# Patient Record
Sex: MALE | Race: WHITE | URBAN - METROPOLITAN AREA
[De-identification: names, ages, dates, MRNs, and addresses within clinical notes are randomized per-mention and may not be internally consistent; named-entity substitution may affect disease eponyms.]

---

## 2022-11-15 ENCOUNTER — OFFICE (OUTPATIENT)
Dept: URBAN - METROPOLITAN AREA CLINIC 92 | Facility: CLINIC | Age: 72
Setting detail: OPHTHALMOLOGY
End: 2022-11-15
Payer: MEDICARE

## 2022-11-15 DIAGNOSIS — H40.033: ICD-10-CM

## 2022-11-15 DIAGNOSIS — H35.371: ICD-10-CM

## 2022-11-15 DIAGNOSIS — E11.9: ICD-10-CM

## 2022-11-15 DIAGNOSIS — H25.13: ICD-10-CM

## 2022-11-15 PROCEDURE — 92012 INTRM OPH EXAM EST PATIENT: CPT | Performed by: SPECIALIST

## 2022-11-15 ASSESSMENT — REFRACTION_CURRENTRX
OD_AXIS: 011
OD_SPHERE: -0.50
OD_CYLINDER: +2.50
OD_VPRISM_DIRECTION: PROGS
OS_OVR_VA: 20/
OS_VPRISM_DIRECTION: PROGS
OD_OVR_VA: 20/
OS_SPHERE: -0.75
OD_ADD: +2.50
OS_CYLINDER: +2.00
OS_AXIS: 162
OS_ADD: +2.50

## 2022-11-15 ASSESSMENT — REFRACTION_AUTOREFRACTION
OS_SPHERE: -1.00
OD_AXIS: 013
OD_CYLINDER: +2.50
OS_CYLINDER: +2.00
OS_AXIS: 156
OD_SPHERE: -0.75

## 2022-11-15 ASSESSMENT — CORNEAL DYSTROPHY - POSTERIOR
OD_POSTERIORDYSTROPHY: T GUTTATA
OS_POSTERIORDYSTROPHY: GUTTATA

## 2022-11-15 ASSESSMENT — KERATOMETRY
OS_AXISANGLE_DEGREES: 131
OS_K1POWER_DIOPTERS: 44.50
OS_K2POWER_DIOPTERS: 45.75
OD_AXISANGLE_DEGREES: 022
OD_K1POWER_DIOPTERS: 44.75
METHOD_AUTO_MANUAL: AUTO
OD_K2POWER_DIOPTERS: 46.00

## 2022-11-15 ASSESSMENT — SPHEQUIV_DERIVED
OD_SPHEQUIV: 0.5
OS_SPHEQUIV: 0
OD_SPHEQUIV: 0.5
OS_SPHEQUIV: 0.25

## 2022-11-15 ASSESSMENT — REFRACTION_MANIFEST
OS_ADD: +2.75
OD_ADD: +2.75
OD_SPHERE: -0.75
OS_SPHERE: -0.75
OS_AXIS: 165
OD_VA1: 20/20
OS_VA1: 20/30
OD_AXIS: 180
OS_CYLINDER: +2.00
OD_CYLINDER: +2.50

## 2022-11-15 ASSESSMENT — VISUAL ACUITY
OD_BCVA: 20/25-1
OS_BCVA: 20/25-2

## 2022-11-15 ASSESSMENT — AXIALLENGTH_DERIVED
OS_AL: 22.9175
OS_AL: 23.0098
OD_AL: 22.7403
OD_AL: 22.7403

## 2022-11-15 ASSESSMENT — PACHYMETRY
OS_CT_UM: 611
OD_CT_UM: 599
OD_CT_CORRECTION: -4
OS_CT_CORRECTION: -5

## 2022-11-15 ASSESSMENT — TONOMETRY
OS_IOP_MMHG: 15
OD_IOP_MMHG: 16

## 2022-11-15 ASSESSMENT — CONFRONTATIONAL VISUAL FIELD TEST (CVF)
OD_FINDINGS: FULL
OS_FINDINGS: FULL

## 2022-11-15 ASSESSMENT — LID EXAM ASSESSMENTS
OS_MEIBOMITIS: LLL LUL 2+
OD_MEIBOMITIS: RLL RUL 2+

## 2022-11-15 ASSESSMENT — CORNEAL DYSTROPHY: OS_DYSTROPHY: RARE

## 2023-09-12 PROBLEM — Z00.00 ENCOUNTER FOR PREVENTIVE HEALTH EXAMINATION: Status: ACTIVE | Noted: 2023-09-12

## 2023-09-13 ENCOUNTER — APPOINTMENT (OUTPATIENT)
Dept: PULMONOLOGY | Facility: CLINIC | Age: 73
End: 2023-09-13
Payer: MEDICARE

## 2023-09-13 VITALS
OXYGEN SATURATION: 97 % | HEART RATE: 85 BPM | WEIGHT: 135 LBS | TEMPERATURE: 97.7 F | SYSTOLIC BLOOD PRESSURE: 136 MMHG | DIASTOLIC BLOOD PRESSURE: 70 MMHG | BODY MASS INDEX: 21.19 KG/M2 | HEIGHT: 67 IN

## 2023-09-13 DIAGNOSIS — Z86.39 PERSONAL HISTORY OF OTHER ENDOCRINE, NUTRITIONAL AND METABOLIC DISEASE: ICD-10-CM

## 2023-09-13 DIAGNOSIS — I10 ESSENTIAL (PRIMARY) HYPERTENSION: ICD-10-CM

## 2023-09-13 DIAGNOSIS — R05.3 CHRONIC COUGH: ICD-10-CM

## 2023-09-13 DIAGNOSIS — R91.8 OTHER NONSPECIFIC ABNORMAL FINDING OF LUNG FIELD: ICD-10-CM

## 2023-09-13 DIAGNOSIS — A49.9 URINARY TRACT INFECTION, SITE NOT SPECIFIED: ICD-10-CM

## 2023-09-13 DIAGNOSIS — N39.0 URINARY TRACT INFECTION, SITE NOT SPECIFIED: ICD-10-CM

## 2023-09-13 PROCEDURE — 94727 GAS DIL/WSHOT DETER LNG VOL: CPT

## 2023-09-13 PROCEDURE — 94729 DIFFUSING CAPACITY: CPT

## 2023-09-13 PROCEDURE — 94060 EVALUATION OF WHEEZING: CPT

## 2023-09-13 PROCEDURE — 99205 OFFICE O/P NEW HI 60 MIN: CPT | Mod: 25,GC

## 2023-09-13 RX ORDER — LISINOPRIL 40 MG/1
40 TABLET ORAL
Refills: 0 | Status: ACTIVE | COMMUNITY

## 2023-09-13 RX ORDER — HYDROCHLOROTHIAZIDE 25 MG/1
25 TABLET ORAL
Refills: 0 | Status: ACTIVE | COMMUNITY

## 2023-09-13 RX ORDER — METOPROLOL SUCCINATE 100 MG/1
100 TABLET, EXTENDED RELEASE ORAL
Refills: 0 | Status: ACTIVE | COMMUNITY

## 2023-09-13 RX ORDER — METFORMIN HYDROCHLORIDE 1000 MG/1
1000 TABLET, COATED ORAL
Refills: 0 | Status: ACTIVE | COMMUNITY

## 2023-09-13 RX ORDER — ATORVASTATIN CALCIUM 80 MG/1
80 TABLET, FILM COATED ORAL
Refills: 0 | Status: ACTIVE | COMMUNITY

## 2023-09-13 RX ORDER — AMLODIPINE BESYLATE 10 MG/1
10 TABLET ORAL
Refills: 0 | Status: ACTIVE | COMMUNITY

## 2023-09-14 LAB
ALBUMIN SERPL ELPH-MCNC: 4.9 G/DL
ALP BLD-CCNC: 58 U/L
ALT SERPL-CCNC: 21 U/L
ANION GAP SERPL CALC-SCNC: 13 MMOL/L
APTT BLD: 31.8 SEC
AST SERPL-CCNC: 20 U/L
BASOPHILS # BLD AUTO: 0.05 K/UL
BASOPHILS NFR BLD AUTO: 0.5 %
BILIRUB SERPL-MCNC: 0.5 MG/DL
BUN SERPL-MCNC: 15 MG/DL
CALCIUM SERPL-MCNC: 9.9 MG/DL
CHLORIDE SERPL-SCNC: 98 MMOL/L
CO2 SERPL-SCNC: 29 MMOL/L
CREAT SERPL-MCNC: 0.88 MG/DL
EGFR: 90 ML/MIN/1.73M2
EOSINOPHIL # BLD AUTO: 0.16 K/UL
EOSINOPHIL NFR BLD AUTO: 1.6 %
GLUCOSE SERPL-MCNC: 114 MG/DL
HCT VFR BLD CALC: 46 %
HGB BLD-MCNC: 14.5 G/DL
IMM GRANULOCYTES NFR BLD AUTO: 0.2 %
INR PPP: 0.93 RATIO
LYMPHOCYTES # BLD AUTO: 2.38 K/UL
LYMPHOCYTES NFR BLD AUTO: 24.3 %
MAN DIFF?: NORMAL
MCHC RBC-ENTMCNC: 27.5 PG
MCHC RBC-ENTMCNC: 31.5 GM/DL
MCV RBC AUTO: 87.3 FL
MONOCYTES # BLD AUTO: 0.78 K/UL
MONOCYTES NFR BLD AUTO: 8 %
NEUTROPHILS # BLD AUTO: 6.4 K/UL
NEUTROPHILS NFR BLD AUTO: 65.4 %
PLATELET # BLD AUTO: 256 K/UL
POTASSIUM SERPL-SCNC: 3.8 MMOL/L
PROT SERPL-MCNC: 7.7 G/DL
PT BLD: 10.5 SEC
RBC # BLD: 5.27 M/UL
RBC # FLD: 15.7 %
SODIUM SERPL-SCNC: 139 MMOL/L
WBC # FLD AUTO: 9.79 K/UL

## 2023-10-04 ENCOUNTER — NON-APPOINTMENT (OUTPATIENT)
Age: 73
End: 2023-10-04

## 2023-10-06 ENCOUNTER — APPOINTMENT (OUTPATIENT)
Dept: PULMONOLOGY | Facility: HOSPITAL | Age: 73
End: 2023-10-06

## 2023-10-06 ENCOUNTER — OUTPATIENT (OUTPATIENT)
Dept: OUTPATIENT SERVICES | Facility: HOSPITAL | Age: 73
LOS: 1 days | Discharge: HOME | End: 2023-10-06
Payer: MEDICARE

## 2023-10-06 ENCOUNTER — RESULT REVIEW (OUTPATIENT)
Age: 73
End: 2023-10-06

## 2023-10-06 VITALS
HEART RATE: 76 BPM | TEMPERATURE: 97 F | OXYGEN SATURATION: 95 % | SYSTOLIC BLOOD PRESSURE: 146 MMHG | DIASTOLIC BLOOD PRESSURE: 75 MMHG | HEIGHT: 67 IN | RESPIRATION RATE: 14 BRPM | WEIGHT: 134.04 LBS

## 2023-10-06 LAB
GLUCOSE BLDC GLUCOMTR-MCNC: 129 MG/DL — HIGH (ref 70–99)
GLUCOSE BLDC GLUCOMTR-MCNC: 197 MG/DL — HIGH (ref 70–99)

## 2023-10-06 PROCEDURE — 82962 GLUCOSE BLOOD TEST: CPT

## 2023-10-06 PROCEDURE — 88173 CYTOPATH EVAL FNA REPORT: CPT

## 2023-10-06 PROCEDURE — 88173 CYTOPATH EVAL FNA REPORT: CPT | Mod: 26

## 2023-10-06 PROCEDURE — 31653 BRONCH EBUS SAMPLNG 3/> NODE: CPT

## 2023-10-06 PROCEDURE — 88305 TISSUE EXAM BY PATHOLOGIST: CPT

## 2023-10-06 PROCEDURE — 88305 TISSUE EXAM BY PATHOLOGIST: CPT | Mod: 26

## 2023-10-06 PROCEDURE — 31645 BRNCHSC W/THER ASPIR 1ST: CPT | Mod: GC

## 2023-10-06 PROCEDURE — 31653 BRONCH EBUS SAMPLNG 3/> NODE: CPT | Mod: GC

## 2023-10-06 NOTE — PRE-ANESTHESIA EVALUATION ADULT - NSANTHPMHFT_GEN_ALL_CORE
75M former smoker c Right lower lobe lung mass, PMH of HLD s/p Carotid Thromboendarterectomy, HTN, s/p PPM, DM II, COPD

## 2023-10-06 NOTE — PRE-ANESTHESIA EVALUATION ADULT - NSANTHTOBACCOSD_GEN_ALL_CORE
Smoker of 20 years x 2 packs = 40pyrs; quit 12 years ago. Smoker of 20 years x 2 packs = 40pyrs; quit 12 years ago./No

## 2023-10-06 NOTE — PRE-ANESTHESIA EVALUATION ADULT - NSANTHOSAYNRD_GEN_A_CORE
No. NEW screening performed.  STOP BANG Legend: 0-2 = LOW Risk; 3-4 = INTERMEDIATE Risk; 5-8 = HIGH Risk

## 2023-10-06 NOTE — PRE-ANESTHESIA EVALUATION ADULT - NSANTHINPATMEDSFT_GEN_ALL_CORE
amLODIPine Besylate 10 MG Oral Tablet  Atorvastatin Calcium 80 MG Oral Tablet  hydroCHLOROthiazide 25 MG Oral Tablet  Lisinopril 40 MG Oral Tablet  metFORMIN HCl - 1000 MG Oral Tablet  Metoprolol Succinate  MG Oral Tablet Extended Release 24 Hour

## 2023-10-09 LAB
NON-GYNECOLOGICAL CYTOLOGY STUDY: SIGNIFICANT CHANGE UP

## 2023-10-10 ENCOUNTER — LABORATORY RESULT (OUTPATIENT)
Age: 73
End: 2023-10-10

## 2023-10-10 ENCOUNTER — APPOINTMENT (OUTPATIENT)
Dept: HEMATOLOGY ONCOLOGY | Facility: CLINIC | Age: 73
End: 2023-10-10
Payer: MEDICARE

## 2023-10-10 ENCOUNTER — NON-APPOINTMENT (OUTPATIENT)
Age: 73
End: 2023-10-10

## 2023-10-10 VITALS
HEIGHT: 67 IN | OXYGEN SATURATION: 96 % | BODY MASS INDEX: 20.88 KG/M2 | RESPIRATION RATE: 18 BRPM | HEART RATE: 93 BPM | WEIGHT: 133 LBS | DIASTOLIC BLOOD PRESSURE: 89 MMHG | SYSTOLIC BLOOD PRESSURE: 170 MMHG | TEMPERATURE: 98.1 F

## 2023-10-10 PROCEDURE — 99205 OFFICE O/P NEW HI 60 MIN: CPT

## 2023-10-11 LAB
HBV CORE IGG+IGM SER QL: NONREACTIVE
HBV SURFACE AB SER QL: NONREACTIVE
HBV SURFACE AG SER QL: NONREACTIVE
HCV AB SER QL: NONREACTIVE
HCV S/CO RATIO: 0.14 S/CO
HIV1+2 AB SPEC QL IA.RAPID: NONREACTIVE

## 2023-10-12 ENCOUNTER — NON-APPOINTMENT (OUTPATIENT)
Age: 73
End: 2023-10-12

## 2023-10-13 ENCOUNTER — OUTPATIENT (OUTPATIENT)
Dept: OUTPATIENT SERVICES | Facility: HOSPITAL | Age: 73
LOS: 1 days | End: 2023-10-13
Payer: MEDICARE

## 2023-10-13 ENCOUNTER — APPOINTMENT (OUTPATIENT)
Dept: MRI IMAGING | Facility: HOSPITAL | Age: 73
End: 2023-10-13

## 2023-10-13 PROCEDURE — A9585: CPT

## 2023-10-13 PROCEDURE — 70553 MRI BRAIN STEM W/O & W/DYE: CPT | Mod: 26,MH

## 2023-10-13 PROCEDURE — 70553 MRI BRAIN STEM W/O & W/DYE: CPT

## 2023-10-17 ENCOUNTER — NON-APPOINTMENT (OUTPATIENT)
Age: 73
End: 2023-10-17

## 2023-10-17 ENCOUNTER — APPOINTMENT (OUTPATIENT)
Dept: RADIATION ONCOLOGY | Facility: CLINIC | Age: 73
End: 2023-10-17
Payer: MEDICARE

## 2023-10-17 ENCOUNTER — APPOINTMENT (OUTPATIENT)
Dept: HEMATOLOGY ONCOLOGY | Facility: CLINIC | Age: 73
End: 2023-10-17
Payer: MEDICARE

## 2023-10-17 ENCOUNTER — APPOINTMENT (OUTPATIENT)
Dept: NEUROSURGERY | Facility: CLINIC | Age: 73
End: 2023-10-17
Payer: MEDICARE

## 2023-10-17 VITALS
RESPIRATION RATE: 18 BRPM | SYSTOLIC BLOOD PRESSURE: 144 MMHG | HEIGHT: 67 IN | TEMPERATURE: 98.2 F | HEART RATE: 80 BPM | WEIGHT: 137 LBS | BODY MASS INDEX: 21.5 KG/M2 | OXYGEN SATURATION: 97 % | DIASTOLIC BLOOD PRESSURE: 77 MMHG

## 2023-10-17 VITALS
WEIGHT: 137 LBS | OXYGEN SATURATION: 97 % | BODY MASS INDEX: 21.5 KG/M2 | TEMPERATURE: 97.5 F | RESPIRATION RATE: 18 BRPM | DIASTOLIC BLOOD PRESSURE: 85 MMHG | HEART RATE: 91 BPM | SYSTOLIC BLOOD PRESSURE: 159 MMHG | HEIGHT: 67 IN

## 2023-10-17 PROCEDURE — 99204 OFFICE O/P NEW MOD 45 MIN: CPT

## 2023-10-17 PROCEDURE — 99203 OFFICE O/P NEW LOW 30 MIN: CPT

## 2023-10-17 PROCEDURE — 99215 OFFICE O/P EST HI 40 MIN: CPT

## 2023-10-17 RX ORDER — POTASSIUM CHLORIDE 1500 MG/1
20 TABLET, FILM COATED, EXTENDED RELEASE ORAL
Qty: 2 | Refills: 0 | Status: COMPLETED | COMMUNITY
Start: 2023-10-10 | End: 2023-10-17

## 2023-10-23 ENCOUNTER — OUTPATIENT (OUTPATIENT)
Dept: OUTPATIENT SERVICES | Facility: HOSPITAL | Age: 73
LOS: 1 days | Discharge: HOME | End: 2023-10-23
Payer: MEDICARE

## 2023-10-23 ENCOUNTER — APPOINTMENT (OUTPATIENT)
Dept: RADIATION ONCOLOGY | Facility: CLINIC | Age: 73
End: 2023-10-23
Payer: MEDICARE

## 2023-10-23 PROCEDURE — 99024 POSTOP FOLLOW-UP VISIT: CPT

## 2023-10-23 PROCEDURE — 77263 THER RADIOLOGY TX PLNG CPLX: CPT

## 2023-10-24 ENCOUNTER — NON-APPOINTMENT (OUTPATIENT)
Age: 73
End: 2023-10-24

## 2023-10-30 ENCOUNTER — APPOINTMENT (OUTPATIENT)
Dept: INFUSION THERAPY | Facility: CLINIC | Age: 73
End: 2023-10-30

## 2023-10-30 ENCOUNTER — APPOINTMENT (OUTPATIENT)
Dept: HEMATOLOGY ONCOLOGY | Facility: CLINIC | Age: 73
End: 2023-10-30
Payer: MEDICARE

## 2023-10-30 ENCOUNTER — OUTPATIENT (OUTPATIENT)
Dept: OUTPATIENT SERVICES | Facility: HOSPITAL | Age: 73
LOS: 1 days | End: 2023-10-30
Payer: MEDICARE

## 2023-10-30 VITALS
WEIGHT: 132.06 LBS | TEMPERATURE: 98 F | RESPIRATION RATE: 18 BRPM | OXYGEN SATURATION: 96 % | DIASTOLIC BLOOD PRESSURE: 70 MMHG | HEIGHT: 67 IN | SYSTOLIC BLOOD PRESSURE: 126 MMHG | HEART RATE: 69 BPM

## 2023-10-30 VITALS
SYSTOLIC BLOOD PRESSURE: 128 MMHG | HEART RATE: 67 BPM | RESPIRATION RATE: 17 BRPM | DIASTOLIC BLOOD PRESSURE: 68 MMHG | OXYGEN SATURATION: 97 % | TEMPERATURE: 98 F

## 2023-10-30 DIAGNOSIS — C79.31 MALIGNANT NEOPLASM OF UNSPECIFIED PART OF UNSPECIFIED BRONCHUS OR LUNG: ICD-10-CM

## 2023-10-30 DIAGNOSIS — C34.90 MALIGNANT NEOPLASM OF UNSPECIFIED PART OF UNSPECIFIED BRONCHUS OR LUNG: ICD-10-CM

## 2023-10-30 LAB
ALBUMIN SERPL ELPH-MCNC: 3.7 G/DL — SIGNIFICANT CHANGE UP (ref 3.3–5)
ALBUMIN SERPL ELPH-MCNC: 3.7 G/DL — SIGNIFICANT CHANGE UP (ref 3.3–5)
ALP SERPL-CCNC: 65 U/L — SIGNIFICANT CHANGE UP (ref 40–120)
ALP SERPL-CCNC: 65 U/L — SIGNIFICANT CHANGE UP (ref 40–120)
ALT FLD-CCNC: 25 U/L — SIGNIFICANT CHANGE UP (ref 10–45)
ALT FLD-CCNC: 25 U/L — SIGNIFICANT CHANGE UP (ref 10–45)
ANION GAP SERPL CALC-SCNC: 13 MMOL/L — SIGNIFICANT CHANGE UP (ref 5–17)
ANION GAP SERPL CALC-SCNC: 13 MMOL/L — SIGNIFICANT CHANGE UP (ref 5–17)
AST SERPL-CCNC: 26 U/L — SIGNIFICANT CHANGE UP (ref 10–40)
AST SERPL-CCNC: 26 U/L — SIGNIFICANT CHANGE UP (ref 10–40)
BILIRUB SERPL-MCNC: 0.7 MG/DL — SIGNIFICANT CHANGE UP (ref 0.2–1.2)
BILIRUB SERPL-MCNC: 0.7 MG/DL — SIGNIFICANT CHANGE UP (ref 0.2–1.2)
BUN SERPL-MCNC: 22 MG/DL — SIGNIFICANT CHANGE UP (ref 7–23)
BUN SERPL-MCNC: 22 MG/DL — SIGNIFICANT CHANGE UP (ref 7–23)
CALCIUM SERPL-MCNC: 9.9 MG/DL — SIGNIFICANT CHANGE UP (ref 8.4–10.5)
CALCIUM SERPL-MCNC: 9.9 MG/DL — SIGNIFICANT CHANGE UP (ref 8.4–10.5)
CHLORIDE SERPL-SCNC: 93 MMOL/L — LOW (ref 96–108)
CHLORIDE SERPL-SCNC: 93 MMOL/L — LOW (ref 96–108)
CO2 SERPL-SCNC: 31 MMOL/L — SIGNIFICANT CHANGE UP (ref 22–31)
CO2 SERPL-SCNC: 31 MMOL/L — SIGNIFICANT CHANGE UP (ref 22–31)
CREAT SERPL-MCNC: 0.9 MG/DL — SIGNIFICANT CHANGE UP (ref 0.5–1.3)
CREAT SERPL-MCNC: 0.9 MG/DL — SIGNIFICANT CHANGE UP (ref 0.5–1.3)
EGFR: 90 ML/MIN/1.73M2 — SIGNIFICANT CHANGE UP
EGFR: 90 ML/MIN/1.73M2 — SIGNIFICANT CHANGE UP
GLUCOSE SERPL-MCNC: 177 MG/DL — HIGH (ref 70–99)
GLUCOSE SERPL-MCNC: 177 MG/DL — HIGH (ref 70–99)
HCT VFR BLD CALC: 46 % — SIGNIFICANT CHANGE UP (ref 39–50)
HCT VFR BLD CALC: 46 % — SIGNIFICANT CHANGE UP (ref 39–50)
HGB BLD-MCNC: 15.3 G/DL — SIGNIFICANT CHANGE UP (ref 13–17)
HGB BLD-MCNC: 15.3 G/DL — SIGNIFICANT CHANGE UP (ref 13–17)
LYMPHOCYTES # BLD AUTO: 18.6 % — SIGNIFICANT CHANGE UP (ref 13–44)
LYMPHOCYTES # BLD AUTO: 18.6 % — SIGNIFICANT CHANGE UP (ref 13–44)
LYMPHOCYTES # BLD AUTO: 2.1 K/UL — SIGNIFICANT CHANGE UP (ref 1–3.3)
LYMPHOCYTES # BLD AUTO: 2.1 K/UL — SIGNIFICANT CHANGE UP (ref 1–3.3)
MCHC RBC-ENTMCNC: 28.6 PG — SIGNIFICANT CHANGE UP (ref 27–34)
MCHC RBC-ENTMCNC: 28.6 PG — SIGNIFICANT CHANGE UP (ref 27–34)
MCHC RBC-ENTMCNC: 33.3 GM/DL — SIGNIFICANT CHANGE UP (ref 32–36)
MCHC RBC-ENTMCNC: 33.3 GM/DL — SIGNIFICANT CHANGE UP (ref 32–36)
MCV RBC AUTO: 86 FL — SIGNIFICANT CHANGE UP (ref 80–100)
MCV RBC AUTO: 86 FL — SIGNIFICANT CHANGE UP (ref 80–100)
NEUTROPHILS # BLD AUTO: 8.5 K/UL — HIGH (ref 1.8–7.4)
NEUTROPHILS # BLD AUTO: 8.5 K/UL — HIGH (ref 1.8–7.4)
NEUTROPHILS NFR BLD AUTO: 74.6 % — SIGNIFICANT CHANGE UP (ref 43–77)
NEUTROPHILS NFR BLD AUTO: 74.6 % — SIGNIFICANT CHANGE UP (ref 43–77)
PLATELET # BLD AUTO: 249 K/UL — SIGNIFICANT CHANGE UP (ref 150–400)
PLATELET # BLD AUTO: 249 K/UL — SIGNIFICANT CHANGE UP (ref 150–400)
POTASSIUM SERPL-MCNC: 3.2 MMOL/L — LOW (ref 3.5–5.3)
POTASSIUM SERPL-MCNC: 3.2 MMOL/L — LOW (ref 3.5–5.3)
POTASSIUM SERPL-SCNC: 3.2 MMOL/L — LOW (ref 3.5–5.3)
POTASSIUM SERPL-SCNC: 3.2 MMOL/L — LOW (ref 3.5–5.3)
PROT SERPL-MCNC: 7.7 G/DL — SIGNIFICANT CHANGE UP (ref 6–8.3)
PROT SERPL-MCNC: 7.7 G/DL — SIGNIFICANT CHANGE UP (ref 6–8.3)
RBC # BLD: 5.35 M/UL — SIGNIFICANT CHANGE UP (ref 4.2–5.8)
RBC # BLD: 5.35 M/UL — SIGNIFICANT CHANGE UP (ref 4.2–5.8)
RBC # FLD: 15.3 % — HIGH (ref 10.3–14.5)
RBC # FLD: 15.3 % — HIGH (ref 10.3–14.5)
SODIUM SERPL-SCNC: 137 MMOL/L — SIGNIFICANT CHANGE UP (ref 135–145)
SODIUM SERPL-SCNC: 137 MMOL/L — SIGNIFICANT CHANGE UP (ref 135–145)
T4 AB SER-ACNC: 9.23 UG/DL — SIGNIFICANT CHANGE UP (ref 4.5–11.7)
T4 AB SER-ACNC: 9.23 UG/DL — SIGNIFICANT CHANGE UP (ref 4.5–11.7)
TSH SERPL-MCNC: 0.99 UIU/ML — SIGNIFICANT CHANGE UP (ref 0.27–4.2)
TSH SERPL-MCNC: 0.99 UIU/ML — SIGNIFICANT CHANGE UP (ref 0.27–4.2)
WBC # BLD: 11.4 K/UL — HIGH (ref 3.8–10.5)
WBC # BLD: 11.4 K/UL — HIGH (ref 3.8–10.5)
WBC # FLD AUTO: 11.4 K/UL — HIGH (ref 3.8–10.5)
WBC # FLD AUTO: 11.4 K/UL — HIGH (ref 3.8–10.5)

## 2023-10-30 PROCEDURE — 85025 COMPLETE CBC W/AUTO DIFF WBC: CPT

## 2023-10-30 PROCEDURE — 80053 COMPREHEN METABOLIC PANEL: CPT

## 2023-10-30 PROCEDURE — 84443 ASSAY THYROID STIM HORMONE: CPT

## 2023-10-30 PROCEDURE — 36415 COLL VENOUS BLD VENIPUNCTURE: CPT

## 2023-10-30 PROCEDURE — 99215 OFFICE O/P EST HI 40 MIN: CPT

## 2023-10-30 PROCEDURE — 96413 CHEMO IV INFUSION 1 HR: CPT

## 2023-10-30 PROCEDURE — 84436 ASSAY OF TOTAL THYROXINE: CPT

## 2023-10-30 RX ORDER — PEMBROLIZUMAB 25 MG/ML
200 INJECTION, SOLUTION INTRAVENOUS ONCE
Refills: 0 | Status: COMPLETED | OUTPATIENT
Start: 2023-10-30 | End: 2023-10-30

## 2023-10-30 RX ORDER — POTASSIUM CHLORIDE 20 MEQ
40 PACKET (EA) ORAL ONCE
Refills: 0 | Status: COMPLETED | OUTPATIENT
Start: 2023-10-30 | End: 2023-10-30

## 2023-10-30 RX ORDER — POTASSIUM CHLORIDE 1500 MG/1
20 TABLET, FILM COATED, EXTENDED RELEASE ORAL
Qty: 2 | Refills: 0 | Status: ACTIVE | COMMUNITY
Start: 2023-10-30 | End: 1900-01-01

## 2023-10-30 RX ADMIN — PEMBROLIZUMAB 200 MILLIGRAM(S): 25 INJECTION, SOLUTION INTRAVENOUS at 11:35

## 2023-10-30 RX ADMIN — Medication 40 MILLIEQUIVALENT(S): at 11:51

## 2023-10-30 RX ADMIN — PEMBROLIZUMAB 200 MILLIGRAM(S): 25 INJECTION, SOLUTION INTRAVENOUS at 12:15

## 2023-10-31 PROBLEM — C34.90 LUNG CANCER METASTATIC TO BRAIN: Status: ACTIVE | Noted: 2023-10-17

## 2023-11-06 ENCOUNTER — NON-APPOINTMENT (OUTPATIENT)
Age: 73
End: 2023-11-06

## 2023-11-08 PROCEDURE — 77470 SPECIAL RADIATION TREATMENT: CPT | Mod: 26

## 2023-11-08 PROCEDURE — 77334 RADIATION TREATMENT AID(S): CPT | Mod: 26

## 2023-11-08 PROCEDURE — 77290 THER RAD SIMULAJ FIELD CPLX: CPT | Mod: 26

## 2023-11-15 ENCOUNTER — OUTPATIENT (OUTPATIENT)
Dept: OUTPATIENT SERVICES | Facility: HOSPITAL | Age: 73
LOS: 1 days | End: 2023-11-15
Payer: MEDICARE

## 2023-11-15 ENCOUNTER — APPOINTMENT (OUTPATIENT)
Dept: HEART AND VASCULAR | Facility: CLINIC | Age: 73
End: 2023-11-15
Payer: MEDICARE

## 2023-11-15 ENCOUNTER — NON-APPOINTMENT (OUTPATIENT)
Age: 73
End: 2023-11-15

## 2023-11-15 ENCOUNTER — APPOINTMENT (OUTPATIENT)
Dept: MRI IMAGING | Facility: HOSPITAL | Age: 73
End: 2023-11-15

## 2023-11-15 DIAGNOSIS — Z95.0 PRESENCE OF CARDIAC PACEMAKER: ICD-10-CM

## 2023-11-15 PROCEDURE — A9585: CPT

## 2023-11-15 PROCEDURE — 70553 MRI BRAIN STEM W/O & W/DYE: CPT

## 2023-11-15 PROCEDURE — 70553 MRI BRAIN STEM W/O & W/DYE: CPT | Mod: 26,MH

## 2023-11-15 PROCEDURE — 93280 PM DEVICE PROGR EVAL DUAL: CPT

## 2023-11-16 ENCOUNTER — APPOINTMENT (OUTPATIENT)
Dept: NEUROSURGERY | Facility: IMAGING CENTER | Age: 73
End: 2023-11-16
Payer: MEDICARE

## 2023-11-16 ENCOUNTER — OUTPATIENT (OUTPATIENT)
Dept: OUTPATIENT SERVICES | Facility: HOSPITAL | Age: 73
LOS: 1 days | Discharge: HOME | End: 2023-11-16
Payer: MEDICARE

## 2023-11-16 DIAGNOSIS — C79.31 SECONDARY MALIGNANT NEOPLASM OF BRAIN: ICD-10-CM

## 2023-11-16 PROCEDURE — 77295 3-D RADIOTHERAPY PLAN: CPT | Mod: 26

## 2023-11-16 PROCEDURE — 77300 RADIATION THERAPY DOSE PLAN: CPT | Mod: 26

## 2023-11-16 PROCEDURE — 77432 STEREOTACTIC RADIATION TRMT: CPT

## 2023-11-16 PROCEDURE — 61797 SRS CRAN LES SIMPLE ADDL: CPT

## 2023-11-16 PROCEDURE — 61796 SRS CRANIAL LESION SIMPLE: CPT

## 2023-11-20 ENCOUNTER — OUTPATIENT (OUTPATIENT)
Dept: OUTPATIENT SERVICES | Facility: HOSPITAL | Age: 73
LOS: 1 days | End: 2023-11-20
Payer: MEDICARE

## 2023-11-20 ENCOUNTER — APPOINTMENT (OUTPATIENT)
Dept: HEMATOLOGY ONCOLOGY | Facility: CLINIC | Age: 73
End: 2023-11-20
Payer: MEDICARE

## 2023-11-20 ENCOUNTER — APPOINTMENT (OUTPATIENT)
Dept: INFUSION THERAPY | Facility: CLINIC | Age: 73
End: 2023-11-20

## 2023-11-20 VITALS
OXYGEN SATURATION: 98 % | TEMPERATURE: 98 F | SYSTOLIC BLOOD PRESSURE: 138 MMHG | DIASTOLIC BLOOD PRESSURE: 80 MMHG | RESPIRATION RATE: 18 BRPM | HEART RATE: 68 BPM

## 2023-11-20 VITALS
OXYGEN SATURATION: 97 % | SYSTOLIC BLOOD PRESSURE: 144 MMHG | WEIGHT: 137 LBS | RESPIRATION RATE: 18 BRPM | BODY MASS INDEX: 21.5 KG/M2 | HEART RATE: 74 BPM | DIASTOLIC BLOOD PRESSURE: 78 MMHG | TEMPERATURE: 96.7 F | HEIGHT: 67 IN

## 2023-11-20 VITALS
TEMPERATURE: 97 F | RESPIRATION RATE: 16 BRPM | SYSTOLIC BLOOD PRESSURE: 134 MMHG | WEIGHT: 136.91 LBS | HEART RATE: 72 BPM | DIASTOLIC BLOOD PRESSURE: 76 MMHG | HEIGHT: 67 IN | OXYGEN SATURATION: 96 %

## 2023-11-20 DIAGNOSIS — C34.90 MALIGNANT NEOPLASM OF UNSPECIFIED PART OF UNSPECIFIED BRONCHUS OR LUNG: ICD-10-CM

## 2023-11-20 DIAGNOSIS — Z80.3 FAMILY HISTORY OF MALIGNANT NEOPLASM OF BREAST: ICD-10-CM

## 2023-11-20 DIAGNOSIS — C34.91 MALIGNANT NEOPLASM OF UNSPECIFIED PART OF RIGHT BRONCHUS OR LUNG: ICD-10-CM

## 2023-11-20 LAB
ALBUMIN SERPL ELPH-MCNC: 4.2 G/DL — SIGNIFICANT CHANGE UP (ref 3.3–5)
ALBUMIN SERPL ELPH-MCNC: 4.2 G/DL — SIGNIFICANT CHANGE UP (ref 3.3–5)
ALP SERPL-CCNC: 64 U/L — SIGNIFICANT CHANGE UP (ref 40–120)
ALP SERPL-CCNC: 64 U/L — SIGNIFICANT CHANGE UP (ref 40–120)
ALT FLD-CCNC: 20 U/L — SIGNIFICANT CHANGE UP (ref 10–45)
ALT FLD-CCNC: 20 U/L — SIGNIFICANT CHANGE UP (ref 10–45)
ANION GAP SERPL CALC-SCNC: 9 MMOL/L — SIGNIFICANT CHANGE UP (ref 5–17)
ANION GAP SERPL CALC-SCNC: 9 MMOL/L — SIGNIFICANT CHANGE UP (ref 5–17)
AST SERPL-CCNC: 22 U/L — SIGNIFICANT CHANGE UP (ref 10–40)
AST SERPL-CCNC: 22 U/L — SIGNIFICANT CHANGE UP (ref 10–40)
BILIRUB SERPL-MCNC: 0.8 MG/DL — SIGNIFICANT CHANGE UP (ref 0.2–1.2)
BILIRUB SERPL-MCNC: 0.8 MG/DL — SIGNIFICANT CHANGE UP (ref 0.2–1.2)
BUN SERPL-MCNC: 15 MG/DL — SIGNIFICANT CHANGE UP (ref 7–23)
BUN SERPL-MCNC: 15 MG/DL — SIGNIFICANT CHANGE UP (ref 7–23)
CALCIUM SERPL-MCNC: 10.4 MG/DL — SIGNIFICANT CHANGE UP (ref 8.4–10.5)
CALCIUM SERPL-MCNC: 10.4 MG/DL — SIGNIFICANT CHANGE UP (ref 8.4–10.5)
CHLORIDE SERPL-SCNC: 98 MMOL/L — SIGNIFICANT CHANGE UP (ref 96–108)
CHLORIDE SERPL-SCNC: 98 MMOL/L — SIGNIFICANT CHANGE UP (ref 96–108)
CO2 SERPL-SCNC: 34 MMOL/L — HIGH (ref 22–31)
CO2 SERPL-SCNC: 34 MMOL/L — HIGH (ref 22–31)
CREAT SERPL-MCNC: 1 MG/DL — SIGNIFICANT CHANGE UP (ref 0.5–1.3)
CREAT SERPL-MCNC: 1 MG/DL — SIGNIFICANT CHANGE UP (ref 0.5–1.3)
EGFR: 79 ML/MIN/1.73M2 — SIGNIFICANT CHANGE UP
EGFR: 79 ML/MIN/1.73M2 — SIGNIFICANT CHANGE UP
GLUCOSE SERPL-MCNC: 114 MG/DL — HIGH (ref 70–99)
GLUCOSE SERPL-MCNC: 114 MG/DL — HIGH (ref 70–99)
HCT VFR BLD CALC: 47.2 % — SIGNIFICANT CHANGE UP (ref 39–50)
HCT VFR BLD CALC: 47.2 % — SIGNIFICANT CHANGE UP (ref 39–50)
HGB BLD-MCNC: 15.6 G/DL — SIGNIFICANT CHANGE UP (ref 13–17)
HGB BLD-MCNC: 15.6 G/DL — SIGNIFICANT CHANGE UP (ref 13–17)
LYMPHOCYTES # BLD AUTO: 2.2 K/UL — SIGNIFICANT CHANGE UP (ref 1–3.3)
LYMPHOCYTES # BLD AUTO: 2.2 K/UL — SIGNIFICANT CHANGE UP (ref 1–3.3)
LYMPHOCYTES # BLD AUTO: 22.1 % — SIGNIFICANT CHANGE UP (ref 13–44)
LYMPHOCYTES # BLD AUTO: 22.1 % — SIGNIFICANT CHANGE UP (ref 13–44)
MCHC RBC-ENTMCNC: 27.9 PG — SIGNIFICANT CHANGE UP (ref 27–34)
MCHC RBC-ENTMCNC: 27.9 PG — SIGNIFICANT CHANGE UP (ref 27–34)
MCHC RBC-ENTMCNC: 33.1 GM/DL — SIGNIFICANT CHANGE UP (ref 32–36)
MCHC RBC-ENTMCNC: 33.1 GM/DL — SIGNIFICANT CHANGE UP (ref 32–36)
MCV RBC AUTO: 84.4 FL — SIGNIFICANT CHANGE UP (ref 80–100)
MCV RBC AUTO: 84.4 FL — SIGNIFICANT CHANGE UP (ref 80–100)
NEUTROPHILS # BLD AUTO: 7.1 K/UL — SIGNIFICANT CHANGE UP (ref 1.8–7.4)
NEUTROPHILS # BLD AUTO: 7.1 K/UL — SIGNIFICANT CHANGE UP (ref 1.8–7.4)
NEUTROPHILS NFR BLD AUTO: 71.2 % — SIGNIFICANT CHANGE UP (ref 43–77)
NEUTROPHILS NFR BLD AUTO: 71.2 % — SIGNIFICANT CHANGE UP (ref 43–77)
PLATELET # BLD AUTO: 275 K/UL — SIGNIFICANT CHANGE UP (ref 150–400)
PLATELET # BLD AUTO: 275 K/UL — SIGNIFICANT CHANGE UP (ref 150–400)
POTASSIUM SERPL-MCNC: 3.9 MMOL/L — SIGNIFICANT CHANGE UP (ref 3.5–5.3)
POTASSIUM SERPL-MCNC: 3.9 MMOL/L — SIGNIFICANT CHANGE UP (ref 3.5–5.3)
POTASSIUM SERPL-SCNC: 3.9 MMOL/L — SIGNIFICANT CHANGE UP (ref 3.5–5.3)
POTASSIUM SERPL-SCNC: 3.9 MMOL/L — SIGNIFICANT CHANGE UP (ref 3.5–5.3)
PROT SERPL-MCNC: 8.7 G/DL — HIGH (ref 6–8.3)
PROT SERPL-MCNC: 8.7 G/DL — HIGH (ref 6–8.3)
RBC # BLD: 5.59 M/UL — SIGNIFICANT CHANGE UP (ref 4.2–5.8)
RBC # BLD: 5.59 M/UL — SIGNIFICANT CHANGE UP (ref 4.2–5.8)
RBC # FLD: 15.4 % — HIGH (ref 10.3–14.5)
RBC # FLD: 15.4 % — HIGH (ref 10.3–14.5)
SODIUM SERPL-SCNC: 141 MMOL/L — SIGNIFICANT CHANGE UP (ref 135–145)
SODIUM SERPL-SCNC: 141 MMOL/L — SIGNIFICANT CHANGE UP (ref 135–145)
T4 AB SER-ACNC: 9.31 UG/DL — SIGNIFICANT CHANGE UP (ref 4.5–11.7)
T4 AB SER-ACNC: 9.31 UG/DL — SIGNIFICANT CHANGE UP (ref 4.5–11.7)
TSH SERPL-MCNC: 0.83 UIU/ML — SIGNIFICANT CHANGE UP (ref 0.27–4.2)
TSH SERPL-MCNC: 0.83 UIU/ML — SIGNIFICANT CHANGE UP (ref 0.27–4.2)
WBC # BLD: 10 K/UL — SIGNIFICANT CHANGE UP (ref 3.8–10.5)
WBC # BLD: 10 K/UL — SIGNIFICANT CHANGE UP (ref 3.8–10.5)
WBC # FLD AUTO: 10 K/UL — SIGNIFICANT CHANGE UP (ref 3.8–10.5)
WBC # FLD AUTO: 10 K/UL — SIGNIFICANT CHANGE UP (ref 3.8–10.5)

## 2023-11-20 PROCEDURE — 84436 ASSAY OF TOTAL THYROXINE: CPT

## 2023-11-20 PROCEDURE — 85025 COMPLETE CBC W/AUTO DIFF WBC: CPT

## 2023-11-20 PROCEDURE — 36415 COLL VENOUS BLD VENIPUNCTURE: CPT

## 2023-11-20 PROCEDURE — 84443 ASSAY THYROID STIM HORMONE: CPT

## 2023-11-20 PROCEDURE — 80053 COMPREHEN METABOLIC PANEL: CPT

## 2023-11-20 PROCEDURE — 99215 OFFICE O/P EST HI 40 MIN: CPT

## 2023-11-20 PROCEDURE — 96413 CHEMO IV INFUSION 1 HR: CPT

## 2023-11-20 RX ORDER — PEMBROLIZUMAB 25 MG/ML
200 INJECTION, SOLUTION INTRAVENOUS ONCE
Refills: 0 | Status: COMPLETED | OUTPATIENT
Start: 2023-11-20 | End: 2023-11-20

## 2023-11-20 RX ADMIN — PEMBROLIZUMAB 200 MILLIGRAM(S): 25 INJECTION, SOLUTION INTRAVENOUS at 15:50

## 2023-11-20 RX ADMIN — PEMBROLIZUMAB 200 MILLIGRAM(S): 25 INJECTION, SOLUTION INTRAVENOUS at 16:24

## 2023-11-20 NOTE — PHARMACY COMMUNICATION NOTE - COMMENTS
Per Dr. Tovar, patient is cleared to receive Keytruda today prior to the return of CBC/CMP results.

## 2023-11-22 PROBLEM — C34.91 NON-SMALL CELL CANCER OF RIGHT LUNG: Status: ACTIVE | Noted: 2023-10-10

## 2023-11-22 PROBLEM — Z80.3 FAMILY HISTORY OF MALIGNANT NEOPLASM OF BREAST: Status: ACTIVE | Noted: 2023-09-13

## 2023-11-27 ENCOUNTER — NON-APPOINTMENT (OUTPATIENT)
Age: 73
End: 2023-11-27

## 2023-12-07 NOTE — REVIEW OF SYSTEMS
[Cough] : cough [SOB on Exertion] : shortness of breath during exertion [Diarrhea: Grade 0] : Diarrhea: Grade 0 [Negative] : Allergic/Immunologic

## 2023-12-11 ENCOUNTER — APPOINTMENT (OUTPATIENT)
Dept: HEMATOLOGY ONCOLOGY | Facility: CLINIC | Age: 73
End: 2023-12-11
Payer: MEDICARE

## 2023-12-11 ENCOUNTER — APPOINTMENT (OUTPATIENT)
Dept: INFUSION THERAPY | Facility: CLINIC | Age: 73
End: 2023-12-11

## 2023-12-11 ENCOUNTER — OUTPATIENT (OUTPATIENT)
Dept: OUTPATIENT SERVICES | Facility: HOSPITAL | Age: 73
LOS: 1 days | End: 2023-12-11
Payer: MEDICARE

## 2023-12-11 VITALS
DIASTOLIC BLOOD PRESSURE: 84 MMHG | TEMPERATURE: 99 F | RESPIRATION RATE: 18 BRPM | HEART RATE: 79 BPM | WEIGHT: 139.99 LBS | OXYGEN SATURATION: 99 % | HEIGHT: 67 IN | SYSTOLIC BLOOD PRESSURE: 148 MMHG

## 2023-12-11 VITALS
DIASTOLIC BLOOD PRESSURE: 74 MMHG | OXYGEN SATURATION: 99 % | SYSTOLIC BLOOD PRESSURE: 117 MMHG | TEMPERATURE: 97 F | HEART RATE: 77 BPM | RESPIRATION RATE: 18 BRPM

## 2023-12-11 DIAGNOSIS — C34.90 MALIGNANT NEOPLASM OF UNSPECIFIED PART OF UNSPECIFIED BRONCHUS OR LUNG: ICD-10-CM

## 2023-12-11 LAB
ALBUMIN SERPL ELPH-MCNC: 3.8 G/DL — SIGNIFICANT CHANGE UP (ref 3.3–5)
ALBUMIN SERPL ELPH-MCNC: 3.8 G/DL — SIGNIFICANT CHANGE UP (ref 3.3–5)
ALP SERPL-CCNC: 68 U/L — SIGNIFICANT CHANGE UP (ref 40–120)
ALP SERPL-CCNC: 68 U/L — SIGNIFICANT CHANGE UP (ref 40–120)
ALT FLD-CCNC: 20 U/L — SIGNIFICANT CHANGE UP (ref 10–45)
ALT FLD-CCNC: 20 U/L — SIGNIFICANT CHANGE UP (ref 10–45)
ANION GAP SERPL CALC-SCNC: 9 MMOL/L — SIGNIFICANT CHANGE UP (ref 5–17)
ANION GAP SERPL CALC-SCNC: 9 MMOL/L — SIGNIFICANT CHANGE UP (ref 5–17)
AST SERPL-CCNC: 25 U/L — SIGNIFICANT CHANGE UP (ref 10–40)
AST SERPL-CCNC: 25 U/L — SIGNIFICANT CHANGE UP (ref 10–40)
BILIRUB SERPL-MCNC: 0.8 MG/DL — SIGNIFICANT CHANGE UP (ref 0.2–1.2)
BILIRUB SERPL-MCNC: 0.8 MG/DL — SIGNIFICANT CHANGE UP (ref 0.2–1.2)
BUN SERPL-MCNC: 16 MG/DL — SIGNIFICANT CHANGE UP (ref 7–23)
BUN SERPL-MCNC: 16 MG/DL — SIGNIFICANT CHANGE UP (ref 7–23)
CALCIUM SERPL-MCNC: 9.9 MG/DL — SIGNIFICANT CHANGE UP (ref 8.4–10.5)
CALCIUM SERPL-MCNC: 9.9 MG/DL — SIGNIFICANT CHANGE UP (ref 8.4–10.5)
CHLORIDE SERPL-SCNC: 102 MMOL/L — SIGNIFICANT CHANGE UP (ref 96–108)
CHLORIDE SERPL-SCNC: 102 MMOL/L — SIGNIFICANT CHANGE UP (ref 96–108)
CO2 SERPL-SCNC: 29 MMOL/L — SIGNIFICANT CHANGE UP (ref 22–31)
CO2 SERPL-SCNC: 29 MMOL/L — SIGNIFICANT CHANGE UP (ref 22–31)
CREAT SERPL-MCNC: 0.9 MG/DL — SIGNIFICANT CHANGE UP (ref 0.5–1.3)
CREAT SERPL-MCNC: 0.9 MG/DL — SIGNIFICANT CHANGE UP (ref 0.5–1.3)
EGFR: 89 ML/MIN/1.73M2 — SIGNIFICANT CHANGE UP
EGFR: 89 ML/MIN/1.73M2 — SIGNIFICANT CHANGE UP
GLUCOSE SERPL-MCNC: 146 MG/DL — HIGH (ref 70–99)
GLUCOSE SERPL-MCNC: 146 MG/DL — HIGH (ref 70–99)
HCT VFR BLD CALC: 43.9 % — SIGNIFICANT CHANGE UP (ref 39–50)
HCT VFR BLD CALC: 43.9 % — SIGNIFICANT CHANGE UP (ref 39–50)
HGB BLD-MCNC: 14.1 G/DL — SIGNIFICANT CHANGE UP (ref 13–17)
HGB BLD-MCNC: 14.1 G/DL — SIGNIFICANT CHANGE UP (ref 13–17)
LYMPHOCYTES # BLD AUTO: 19.1 % — SIGNIFICANT CHANGE UP (ref 13–44)
LYMPHOCYTES # BLD AUTO: 19.1 % — SIGNIFICANT CHANGE UP (ref 13–44)
LYMPHOCYTES # BLD AUTO: 2.1 K/UL — SIGNIFICANT CHANGE UP (ref 1–3.3)
LYMPHOCYTES # BLD AUTO: 2.1 K/UL — SIGNIFICANT CHANGE UP (ref 1–3.3)
MCHC RBC-ENTMCNC: 27.3 PG — SIGNIFICANT CHANGE UP (ref 27–34)
MCHC RBC-ENTMCNC: 27.3 PG — SIGNIFICANT CHANGE UP (ref 27–34)
MCHC RBC-ENTMCNC: 32.1 GM/DL — SIGNIFICANT CHANGE UP (ref 32–36)
MCHC RBC-ENTMCNC: 32.1 GM/DL — SIGNIFICANT CHANGE UP (ref 32–36)
MCV RBC AUTO: 85.1 FL — SIGNIFICANT CHANGE UP (ref 80–100)
MCV RBC AUTO: 85.1 FL — SIGNIFICANT CHANGE UP (ref 80–100)
NEUTROPHILS # BLD AUTO: 8 K/UL — HIGH (ref 1.8–7.4)
NEUTROPHILS # BLD AUTO: 8 K/UL — HIGH (ref 1.8–7.4)
NEUTROPHILS NFR BLD AUTO: 73.4 % — SIGNIFICANT CHANGE UP (ref 43–77)
NEUTROPHILS NFR BLD AUTO: 73.4 % — SIGNIFICANT CHANGE UP (ref 43–77)
PLATELET # BLD AUTO: 263 K/UL — SIGNIFICANT CHANGE UP (ref 150–400)
PLATELET # BLD AUTO: 263 K/UL — SIGNIFICANT CHANGE UP (ref 150–400)
POTASSIUM SERPL-MCNC: 4 MMOL/L — SIGNIFICANT CHANGE UP (ref 3.5–5.3)
POTASSIUM SERPL-MCNC: 4 MMOL/L — SIGNIFICANT CHANGE UP (ref 3.5–5.3)
POTASSIUM SERPL-SCNC: 4 MMOL/L — SIGNIFICANT CHANGE UP (ref 3.5–5.3)
POTASSIUM SERPL-SCNC: 4 MMOL/L — SIGNIFICANT CHANGE UP (ref 3.5–5.3)
PROT SERPL-MCNC: 7.5 G/DL — SIGNIFICANT CHANGE UP (ref 6–8.3)
PROT SERPL-MCNC: 7.5 G/DL — SIGNIFICANT CHANGE UP (ref 6–8.3)
RBC # BLD: 5.16 M/UL — SIGNIFICANT CHANGE UP (ref 4.2–5.8)
RBC # BLD: 5.16 M/UL — SIGNIFICANT CHANGE UP (ref 4.2–5.8)
RBC # FLD: 15.3 % — HIGH (ref 10.3–14.5)
RBC # FLD: 15.3 % — HIGH (ref 10.3–14.5)
SODIUM SERPL-SCNC: 140 MMOL/L — SIGNIFICANT CHANGE UP (ref 135–145)
SODIUM SERPL-SCNC: 140 MMOL/L — SIGNIFICANT CHANGE UP (ref 135–145)
T4 FREE SERPL-MCNC: 1.24 NG/DL — SIGNIFICANT CHANGE UP (ref 0.93–1.7)
T4 FREE SERPL-MCNC: 1.24 NG/DL — SIGNIFICANT CHANGE UP (ref 0.93–1.7)
TSH SERPL-MCNC: 0.98 UIU/ML — SIGNIFICANT CHANGE UP (ref 0.27–4.2)
TSH SERPL-MCNC: 0.98 UIU/ML — SIGNIFICANT CHANGE UP (ref 0.27–4.2)
WBC # BLD: 10.9 K/UL — HIGH (ref 3.8–10.5)
WBC # BLD: 10.9 K/UL — HIGH (ref 3.8–10.5)
WBC # FLD AUTO: 10.9 K/UL — HIGH (ref 3.8–10.5)
WBC # FLD AUTO: 10.9 K/UL — HIGH (ref 3.8–10.5)

## 2023-12-11 PROCEDURE — 85025 COMPLETE CBC W/AUTO DIFF WBC: CPT

## 2023-12-11 PROCEDURE — 84443 ASSAY THYROID STIM HORMONE: CPT

## 2023-12-11 PROCEDURE — 96413 CHEMO IV INFUSION 1 HR: CPT

## 2023-12-11 PROCEDURE — 84439 ASSAY OF FREE THYROXINE: CPT

## 2023-12-11 PROCEDURE — 80053 COMPREHEN METABOLIC PANEL: CPT

## 2023-12-11 PROCEDURE — 99215 OFFICE O/P EST HI 40 MIN: CPT

## 2023-12-11 PROCEDURE — 36415 COLL VENOUS BLD VENIPUNCTURE: CPT

## 2023-12-11 RX ORDER — PEMBROLIZUMAB 25 MG/ML
200 INJECTION, SOLUTION INTRAVENOUS ONCE
Refills: 0 | Status: COMPLETED | OUTPATIENT
Start: 2023-12-11 | End: 2023-12-11

## 2023-12-11 RX ADMIN — PEMBROLIZUMAB 200 MILLIGRAM(S): 25 INJECTION, SOLUTION INTRAVENOUS at 13:47

## 2023-12-11 RX ADMIN — PEMBROLIZUMAB 200 MILLIGRAM(S): 25 INJECTION, SOLUTION INTRAVENOUS at 14:32

## 2023-12-18 ENCOUNTER — APPOINTMENT (OUTPATIENT)
Dept: NEUROSURGERY | Facility: CLINIC | Age: 73
End: 2023-12-18

## 2023-12-26 NOTE — PHYSICAL EXAM
[Normal] : affect appropriate [de-identified] : no accessory muscle use, occasional cough [de-identified] : normal gait [de-identified] : no gross deficits

## 2023-12-26 NOTE — HISTORY OF PRESENT ILLNESS
[Disease: _____________________] : Disease: [unfilled] [T: ___] : T[unfilled] [N: ___] : N[unfilled] [M: ___] : M[unfilled] [AJCC Stage: ____] : AJCC Stage: [unfilled] [90: Able to carry normal activity; minor signs or symptoms of disease.] : 90: Able to carry normal activity; minor signs or symptoms of disease.  [ECOG Performance Status: 1 - Restricted in physically strenuous activity but ambulatory and able to carry out work of a light or sedentary nature] : Performance Status: 1 - Restricted in physically strenuous activity but ambulatory and able to carry out work of a light or sedentary nature, e.g., light house work, office work [de-identified] : Mr. Christopher is a 72 M ex heavey smoker who presents for follow up of NSCLC.  Oncologic History: - Initially presented to pulmonology regarding approximately 1 year of generalized symptoms such as chest congestion, cough, and weight loss. 40 pack year history, quit 12 years ago. - CT Chest (LHR) reviwed by pulmonology "CT chest LHR 2023 personally reviewed : GGO in bilateral apices; RLL spiculated lung mass with some invasion in the right mainstem bronchus. Suspect lymphangitic spread due to some interlobular septal thickening. Emphysema" - PET/CT (2023) hypermetabolic RLL pulmonary mass 4.1x3.9 cm,emphysema, hypermetabolic mediastinal and right hilar lymphadenopathy, consistent with metastatic jose disease - Bronchoscopy (10/06/2023) showing mediastinal lymphadenopathy with extrinsic compression of right upper, right middle, and right lower lobe segments, mucosal infiltration.  - Lymph node level 7 (10/6/2023), positive for malignant cells, metastatic non small cell carcinoma with features of adenocarcinoma. Level 11L negative, 4L lymph node negative. TT VUS: ALK N411L DDR2 TMB-HIGH PEYMAN PD-L1 90% positive - MR Brain (10/16/2023) demonstrates at least 3 intracranial metastases with the largest measuring 7 mm within the right temporal lobe. 2023: Foundation on original tissue resulted: PD-L1 22c3 80% EGFR L858R - subclonal  (PD-L1) amplification - equivocal [id number] (PD-L2) amplification - equivocal NF1 * CREBBP * IKZF1 L330* NFE2L2 L30P NFKBIA amplification - equivocal NKX2-1 amplification - equivocal TP53 L257R - subclonal, E287* [de-identified] : Adenocarcinoma - PD-L1 90% [de-identified] : Pulm:  Neurosurgery: Dr. D'Amico Radiation: Dr. Wernicke [FreeTextEntry1] : 10/30/2023: C1 pembrolizumab 11/20/23: C2 pembrolizumab 12/11/23: C3 pembrolizumab [de-identified] : We discussed the results of foundation NGS testing from last week. 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 We have discussed that in instances where targetable alteration is found the preferred line of treatment is changing to targeted therapy with EGFR TKI, osimertinib.  However given the fact that he already received 1 dose of immunotherapy before he is more at risk for developing pneumonitis with EGFR TKI. He states since getting his first treatment he feels much better, his appetite is much greater, he is currently not on steroids.  He has not been seeing any side effects from his treatment.

## 2023-12-26 NOTE — ASSESSMENT
[FreeTextEntry1] : Mr. Christopher is a 72 M who presents for follow up of NSCLC.  NSCLC, adenocarcinoma T4N2M1 - stage IV PD-L1 90% positive, DDR2 mutant, TP53 mutant. 3 brain mets  Seeing  at [facility] for [facility] (completed on 11/16/2023)  We had extensive discussion today that additional NGS testing results despite initial testing not revealing any targetable alteration, and now showing EGFR L858R subcoronal mutation with a VAS of 4.2%.  It is unusual that [facility] liquid biopsy did not pick that up, nor [facility] initial NGS testing on tissue, but nonetheless given the low VAF and subclonality can be somewhat explained.  I explained to the patient that first-line treatment in EGFR mutant driven lung cancers is EGFR TKI with osimertinib.  However in patients who are prior exposed to pembrolizumab, there is a greater risk of developing pneumonitis and other side effects from TKI's. The patient states he feels much better after the first dose of pembrolizumab, his appetite has improved significantly.  I gave him 3 options:  1)To change therapy now to osimertinib,  2)hold immunotherapy today and wait for molecular tumor board on Monday to obtain recommendation from tumor board regarding the super clonal EGFR  alteration, or  3)continue pembrolizumab to complete 3 more cycles.    We discussed NCCN guidelines, that the preferred modality is targeted therapy.  He states that given he feels much better with this treatment he would prefer to continue at this time, get his treatment today, and repeat scans after 3 cycles of therapy to see where his disease stands. The patient, his friend and I had an extensive discussion about all 3 modalities.  I answered all their questions to the best of my ability.  They were given a printout of all molecular testing.  C/w pembro today (C2) Present at molecular TB on Monday. I will call the patient after TB.  RTC in 3 wks. Labs each visit: CBC, CMP, TSH

## 2024-01-02 ENCOUNTER — RESULT REVIEW (OUTPATIENT)
Age: 74
End: 2024-01-02

## 2024-01-02 ENCOUNTER — APPOINTMENT (OUTPATIENT)
Dept: CT IMAGING | Facility: HOSPITAL | Age: 74
End: 2024-01-02

## 2024-01-02 ENCOUNTER — APPOINTMENT (OUTPATIENT)
Dept: MRI IMAGING | Facility: HOSPITAL | Age: 74
End: 2024-01-02

## 2024-01-02 ENCOUNTER — APPOINTMENT (OUTPATIENT)
Dept: HEART AND VASCULAR | Facility: CLINIC | Age: 74
End: 2024-01-02
Payer: MEDICARE

## 2024-01-02 ENCOUNTER — OUTPATIENT (OUTPATIENT)
Dept: OUTPATIENT SERVICES | Facility: HOSPITAL | Age: 74
LOS: 1 days | End: 2024-01-02
Payer: MEDICARE

## 2024-01-02 LAB
POCT ISTAT CREATININE: 1.1 MG/DL — SIGNIFICANT CHANGE UP (ref 0.5–1.3)
POCT ISTAT CREATININE: 1.1 MG/DL — SIGNIFICANT CHANGE UP (ref 0.5–1.3)

## 2024-01-02 PROCEDURE — 74177 CT ABD & PELVIS W/CONTRAST: CPT | Mod: MH

## 2024-01-02 PROCEDURE — A9585: CPT

## 2024-01-02 PROCEDURE — 70553 MRI BRAIN STEM W/O & W/DYE: CPT | Mod: MH

## 2024-01-02 PROCEDURE — 70553 MRI BRAIN STEM W/O & W/DYE: CPT | Mod: 26,MH

## 2024-01-02 PROCEDURE — 74177 CT ABD & PELVIS W/CONTRAST: CPT | Mod: 26,MH

## 2024-01-02 PROCEDURE — 71260 CT THORAX DX C+: CPT | Mod: 26,MH

## 2024-01-02 PROCEDURE — 82565 ASSAY OF CREATININE: CPT

## 2024-01-02 PROCEDURE — 93280 PM DEVICE PROGR EVAL DUAL: CPT

## 2024-01-02 PROCEDURE — 71260 CT THORAX DX C+: CPT | Mod: MH

## 2024-01-02 NOTE — PROCEDURE
[Complete Heart Block] : complete heart block [Pacemaker] : pacemaker [DDD] : DDD [Normal] : The battery status is normal. [Threshold Testing Performed] : Threshold testing was performed [Lead Imp:  ___ohms] : lead impedance was [unfilled] ohms [Sensing Amplitude ___mv] : sensing amplitude was [unfilled] mv [___V @] : [unfilled] V [___ ms] : [unfilled] ms [de-identified] : No escape > 40 bpm  [de-identified] : Denise MELARA DR MRI [de-identified] : Medtronic [de-identified] : [id number] [de-identified] : 8/2020 [de-identified] :  [de-identified] : AP 3.7 % 99.9 %

## 2024-01-02 NOTE — DISCUSSION/SUMMARY
[Pacemaker Function Normal] : normal pacemaker function [FreeTextEntry1] : 72 y/o M pacing dependent with normal dual chamber PPM (medtronic). Device was reprogrammed to DOO mode pre MRI.  Device is now reprogramed back to DDD post MRI. All parameters stable post MRI.

## 2024-01-02 NOTE — HISTORY OF PRESENT ILLNESS
[None] : The patient complains of no symptoms [de-identified] : Mr. Christopher is a 72 y.o. M with Medtronic PPM-D (8/2020) at [facility] who presents for MRI programming. He is pacing dependent.  RV lead is deep septal lead per device information.   No device related complaints.

## 2024-01-07 PROBLEM — Z87.891 FORMER SMOKER: Status: ACTIVE | Noted: 2023-10-17

## 2024-01-08 DIAGNOSIS — C79.31 SECONDARY MALIGNANT NEOPLASM OF BRAIN: ICD-10-CM

## 2024-01-08 DIAGNOSIS — G93.6 CEREBRAL EDEMA: ICD-10-CM

## 2024-01-08 DIAGNOSIS — C34.90 MALIGNANT NEOPLASM OF UNSPECIFIED PART OF UNSPECIFIED BRONCHUS OR LUNG: ICD-10-CM

## 2024-01-08 DIAGNOSIS — R91.8 OTHER NONSPECIFIC ABNORMAL FINDING OF LUNG FIELD: ICD-10-CM

## 2024-01-09 ENCOUNTER — APPOINTMENT (OUTPATIENT)
Dept: NEUROSURGERY | Facility: CLINIC | Age: 74
End: 2024-01-09
Payer: MEDICARE

## 2024-01-09 ENCOUNTER — LABORATORY RESULT (OUTPATIENT)
Age: 74
End: 2024-01-09

## 2024-01-09 ENCOUNTER — APPOINTMENT (OUTPATIENT)
Dept: INFUSION THERAPY | Facility: CLINIC | Age: 74
End: 2024-01-09

## 2024-01-09 ENCOUNTER — APPOINTMENT (OUTPATIENT)
Dept: HEMATOLOGY ONCOLOGY | Facility: CLINIC | Age: 74
End: 2024-01-09
Payer: MEDICARE

## 2024-01-09 ENCOUNTER — OUTPATIENT (OUTPATIENT)
Dept: OUTPATIENT SERVICES | Facility: HOSPITAL | Age: 74
LOS: 1 days | End: 2024-01-09
Payer: MEDICARE

## 2024-01-09 VITALS
HEIGHT: 67 IN | RESPIRATION RATE: 18 BRPM | WEIGHT: 144 LBS | HEART RATE: 79 BPM | OXYGEN SATURATION: 95 % | TEMPERATURE: 96.7 F | SYSTOLIC BLOOD PRESSURE: 157 MMHG | BODY MASS INDEX: 22.6 KG/M2 | DIASTOLIC BLOOD PRESSURE: 78 MMHG

## 2024-01-09 VITALS
SYSTOLIC BLOOD PRESSURE: 157 MMHG | WEIGHT: 143.96 LBS | HEART RATE: 79 BPM | RESPIRATION RATE: 18 BRPM | DIASTOLIC BLOOD PRESSURE: 78 MMHG | OXYGEN SATURATION: 95 % | TEMPERATURE: 97 F | HEIGHT: 67 IN

## 2024-01-09 VITALS
DIASTOLIC BLOOD PRESSURE: 75 MMHG | SYSTOLIC BLOOD PRESSURE: 133 MMHG | HEART RATE: 60 BPM | RESPIRATION RATE: 18 BRPM | TEMPERATURE: 98 F | OXYGEN SATURATION: 95 %

## 2024-01-09 DIAGNOSIS — Z87.891 PERSONAL HISTORY OF NICOTINE DEPENDENCE: ICD-10-CM

## 2024-01-09 DIAGNOSIS — C34.90 MALIGNANT NEOPLASM OF UNSPECIFIED PART OF UNSPECIFIED BRONCHUS OR LUNG: ICD-10-CM

## 2024-01-09 LAB
ALBUMIN SERPL ELPH-MCNC: 3.9 G/DL
ALP BLD-CCNC: 60 U/L
ALT SERPL-CCNC: 18 U/L
ANION GAP SERPL CALC-SCNC: 2 MMOL/L
AST SERPL-CCNC: 22 U/L
BILIRUB SERPL-MCNC: 0.8 MG/DL
BUN SERPL-MCNC: 18 MG/DL
CALCIUM SERPL-MCNC: 10.2 MG/DL
CHLORIDE SERPL-SCNC: 103 MMOL/L
CO2 SERPL-SCNC: 33 MMOL/L
CREAT SERPL-MCNC: 1 MG/DL
EGFR: 78 ML/MIN/1.73M2
GLUCOSE SERPL-MCNC: 123 MG/DL
POTASSIUM SERPL-SCNC: 4.1 MMOL/L
PROT SERPL-MCNC: 7.4 G/DL
SODIUM SERPL-SCNC: 138 MMOL/L

## 2024-01-09 PROCEDURE — 99212 OFFICE O/P EST SF 10 MIN: CPT | Mod: 24

## 2024-01-09 PROCEDURE — 96413 CHEMO IV INFUSION 1 HR: CPT

## 2024-01-09 PROCEDURE — 99215 OFFICE O/P EST HI 40 MIN: CPT

## 2024-01-09 RX ORDER — PEMBROLIZUMAB 25 MG/ML
200 INJECTION, SOLUTION INTRAVENOUS ONCE
Refills: 0 | Status: COMPLETED | OUTPATIENT
Start: 2024-01-09 | End: 2024-01-09

## 2024-01-09 RX ORDER — KRILL/OM-3/DHA/EPA/PHOSPHO/AST 1000-230MG
81 CAPSULE ORAL
Refills: 0 | Status: ACTIVE | COMMUNITY
Start: 2024-01-09

## 2024-01-09 RX ADMIN — PEMBROLIZUMAB 200 MILLIGRAM(S): 25 INJECTION, SOLUTION INTRAVENOUS at 11:39

## 2024-01-09 RX ADMIN — PEMBROLIZUMAB 200 MILLIGRAM(S): 25 INJECTION, SOLUTION INTRAVENOUS at 12:09

## 2024-01-09 NOTE — END OF VISIT
[Time Spent: ___ minutes] : I have spent [unfilled] minutes of time on the encounter. [] : Fellow [FreeTextEntry3] : Seen with oncology fellow, .  74 yo M with stage IV PDL1 high NSCLC with brain mets, s/p 3 cycles of pembrolizumab and SRS to brain mets. CT CAP with overall >20% decrease in lung disease. BM resolved. This is c/w partial response. Repeat CT CAP and MRIB in 3 months. RTC in 3 weeks with labs as above.

## 2024-01-09 NOTE — ASSESSMENT
[FreeTextEntry1] : 72 M ex heavy smoker who presents for follow up of stage IV NSCLC (adenocarcinoma) with BMs, PDL1 high. Started on pembrolizumab 10/30/2023, here for cycle 4.  NSCLC, adenocarcinoma T4N2M1 - stage IV - PD-L1 90% positive, DDR2 mutant, TP53 mutant. EGFR was noted to be contaminant  - CT CAP with overall good partial response of conglomerate mass; 2 new nodules noted (increasing ins ize, largest now 11 mm) - will continue to monitor but taken together >20% decrease in disease burden c/w [place] - MRI brain 1/2/2024 - decrease in size of right temporal ring-enhancing lesion; previous posterior temporal and meial posterior frontal enhancing nodules are no longer identified. Improvement of edema as well. 3mm nodular focus along right anterior inferior frontal convexity - unchanged.  - labs reviewed, stable for pembrolizumab today C4 - can get routine CT C/A/P and MRI brain in 3 months - Tolerating well, no adverse events - follows with Dr. Sanchez for pacemaker  RTC in 3 wks with tx Labs each visit: CBC, CMP, TSH

## 2024-01-09 NOTE — PHYSICAL EXAM
[Normal] : affect appropriate [de-identified] : no accessory muscle use, occasional cough [de-identified] : normal gait [de-identified] : no gross deficits

## 2024-01-09 NOTE — DISCHARGE INSTRUCTIONS: CHEMOTHERAPY - NSFACILITYCONTAFTRHOUR_HEME_A_AMB
[facility] ([phone]) [facility] ([phone])

## 2024-01-09 NOTE — DISCHARGE INSTRUCTIONS: CHEMOTHERAPY - NSAPPTSFOLLOWUP_HEME_A_AMB
[facility]... [facility]...

## 2024-01-09 NOTE — HISTORY OF PRESENT ILLNESS
[Disease: _____________________] : Disease: [unfilled] [T: ___] : T[unfilled] [N: ___] : N[unfilled] [M: ___] : M[unfilled] [AJCC Stage: ____] : AJCC Stage: [unfilled] [90: Able to carry normal activity; minor signs or symptoms of disease.] : 90: Able to carry normal activity; minor signs or symptoms of disease.  [ECOG Performance Status: 1 - Restricted in physically strenuous activity but ambulatory and able to carry out work of a light or sedentary nature] : Performance Status: 1 - Restricted in physically strenuous activity but ambulatory and able to carry out work of a light or sedentary nature, e.g., light house work, office work [de-identified] : Mr. Christopher is a 72 M ex heavy smoker who presents for follow up of stage IV NSCLC (adenocarcinoma) with BMs, PDL1 high.  Oncologic History: - Initially presented to pulmonology regarding approximately 1 year of generalized symptoms such as chest congestion, cough, and weight loss. 40 pack year history, quit 12 years ago. - CT Chest (LHR) reviwed by pulmonology "CT chest LHR 2023 personally reviewed : GGO in bilateral apices; RLL spiculated lung mass with some invasion in the right mainstem bronchus. Suspect lymphangitic spread due to some interlobular septal thickening. Emphysema" - PET/CT (2023) hypermetabolic RLL pulmonary mass 4.1x3.9 cm,emphysema, hypermetabolic mediastinal and right hilar lymphadenopathy, consistent with metastatic jose disease - Bronchoscopy (10/06/2023) showing mediastinal lymphadenopathy with extrinsic compression of right upper, right middle, and right lower lobe segments, mucosal infiltration.  - Lymph node level 7 (10/6/2023), positive for malignant cells, metastatic non small cell carcinoma with features of adenocarcinoma. Level 11L negative, 4L lymph node negative. TT VUS: ALK N411L DDR2 TMB-HIGH PEYMAN PD-L1 90% positive - MR Brain (10/16/2023) demonstrates at least 3 intracranial metastases with the largest measuring 7 mm within the right temporal lobe. 2023: Foundation on original tissue resulted: PD-L1 22c3 80%  (PD-L1) amplification - equivocal [id number] (PD-L2) amplification - equivocal NF1 * CREBBP * IKZF1 L330* NFE2L2 L30P NFKBIA amplification - equivocal NKX2-1 amplification - equivocal This Amended Report has been issued to reflect a change in reportable variants with the removal of the EGFR L858R alteration on the reanalyzed sample, resulting in the removal of the associated therapies afatinib, dacomitinib, erlotinib, gefitinib, and osimertinib, and to remove the TP53 L257R alteration on the reanalyzed sample. Please note that other aspects of this report may have changed from the previous version to reflect the most up-to-date reporting information. 2024: MRIB: Since 11/15/2023 there has been interval decrease in size and surrounding vasogenic edema of previously noted right temporal ring-enhancing lesion that is now an approximately 4 mm nodule. The previously seen posterior temporal and mesial posterior frontal enhancing nodules are no longer identified. There has been marked improvement in associated edema.  Approximately 3 mm nodular focus of enhancement along the right anterior inferior frontal convexity, unchanged; findings may represent a tiny vessel. Less than 1 mm punctate focus of T1 hyperintensity/possible enhancement seen within the right occipital cortex that likely represent artifact.. 2024: CT CAP: 1.  Decreased size of previously noted right perihilar/subcarinal conglomerate mass. 2.  Scattered small pulmonary nodules, some are stable, however, 2 have increased in size as noted above. 3.  Stable mild nodular fullness of the left adrenal gland; cannot exclude metastasis. [de-identified] : Adenocarcinoma - PD-L1 90% [de-identified] : Pulm:  Neurosurgery: Dr. D'Amico Radiation: Dr. Wernicke [FreeTextEntry1] : 10/30/2023: C1 pembrolizumab 11/20/23: C2 pembrolizumab 12/11/23: C3 pembrolizumab 1/9/24: C4 pembrolizumab [de-identified] : Clifford endorses some loose stools that have improved.  Some nausea a few weeks ago, that has improved as well Cough is improving. Has some intermittent body itching - taking "oatmeal baths"

## 2024-01-10 LAB — TSH SERPL-ACNC: 1.42 UIU/ML

## 2024-01-29 ENCOUNTER — OUTPATIENT (OUTPATIENT)
Dept: OUTPATIENT SERVICES | Facility: HOSPITAL | Age: 74
LOS: 1 days | End: 2024-01-29
Payer: MEDICARE

## 2024-01-29 ENCOUNTER — LABORATORY RESULT (OUTPATIENT)
Age: 74
End: 2024-01-29

## 2024-01-29 ENCOUNTER — APPOINTMENT (OUTPATIENT)
Dept: INFUSION THERAPY | Facility: CLINIC | Age: 74
End: 2024-01-29

## 2024-01-29 ENCOUNTER — APPOINTMENT (OUTPATIENT)
Dept: HEMATOLOGY ONCOLOGY | Facility: CLINIC | Age: 74
End: 2024-01-29
Payer: MEDICARE

## 2024-01-29 VITALS
HEART RATE: 82 BPM | DIASTOLIC BLOOD PRESSURE: 83 MMHG | TEMPERATURE: 97.3 F | HEIGHT: 67 IN | RESPIRATION RATE: 18 BRPM | BODY MASS INDEX: 22.44 KG/M2 | OXYGEN SATURATION: 97 % | WEIGHT: 143 LBS | SYSTOLIC BLOOD PRESSURE: 136 MMHG

## 2024-01-29 VITALS
DIASTOLIC BLOOD PRESSURE: 83 MMHG | WEIGHT: 143.08 LBS | HEART RATE: 82 BPM | SYSTOLIC BLOOD PRESSURE: 136 MMHG | OXYGEN SATURATION: 97 % | HEIGHT: 67 IN | TEMPERATURE: 97 F | RESPIRATION RATE: 17 BRPM

## 2024-01-29 DIAGNOSIS — C34.90 MALIGNANT NEOPLASM OF UNSPECIFIED PART OF UNSPECIFIED BRONCHUS OR LUNG: ICD-10-CM

## 2024-01-29 LAB
ALBUMIN SERPL ELPH-MCNC: 4.3 G/DL
ALP BLD-CCNC: 70 U/L
ALT SERPL-CCNC: 21 U/L
ANION GAP SERPL CALC-SCNC: 3 MMOL/L
AST SERPL-CCNC: 24 U/L
BILIRUB SERPL-MCNC: 0.9 MG/DL
BUN SERPL-MCNC: 21 MG/DL
CALCIUM SERPL-MCNC: 10.3 MG/DL
CHLORIDE SERPL-SCNC: 102 MMOL/L
CO2 SERPL-SCNC: 33 MMOL/L
CREAT SERPL-MCNC: 1.1 MG/DL
EGFR: 70 ML/MIN/1.73M2
GLUCOSE SERPL-MCNC: 126 MG/DL
MAGNESIUM SERPL-MCNC: 2.1 MG/DL
POTASSIUM SERPL-SCNC: 4 MMOL/L
PROT SERPL-MCNC: 8.1 G/DL
SODIUM SERPL-SCNC: 138 MMOL/L

## 2024-01-29 PROCEDURE — 36415 COLL VENOUS BLD VENIPUNCTURE: CPT

## 2024-01-29 PROCEDURE — 96413 CHEMO IV INFUSION 1 HR: CPT

## 2024-01-29 PROCEDURE — 99213 OFFICE O/P EST LOW 20 MIN: CPT | Mod: 25

## 2024-01-29 RX ORDER — PEMBROLIZUMAB 25 MG/ML
200 INJECTION, SOLUTION INTRAVENOUS ONCE
Refills: 0 | Status: COMPLETED | OUTPATIENT
Start: 2024-01-29 | End: 2024-01-29

## 2024-01-29 RX ADMIN — PEMBROLIZUMAB 200 MILLIGRAM(S): 25 INJECTION, SOLUTION INTRAVENOUS at 13:34

## 2024-01-29 RX ADMIN — PEMBROLIZUMAB 200 MILLIGRAM(S): 25 INJECTION, SOLUTION INTRAVENOUS at 14:08

## 2024-01-29 NOTE — PHYSICAL EXAM
[Normal] : affect appropriate [de-identified] : no accessory muscle use, occasional cough [de-identified] : normal gait, left upper arm bicep shorten possible bicep partial tear. Nontender , no swelling, erythema or ecchymosis. FROM , no motor or sensory deficits [de-identified] : no gross deficits

## 2024-01-29 NOTE — HISTORY OF PRESENT ILLNESS
[Disease: _____________________] : Disease: [unfilled] [T: ___] : T[unfilled] [N: ___] : N[unfilled] [M: ___] : M[unfilled] [AJCC Stage: ____] : AJCC Stage: [unfilled] [90: Able to carry normal activity; minor signs or symptoms of disease.] : 90: Able to carry normal activity; minor signs or symptoms of disease.  [ECOG Performance Status: 1 - Restricted in physically strenuous activity but ambulatory and able to carry out work of a light or sedentary nature] : Performance Status: 1 - Restricted in physically strenuous activity but ambulatory and able to carry out work of a light or sedentary nature, e.g., light house work, office work [de-identified] : Mr. Christopher is a 72 M ex heavy smoker who presents for follow up of stage IV NSCLC (adenocarcinoma) with BMs, PDL1 high.  Oncologic History: - Initially presented to pulmonology regarding approximately 1 year of generalized symptoms such as chest congestion, cough, and weight loss. 40 pack year history, quit 12 years ago. - CT Chest (LHR) reviwed by pulmonology "CT chest LHR 2023 personally reviewed : GGO in bilateral apices; RLL spiculated lung mass with some invasion in the right mainstem bronchus. Suspect lymphangitic spread due to some interlobular septal thickening. Emphysema" - PET/CT (2023) hypermetabolic RLL pulmonary mass 4.1x3.9 cm,emphysema, hypermetabolic mediastinal and right hilar lymphadenopathy, consistent with metastatic jose disease - Bronchoscopy (10/06/2023) showing mediastinal lymphadenopathy with extrinsic compression of right upper, right middle, and right lower lobe segments, mucosal infiltration.  - Lymph node level 7 (10/6/2023), positive for malignant cells, metastatic non small cell carcinoma with features of adenocarcinoma. Level 11L negative, 4L lymph node negative. TT VUS: ALK N411L DDR2 TMB-HIGH PEYMAN PD-L1 90% positive - MR Brain (10/16/2023) demonstrates at least 3 intracranial metastases with the largest measuring 7 mm within the right temporal lobe. 2023: Foundation on original tissue resulted: PD-L1 22c3 80%  (PD-L1) amplification - equivocal [id number] (PD-L2) amplification - equivocal NF1 * CREBBP * IKZF1 L330* NFE2L2 L30P NFKBIA amplification - equivocal NKX2-1 amplification - equivocal This Amended Report has been issued to reflect a change in reportable variants with the removal of the EGFR L858R alteration on the reanalyzed sample, resulting in the removal of the associated therapies afatinib, dacomitinib, erlotinib, gefitinib, and osimertinib, and to remove the TP53 L257R alteration on the reanalyzed sample. Please note that other aspects of this report may have changed from the previous version to reflect the most up-to-date reporting information. 2024: MRIB: Since 11/15/2023 there has been interval decrease in size and surrounding vasogenic edema of previously noted right temporal ring-enhancing lesion that is now an approximately 4 mm nodule. The previously seen posterior temporal and mesial posterior frontal enhancing nodules are no longer identified. There has been marked improvement in associated edema.  Approximately 3 mm nodular focus of enhancement along the right anterior inferior frontal convexity, unchanged; findings may represent a tiny vessel. Less than 1 mm punctate focus of T1 hyperintensity/possible enhancement seen within the right occipital cortex that likely represent artifact.. 2024: CT CAP: 1.  Decreased size of previously noted right perihilar/subcarinal conglomerate mass. 2.  Scattered small pulmonary nodules, some are stable, however, 2 have increased in size as noted above. 3.  Stable mild nodular fullness of the left adrenal gland; cannot exclude metastasis.  2024 onc clinic 74 y/o m with IV NSCLC presents to clinic for follow and tx. He states to feel well. He reports recently he noticed a bruised to his left upper arm and thought it was just a bruise. However, he noticed that the left upper arm bicep shape was different. He denies any tenderness or any injury. No limitation in movement or numbness or tingling.  [de-identified] : Adenocarcinoma - PD-L1 90% [de-identified] : Pulm:  Neurosurgery: Dr. D'Amico Radiation: Dr. Wernicke [FreeTextEntry1] : 10/30/2023: C1 pembrolizumab 11/20/23: C2 pembrolizumab 12/11/23: C3 pembrolizumab 1/9/24: C4 pembrolizumab 1/29/2024  C5; Pembro  [de-identified] : Clifford endorses some loose stools that have improved.  Some nausea a few weeks ago, that has improved as well Cough is improving. Has some intermittent body itching - taking "oatmeal baths"

## 2024-01-29 NOTE — ASSESSMENT
[FreeTextEntry1] : 72 M ex heavy smoker who presents for follow up of stage IV NSCLC (adenocarcinoma) with BMs, PDL1 high. Started on pembrolizumab 10/30/2023, here for cycle 4.  NSCLC, adenocarcinoma T4N2M1 - stage IV - PD-L1 90% positive, DDR2 mutant, TP53 mutant. EGFR was noted to be contaminant  - CT CAP with overall good partial response of conglomerate mass; 2 new nodules noted (increasing ins ize, largest now 11 mm) - will continue to monitor but taken together >20% decrease in disease burden c/w [place] - MRI brain 1/2/2024 - decrease in size of right temporal ring-enhancing lesion; previous posterior temporal and meial posterior frontal enhancing nodules are no longer identified. Improvement of edema as well. 3mm nodular focus along right anterior inferior frontal convexity - unchanged.  - labs reviewed, stable for pembrolizumab today C4 - can get routine CT C/A/P and MRI brain in 3 months - Tolerating well, no adverse events - follows with Dr. Sanchez for pacemaker  RTC in 3 wks with tx Labs each visit: CBC, CMP, TSH  1/29/2024 Onc clinic a/p IV NSCLC  stable labs : proceed with C5  C5 out of 17 cycles c/w current meds cont to monitor labs and s/s with each visit.  RTC in 3 wks. .Encourage to contact the office for any concerns or worsening symptoms. Pt verbalizes understanding

## 2024-01-29 NOTE — REVIEW OF SYSTEMS
[Cough] : cough [SOB on Exertion] : shortness of breath during exertion [Diarrhea: Grade 0] : Diarrhea: Grade 0 [Negative] : Allergic/Immunologic [FreeTextEntry9] : left upper arm bicep muscle shorten

## 2024-01-29 NOTE — END OF VISIT
[] : Fellow [Time Spent: ___ minutes] : I have spent [unfilled] minutes of time on the encounter. [FreeTextEntry3] : Seen with oncology fellow, .  72 yo M with stage IV PDL1 high NSCLC with brain mets, s/p 3 cycles of pembrolizumab and SRS to brain mets. CT CAP with overall >20% decrease in lung disease. BM resolved. This is c/w partial response. Repeat CT CAP and MRIB in 3 months. RTC in 3 weeks with labs as above.

## 2024-01-30 LAB — TSH SERPL-ACNC: 0.84 UIU/ML

## 2024-02-01 ENCOUNTER — APPOINTMENT (OUTPATIENT)
Dept: ORTHOPEDIC SURGERY | Facility: CLINIC | Age: 74
End: 2024-02-01
Payer: MEDICARE

## 2024-02-01 DIAGNOSIS — S46.112A STRAIN OF MUSCLE, FASCIA AND TENDON OF LONG HEAD OF BICEPS, LEFT ARM, INITIAL ENCOUNTER: ICD-10-CM

## 2024-02-01 DIAGNOSIS — S46.212A STRAIN OF MUSCLE, FASCIA AND TENDON OF OTHER PARTS OF BICEPS, LEFT ARM, INITIAL ENCOUNTER: ICD-10-CM

## 2024-02-01 PROCEDURE — 99203 OFFICE O/P NEW LOW 30 MIN: CPT

## 2024-02-01 NOTE — ASSESSMENT
[FreeTextEntry1] : 74 y/o RHD male who ruptured his proximal long head of the biceps tendon about 6 weeks ago based on the exam and history. He has good pain-free range of motion of the shoulder and elbow and good strength. I discussed the injury.  Patients often can have satisfactory function but sometimes will have some increased comfort in the biceps muscle with this type of injury. I recommended symptomatic treatment and some home exercises which I reviewed for the shoulder and arm with strengthening.  Heat and ice as needed.  Tylenol as needed. If he has ongoing discomfort he will come back for follow-up. I did discuss surgical treatment which would be to reattach the biceps tendon into the more proximal humerus.  That would require some immobilization postoperatively and limited activity followed by physical therapy. Usually nonoperative treatment provide satisfactory outcome and I would not recommend surgery unless he has chronic pain. If it is lingering I suggested trying physical therapy before seeing me back and gave him a prescription. Follow-up as needed.

## 2024-02-01 NOTE — PHYSICAL EXAM
[UE] : Sensory: Intact in bilateral upper extremities [Normal RUE] : Right Upper Extremity: No scars, rashes, lesions, ulcers, skin intact [Normal LUE] : Left Upper Extremity: No scars, rashes, lesions, ulcers, skin intact [Normal Touch] : sensation intact for touch [Normal] : Oriented to person, place, and time, insight and judgement were intact and the affect was normal [de-identified] :  X-rays ordered, performed and reviewed today of LEFT [de-identified] : Left upper extremity No edema, ecchymosis, erythema. There is obvious Vern deformity left biceps with the biceps muscle dropped distally. Mild tenderness in the area but no significant tenderness.  No significant tenderness around the shoulder. Patient is thin exaggerating of the visible deformity. Range of motion of the left shoulder actively is with 170 degrees forward elevation without any pain and internal rotation to T10 without pain.  Passively with the arm at the side there is 65 degrees external rotation. Negative Neer and Marroquin. 5/5 supraspinatus, internal rotation, external rotation, biceps strength. Elbow range of motion 0 to 145 degrees flexion and 90 degrees pronation and supination Motor and sensation are intact distally.

## 2024-02-01 NOTE — HISTORY OF PRESENT ILLNESS
[de-identified] :  Mr. Christopher is a 73 year old RHD man who comes in for evaluation for LEFT bicep pain that started 6-8 weeks ago while lifting a Ramírez bed and patient belives he pulled his muscle. He describes his pain as 1 or 2/10. He also reports that the shape of his bicep started to look like bulging. After the incident, the area bruised and got better after 1 or 1 and a half week later. It is not particularly painful now up to a 2 out of 10 and intermittent.  No shoulder pain or elbow pain.  No other concerns.  He is not taking medication for the pain.

## 2024-02-20 ENCOUNTER — OUTPATIENT (OUTPATIENT)
Dept: OUTPATIENT SERVICES | Facility: HOSPITAL | Age: 74
LOS: 1 days | End: 2024-02-20
Payer: MEDICARE

## 2024-02-20 ENCOUNTER — LABORATORY RESULT (OUTPATIENT)
Age: 74
End: 2024-02-20

## 2024-02-20 ENCOUNTER — APPOINTMENT (OUTPATIENT)
Dept: HEMATOLOGY ONCOLOGY | Facility: CLINIC | Age: 74
End: 2024-02-20
Payer: MEDICARE

## 2024-02-20 ENCOUNTER — APPOINTMENT (OUTPATIENT)
Dept: INFUSION THERAPY | Facility: CLINIC | Age: 74
End: 2024-02-20

## 2024-02-20 VITALS
HEART RATE: 76 BPM | SYSTOLIC BLOOD PRESSURE: 159 MMHG | WEIGHT: 143.96 LBS | HEIGHT: 67 IN | DIASTOLIC BLOOD PRESSURE: 75 MMHG | RESPIRATION RATE: 16 BRPM | TEMPERATURE: 96 F | OXYGEN SATURATION: 95 %

## 2024-02-20 VITALS
SYSTOLIC BLOOD PRESSURE: 134 MMHG | DIASTOLIC BLOOD PRESSURE: 76 MMHG | TEMPERATURE: 97 F | HEART RATE: 65 BPM | RESPIRATION RATE: 18 BRPM | OXYGEN SATURATION: 96 %

## 2024-02-20 VITALS
TEMPERATURE: 96.5 F | OXYGEN SATURATION: 95 % | WEIGHT: 144 LBS | RESPIRATION RATE: 18 BRPM | DIASTOLIC BLOOD PRESSURE: 75 MMHG | HEIGHT: 67 IN | SYSTOLIC BLOOD PRESSURE: 159 MMHG | BODY MASS INDEX: 22.6 KG/M2 | HEART RATE: 76 BPM

## 2024-02-20 DIAGNOSIS — C79.31 MALIGNANT NEOPLASM OF UNSPECIFIED PART OF UNSPECIFIED BRONCHUS OR LUNG: ICD-10-CM

## 2024-02-20 DIAGNOSIS — C34.90 MALIGNANT NEOPLASM OF UNSPECIFIED PART OF UNSPECIFIED BRONCHUS OR LUNG: ICD-10-CM

## 2024-02-20 DIAGNOSIS — J44.9 CHRONIC OBSTRUCTIVE PULMONARY DISEASE, UNSPECIFIED: ICD-10-CM

## 2024-02-20 PROCEDURE — 96413 CHEMO IV INFUSION 1 HR: CPT

## 2024-02-20 PROCEDURE — G2211 COMPLEX E/M VISIT ADD ON: CPT

## 2024-02-20 PROCEDURE — 99213 OFFICE O/P EST LOW 20 MIN: CPT

## 2024-02-20 RX ORDER — PEMBROLIZUMAB 25 MG/ML
200 INJECTION, SOLUTION INTRAVENOUS ONCE
Refills: 0 | Status: COMPLETED | OUTPATIENT
Start: 2024-02-20 | End: 2024-02-20

## 2024-02-20 RX ADMIN — PEMBROLIZUMAB 200 MILLIGRAM(S): 25 INJECTION, SOLUTION INTRAVENOUS at 12:05

## 2024-02-20 RX ADMIN — PEMBROLIZUMAB 200 MILLIGRAM(S): 25 INJECTION, SOLUTION INTRAVENOUS at 11:31

## 2024-02-20 NOTE — PHARMACY COMMUNICATION NOTE - COMMENTS
Per Dr. Tovar via Teams chat, patient is cleared for Keytruda on 2/20/2024 prior to the CMP resulting.

## 2024-02-23 ENCOUNTER — OFFICE (OUTPATIENT)
Dept: URBAN - METROPOLITAN AREA CLINIC 92 | Facility: CLINIC | Age: 74
Setting detail: OPHTHALMOLOGY
End: 2024-02-23
Payer: MEDICARE

## 2024-02-23 VITALS — HEIGHT: 60 IN

## 2024-02-23 DIAGNOSIS — H25.13: ICD-10-CM

## 2024-02-23 DIAGNOSIS — E11.9: ICD-10-CM

## 2024-02-23 DIAGNOSIS — H52.4: ICD-10-CM

## 2024-02-23 DIAGNOSIS — H40.033: ICD-10-CM

## 2024-02-23 PROBLEM — J44.9 COPD, MODERATE: Status: ACTIVE | Noted: 2023-09-13

## 2024-02-23 PROBLEM — C34.90 NSCLC METASTATIC TO BRAIN: Status: ACTIVE | Noted: 2023-10-17

## 2024-02-23 PROCEDURE — 92015 DETERMINE REFRACTIVE STATE: CPT | Performed by: SPECIALIST

## 2024-02-23 PROCEDURE — 92012 INTRM OPH EXAM EST PATIENT: CPT | Performed by: SPECIALIST

## 2024-02-23 ASSESSMENT — REFRACTION_MANIFEST
OD_CYLINDER: +2.25
OS_SPHERE: -0.75
OS_VA1: 20/30
OS_CYLINDER: +1.75
OS_AXIS: 165
OD_SPHERE: -0.75
OS_VA1: 20/30
OD_ADD: +2.75
OS_ADD: +2.75
OS_SPHERE: -0.75
OD_AXIS: 180
OD_CYLINDER: +2.50
OD_SPHERE: -1.00
OD_AXIS: 175
OS_ADD: +2.75
OS_CYLINDER: +2.00
OD_VA1: 20/20
OD_ADD: +2.75
OS_AXIS: 163
OD_VA1: 20/30

## 2024-02-23 ASSESSMENT — CONFRONTATIONAL VISUAL FIELD TEST (CVF)
OS_FINDINGS: FULL
OD_FINDINGS: FULL

## 2024-02-23 ASSESSMENT — REFRACTION_AUTOREFRACTION
OD_CYLINDER: +2.00
OS_AXIS: 158
OD_SPHERE: -0.75
OS_CYLINDER: +2.00
OD_AXIS: 008
OS_SPHERE: -0.75

## 2024-02-23 ASSESSMENT — REFRACTION_CURRENTRX
OD_OVR_VA: 20/
OD_AXIS: 94+
OD_VPRISM_DIRECTION: PROGS
OD_CYLINDER: +2.50
OS_SPHERE: +2.00
OD_SPHERE: +2.00
OS_AXIS: 162
OD_AXIS: 011
OS_VPRISM_DIRECTION: PROGS
OD_ADD: +2.00
OD_ADD: +2.50
OD_SPHERE: -0.50
OS_OVR_VA: 20/
OD_CYLINDER: -2.75
OS_ADD: +2.00
OS_ADD: +2.50
OS_CYLINDER: +2.00
OD_OVR_VA: 20/
OS_AXIS: 86+
OS_OVR_VA: 20/
OS_SPHERE: -0.75
OS_CYLINDER: -2.00

## 2024-02-23 ASSESSMENT — CORNEAL DYSTROPHY: OS_DYSTROPHY: RARE

## 2024-02-23 ASSESSMENT — CORNEAL DYSTROPHY - POSTERIOR
OD_POSTERIORDYSTROPHY: T GUTTATA
OS_POSTERIORDYSTROPHY: GUTTATA

## 2024-02-23 ASSESSMENT — SPHEQUIV_DERIVED
OS_SPHEQUIV: 0.125
OD_SPHEQUIV: 0.25
OS_SPHEQUIV: 0.25
OD_SPHEQUIV: 0.125
OS_SPHEQUIV: 0.25
OD_SPHEQUIV: 0.5

## 2024-02-23 ASSESSMENT — LID EXAM ASSESSMENTS
OS_MEIBOMITIS: LLL LUL 2+
OD_MEIBOMITIS: RLL RUL 2+

## 2024-02-23 NOTE — HISTORY OF PRESENT ILLNESS
[Disease: _____________________] : Disease: [unfilled] [T: ___] : T[unfilled] [N: ___] : N[unfilled] [M: ___] : M[unfilled] [AJCC Stage: ____] : AJCC Stage: [unfilled] [90: Able to carry normal activity; minor signs or symptoms of disease.] : 90: Able to carry normal activity; minor signs or symptoms of disease.  [ECOG Performance Status: 1 - Restricted in physically strenuous activity but ambulatory and able to carry out work of a light or sedentary nature] : Performance Status: 1 - Restricted in physically strenuous activity but ambulatory and able to carry out work of a light or sedentary nature, e.g., light house work, office work [de-identified] : Mr. Christopher is a 73 M ex heavy smoker who presents for follow up of stage IV NSCLC (adenocarcinoma) with BMs, PDL1 high.  Oncologic History: - Initially presented to pulmonology regarding approximately 1 year of generalized symptoms such as chest congestion, cough, and weight loss. 40 pack year history, quit 12 years ago. - CT Chest ([facility]) reviwed by pulmonology "CT chest [facility] 2023 personally reviewed : GGO in bilateral apices; RLL spiculated lung mass with some invasion in the right mainstem bronchus. Suspect lymphangitic spread due to some interlobular septal thickening. Emphysema" - PET/CT (2023) hypermetabolic RLL pulmonary mass 4.1x3.9 cm,emphysema, hypermetabolic mediastinal and right hilar lymphadenopathy, consistent with metastatic jose disease - Bronchoscopy (10/06/2023) showing mediastinal lymphadenopathy with extrinsic compression of right upper, right middle, and right lower lobe segments, mucosal infiltration.  - Lymph node level 7 (10/6/2023), positive for malignant cells, metastatic non small cell carcinoma with features of adenocarcinoma. Level 11L negative, 4L lymph node negative. TT VUS: ALK N411L DDR2 TMB-HIGH PEYMAN PD-L1 90% positive - MR Brain (10/16/2023) demonstrates at least 3 intracranial metastases with the largest measuring 7 mm within the right temporal lobe. 2023: Foundation on original tissue resulted: PD-L1 22c3 80%  (PD-L1) amplification - equivocal [id number] (PD-L2) amplification - equivocal NF1 * CREBBP * IKZF1 L330* NFE2L2 L30P NFKBIA amplification - equivocal NKX2-1 amplification - equivocal This Amended Report has been issued to reflect a change in reportable variants with the removal of the EGFR L858R alteration on the reanalyzed sample, resulting in the removal of the associated therapies afatinib, dacomitinib, erlotinib, gefitinib, and osimertinib, and to remove the TP53 L257R alteration on the reanalyzed sample. Please note that other aspects of this report may have changed from the previous version to reflect the most up-to-date reporting information. 2024: MRIB: Since 11/15/2023 there has been interval decrease in size and surrounding vasogenic edema of previously noted right temporal ring-enhancing lesion that is now an approximately 4 mm nodule. The previously seen posterior temporal and mesial posterior frontal enhancing nodules are no longer identified. There has been marked improvement in associated edema. Approximately 3 mm nodular focus of enhancement along the right anterior inferior frontal convexity, unchanged; findings may represent a tiny vessel. Less than 1 mm punctate focus of T1 hyperintensity/possible enhancement seen within the right occipital cortex that likely represent artifact.. 2024: CT CAP: 1.  Decreased size of previously noted right perihilar/subcarinal conglomerate mass. 2.  Scattered small pulmonary nodules, some are stable, however, 2 have increased in size as noted above. 3.  Stable mild nodular fullness of the left adrenal gland; cannot exclude metastasis.  [de-identified] : Adenocarcinoma - PD-L1 90% [de-identified] : Pulm:  Neurosurgery: Dr. D'Amico Radiation: Dr. Wernicke [FreeTextEntry1] : 10/30/2023: C1 pembrolizumab 11/20/23: C2 pembrolizumab 12/11/23: C3 pembrolizumab 1/9/24: C4 pembrolizumab 1/29/2024  C5; Pembro  2/20/24: C6 Pembro  [de-identified] : Still having body itching - taking benadryl when it gets bad. still present but mostly manageable  Denies chest pain, nausea, vomiting, shortness of breath, cough.  Mostly regular bowel movements.

## 2024-02-23 NOTE — PHYSICAL EXAM
[Normal] : affect appropriate [de-identified] : normal gait, left upper arm bicep shorten possible bicep partial tear. Nontender , no swelling, erythema or ecchymosis. FROM , no motor or sensory deficits [de-identified] : no gross deficits

## 2024-02-23 NOTE — ASSESSMENT
[FreeTextEntry1] : 73 M ex heavy smoker who presents for follow up of stage IV NSCLC (adenocarcinoma) with BMs, PDL1 high. Started on pembrolizumab 10/30/2023.  NSCLC, adenocarcinoma T4N2M1 - stage IV PD-L1 90% positive, DDR2 mutant, TP53 mutant. EGFR was noted to be contaminant  CT CAP with overall good partial response of conglomerate mass; 2 new nodules noted (increasing ins ize, largest now 11 mm) - will continue to monitor but taken together >20% decrease in disease burden c/w CA MRI brain 1/2/2024 - decrease in size of right temporal ring-enhancing lesion; previous posterior temporal and meial posterior frontal enhancing nodules are no longer identified. Improvement of edema as well. 3mm nodular focus along right anterior inferior frontal convexity - unchanged.  - can get routine CT C/A/P and MRI brain in late March/early April - Tolerating well, no adverse events - follows with Dr. Sanchez for pacemaker  RTC in 3 wks with tx Labs each visit: CBC, CMP, TSH

## 2024-02-23 NOTE — END OF VISIT
[] : Fellow [Time Spent: ___ minutes] : I have spent [unfilled] minutes of time on the encounter. [FreeTextEntry3] : Seen with oncology fellow, .  72 yo M with stage IV PDL1 high NSCLC with brain mets, s/p 5 cycles of pembrolizumab and SRS to brain mets. CT CAP with overall >20% decrease in lung disease. BM resolved. This is c/w partial response. Repeat CT CAP and MRIB every 3 months. RTC in 3 weeks with labs as above.

## 2024-03-07 RX ORDER — PEMBROLIZUMAB 25 MG/ML
200 INJECTION, SOLUTION INTRAVENOUS ONCE
Refills: 0 | Status: COMPLETED | OUTPATIENT
Start: 2024-03-11 | End: 2024-03-11

## 2024-03-11 ENCOUNTER — APPOINTMENT (OUTPATIENT)
Dept: HEMATOLOGY ONCOLOGY | Facility: CLINIC | Age: 74
End: 2024-03-11
Payer: MEDICARE

## 2024-03-11 ENCOUNTER — LABORATORY RESULT (OUTPATIENT)
Age: 74
End: 2024-03-11

## 2024-03-11 ENCOUNTER — OUTPATIENT (OUTPATIENT)
Dept: OUTPATIENT SERVICES | Facility: HOSPITAL | Age: 74
LOS: 1 days | End: 2024-03-11
Payer: MEDICARE

## 2024-03-11 ENCOUNTER — APPOINTMENT (OUTPATIENT)
Dept: INFUSION THERAPY | Facility: CLINIC | Age: 74
End: 2024-03-11

## 2024-03-11 VITALS
WEIGHT: 145.95 LBS | RESPIRATION RATE: 18 BRPM | SYSTOLIC BLOOD PRESSURE: 107 MMHG | TEMPERATURE: 97 F | DIASTOLIC BLOOD PRESSURE: 82 MMHG | OXYGEN SATURATION: 99 % | HEIGHT: 67 IN | HEART RATE: 74 BPM

## 2024-03-11 VITALS
BODY MASS INDEX: 22.91 KG/M2 | HEART RATE: 74 BPM | OXYGEN SATURATION: 95 % | TEMPERATURE: 97.3 F | RESPIRATION RATE: 18 BRPM | SYSTOLIC BLOOD PRESSURE: 157 MMHG | WEIGHT: 146 LBS | HEIGHT: 67 IN | DIASTOLIC BLOOD PRESSURE: 82 MMHG

## 2024-03-11 DIAGNOSIS — C34.90 MALIGNANT NEOPLASM OF UNSPECIFIED PART OF UNSPECIFIED BRONCHUS OR LUNG: ICD-10-CM

## 2024-03-11 LAB
ALBUMIN SERPL ELPH-MCNC: 3.8 G/DL
ALP BLD-CCNC: 61 U/L
ALT SERPL-CCNC: 21 U/L
ANION GAP SERPL CALC-SCNC: 7 MMOL/L
AST SERPL-CCNC: 23 U/L
BILIRUB SERPL-MCNC: 0.8 MG/DL
BUN SERPL-MCNC: 13 MG/DL
CALCIUM SERPL-MCNC: 9.9 MG/DL
CHLORIDE SERPL-SCNC: 103 MMOL/L
CO2 SERPL-SCNC: 32 MMOL/L
CREAT SERPL-MCNC: 1.1 MG/DL
EGFR: 70 ML/MIN/1.73M2
GLUCOSE SERPL-MCNC: 117 MG/DL
POTASSIUM SERPL-SCNC: 4.1 MMOL/L
PROT SERPL-MCNC: 7.3 G/DL
SODIUM SERPL-SCNC: 142 MMOL/L

## 2024-03-11 PROCEDURE — 99213 OFFICE O/P EST LOW 20 MIN: CPT | Mod: 25

## 2024-03-11 PROCEDURE — 36415 COLL VENOUS BLD VENIPUNCTURE: CPT

## 2024-03-11 PROCEDURE — 96413 CHEMO IV INFUSION 1 HR: CPT

## 2024-03-11 RX ADMIN — PEMBROLIZUMAB 200 MILLIGRAM(S): 25 INJECTION, SOLUTION INTRAVENOUS at 14:45

## 2024-03-11 RX ADMIN — PEMBROLIZUMAB 200 MILLIGRAM(S): 25 INJECTION, SOLUTION INTRAVENOUS at 13:58

## 2024-03-12 NOTE — ASSESSMENT
[FreeTextEntry1] : 73 M ex heavy smoker who presents for follow up of stage IV NSCLC (adenocarcinoma) with BMs, PDL1 high. Started on pembrolizumab 10/30/2023.  NSCLC, adenocarcinoma T4N2M1 - stage IV PD-L1 90% positive, DDR2 mutant, TP53 mutant. EGFR was noted to be contaminant  CT CAP with overall good partial response of conglomerate mass; 2 new nodules noted (increasing ins ize, largest now 11 mm) - will continue to monitor but taken together >20% decrease in disease burden c/w CO MRI brain 1/2/2024 - decrease in size of right temporal ring-enhancing lesion; previous posterior temporal and meial posterior frontal enhancing nodules are no longer identified. Improvement of edema as well. 3mm nodular focus along right anterior inferior frontal convexity - unchanged.  - can get routine CT C/A/P and MRI brain in late March/early April - Tolerating well, no adverse events - follows with Dr. Sanchez for pacemaker  RTC in 3 wks with tx Labs each visit: CBC, CMP, TSH  3/11/24 onc clinic  a/p  stage IV NSCLC (adenocarcinoma) with BMs, PDL1 high. Labs: stable Tolerating Pembro well CAP /MRIB scheduled for april cont to check labs and s/s upon visits. RTC 4/1/24 C8 pembro  . Encourage to contact the office for any concerns or worsening symptoms. Pt verbalizes understanding.

## 2024-03-12 NOTE — REVIEW OF SYSTEMS
[Diarrhea: Grade 0] : Diarrhea: Grade 0 [FreeTextEntry9] : left upper arm bicep muscle shorten  [Cough] : no cough [SOB on Exertion] : no shortness of breath during exertion [Negative] : Musculoskeletal

## 2024-03-12 NOTE — HISTORY OF PRESENT ILLNESS
[Disease: _____________________] : Disease: [unfilled] [T: ___] : T[unfilled] [N: ___] : N[unfilled] [M: ___] : M[unfilled] [AJCC Stage: ____] : AJCC Stage: [unfilled] [90: Able to carry normal activity; minor signs or symptoms of disease.] : 90: Able to carry normal activity; minor signs or symptoms of disease.  [ECOG Performance Status: 1 - Restricted in physically strenuous activity but ambulatory and able to carry out work of a light or sedentary nature] : Performance Status: 1 - Restricted in physically strenuous activity but ambulatory and able to carry out work of a light or sedentary nature, e.g., light house work, office work [de-identified] : Mr. Christopher is a 73 M ex heavy smoker who presents for follow up of stage IV NSCLC (adenocarcinoma) with BMs, PDL1 high.  Oncologic History: - Initially presented to pulmonology regarding approximately 1 year of generalized symptoms such as chest congestion, cough, and weight loss. 40 pack year history, quit 12 years ago. - CT Chest ([facility]) reviwed by pulmonology "CT chest [facility] 2023 personally reviewed : GGO in bilateral apices; RLL spiculated lung mass with some invasion in the right mainstem bronchus. Suspect lymphangitic spread due to some interlobular septal thickening. Emphysema" - PET/CT (2023) hypermetabolic RLL pulmonary mass 4.1x3.9 cm,emphysema, hypermetabolic mediastinal and right hilar lymphadenopathy, consistent with metastatic jose disease - Bronchoscopy (10/06/2023) showing mediastinal lymphadenopathy with extrinsic compression of right upper, right middle, and right lower lobe segments, mucosal infiltration.  - Lymph node level 7 (10/6/2023), positive for malignant cells, metastatic non small cell carcinoma with features of adenocarcinoma. Level 11L negative, 4L lymph node negative. TT VUS: ALK N411L DDR2 TMB-HIGH PEYMAN PD-L1 90% positive - MR Brain (10/16/2023) demonstrates at least 3 intracranial metastases with the largest measuring 7 mm within the right temporal lobe. 2023: Foundation on original tissue resulted: PD-L1 22c3 80%  (PD-L1) amplification - equivocal [id number] (PD-L2) amplification - equivocal NF1 * CREBBP * IKZF1 L330* NFE2L2 L30P NFKBIA amplification - equivocal NKX2-1 amplification - equivocal This Amended Report has been issued to reflect a change in reportable variants with the removal of the EGFR L858R alteration on the reanalyzed sample, resulting in the removal of the associated therapies afatinib, dacomitinib, erlotinib, gefitinib, and osimertinib, and to remove the TP53 L257R alteration on the reanalyzed sample. Please note that other aspects of this report may have changed from the previous version to reflect the most up-to-date reporting information. 2024: MRIB: Since 11/15/2023 there has been interval decrease in size and surrounding vasogenic edema of previously noted right temporal ring-enhancing lesion that is now an approximately 4 mm nodule. The previously seen posterior temporal and mesial posterior frontal enhancing nodules are no longer identified. There has been marked improvement in associated edema. Approximately 3 mm nodular focus of enhancement along the right anterior inferior frontal convexity, unchanged; findings may represent a tiny vessel. Less than 1 mm punctate focus of T1 hyperintensity/possible enhancement seen within the right occipital cortex that likely represent artifact.. 2024: CT CAP: 1.  Decreased size of previously noted right perihilar/subcarinal conglomerate mass. 2.  Scattered small pulmonary nodules, some are stable, however, 2 have increased in size as noted above. 3.  Stable mild nodular fullness of the left adrenal gland; cannot exclude metastasis.  3/11/24  onc clinic 74 y/o presents for follow up of stage IV NSCLC (adenocarcinoma) with BMs, PDL1 high. Patient report of intermittent pruritus relieved with topicals no other ac complaints. Tolerating tx well.   [de-identified] : Adenocarcinoma - PD-L1 90% [de-identified] : Pulm:  Neurosurgery: Dr. D'Amico Radiation: Dr. Wernicke [FreeTextEntry1] : 10/30/2023: C1 pembrolizumab 11/20/23: C2 pembrolizumab 12/11/23: C3 pembrolizumab 1/9/24: C4 pembrolizumab 1/29/2024  C5; Pembro  2/20/24: C6 Pembro  3/11/24: C7 Pembro 4/1/2024  C8 Pembro  [de-identified] : Still having body itching - taking benadryl when it gets bad. still present but mostly manageable  Denies chest pain, nausea, vomiting, shortness of breath, cough.  Mostly regular bowel movements.

## 2024-03-12 NOTE — END OF VISIT
[] : Fellow [Time Spent: ___ minutes] : I have spent [unfilled] minutes of time on the encounter. [FreeTextEntry3] : Seen with oncology fellow, .  74 yo M with stage IV PDL1 high NSCLC with brain mets, s/p 5 cycles of pembrolizumab and SRS to brain mets. CT CAP with overall >20% decrease in lung disease. BM resolved. This is c/w partial response. Repeat CT CAP and MRIB every 3 months. RTC in 3 weeks with labs as above.

## 2024-03-12 NOTE — PHYSICAL EXAM
[Normal] : affect appropriate [de-identified] : normal gait, left upper arm bicep shorten possible bicep partial tear. Nontender , no swelling, erythema or ecchymosis. FROM , no motor or sensory deficits [de-identified] : no gross deficits

## 2024-03-13 LAB — TSH SERPL-ACNC: 0.84 UIU/ML

## 2024-03-15 ENCOUNTER — RESULT REVIEW (OUTPATIENT)
Age: 74
End: 2024-03-15

## 2024-04-01 ENCOUNTER — OUTPATIENT (OUTPATIENT)
Dept: OUTPATIENT SERVICES | Facility: HOSPITAL | Age: 74
LOS: 1 days | End: 2024-04-01
Payer: MEDICARE

## 2024-04-01 ENCOUNTER — APPOINTMENT (OUTPATIENT)
Dept: INFUSION THERAPY | Facility: CLINIC | Age: 74
End: 2024-04-01

## 2024-04-01 VITALS
DIASTOLIC BLOOD PRESSURE: 80 MMHG | OXYGEN SATURATION: 97 % | TEMPERATURE: 98 F | RESPIRATION RATE: 18 BRPM | HEART RATE: 78 BPM | SYSTOLIC BLOOD PRESSURE: 135 MMHG

## 2024-04-01 VITALS
OXYGEN SATURATION: 96 % | DIASTOLIC BLOOD PRESSURE: 79 MMHG | SYSTOLIC BLOOD PRESSURE: 132 MMHG | TEMPERATURE: 98 F | RESPIRATION RATE: 18 BRPM | WEIGHT: 145.95 LBS | HEIGHT: 67 IN | HEART RATE: 88 BPM

## 2024-04-01 DIAGNOSIS — C34.90 MALIGNANT NEOPLASM OF UNSPECIFIED PART OF UNSPECIFIED BRONCHUS OR LUNG: ICD-10-CM

## 2024-04-01 LAB
ALBUMIN SERPL ELPH-MCNC: 3.7 G/DL — SIGNIFICANT CHANGE UP (ref 3.3–5)
ALP SERPL-CCNC: 61 U/L — SIGNIFICANT CHANGE UP (ref 40–120)
ALT FLD-CCNC: 24 U/L — SIGNIFICANT CHANGE UP (ref 10–45)
ANION GAP SERPL CALC-SCNC: 4 MMOL/L — LOW (ref 5–17)
AST SERPL-CCNC: 21 U/L — SIGNIFICANT CHANGE UP (ref 10–40)
BILIRUB SERPL-MCNC: 0.5 MG/DL — SIGNIFICANT CHANGE UP (ref 0.2–1.2)
BUN SERPL-MCNC: 17 MG/DL — SIGNIFICANT CHANGE UP (ref 7–23)
CALCIUM SERPL-MCNC: 9.6 MG/DL — SIGNIFICANT CHANGE UP (ref 8.4–10.5)
CHLORIDE SERPL-SCNC: 109 MMOL/L — HIGH (ref 96–108)
CO2 SERPL-SCNC: 30 MMOL/L — SIGNIFICANT CHANGE UP (ref 22–31)
CREAT SERPL-MCNC: 1 MG/DL — SIGNIFICANT CHANGE UP (ref 0.5–1.3)
EGFR: 78 ML/MIN/1.73M2 — SIGNIFICANT CHANGE UP
GLUCOSE SERPL-MCNC: 86 MG/DL — SIGNIFICANT CHANGE UP (ref 70–99)
HCT VFR BLD CALC: 44.6 % — SIGNIFICANT CHANGE UP (ref 39–50)
HGB BLD-MCNC: 14.6 G/DL — SIGNIFICANT CHANGE UP (ref 13–17)
LYMPHOCYTES # BLD AUTO: 2.3 K/UL — SIGNIFICANT CHANGE UP (ref 1–3.3)
LYMPHOCYTES # BLD AUTO: 23.1 % — SIGNIFICANT CHANGE UP (ref 13–44)
MCHC RBC-ENTMCNC: 27.7 PG — SIGNIFICANT CHANGE UP (ref 27–34)
MCHC RBC-ENTMCNC: 32.7 GM/DL — SIGNIFICANT CHANGE UP (ref 32–36)
MCV RBC AUTO: 84.5 FL — SIGNIFICANT CHANGE UP (ref 80–100)
NEUTROPHILS # BLD AUTO: 6.7 K/UL — SIGNIFICANT CHANGE UP (ref 1.8–7.4)
NEUTROPHILS NFR BLD AUTO: 67.4 % — SIGNIFICANT CHANGE UP (ref 43–77)
PLATELET # BLD AUTO: 212 K/UL — SIGNIFICANT CHANGE UP (ref 150–400)
POTASSIUM SERPL-MCNC: 4 MMOL/L — SIGNIFICANT CHANGE UP (ref 3.5–5.3)
POTASSIUM SERPL-SCNC: 4 MMOL/L — SIGNIFICANT CHANGE UP (ref 3.5–5.3)
PROT SERPL-MCNC: 7 G/DL — SIGNIFICANT CHANGE UP (ref 6–8.3)
RBC # BLD: 5.28 M/UL — SIGNIFICANT CHANGE UP (ref 4.2–5.8)
RBC # FLD: 16 % — HIGH (ref 10.3–14.5)
SODIUM SERPL-SCNC: 143 MMOL/L — SIGNIFICANT CHANGE UP (ref 135–145)
T4 FREE SERPL-MCNC: 1.17 NG/DL — SIGNIFICANT CHANGE UP (ref 0.93–1.7)
TSH SERPL-MCNC: 0.82 UIU/ML — SIGNIFICANT CHANGE UP (ref 0.27–4.2)
WBC # BLD: 10 K/UL — SIGNIFICANT CHANGE UP (ref 3.8–10.5)
WBC # FLD AUTO: 10 K/UL — SIGNIFICANT CHANGE UP (ref 3.8–10.5)

## 2024-04-01 PROCEDURE — 85025 COMPLETE CBC W/AUTO DIFF WBC: CPT

## 2024-04-01 PROCEDURE — 80053 COMPREHEN METABOLIC PANEL: CPT

## 2024-04-01 PROCEDURE — 84439 ASSAY OF FREE THYROXINE: CPT

## 2024-04-01 PROCEDURE — 84443 ASSAY THYROID STIM HORMONE: CPT

## 2024-04-01 PROCEDURE — 36415 COLL VENOUS BLD VENIPUNCTURE: CPT

## 2024-04-01 PROCEDURE — 96413 CHEMO IV INFUSION 1 HR: CPT

## 2024-04-01 RX ORDER — PEMBROLIZUMAB 25 MG/ML
200 INJECTION, SOLUTION INTRAVENOUS ONCE
Refills: 0 | Status: COMPLETED | OUTPATIENT
Start: 2024-04-01 | End: 2024-04-01

## 2024-04-01 RX ADMIN — PEMBROLIZUMAB 200 MILLIGRAM(S): 25 INJECTION, SOLUTION INTRAVENOUS at 17:37

## 2024-04-01 RX ADMIN — PEMBROLIZUMAB 200 MILLIGRAM(S): 25 INJECTION, SOLUTION INTRAVENOUS at 17:07

## 2024-04-05 ENCOUNTER — APPOINTMENT (OUTPATIENT)
Dept: HEART AND VASCULAR | Facility: CLINIC | Age: 74
End: 2024-04-05
Payer: MEDICARE

## 2024-04-05 ENCOUNTER — APPOINTMENT (OUTPATIENT)
Dept: MRI IMAGING | Facility: HOSPITAL | Age: 74
End: 2024-04-05
Payer: MEDICARE

## 2024-04-05 ENCOUNTER — APPOINTMENT (OUTPATIENT)
Dept: CT IMAGING | Facility: HOSPITAL | Age: 74
End: 2024-04-05
Payer: MEDICARE

## 2024-04-05 ENCOUNTER — OUTPATIENT (OUTPATIENT)
Dept: OUTPATIENT SERVICES | Facility: HOSPITAL | Age: 74
LOS: 1 days | End: 2024-04-05
Payer: MEDICARE

## 2024-04-05 PROCEDURE — 70553 MRI BRAIN STEM W/O & W/DYE: CPT

## 2024-04-05 PROCEDURE — 93283 PRGRMG EVAL IMPLANTABLE DFB: CPT

## 2024-04-05 PROCEDURE — 74177 CT ABD & PELVIS W/CONTRAST: CPT

## 2024-04-05 PROCEDURE — 74177 CT ABD & PELVIS W/CONTRAST: CPT | Mod: 26,MH

## 2024-04-05 PROCEDURE — 71260 CT THORAX DX C+: CPT | Mod: 26,MH

## 2024-04-05 PROCEDURE — 71260 CT THORAX DX C+: CPT

## 2024-04-05 PROCEDURE — 70553 MRI BRAIN STEM W/O & W/DYE: CPT | Mod: 26,MH

## 2024-04-05 PROCEDURE — A9585: CPT

## 2024-04-05 PROCEDURE — 82565 ASSAY OF CREATININE: CPT

## 2024-04-05 NOTE — PROCEDURE
[Complete Heart Block] : complete heart block [Pacemaker] : pacemaker [DDD] : DDD [Normal] : The battery status is normal. [Threshold Testing Performed] : Threshold testing was performed [Lead Imp:  ___ohms] : lead impedance was [unfilled] ohms [Sensing Amplitude ___mv] : sensing amplitude was [unfilled] mv [___V @] : [unfilled] V [___ ms] : [unfilled] ms [de-identified] : No escape > 40 bpm  [de-identified] : Denise MELARA DR MRI [de-identified] : Medtronic [de-identified] : [id number] [de-identified] : 8/2020 [de-identified] :  [de-identified] : Patient placed in DOO at 100 bpm  [de-identified] : : 99.9% AT/AF: <0.1% AP: 5.3%

## 2024-04-05 NOTE — DISCUSSION/SUMMARY
[Pacemaker Function Normal] : normal pacemaker function [FreeTextEntry1] : 74 y/o M pacing dependent with normal dual chamber PPM (medtronic). Device was reprogrammed to DOO mode pre MRI.  Device is now reprogramed back to DDD post MRI. All parameters stable post MRI.

## 2024-04-05 NOTE — HISTORY OF PRESENT ILLNESS
[None] : The patient complains of no symptoms [de-identified] : Mr. Christopher is a 72 y.o. M with Medtronic PPM-D (8/2020) at [facility] who presents for MRI programming. He is pacing dependent.  RV lead is deep septal lead per device information.   No device related complaints.

## 2024-04-22 ENCOUNTER — OUTPATIENT (OUTPATIENT)
Dept: OUTPATIENT SERVICES | Facility: HOSPITAL | Age: 74
LOS: 1 days | End: 2024-04-22
Payer: MEDICARE

## 2024-04-22 ENCOUNTER — APPOINTMENT (OUTPATIENT)
Dept: INFUSION THERAPY | Facility: CLINIC | Age: 74
End: 2024-04-22

## 2024-04-22 VITALS
DIASTOLIC BLOOD PRESSURE: 79 MMHG | TEMPERATURE: 98 F | HEIGHT: 67 IN | HEART RATE: 84 BPM | RESPIRATION RATE: 18 BRPM | WEIGHT: 141.98 LBS | SYSTOLIC BLOOD PRESSURE: 129 MMHG | OXYGEN SATURATION: 96 %

## 2024-04-22 VITALS
RESPIRATION RATE: 18 BRPM | SYSTOLIC BLOOD PRESSURE: 119 MMHG | HEART RATE: 70 BPM | OXYGEN SATURATION: 97 % | TEMPERATURE: 98 F | DIASTOLIC BLOOD PRESSURE: 66 MMHG

## 2024-04-22 DIAGNOSIS — C34.90 MALIGNANT NEOPLASM OF UNSPECIFIED PART OF UNSPECIFIED BRONCHUS OR LUNG: ICD-10-CM

## 2024-04-22 LAB
ALBUMIN SERPL ELPH-MCNC: 3.7 G/DL — SIGNIFICANT CHANGE UP (ref 3.3–5)
ALP SERPL-CCNC: 64 U/L — SIGNIFICANT CHANGE UP (ref 40–120)
ALT FLD-CCNC: 27 U/L — SIGNIFICANT CHANGE UP (ref 10–45)
ANION GAP SERPL CALC-SCNC: -1 MMOL/L — LOW (ref 5–17)
AST SERPL-CCNC: 23 U/L — SIGNIFICANT CHANGE UP (ref 10–40)
BILIRUB SERPL-MCNC: 0.7 MG/DL — SIGNIFICANT CHANGE UP (ref 0.2–1.2)
BUN SERPL-MCNC: 15 MG/DL — SIGNIFICANT CHANGE UP (ref 7–23)
CALCIUM SERPL-MCNC: 9.6 MG/DL — SIGNIFICANT CHANGE UP (ref 8.4–10.5)
CHLORIDE SERPL-SCNC: 106 MMOL/L — SIGNIFICANT CHANGE UP (ref 96–108)
CO2 SERPL-SCNC: 35 MMOL/L — HIGH (ref 22–31)
CREAT SERPL-MCNC: 0.9 MG/DL — SIGNIFICANT CHANGE UP (ref 0.5–1.3)
EGFR: 89 ML/MIN/1.73M2 — SIGNIFICANT CHANGE UP
GLUCOSE SERPL-MCNC: 141 MG/DL — HIGH (ref 70–99)
HCT VFR BLD CALC: 44.3 % — SIGNIFICANT CHANGE UP (ref 39–50)
HGB BLD-MCNC: 14.6 G/DL — SIGNIFICANT CHANGE UP (ref 13–17)
LYMPHOCYTES # BLD AUTO: 2 K/UL — SIGNIFICANT CHANGE UP (ref 1–3.3)
LYMPHOCYTES # BLD AUTO: 21.8 % — SIGNIFICANT CHANGE UP (ref 13–44)
MCHC RBC-ENTMCNC: 27.8 PG — SIGNIFICANT CHANGE UP (ref 27–34)
MCHC RBC-ENTMCNC: 33 GM/DL — SIGNIFICANT CHANGE UP (ref 32–36)
MCV RBC AUTO: 84.4 FL — SIGNIFICANT CHANGE UP (ref 80–100)
NEUTROPHILS # BLD AUTO: 6.7 K/UL — SIGNIFICANT CHANGE UP (ref 1.8–7.4)
NEUTROPHILS NFR BLD AUTO: 70.4 % — SIGNIFICANT CHANGE UP (ref 43–77)
PLATELET # BLD AUTO: 228 K/UL — SIGNIFICANT CHANGE UP (ref 150–400)
POTASSIUM SERPL-MCNC: 3.9 MMOL/L — SIGNIFICANT CHANGE UP (ref 3.5–5.3)
POTASSIUM SERPL-SCNC: 3.9 MMOL/L — SIGNIFICANT CHANGE UP (ref 3.5–5.3)
PROT SERPL-MCNC: 7 G/DL — SIGNIFICANT CHANGE UP (ref 6–8.3)
RBC # BLD: 5.25 M/UL — SIGNIFICANT CHANGE UP (ref 4.2–5.8)
RBC # FLD: 15.4 % — HIGH (ref 10.3–14.5)
SODIUM SERPL-SCNC: 140 MMOL/L — SIGNIFICANT CHANGE UP (ref 135–145)
T4 AB SER-ACNC: 8.41 UG/DL — SIGNIFICANT CHANGE UP (ref 4.5–11.7)
TSH SERPL-MCNC: 0.98 UIU/ML — SIGNIFICANT CHANGE UP (ref 0.27–4.2)
WBC # BLD: 9.4 K/UL — SIGNIFICANT CHANGE UP (ref 3.8–10.5)
WBC # FLD AUTO: 9.4 K/UL — SIGNIFICANT CHANGE UP (ref 3.8–10.5)

## 2024-04-22 PROCEDURE — 96413 CHEMO IV INFUSION 1 HR: CPT

## 2024-04-22 PROCEDURE — 80053 COMPREHEN METABOLIC PANEL: CPT

## 2024-04-22 PROCEDURE — 85025 COMPLETE CBC W/AUTO DIFF WBC: CPT

## 2024-04-22 PROCEDURE — 36415 COLL VENOUS BLD VENIPUNCTURE: CPT

## 2024-04-22 PROCEDURE — 84436 ASSAY OF TOTAL THYROXINE: CPT

## 2024-04-22 PROCEDURE — 84443 ASSAY THYROID STIM HORMONE: CPT

## 2024-04-22 RX ORDER — PEMBROLIZUMAB 25 MG/ML
200 INJECTION, SOLUTION INTRAVENOUS ONCE
Refills: 0 | Status: COMPLETED | OUTPATIENT
Start: 2024-04-22 | End: 2024-04-22

## 2024-04-22 RX ADMIN — PEMBROLIZUMAB 200 MILLIGRAM(S): 25 INJECTION, SOLUTION INTRAVENOUS at 10:57

## 2024-04-22 RX ADMIN — PEMBROLIZUMAB 200 MILLIGRAM(S): 25 INJECTION, SOLUTION INTRAVENOUS at 11:30

## 2024-05-13 ENCOUNTER — APPOINTMENT (OUTPATIENT)
Dept: INFUSION THERAPY | Facility: CLINIC | Age: 74
End: 2024-05-13

## 2024-05-13 ENCOUNTER — OUTPATIENT (OUTPATIENT)
Dept: OUTPATIENT SERVICES | Facility: HOSPITAL | Age: 74
LOS: 1 days | End: 2024-05-13
Payer: MEDICARE

## 2024-05-13 VITALS
HEIGHT: 67 IN | HEART RATE: 85 BPM | DIASTOLIC BLOOD PRESSURE: 82 MMHG | OXYGEN SATURATION: 99 % | TEMPERATURE: 97 F | SYSTOLIC BLOOD PRESSURE: 150 MMHG | RESPIRATION RATE: 18 BRPM | WEIGHT: 143.08 LBS

## 2024-05-13 DIAGNOSIS — E83.119 HEMOCHROMATOSIS, UNSPECIFIED: ICD-10-CM

## 2024-05-13 LAB
ALBUMIN SERPL ELPH-MCNC: 3.6 G/DL — SIGNIFICANT CHANGE UP (ref 3.3–5)
ALP SERPL-CCNC: 59 U/L — SIGNIFICANT CHANGE UP (ref 40–120)
ALT FLD-CCNC: 20 U/L — SIGNIFICANT CHANGE UP (ref 10–45)
ANION GAP SERPL CALC-SCNC: 10 MMOL/L — SIGNIFICANT CHANGE UP (ref 5–17)
AST SERPL-CCNC: 21 U/L — SIGNIFICANT CHANGE UP (ref 10–40)
BILIRUB SERPL-MCNC: 0.6 MG/DL — SIGNIFICANT CHANGE UP (ref 0.2–1.2)
BUN SERPL-MCNC: 16 MG/DL — SIGNIFICANT CHANGE UP (ref 7–23)
CALCIUM SERPL-MCNC: 9.6 MG/DL — SIGNIFICANT CHANGE UP (ref 8.4–10.5)
CHLORIDE SERPL-SCNC: 99 MMOL/L — SIGNIFICANT CHANGE UP (ref 96–108)
CO2 SERPL-SCNC: 32 MMOL/L — HIGH (ref 22–31)
CREAT SERPL-MCNC: 1.1 MG/DL — SIGNIFICANT CHANGE UP (ref 0.5–1.3)
EGFR: 70 ML/MIN/1.73M2 — SIGNIFICANT CHANGE UP
GLUCOSE SERPL-MCNC: 117 MG/DL — HIGH (ref 70–99)
HCT VFR BLD CALC: 44.8 % — SIGNIFICANT CHANGE UP (ref 39–50)
HGB BLD-MCNC: 14.5 G/DL — SIGNIFICANT CHANGE UP (ref 13–17)
LYMPHOCYTES # BLD AUTO: 2 K/UL — SIGNIFICANT CHANGE UP (ref 1–3.3)
LYMPHOCYTES # BLD AUTO: 23.8 % — SIGNIFICANT CHANGE UP (ref 13–44)
MCHC RBC-ENTMCNC: 27.4 PG — SIGNIFICANT CHANGE UP (ref 27–34)
MCHC RBC-ENTMCNC: 32.4 GM/DL — SIGNIFICANT CHANGE UP (ref 32–36)
MCV RBC AUTO: 84.5 FL — SIGNIFICANT CHANGE UP (ref 80–100)
NEUTROPHILS # BLD AUTO: 5.7 K/UL — SIGNIFICANT CHANGE UP (ref 1.8–7.4)
NEUTROPHILS NFR BLD AUTO: 67.3 % — SIGNIFICANT CHANGE UP (ref 43–77)
PLATELET # BLD AUTO: 211 K/UL — SIGNIFICANT CHANGE UP (ref 150–400)
POTASSIUM SERPL-MCNC: 3.9 MMOL/L — SIGNIFICANT CHANGE UP (ref 3.5–5.3)
POTASSIUM SERPL-SCNC: 3.9 MMOL/L — SIGNIFICANT CHANGE UP (ref 3.5–5.3)
PROT SERPL-MCNC: 6.8 G/DL — SIGNIFICANT CHANGE UP (ref 6–8.3)
RBC # BLD: 5.3 M/UL — SIGNIFICANT CHANGE UP (ref 4.2–5.8)
RBC # FLD: 15.4 % — HIGH (ref 10.3–14.5)
SODIUM SERPL-SCNC: 141 MMOL/L — SIGNIFICANT CHANGE UP (ref 135–145)
T4 FREE SERPL-MCNC: 1.28 NG/DL — SIGNIFICANT CHANGE UP (ref 0.93–1.7)
TSH SERPL-MCNC: 0.81 UIU/ML — SIGNIFICANT CHANGE UP (ref 0.27–4.2)
WBC # BLD: 8.5 K/UL — SIGNIFICANT CHANGE UP (ref 3.8–10.5)
WBC # FLD AUTO: 8.5 K/UL — SIGNIFICANT CHANGE UP (ref 3.8–10.5)

## 2024-05-13 PROCEDURE — 84439 ASSAY OF FREE THYROXINE: CPT

## 2024-05-13 PROCEDURE — 84443 ASSAY THYROID STIM HORMONE: CPT

## 2024-05-13 PROCEDURE — 80053 COMPREHEN METABOLIC PANEL: CPT

## 2024-05-13 PROCEDURE — 36415 COLL VENOUS BLD VENIPUNCTURE: CPT

## 2024-05-13 PROCEDURE — 96413 CHEMO IV INFUSION 1 HR: CPT

## 2024-05-13 PROCEDURE — 85025 COMPLETE CBC W/AUTO DIFF WBC: CPT

## 2024-05-13 RX ORDER — PEMBROLIZUMAB 25 MG/ML
200 INJECTION, SOLUTION INTRAVENOUS ONCE
Refills: 0 | Status: COMPLETED | OUTPATIENT
Start: 2024-05-13 | End: 2024-05-13

## 2024-05-13 RX ADMIN — PEMBROLIZUMAB 200 MILLIGRAM(S): 25 INJECTION, SOLUTION INTRAVENOUS at 13:20

## 2024-05-13 RX ADMIN — PEMBROLIZUMAB 200 MILLIGRAM(S): 25 INJECTION, SOLUTION INTRAVENOUS at 12:37

## 2024-05-24 ENCOUNTER — OFFICE (OUTPATIENT)
Dept: URBAN - METROPOLITAN AREA CLINIC 92 | Facility: CLINIC | Age: 74
Setting detail: OPHTHALMOLOGY
End: 2024-05-24
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
## 2024-05-24 PROCEDURE — NO SHOW FE NO SHOW FEE: Performed by: SPECIALIST

## 2024-06-03 ENCOUNTER — APPOINTMENT (OUTPATIENT)
Dept: INFUSION THERAPY | Facility: CLINIC | Age: 74
End: 2024-06-03

## 2024-06-03 ENCOUNTER — OUTPATIENT (OUTPATIENT)
Dept: OUTPATIENT SERVICES | Facility: HOSPITAL | Age: 74
LOS: 1 days | End: 2024-06-03
Payer: MEDICARE

## 2024-06-03 VITALS
HEIGHT: 67 IN | SYSTOLIC BLOOD PRESSURE: 127 MMHG | WEIGHT: 143.08 LBS | RESPIRATION RATE: 17 BRPM | OXYGEN SATURATION: 96 % | TEMPERATURE: 98 F | DIASTOLIC BLOOD PRESSURE: 78 MMHG | HEART RATE: 83 BPM

## 2024-06-03 DIAGNOSIS — C34.90 MALIGNANT NEOPLASM OF UNSPECIFIED PART OF UNSPECIFIED BRONCHUS OR LUNG: ICD-10-CM

## 2024-06-03 LAB
ALBUMIN SERPL ELPH-MCNC: 3.9 G/DL — SIGNIFICANT CHANGE UP (ref 3.3–5)
ALP SERPL-CCNC: 58 U/L — SIGNIFICANT CHANGE UP (ref 40–120)
ALT FLD-CCNC: 17 U/L — SIGNIFICANT CHANGE UP (ref 10–45)
ANION GAP SERPL CALC-SCNC: 8 MMOL/L — SIGNIFICANT CHANGE UP (ref 5–17)
AST SERPL-CCNC: 20 U/L — SIGNIFICANT CHANGE UP (ref 10–40)
BILIRUB SERPL-MCNC: 0.7 MG/DL — SIGNIFICANT CHANGE UP (ref 0.2–1.2)
BUN SERPL-MCNC: 19 MG/DL — SIGNIFICANT CHANGE UP (ref 7–23)
CALCIUM SERPL-MCNC: 9.6 MG/DL — SIGNIFICANT CHANGE UP (ref 8.4–10.5)
CHLORIDE SERPL-SCNC: 102 MMOL/L — SIGNIFICANT CHANGE UP (ref 96–108)
CO2 SERPL-SCNC: 30 MMOL/L — SIGNIFICANT CHANGE UP (ref 22–31)
CREAT SERPL-MCNC: 0.8 MG/DL — SIGNIFICANT CHANGE UP (ref 0.5–1.3)
EGFR: 92 ML/MIN/1.73M2 — SIGNIFICANT CHANGE UP
GLUCOSE SERPL-MCNC: 115 MG/DL — HIGH (ref 70–99)
HCT VFR BLD CALC: 46.1 % — SIGNIFICANT CHANGE UP (ref 39–50)
HGB BLD-MCNC: 14.8 G/DL — SIGNIFICANT CHANGE UP (ref 13–17)
LYMPHOCYTES # BLD AUTO: 2.2 K/UL — SIGNIFICANT CHANGE UP (ref 1–3.3)
LYMPHOCYTES # BLD AUTO: 25.8 % — SIGNIFICANT CHANGE UP (ref 13–44)
MCHC RBC-ENTMCNC: 27.5 PG — SIGNIFICANT CHANGE UP (ref 27–34)
MCHC RBC-ENTMCNC: 32.1 GM/DL — SIGNIFICANT CHANGE UP (ref 32–36)
MCV RBC AUTO: 85.5 FL — SIGNIFICANT CHANGE UP (ref 80–100)
NEUTROPHILS # BLD AUTO: 5.6 K/UL — SIGNIFICANT CHANGE UP (ref 1.8–7.4)
NEUTROPHILS NFR BLD AUTO: 64.2 % — SIGNIFICANT CHANGE UP (ref 43–77)
PLATELET # BLD AUTO: 216 K/UL — SIGNIFICANT CHANGE UP (ref 150–400)
POTASSIUM SERPL-MCNC: 3.9 MMOL/L — SIGNIFICANT CHANGE UP (ref 3.5–5.3)
POTASSIUM SERPL-SCNC: 3.9 MMOL/L — SIGNIFICANT CHANGE UP (ref 3.5–5.3)
PROT SERPL-MCNC: 7 G/DL — SIGNIFICANT CHANGE UP (ref 6–8.3)
RBC # BLD: 5.39 M/UL — SIGNIFICANT CHANGE UP (ref 4.2–5.8)
RBC # FLD: 15.5 % — HIGH (ref 10.3–14.5)
SODIUM SERPL-SCNC: 140 MMOL/L — SIGNIFICANT CHANGE UP (ref 135–145)
TSH SERPL-MCNC: 0.59 UIU/ML — SIGNIFICANT CHANGE UP (ref 0.27–4.2)
WBC # BLD: 8.7 K/UL — SIGNIFICANT CHANGE UP (ref 3.8–10.5)
WBC # FLD AUTO: 8.7 K/UL — SIGNIFICANT CHANGE UP (ref 3.8–10.5)

## 2024-06-03 PROCEDURE — 36415 COLL VENOUS BLD VENIPUNCTURE: CPT

## 2024-06-03 PROCEDURE — 85025 COMPLETE CBC W/AUTO DIFF WBC: CPT

## 2024-06-03 PROCEDURE — 80053 COMPREHEN METABOLIC PANEL: CPT

## 2024-06-03 PROCEDURE — 84443 ASSAY THYROID STIM HORMONE: CPT

## 2024-06-03 PROCEDURE — 96413 CHEMO IV INFUSION 1 HR: CPT

## 2024-06-03 RX ORDER — PEMBROLIZUMAB 25 MG/ML
200 INJECTION, SOLUTION INTRAVENOUS ONCE
Refills: 0 | Status: COMPLETED | OUTPATIENT
Start: 2024-06-03 | End: 2024-06-03

## 2024-06-03 RX ADMIN — PEMBROLIZUMAB 200 MILLIGRAM(S): 25 INJECTION, SOLUTION INTRAVENOUS at 13:40

## 2024-06-03 RX ADMIN — PEMBROLIZUMAB 200 MILLIGRAM(S): 25 INJECTION, SOLUTION INTRAVENOUS at 14:10

## 2024-06-05 ENCOUNTER — APPOINTMENT (OUTPATIENT)
Dept: HEMATOLOGY ONCOLOGY | Facility: CLINIC | Age: 74
End: 2024-06-05
Payer: MEDICARE

## 2024-06-05 VITALS
TEMPERATURE: 97.7 F | HEIGHT: 67 IN | BODY MASS INDEX: 22.44 KG/M2 | SYSTOLIC BLOOD PRESSURE: 156 MMHG | DIASTOLIC BLOOD PRESSURE: 80 MMHG | OXYGEN SATURATION: 94 % | WEIGHT: 143 LBS | RESPIRATION RATE: 18 BRPM | HEART RATE: 90 BPM

## 2024-06-05 PROCEDURE — 99214 OFFICE O/P EST MOD 30 MIN: CPT

## 2024-06-05 PROCEDURE — G2211 COMPLEX E/M VISIT ADD ON: CPT

## 2024-06-05 NOTE — HISTORY OF PRESENT ILLNESS
[Disease: _____________________] : Disease: [unfilled] [T: ___] : T[unfilled] [N: ___] : N[unfilled] [M: ___] : M[unfilled] [AJCC Stage: ____] : AJCC Stage: [unfilled] [de-identified] : Mr. Christopher is a 73 M ex heavy smoker who presents for follow up of stage IV NSCLC (adenocarcinoma) with BMs, PDL1 high.  Oncologic History: - Initially presented to pulmonology regarding approximately 1 year of generalized symptoms such as chest congestion, cough, and weight loss. 40 pack year history, quit 12 years ago. - CT Chest ([facility]) reviwed by pulmonology "CT chest [facility] 2023 personally reviewed : GGO in bilateral apices; RLL spiculated lung mass with some invasion in the right mainstem bronchus. Suspect lymphangitic spread due to some interlobular septal thickening. Emphysema" - PET/CT (2023) hypermetabolic RLL pulmonary mass 4.1x3.9 cm,emphysema, hypermetabolic mediastinal and right hilar lymphadenopathy, consistent with metastatic jose disease - Bronchoscopy (10/06/2023) showing mediastinal lymphadenopathy with extrinsic compression of right upper, right middle, and right lower lobe segments, mucosal infiltration.  - Lymph node level 7 (10/6/2023), positive for malignant cells, metastatic non small cell carcinoma with features of adenocarcinoma. Level 11L negative, 4L lymph node negative. TT VUS: ALK N411L DDR2 TMB-HIGH PEYMAN PD-L1 90% positive - MR Brain (10/16/2023) demonstrates at least 3 intracranial metastases with the largest measuring 7 mm within the right temporal lobe. 2023: Foundation on original tissue resulted: PD-L1 22c3 80%  (PD-L1) amplification - equivocal [id number] (PD-L2) amplification - equivocal NF1 * CREBBP * IKZF1 L330* NFE2L2 L30P NFKBIA amplification - equivocal NKX2-1 amplification - equivocal This Amended Report has been issued to reflect a change in reportable variants with the removal of the EGFR L858R alteration on the reanalyzed sample, resulting in the removal of the associated therapies afatinib, dacomitinib, erlotinib, gefitinib, and osimertinib, and to remove the TP53 L257R alteration on the reanalyzed sample. Please note that other aspects of this report may have changed from the previous version to reflect the most up-to-date reporting information. 2024: MRIB: Since 11/15/2023 there has been interval decrease in size and surrounding vasogenic edema of previously noted right temporal ring-enhancing lesion that is now an approximately 4 mm nodule. The previously seen posterior temporal and mesial posterior frontal enhancing nodules are no longer identified. There has been marked improvement in associated edema. Approximately 3 mm nodular focus of enhancement along the right anterior inferior frontal convexity, unchanged; findings may represent a tiny vessel. Less than 1 mm punctate focus of T1 hyperintensity/possible enhancement seen within the right occipital cortex that likely represent artifact.. 2024: CT CAP: 1.  Decreased size of previously noted right perihilar/subcarinal conglomerate mass. 2.  Scattered small pulmonary nodules, some are stable, however, 2 have increased in size as noted above. 3.  Stable mild nodular fullness of the left adrenal gland; cannot exclude metastasis.  3/11/24  onc clinic 74 y/o presents for follow up of stage IV NSCLC (adenocarcinoma) with BMs, PDL1 high. Patient report of intermittent pruritus relieved with topicals no other ac complaints. Tolerating tx well.   24 onc clinic 74 y/o m with stage IV NSCLC present for follow up. Patient report of feeling well he has no ac complaints at this time. Tolerating tx well.  [de-identified] : Adenocarcinoma - PD-L1 90% [de-identified] : Pulm:  Neurosurgery: Dr. D'Amico Radiation: Dr. Wernicke [FreeTextEntry1] : 10/30/2023: C1 pembrolizumab 11/20/23: C2 pembrolizumab 12/11/23: C3 pembrolizumab 1/9/24: C4 pembrolizumab 1/29/2024  C5; Pembro  2/20/24: C6 Pembro  3/11/24: C7 Pembro 4/1/2024  C8 Pembro  4/22/2024  C9 pembro 5/13/2024  C10 pembro 6/3/2024    C11  pembro  6/24/2024   C12  pembro     [de-identified] : Still having body itching - taking benadryl when it gets bad. still present but mostly manageable  Denies chest pain, nausea, vomiting, shortness of breath, cough.  Mostly regular bowel movements.    [90: Able to carry normal activity; minor signs or symptoms of disease.] : 90: Able to carry normal activity; minor signs or symptoms of disease.  [ECOG Performance Status: 1 - Restricted in physically strenuous activity but ambulatory and able to carry out work of a light or sedentary nature] : Performance Status: 1 - Restricted in physically strenuous activity but ambulatory and able to carry out work of a light or sedentary nature, e.g., light house work, office work

## 2024-06-05 NOTE — PHYSICAL EXAM
[Normal] : affect appropriate [de-identified] : normal gait, left upper arm bicep shorten possible bicep partial tear. Nontender , no swelling, erythema or ecchymosis. FROM , no motor or sensory deficits [de-identified] : no gross deficits

## 2024-06-05 NOTE — PHYSICAL EXAM
[Normal] : affect appropriate [de-identified] : normal gait, left upper arm bicep shorten possible bicep partial tear. Nontender , no swelling, erythema or ecchymosis. FROM , no motor or sensory deficits [de-identified] : no gross deficits

## 2024-06-05 NOTE — REVIEW OF SYSTEMS
[Cough] : no cough [SOB on Exertion] : no shortness of breath during exertion [Diarrhea: Grade 0] : Diarrhea: Grade 0 [Negative] : Allergic/Immunologic

## 2024-06-05 NOTE — HISTORY OF PRESENT ILLNESS
[Disease: _____________________] : Disease: [unfilled] [T: ___] : T[unfilled] [N: ___] : N[unfilled] [M: ___] : M[unfilled] [AJCC Stage: ____] : AJCC Stage: [unfilled] [de-identified] : Mr. Christopher is a 73 M ex heavy smoker who presents for follow up of stage IV NSCLC (adenocarcinoma) with BMs, PDL1 high.  Oncologic History: - Initially presented to pulmonology regarding approximately 1 year of generalized symptoms such as chest congestion, cough, and weight loss. 40 pack year history, quit 12 years ago. - CT Chest ([facility]) reviwed by pulmonology "CT chest [facility] 2023 personally reviewed : GGO in bilateral apices; RLL spiculated lung mass with some invasion in the right mainstem bronchus. Suspect lymphangitic spread due to some interlobular septal thickening. Emphysema" - PET/CT (2023) hypermetabolic RLL pulmonary mass 4.1x3.9 cm,emphysema, hypermetabolic mediastinal and right hilar lymphadenopathy, consistent with metastatic jose disease - Bronchoscopy (10/06/2023) showing mediastinal lymphadenopathy with extrinsic compression of right upper, right middle, and right lower lobe segments, mucosal infiltration.  - Lymph node level 7 (10/6/2023), positive for malignant cells, metastatic non small cell carcinoma with features of adenocarcinoma. Level 11L negative, 4L lymph node negative. TT VUS: ALK N411L DDR2 TMB-HIGH PEYMAN PD-L1 90% positive - MR Brain (10/16/2023) demonstrates at least 3 intracranial metastases with the largest measuring 7 mm within the right temporal lobe. 2023: Foundation on original tissue resulted: PD-L1 22c3 80%  (PD-L1) amplification - equivocal [id number] (PD-L2) amplification - equivocal NF1 * CREBBP * IKZF1 L330* NFE2L2 L30P NFKBIA amplification - equivocal NKX2-1 amplification - equivocal This Amended Report has been issued to reflect a change in reportable variants with the removal of the EGFR L858R alteration on the reanalyzed sample, resulting in the removal of the associated therapies afatinib, dacomitinib, erlotinib, gefitinib, and osimertinib, and to remove the TP53 L257R alteration on the reanalyzed sample. Please note that other aspects of this report may have changed from the previous version to reflect the most up-to-date reporting information. 2024: MRIB: Since 11/15/2023 there has been interval decrease in size and surrounding vasogenic edema of previously noted right temporal ring-enhancing lesion that is now an approximately 4 mm nodule. The previously seen posterior temporal and mesial posterior frontal enhancing nodules are no longer identified. There has been marked improvement in associated edema. Approximately 3 mm nodular focus of enhancement along the right anterior inferior frontal convexity, unchanged; findings may represent a tiny vessel. Less than 1 mm punctate focus of T1 hyperintensity/possible enhancement seen within the right occipital cortex that likely represent artifact.. 2024: CT CAP: 1.  Decreased size of previously noted right perihilar/subcarinal conglomerate mass. 2.  Scattered small pulmonary nodules, some are stable, however, 2 have increased in size as noted above. 3.  Stable mild nodular fullness of the left adrenal gland; cannot exclude metastasis.  3/11/24  onc clinic 72 y/o presents for follow up of stage IV NSCLC (adenocarcinoma) with BMs, PDL1 high. Patient report of intermittent pruritus relieved with topicals no other ac complaints. Tolerating tx well.   24 onc clinic 72 y/o m with stage IV NSCLC present for follow up. Patient report of feeling well he has no ac complaints at this time. Tolerating tx well.  [de-identified] : Adenocarcinoma - PD-L1 90% [de-identified] : Pulm:  Neurosurgery: Dr. D'Amico Radiation: Dr. Wernicke [FreeTextEntry1] : 10/30/2023: C1 pembrolizumab 11/20/23: C2 pembrolizumab 12/11/23: C3 pembrolizumab 1/9/24: C4 pembrolizumab 1/29/2024  C5; Pembro  2/20/24: C6 Pembro  3/11/24: C7 Pembro 4/1/2024  C8 Pembro  4/22/2024  C9 pembro 5/13/2024  C10 pembro 6/3/2024    C11  pembro  6/24/2024   C12  pembro     [de-identified] : Still having body itching - taking benadryl when it gets bad. still present but mostly manageable  Denies chest pain, nausea, vomiting, shortness of breath, cough.  Mostly regular bowel movements.    [90: Able to carry normal activity; minor signs or symptoms of disease.] : 90: Able to carry normal activity; minor signs or symptoms of disease.  [ECOG Performance Status: 1 - Restricted in physically strenuous activity but ambulatory and able to carry out work of a light or sedentary nature] : Performance Status: 1 - Restricted in physically strenuous activity but ambulatory and able to carry out work of a light or sedentary nature, e.g., light house work, office work

## 2024-06-05 NOTE — ASSESSMENT
[FreeTextEntry1] : 73 M ex heavy smoker who presents for follow up of stage IV NSCLC (adenocarcinoma) with BMs, PDL1 high. Started on pembrolizumab 10/30/2023.  NSCLC, adenocarcinoma T4N2M1 - stage IV PD-L1 90% positive, DDR2 mutant, TP53 mutant. EGFR was noted to be contaminant  CT CAP with overall good partial response of conglomerate mass; 2 new nodules noted (increasing ins ize, largest now 11 mm) - will continue to monitor but taken together >20% decrease in disease burden c/w [place] MRI brain 1/2/2024 - decrease in size of right temporal ring-enhancing lesion; previous posterior temporal and meial posterior frontal enhancing nodules are no longer identified. Improvement of edema as well. 3mm nodular focus along right anterior inferior frontal convexity - unchanged.  - can get routine CT C/A/P and MRI brain in late March/early April - Tolerating well, no adverse events - follows with Dr. Sanchez for pacemaker  RTC in 3 wks with tx Labs each visit: CBC, CMP, TSH  3/11/24 onc clinic  a/p  stage IV NSCLC (adenocarcinoma) with BMs, PDL1 high. Labs: stable Tolerating Pembro well CAP /MRIB scheduled for april cont to check labs and s/s upon visits. RTC 4/1/24 C8 pembro  . Encourage to contact the office for any concerns or worsening symptoms. Pt verbalizes understanding.  6/5/24 onc clinic  a/p stage IV NSCLC  Labs stable Continue to monitor labs and s/s Tolerating pembro well Pending scans in july  Next visit labs cbc, cmp , tsh / ft4  RTC in 3 weeks for f/u and tx .Encourage to contact the office for any concerns or worsening symptoms. Pt verbalizes understanding

## 2024-06-05 NOTE — ASSESSMENT
[FreeTextEntry1] : 73 M ex heavy smoker who presents for follow up of stage IV NSCLC (adenocarcinoma) with BMs, PDL1 high. Started on pembrolizumab 10/30/2023.  NSCLC, adenocarcinoma T4N2M1 - stage IV PD-L1 90% positive, DDR2 mutant, TP53 mutant. EGFR was noted to be contaminant  CT CAP with overall good partial response of conglomerate mass; 2 new nodules noted (increasing ins ize, largest now 11 mm) - will continue to monitor but taken together >20% decrease in disease burden c/w [place] MRI brain 1/2/2024 - decrease in size of right temporal ring-enhancing lesion; previous posterior temporal and meial posterior frontal enhancing nodules are no longer identified. Improvement of edema as well. 3mm nodular focus along right anterior inferior frontal convexity - unchanged.  - can get routine CT C/A/P and MRI brain in late March/early April - Tolerating well, no adverse events - follows with Dr. Sanchez for pacemaker  RTC in 3 wks with tx Labs each visit: CBC, CMP, TSH  3/11/24 onc clinic  a/p  stage IV NSCLC (adenocarcinoma) with BMs, PDL1 high. Labs: stable Tolerating Pembro well CAP /MRIB scheduled for april cont to check labs and s/s upon visits. RTC 4/1/24 C8 pembro  . Encourage to contact the office for any concerns or worsening symptoms. Pt verbalizes understanding.  6/5/24 onc clinic  a/p stage IV NSCLC  Labs stable Continue to monitor labs and s/s Tolerating pembro well Pending scans in july  Next visit labs cbc, cmp , tsh / ft4  RTC in 3 weeks for f/u and tx .Encourage to contact the office for any concerns or worsening symptoms. Pt verbalizes understanding

## 2024-06-05 NOTE — END OF VISIT
Talked with wife. She was not sure why the center sent a note home for pt to see Hepatology. Dialysis center states blood flow issues and bleeding at site after dialysis. Pending an urgent appt with Dr. William, pt's Vascular MD who revised his fistula. Will call wife once appt confirmed.   [FreeTextEntry3] : Seen with oncology fellow, .  74 yo M with stage IV PDL1 high NSCLC with brain mets, s/p 5 cycles of pembrolizumab and SRS to brain mets. CT CAP with overall >20% decrease in lung disease. BM resolved. This is c/w partial response. Repeat CT CAP and MRIB every 3 months. RTC in 3 weeks with labs as above. [] : Fellow [Time Spent: ___ minutes] : I have spent [unfilled] minutes of time on the encounter.

## 2024-06-05 NOTE — BEGINNING OF VISIT
[0] : 2) Feeling down, depressed, or hopeless: Not at all (0) [Future Reassessment of Pain Scale] : Future reassessment of pain scale

## 2024-06-07 ENCOUNTER — OFFICE (OUTPATIENT)
Dept: URBAN - METROPOLITAN AREA CLINIC 92 | Facility: CLINIC | Age: 74
Setting detail: OPHTHALMOLOGY
End: 2024-06-07
Payer: MEDICARE

## 2024-06-07 DIAGNOSIS — H25.12: ICD-10-CM

## 2024-06-07 DIAGNOSIS — H35.371: ICD-10-CM

## 2024-06-07 DIAGNOSIS — H40.033: ICD-10-CM

## 2024-06-07 DIAGNOSIS — E11.9: ICD-10-CM

## 2024-06-07 DIAGNOSIS — H25.13: ICD-10-CM

## 2024-06-07 PROBLEM — H25.11 CATARACT SENILE NUCLEAR SCLEROSIS; RIGHT EYE, LEFT EYE, BOTH EYES: Status: ACTIVE | Noted: 2024-06-07

## 2024-06-07 PROCEDURE — 92014 COMPRE OPH EXAM EST PT 1/>: CPT | Performed by: SPECIALIST

## 2024-06-07 PROCEDURE — 92136 OPHTHALMIC BIOMETRY: CPT | Mod: TC | Performed by: SPECIALIST

## 2024-06-07 PROCEDURE — 92136 OPHTHALMIC BIOMETRY: CPT | Mod: 26,LT | Performed by: SPECIALIST

## 2024-06-07 ASSESSMENT — CONFRONTATIONAL VISUAL FIELD TEST (CVF)
OD_FINDINGS: FULL
OS_FINDINGS: FULL

## 2024-06-07 ASSESSMENT — LID EXAM ASSESSMENTS
OS_MEIBOMITIS: LLL LUL 2+
OD_MEIBOMITIS: RLL RUL 2+

## 2024-06-24 ENCOUNTER — LABORATORY RESULT (OUTPATIENT)
Age: 74
End: 2024-06-24

## 2024-06-24 ENCOUNTER — APPOINTMENT (OUTPATIENT)
Dept: INFUSION THERAPY | Facility: CLINIC | Age: 74
End: 2024-06-24

## 2024-06-24 ENCOUNTER — APPOINTMENT (OUTPATIENT)
Dept: HEMATOLOGY ONCOLOGY | Facility: CLINIC | Age: 74
End: 2024-06-24
Payer: MEDICARE

## 2024-06-24 ENCOUNTER — OUTPATIENT (OUTPATIENT)
Dept: OUTPATIENT SERVICES | Facility: HOSPITAL | Age: 74
LOS: 1 days | End: 2024-06-24
Payer: MEDICARE

## 2024-06-24 VITALS
WEIGHT: 141.1 LBS | DIASTOLIC BLOOD PRESSURE: 70 MMHG | SYSTOLIC BLOOD PRESSURE: 144 MMHG | HEIGHT: 67 IN | TEMPERATURE: 98 F | OXYGEN SATURATION: 96 % | RESPIRATION RATE: 18 BRPM | HEART RATE: 73 BPM

## 2024-06-24 VITALS
DIASTOLIC BLOOD PRESSURE: 72 MMHG | TEMPERATURE: 98.1 F | BODY MASS INDEX: 22.13 KG/M2 | OXYGEN SATURATION: 96 % | RESPIRATION RATE: 18 BRPM | SYSTOLIC BLOOD PRESSURE: 144 MMHG | WEIGHT: 141 LBS | HEIGHT: 67 IN | HEART RATE: 73 BPM

## 2024-06-24 DIAGNOSIS — C34.90 MALIGNANT NEOPLASM OF UNSPECIFIED PART OF UNSPECIFIED BRONCHUS OR LUNG: ICD-10-CM

## 2024-06-24 LAB
ALBUMIN SERPL ELPH-MCNC: 3.9 G/DL
ALP BLD-CCNC: 51 U/L
ALT SERPL-CCNC: 19 U/L
ANION GAP SERPL CALC-SCNC: 12 MMOL/L
AST SERPL-CCNC: 21 U/L
BILIRUB SERPL-MCNC: 0.8 MG/DL
BUN SERPL-MCNC: 20 MG/DL
CALCIUM SERPL-MCNC: 9.7 MG/DL
CHLORIDE SERPL-SCNC: 104 MMOL/L
CO2 SERPL-SCNC: 32 MMOL/L
CREAT SERPL-MCNC: 1 MG/DL
EGFR: 78 ML/MIN/1.73M2
GLUCOSE SERPL-MCNC: 116 MG/DL
MAGNESIUM SERPL-MCNC: 2.1 MG/DL
POTASSIUM SERPL-SCNC: 4.4 MMOL/L
PROT SERPL-MCNC: 7.3 G/DL
SODIUM SERPL-SCNC: 148 MMOL/L

## 2024-06-24 PROCEDURE — 96413 CHEMO IV INFUSION 1 HR: CPT

## 2024-06-24 PROCEDURE — 99214 OFFICE O/P EST MOD 30 MIN: CPT

## 2024-06-24 PROCEDURE — G2211 COMPLEX E/M VISIT ADD ON: CPT

## 2024-06-24 PROCEDURE — 36415 COLL VENOUS BLD VENIPUNCTURE: CPT

## 2024-06-24 RX ORDER — PEMBROLIZUMAB 25 MG/ML
200 INJECTION, SOLUTION INTRAVENOUS ONCE
Refills: 0 | Status: COMPLETED | OUTPATIENT
Start: 2024-06-24 | End: 2024-06-24

## 2024-06-24 RX ADMIN — PEMBROLIZUMAB 200 MILLIGRAM(S): 25 INJECTION, SOLUTION INTRAVENOUS at 14:04

## 2024-06-24 RX ADMIN — PEMBROLIZUMAB 200 MILLIGRAM(S): 25 INJECTION, SOLUTION INTRAVENOUS at 14:35

## 2024-06-25 LAB — TSH SERPL-ACNC: 0.66 UIU/ML

## 2024-06-25 NOTE — HISTORY OF PRESENT ILLNESS
[Disease: _____________________] : Disease: [unfilled] [T: ___] : T[unfilled] [N: ___] : N[unfilled] [M: ___] : M[unfilled] [AJCC Stage: ____] : AJCC Stage: [unfilled] [90: Able to carry normal activity; minor signs or symptoms of disease.] : 90: Able to carry normal activity; minor signs or symptoms of disease.  [ECOG Performance Status: 1 - Restricted in physically strenuous activity but ambulatory and able to carry out work of a light or sedentary nature] : Performance Status: 1 - Restricted in physically strenuous activity but ambulatory and able to carry out work of a light or sedentary nature, e.g., light house work, office work [de-identified] : Mr. Christopher is a 73 M ex heavy smoker who presents for follow up of stage IV NSCLC (adenocarcinoma) with BMs, PDL1 high.  Oncologic History: - Initially presented to pulmonology regarding approximately 1 year of generalized symptoms such as chest congestion, cough, and weight loss. 40 pack year history, quit 12 years ago. - CT Chest ([facility]) reviwed by pulmonology "CT chest [facility] 2023 personally reviewed : GGO in bilateral apices; RLL spiculated lung mass with some invasion in the right mainstem bronchus. Suspect lymphangitic spread due to some interlobular septal thickening. Emphysema" - PET/CT (2023) hypermetabolic RLL pulmonary mass 4.1x3.9 cm,emphysema, hypermetabolic mediastinal and right hilar lymphadenopathy, consistent with metastatic jose disease - Bronchoscopy (10/06/2023) showing mediastinal lymphadenopathy with extrinsic compression of right upper, right middle, and right lower lobe segments, mucosal infiltration.  - Lymph node level 7 (10/6/2023), positive for malignant cells, metastatic non small cell carcinoma with features of adenocarcinoma. Level 11L negative, 4L lymph node negative. TT VUS: ALK N411L DDR2 TMB-HIGH [place]YMAN PD-L1 90% positive - MR Brain (10/16/2023) demonstrates at least 3 intracranial metastases with the largest measuring 7 mm within the right temporal lobe. 2023: Foundation on original tissue resulted: PD-L1 22c3 80%  (PD-L1) amplification - equivocal [id number] (PD-L2) amplification - equivocal NF1 * CREBBP * IKZF1 L330* NFE2L2 L30P NFKBIA amplification - equivocal NKX2-1 amplification - equivocal This Amended Report has been issued to reflect a change in reportable variants with the removal of the EGFR L858R alteration on the reanalyzed sample, resulting in the removal of the associated therapies afatinib, dacomitinib, erlotinib, gefitinib, and osimertinib, and to remove the TP53 L257R alteration on the reanalyzed sample. Please note that other aspects of this report may have changed from the previous version to reflect the most up-to-date reporting information. 2024: MRIB: Since 11/15/2023 there has been interval decrease in size and surrounding vasogenic edema of previously noted right temporal ring-enhancing lesion that is now an approximately 4 mm nodule. The previously seen posterior temporal and mesial posterior frontal enhancing nodules are no longer identified. There has been marked improvement in associated edema. Approximately 3 mm nodular focus of enhancement along the right anterior inferior frontal convexity, unchanged; findings may represent a tiny vessel. Less than 1 mm punctate focus of T1 hyperintensity/possible enhancement seen within the right occipital cortex that likely represent artifact.. 2024: CT CAP: 1.  Decreased size of previously noted right perihilar/subcarinal conglomerate mass. 2.  Scattered small pulmonary nodules, some are stable, however, 2 have increased in size as noted above. 3.  Stable mild nodular fullness of the left adrenal gland; cannot exclude metastasis.  3/11/24  onc clinic 72 y/o presents for follow up of stage IV NSCLC (adenocarcinoma) with BMs, PDL1 high. Patient report of intermittent pruritus relieved with topicals no other ac complaints. Tolerating tx well.   24 onc clinic 72 y/o m with stage IV NSCLC present for follow up. Patient report of feeling well he has no ac complaints at this time. Tolerating tx well.   24 onc clinic  72 y/o with stage IV NSCLC is here for follow up and tx,  Patient states of  feeling well. He has no ac complaints at this time.  [de-identified] : Adenocarcinoma - PD-L1 90% [de-identified] : Pulm:  Neurosurgery: Dr. D'Amico Radiation: Dr. Wernicke [FreeTextEntry1] : 10/30/2023: C1 pembrolizumab 11/20/23: C2 pembrolizumab 12/11/23: C3 pembrolizumab 1/9/24: C4 pembrolizumab 1/29/2024  C5; Pembro  2/20/24: C6 Pembro  3/11/24: C7 Pembro 4/1/2024  C8 Pembro  4/22/2024  C9 pembro 5/13/2024  C10 pembro 6/3/2024    C11  pembro  6/24/2024   C12  pembro  7/15/2024   C13    [de-identified] : Still having body itching - taking benadryl when it gets bad. still present but mostly manageable  Denies chest pain, nausea, vomiting, shortness of breath, cough.  Mostly regular bowel movements.

## 2024-06-25 NOTE — REVIEW OF SYSTEMS
[Diarrhea: Grade 0] : Diarrhea: Grade 0 [Negative] : Allergic/Immunologic [Cough] : no cough [SOB on Exertion] : no shortness of breath during exertion

## 2024-06-25 NOTE — ASSESSMENT
[FreeTextEntry1] : 73 M ex heavy smoker who presents for follow up of stage IV NSCLC (adenocarcinoma) with BMs, PDL1 high. Started on pembrolizumab 10/30/2023.  NSCLC, adenocarcinoma T4N2M1 - stage IV PD-L1 90% positive, DDR2 mutant, TP53 mutant. EGFR was noted to be contaminant  CT CAP with overall good partial response of conglomerate mass; 2 new nodules noted (increasing ins ize, largest now 11 mm) - will continue to monitor but taken together >20% decrease in disease burden c/w [place] MRI brain 1/2/2024 - decrease in size of right temporal ring-enhancing lesion; previous posterior temporal and meial posterior frontal enhancing nodules are no longer identified. Improvement of edema as well. 3mm nodular focus along right anterior inferior frontal convexity - unchanged.  - can get routine CT C/A/P and MRI brain in late March/early April - Tolerating well, no adverse events - follows with Dr. Sanchez for pacemaker  RTC in 3 wks with tx Labs each visit: CBC, CMP, TSH  3/11/24 onc clinic  a/p  stage IV NSCLC (adenocarcinoma) with BMs, PDL1 high. Labs: stable Tolerating Pembro well CAP /MRIB scheduled for april cont to check labs and s/s upon visits. RTC 4/1/24 C8 pembro  . Encourage to contact the office for any concerns or worsening symptoms. Pt verbalizes understanding.  6/5/24 onc clinic  a/p stage IV NSCLC  Labs stable Continue to monitor labs and s/s Tolerating pembro well Pending scans in july  Next visit labs cbc, cmp , tsh / ft4  RTC in 3 weeks for f/u and tx .Encourage to contact the office for any concerns or worsening symptoms. Pt verbalizes understanding  6/24/24 onc clinic  a/p stage IV NSCLC  labs stable Cont to monitor labs and s/s Proceed with C12 pembro , Cycle 12 out of 17  pending scans 7/8 RTC 7/15 for f/u, scan review and tx next visit labs cmc,cmp, tsh/.tf4 .Encourage to contact the office for any concerns or worsening symptoms. Pt verbalizes understanding

## 2024-06-25 NOTE — PHYSICAL EXAM
[Normal] : affect appropriate [de-identified] : normal gait,. . FROM , no motor or sensory deficits [de-identified] : no gross deficits

## 2024-07-09 ENCOUNTER — APPOINTMENT (OUTPATIENT)
Dept: UROLOGY | Facility: CLINIC | Age: 74
End: 2024-07-09
Payer: MEDICARE

## 2024-07-09 VITALS
HEART RATE: 74 BPM | SYSTOLIC BLOOD PRESSURE: 158 MMHG | OXYGEN SATURATION: 94 % | DIASTOLIC BLOOD PRESSURE: 78 MMHG | TEMPERATURE: 97.4 F

## 2024-07-09 PROCEDURE — 99204 OFFICE O/P NEW MOD 45 MIN: CPT

## 2024-07-11 ENCOUNTER — NON-APPOINTMENT (OUTPATIENT)
Age: 74
End: 2024-07-11

## 2024-07-12 ENCOUNTER — OUTPATIENT (OUTPATIENT)
Dept: OUTPATIENT SERVICES | Facility: HOSPITAL | Age: 74
LOS: 1 days | End: 2024-07-12
Payer: MEDICARE

## 2024-07-12 ENCOUNTER — APPOINTMENT (OUTPATIENT)
Dept: HEART AND VASCULAR | Facility: CLINIC | Age: 74
End: 2024-07-12

## 2024-07-12 ENCOUNTER — APPOINTMENT (OUTPATIENT)
Dept: CT IMAGING | Facility: HOSPITAL | Age: 74
End: 2024-07-12

## 2024-07-12 ENCOUNTER — APPOINTMENT (OUTPATIENT)
Dept: MRI IMAGING | Facility: HOSPITAL | Age: 74
End: 2024-07-12

## 2024-07-12 LAB — POCT ISTAT CREATININE: 0.9 MG/DL — SIGNIFICANT CHANGE UP (ref 0.5–1.3)

## 2024-07-12 PROCEDURE — 93280 PM DEVICE PROGR EVAL DUAL: CPT

## 2024-07-12 PROCEDURE — 71260 CT THORAX DX C+: CPT

## 2024-07-12 PROCEDURE — 71260 CT THORAX DX C+: CPT | Mod: 26,MH

## 2024-07-12 PROCEDURE — 74177 CT ABD & PELVIS W/CONTRAST: CPT

## 2024-07-12 PROCEDURE — 70553 MRI BRAIN STEM W/O & W/DYE: CPT

## 2024-07-12 PROCEDURE — 82565 ASSAY OF CREATININE: CPT

## 2024-07-12 PROCEDURE — 74177 CT ABD & PELVIS W/CONTRAST: CPT | Mod: 26,MH

## 2024-07-12 PROCEDURE — A9585: CPT

## 2024-07-12 PROCEDURE — 70553 MRI BRAIN STEM W/O & W/DYE: CPT | Mod: 26,MH

## 2024-07-15 ENCOUNTER — OUTPATIENT (OUTPATIENT)
Dept: OUTPATIENT SERVICES | Facility: HOSPITAL | Age: 74
LOS: 1 days | End: 2024-07-15
Payer: MEDICARE

## 2024-07-15 ENCOUNTER — APPOINTMENT (OUTPATIENT)
Dept: INFUSION THERAPY | Facility: CLINIC | Age: 74
End: 2024-07-15

## 2024-07-15 ENCOUNTER — APPOINTMENT (OUTPATIENT)
Dept: HEMATOLOGY ONCOLOGY | Facility: CLINIC | Age: 74
End: 2024-07-15
Payer: MEDICARE

## 2024-07-15 VITALS
WEIGHT: 138.89 LBS | DIASTOLIC BLOOD PRESSURE: 71 MMHG | RESPIRATION RATE: 18 BRPM | HEART RATE: 88 BPM | TEMPERATURE: 99 F | HEIGHT: 67 IN | SYSTOLIC BLOOD PRESSURE: 144 MMHG | OXYGEN SATURATION: 99 %

## 2024-07-15 VITALS
BODY MASS INDEX: 21.82 KG/M2 | SYSTOLIC BLOOD PRESSURE: 144 MMHG | WEIGHT: 139 LBS | RESPIRATION RATE: 18 BRPM | TEMPERATURE: 98.7 F | OXYGEN SATURATION: 96 % | HEART RATE: 91 BPM | DIASTOLIC BLOOD PRESSURE: 71 MMHG | HEIGHT: 67 IN

## 2024-07-15 DIAGNOSIS — C79.31 MALIGNANT NEOPLASM OF UNSPECIFIED PART OF UNSPECIFIED BRONCHUS OR LUNG: ICD-10-CM

## 2024-07-15 DIAGNOSIS — C34.90 MALIGNANT NEOPLASM OF UNSPECIFIED PART OF UNSPECIFIED BRONCHUS OR LUNG: ICD-10-CM

## 2024-07-15 LAB
ALBUMIN SERPL ELPH-MCNC: 4.2 G/DL
ALP BLD-CCNC: 49 U/L
ALT SERPL-CCNC: 22 U/L
ANION GAP SERPL CALC-SCNC: 9 MMOL/L
AST SERPL-CCNC: 24 U/L
BILIRUB SERPL-MCNC: 0.8 MG/DL
BUN SERPL-MCNC: 15 MG/DL
CALCIUM SERPL-MCNC: 10.3 MG/DL
CHLORIDE SERPL-SCNC: 102 MMOL/L
CO2 SERPL-SCNC: 32 MMOL/L
CREAT SERPL-MCNC: 0.8 MG/DL
EGFR: 92 ML/MIN/1.73M2
GLUCOSE SERPL-MCNC: 131 MG/DL
HCT VFR BLD CALC: 47.5 %
HGB BLD-MCNC: 15.5 G/DL
LYMPHOCYTES # BLD AUTO: 2.1 K/UL
LYMPHOCYTES NFR BLD AUTO: 22.1 %
MAN DIFF?: NO
MCHC RBC-ENTMCNC: 28 PG
MCHC RBC-ENTMCNC: 32.6 GM/DL
MCV RBC AUTO: 85.7 FL
NEUTROPHILS # BLD AUTO: 6.7 K/UL
NEUTROPHILS NFR BLD AUTO: 70.2 %
PLATELET # BLD AUTO: 220 K/UL
POTASSIUM SERPL-SCNC: 3.9 MMOL/L
PROT SERPL-MCNC: 7.9 G/DL
RBC # BLD: 5.54 M/UL
RBC # FLD: 15.3 %
SODIUM SERPL-SCNC: 143 MMOL/L
WBC # FLD AUTO: 9.5 K/UL

## 2024-07-15 PROCEDURE — 96413 CHEMO IV INFUSION 1 HR: CPT

## 2024-07-15 PROCEDURE — 99215 OFFICE O/P EST HI 40 MIN: CPT

## 2024-07-15 PROCEDURE — G2211 COMPLEX E/M VISIT ADD ON: CPT

## 2024-07-15 RX ORDER — PEMBROLIZUMAB 25 MG/ML
200 INJECTION, SOLUTION INTRAVENOUS ONCE
Refills: 0 | Status: COMPLETED | OUTPATIENT
Start: 2024-07-15 | End: 2024-07-15

## 2024-07-15 RX ADMIN — PEMBROLIZUMAB 200 MILLIGRAM(S): 25 INJECTION, SOLUTION INTRAVENOUS at 12:20

## 2024-07-15 RX ADMIN — PEMBROLIZUMAB 200 MILLIGRAM(S): 25 INJECTION, SOLUTION INTRAVENOUS at 11:45

## 2024-07-16 LAB — TSH SERPL-ACNC: 0.69 UIU/ML

## 2024-07-18 DIAGNOSIS — C34.91 MALIGNANT NEOPLASM OF UNSPECIFIED PART OF RIGHT BRONCHUS OR LUNG: ICD-10-CM

## 2024-07-18 DIAGNOSIS — I70.8 ATHEROSCLEROSIS OF OTHER ARTERIES: ICD-10-CM

## 2024-07-18 DIAGNOSIS — I70.0 ATHEROSCLEROSIS OF AORTA: ICD-10-CM

## 2024-07-18 DIAGNOSIS — N40.0 BENIGN PROSTATIC HYPERPLASIA WITHOUT LOWER URINARY TRACT SYMPTOMS: ICD-10-CM

## 2024-07-18 DIAGNOSIS — I25.10 ATHEROSCLEROTIC HEART DISEASE OF NATIVE CORONARY ARTERY WITHOUT ANGINA PECTORIS: ICD-10-CM

## 2024-07-18 DIAGNOSIS — N32.89 OTHER SPECIFIED DISORDERS OF BLADDER: ICD-10-CM

## 2024-07-18 DIAGNOSIS — K57.30 DIVERTICULOSIS OF LARGE INTESTINE WITHOUT PERFORATION OR ABSCESS WITHOUT BLEEDING: ICD-10-CM

## 2024-07-18 DIAGNOSIS — Z95.818 PRESENCE OF OTHER CARDIAC IMPLANTS AND GRAFTS: ICD-10-CM

## 2024-07-18 DIAGNOSIS — G93.89 OTHER SPECIFIED DISORDERS OF BRAIN: ICD-10-CM

## 2024-07-18 DIAGNOSIS — R90.82 WHITE MATTER DISEASE, UNSPECIFIED: ICD-10-CM

## 2024-07-18 DIAGNOSIS — K42.9 UMBILICAL HERNIA WITHOUT OBSTRUCTION OR GANGRENE: ICD-10-CM

## 2024-07-18 DIAGNOSIS — E27.8 OTHER SPECIFIED DISORDERS OF ADRENAL GLAND: ICD-10-CM

## 2024-07-18 DIAGNOSIS — R91.8 OTHER NONSPECIFIC ABNORMAL FINDING OF LUNG FIELD: ICD-10-CM

## 2024-07-18 DIAGNOSIS — J43.2 CENTRILOBULAR EMPHYSEMA: ICD-10-CM

## 2024-07-18 DIAGNOSIS — N32.3 DIVERTICULUM OF BLADDER: ICD-10-CM

## 2024-07-18 DIAGNOSIS — N28.1 CYST OF KIDNEY, ACQUIRED: ICD-10-CM

## 2024-07-18 DIAGNOSIS — C79.31 SECONDARY MALIGNANT NEOPLASM OF BRAIN: ICD-10-CM

## 2024-08-05 ENCOUNTER — OUTPATIENT (OUTPATIENT)
Dept: OUTPATIENT SERVICES | Facility: HOSPITAL | Age: 74
LOS: 1 days | End: 2024-08-05
Payer: MEDICARE

## 2024-08-05 ENCOUNTER — APPOINTMENT (OUTPATIENT)
Dept: HEMATOLOGY ONCOLOGY | Facility: CLINIC | Age: 74
End: 2024-08-05

## 2024-08-05 ENCOUNTER — APPOINTMENT (OUTPATIENT)
Dept: INFUSION THERAPY | Facility: CLINIC | Age: 74
End: 2024-08-05

## 2024-08-05 VITALS
TEMPERATURE: 98 F | DIASTOLIC BLOOD PRESSURE: 66 MMHG | RESPIRATION RATE: 18 BRPM | HEART RATE: 72 BPM | SYSTOLIC BLOOD PRESSURE: 121 MMHG | WEIGHT: 141.54 LBS | OXYGEN SATURATION: 96 % | HEIGHT: 66 IN

## 2024-08-05 DIAGNOSIS — C34.90 MALIGNANT NEOPLASM OF UNSPECIFIED PART OF UNSPECIFIED BRONCHUS OR LUNG: ICD-10-CM

## 2024-08-05 PROCEDURE — 36415 COLL VENOUS BLD VENIPUNCTURE: CPT

## 2024-08-05 PROCEDURE — G2211 COMPLEX E/M VISIT ADD ON: CPT

## 2024-08-05 PROCEDURE — 99214 OFFICE O/P EST MOD 30 MIN: CPT

## 2024-08-05 PROCEDURE — 96413 CHEMO IV INFUSION 1 HR: CPT

## 2024-08-05 RX ORDER — PEMBROLIZUMAB 25 MG/ML
200 INJECTION, SOLUTION INTRAVENOUS ONCE
Refills: 0 | Status: COMPLETED | OUTPATIENT
Start: 2024-08-05 | End: 2024-08-05

## 2024-08-05 RX ADMIN — PEMBROLIZUMAB 200 MILLIGRAM(S): 25 INJECTION, SOLUTION INTRAVENOUS at 12:09

## 2024-08-09 NOTE — END OF VISIT
[] : Fellow [Time Spent: ___ minutes] : I have spent [unfilled] minutes of time on the encounter. [FreeTextEntry3] : Seen with oncology fellow, .  74 yo M with stage IV PDL1 high NSCLC with brain mets. S/p SRS to BMs. CT CAP with possibly new lung nodes in RLL - will review at [facility] this week. Would be unusual to get response in LAD and new mets. Reviewed images in detail with patient. No evidence of irAEs. Favor repeat CT CAP in 3 months with MRIB. Proceed with pemrbolizumab today.

## 2024-08-09 NOTE — PHYSICAL EXAM
[Normal] : affect appropriate [de-identified] : normal gait,. . FROM , no motor or sensory deficits [de-identified] : no gross deficits

## 2024-08-09 NOTE — HISTORY OF PRESENT ILLNESS
[Disease: _____________________] : Disease: [unfilled] [T: ___] : T[unfilled] [N: ___] : N[unfilled] [M: ___] : M[unfilled] [AJCC Stage: ____] : AJCC Stage: [unfilled] [90: Able to carry normal activity; minor signs or symptoms of disease.] : 90: Able to carry normal activity; minor signs or symptoms of disease.  [ECOG Performance Status: 1 - Restricted in physically strenuous activity but ambulatory and able to carry out work of a light or sedentary nature] : Performance Status: 1 - Restricted in physically strenuous activity but ambulatory and able to carry out work of a light or sedentary nature, e.g., light house work, office work [de-identified] : Mr. Christopher is a 73 M ex heavy smoker who presents for follow up of stage IV NSCLC (adenocarcinoma) with BMs, PDL1 high.  Oncologic History: - Initially presented to pulmonology regarding approximately 1 year of generalized symptoms such as chest congestion, cough, and weight loss. 40 pack year history, quit 12 years ago. - CT Chest ([facility]) reviwed by pulmonology "CT chest [facility] 2023 personally reviewed : GGO in bilateral apices; RLL spiculated lung mass with some invasion in the right mainstem bronchus. Suspect lymphangitic spread due to some interlobular septal thickening. Emphysema" - PET/CT (2023) hypermetabolic RLL pulmonary mass 4.1x3.9 cm,emphysema, hypermetabolic mediastinal and right hilar lymphadenopathy, consistent with metastatic jose disease - Bronchoscopy (10/06/2023) showing mediastinal lymphadenopathy with extrinsic compression of right upper, right middle, and right lower lobe segments, mucosal infiltration.  - Lymph node level 7 (10/6/2023), positive for malignant cells, metastatic non small cell carcinoma with features of adenocarcinoma. Level 11L negative, 4L lymph node negative. TT VUS: ALK N411L DDR2 TMB-HIGH [place]YMAN PD-L1 90% positive - MR Brain (10/16/2023) demonstrates at least 3 intracranial metastases with the largest measuring 7 mm within the right temporal lobe. 2023: Foundation on original tissue resulted: PD-L1 22c3 80%  (PD-L1) amplification - equivocal [id number] (PD-L2) amplification - equivocal NF1 * CREBBP * IKZF1 L330* NFE2L2 L30P NFKBIA amplification - equivocal NKX2-1 amplification - equivocal This Amended Report has been issued to reflect a change in reportable variants with the removal of the EGFR L858R alteration on the reanalyzed sample, resulting in the removal of the associated therapies afatinib, dacomitinib, erlotinib, gefitinib, and osimertinib, and to remove the TP53 L257R alteration on the reanalyzed sample. Please note that other aspects of this report may have changed from the previous version to reflect the most up-to-date reporting information. 2024: MRIB: Since 11/15/2023 there has been interval decrease in size and surrounding vasogenic edema of previously noted right temporal ring-enhancing lesion that is now an approximately 4 mm nodule. The previously seen posterior temporal and mesial posterior frontal enhancing nodules are no longer identified. There has been marked improvement in associated edema. Approximately 3 mm nodular focus of enhancement along the right anterior inferior frontal convexity, unchanged; findings may represent a tiny vessel. Less than 1 mm punctate focus of T1 hyperintensity/possible enhancement seen within the right occipital cortex that likely represent artifact.. 2024: CT CAP: 1.  Decreased size of previously noted right perihilar/subcarinal conglomerate mass. 2.  Scattered small pulmonary nodules, some are stable, however, 2 have increased in size as noted above. 3.  Stable mild nodular fullness of the left adrenal gland; cannot exclude metastasis.  2024 CT C/A/P 1.  As noted previously, multiple varied sized small pulmonary nodules. Small new nodules right lower lobe as described above. These are indeterminate, too small to characterize. Recommend continued follow-up. 2.  Slight decrease in size of right perihilar/subcarinal conglomerate mass. 3.  Stable mild nodular fullness of the left adrenal gland.  24 MRI Brain Near complete resolution of metastatic lesion in the posterior RIGHT temporal lobe now measuring 1 to 2 mm. No other metastatic lesions are identified. Mild pontine, periventricular and scattered deep and subcortical white matter ischemia. Tiny old cortical infarction about the RIGHT medial RIGHT central sulcus at the convexity.  MRI Brain 24 Since 2024, continued interval decrease in now punctate focus of enhancement in the right temporal lobe corresponding to previous rim-enhancing metastatic lesion. No new area of enhancement are signal abnormality to suggest new metastases.  CT/A/P 7/15/24  1. Since 2024, essentially unchanged size of right perihilar/subcarinal conglomerate mass. 2. Overall increase in pulmonary nodule burden, with several new nodules appearing in the right lower lobe superior segment measuring up to 8 mm. Consider short interval 3 month CT follow-up.  24 onc clinic    73 M ex heavy smoker who presents for follow up of stage IV NSCLC (adenocarcinoma) with BMs, PDL1 high. Patient presents for follow up and tx. He states of feeling well. and has no ac complaints. Denies , dizziness, n, v, d, or neuropathy. No worsening sob. States he feel fine.   [de-identified] : Adenocarcinoma - PD-L1 90% [de-identified] : Pulm:  Neurosurgery: Dr. D'Amico Radiation: Dr. Wernicke [FreeTextEntry1] : 10/30/2023: C1 pembrolizumab 11/20/23: C2 pembrolizumab 12/11/23: C3 pembrolizumab 1/9/24: C4 pembrolizumab 1/29/2024  C5; Pembro  2/20/24: C6 Pembro  3/11/24: C7 Pembro 4/1/2024  C8 Pembro  4/22/2024  C9 pembro 5/13/2024  C10 pembro 6/3/2024    C11  pembro  6/24/2024   C12  pembro  7/15/2024   C13 pembrolizumab 8/5/2024     C14 pembrolizumab    [de-identified] : Manageable body itching - takes benadryl as needed Denies any acute symptoms.  denies chest pain, shortenss of breath.  Trying to eat multiple meals daily Weight loss of 2 pounds since last appt

## 2024-08-09 NOTE — HISTORY OF PRESENT ILLNESS
[Disease: _____________________] : Disease: [unfilled] [T: ___] : T[unfilled] [N: ___] : N[unfilled] [M: ___] : M[unfilled] [AJCC Stage: ____] : AJCC Stage: [unfilled] [90: Able to carry normal activity; minor signs or symptoms of disease.] : 90: Able to carry normal activity; minor signs or symptoms of disease.  [ECOG Performance Status: 1 - Restricted in physically strenuous activity but ambulatory and able to carry out work of a light or sedentary nature] : Performance Status: 1 - Restricted in physically strenuous activity but ambulatory and able to carry out work of a light or sedentary nature, e.g., light house work, office work [de-identified] : Mr. Christopher is a 73 M ex heavy smoker who presents for follow up of stage IV NSCLC (adenocarcinoma) with BMs, PDL1 high.  Oncologic History: - Initially presented to pulmonology regarding approximately 1 year of generalized symptoms such as chest congestion, cough, and weight loss. 40 pack year history, quit 12 years ago. - CT Chest ([facility]) reviwed by pulmonology "CT chest [facility] 2023 personally reviewed : GGO in bilateral apices; RLL spiculated lung mass with some invasion in the right mainstem bronchus. Suspect lymphangitic spread due to some interlobular septal thickening. Emphysema" - PET/CT (2023) hypermetabolic RLL pulmonary mass 4.1x3.9 cm,emphysema, hypermetabolic mediastinal and right hilar lymphadenopathy, consistent with metastatic jose disease - Bronchoscopy (10/06/2023) showing mediastinal lymphadenopathy with extrinsic compression of right upper, right middle, and right lower lobe segments, mucosal infiltration.  - Lymph node level 7 (10/6/2023), positive for malignant cells, metastatic non small cell carcinoma with features of adenocarcinoma. Level 11L negative, 4L lymph node negative. TT VUS: ALK N411L DDR2 TMB-HIGH PEYMAN PD-L1 90% positive - MR Brain (10/16/2023) demonstrates at least 3 intracranial metastases with the largest measuring 7 mm within the right temporal lobe. 2023: Foundation on original tissue resulted: PD-L1 22c3 80%  (PD-L1) amplification - equivocal [id number] (PD-L2) amplification - equivocal NF1 * CREBBP * IKZF1 L330* NFE2L2 L30P NFKBIA amplification - equivocal NKX2-1 amplification - equivocal This Amended Report has been issued to reflect a change in reportable variants with the removal of the EGFR L858R alteration on the reanalyzed sample, resulting in the removal of the associated therapies afatinib, dacomitinib, erlotinib, gefitinib, and osimertinib, and to remove the TP53 L257R alteration on the reanalyzed sample. Please note that other aspects of this report may have changed from the previous version to reflect the most up-to-date reporting information. 2024: MRIB: Since 11/15/2023 there has been interval decrease in size and surrounding vasogenic edema of previously noted right temporal ring-enhancing lesion that is now an approximately 4 mm nodule. The previously seen posterior temporal and mesial posterior frontal enhancing nodules are no longer identified. There has been marked improvement in associated edema. Approximately 3 mm nodular focus of enhancement along the right anterior inferior frontal convexity, unchanged; findings may represent a tiny vessel. Less than 1 mm punctate focus of T1 hyperintensity/possible enhancement seen within the right occipital cortex that likely represent artifact.. 2024: CT CAP: 1.  Decreased size of previously noted right perihilar/subcarinal conglomerate mass. 2.  Scattered small pulmonary nodules, some are stable, however, 2 have increased in size as noted above. 3.  Stable mild nodular fullness of the left adrenal gland; cannot exclude metastasis.  2024 CT C/A/P 1.  As noted previously, multiple varied sized small pulmonary nodules. Small new nodules right lower lobe as described above. These are indeterminate, too small to characterize. Recommend continued follow-up. 2.  Slight decrease in size of right perihilar/subcarinal conglomerate mass. 3.  Stable mild nodular fullness of the left adrenal gland.  24 MRI Brain Near complete resolution of metastatic lesion in the posterior RIGHT temporal lobe now measuring 1 to 2 mm. No other metastatic lesions are identified. Mild pontine, periventricular and scattered deep and subcortical white matter ischemia. Tiny old cortical infarction about the RIGHT medial RIGHT central sulcus at the convexity.  MRI Brain 24 Since 2024, continued interval decrease in now punctate focus of enhancement in the right temporal lobe corresponding to previous rim-enhancing metastatic lesion. No new area of enhancement are signal abnormality to suggest new metastases.  CT/A/P 7/15/24  1. Since 2024, essentially unchanged size of right perihilar/subcarinal conglomerate mass. 2. Overall increase in pulmonary nodule burden, with several new nodules appearing in the right lower lobe superior segment measuring up to 8 mm. Consider short interval 3 month CT follow-up.  24 onc clinic    73 M ex heavy smoker who presents for follow up of stage IV NSCLC (adenocarcinoma) with BMs, PDL1 high. Patient presents for follow up and tx. He states of feeling well. and has no ac complaints. Denies , dizziness, n, v, d, or neuropathy. No worsening sob. States he feel fine.   [de-identified] : Adenocarcinoma - PD-L1 90% [de-identified] : Pulm:  Neurosurgery: Dr. D'Amico Radiation: Dr. Wernicke [FreeTextEntry1] : 10/30/2023: C1 pembrolizumab 11/20/23: C2 pembrolizumab 12/11/23: C3 pembrolizumab 1/9/24: C4 pembrolizumab 1/29/2024  C5; Pembro  2/20/24: C6 Pembro  3/11/24: C7 Pembro 4/1/2024  C8 Pembro  4/22/2024  C9 pembro 5/13/2024  C10 pembro 6/3/2024    C11  pembro  6/24/2024   C12  pembro  7/15/2024   C13 pembrolizumab 8/5/2024     C14 pembrolizumab    [de-identified] : Manageable body itching - takes benadryl as needed Denies any acute symptoms.  denies chest pain, shortenss of breath.  Trying to eat multiple meals daily Weight loss of 2 pounds since last appt

## 2024-08-09 NOTE — END OF VISIT
[] : Fellow [Time Spent: ___ minutes] : I have spent [unfilled] minutes of time on the encounter. [FreeTextEntry3] : Seen with oncology fellow, .  72 yo M with stage IV PDL1 high NSCLC with brain mets. S/p SRS to BMs. CT CAP with possibly new lung nodes in RLL - will review at [facility] this week. Would be unusual to get response in LAD and new mets. Reviewed images in detail with patient. No evidence of irAEs. Favor repeat CT CAP in 3 months with MRIB. Proceed with pemrbolizumab today.

## 2024-08-09 NOTE — HISTORY OF PRESENT ILLNESS
[Disease: _____________________] : Disease: [unfilled] [T: ___] : T[unfilled] [N: ___] : N[unfilled] [M: ___] : M[unfilled] [AJCC Stage: ____] : AJCC Stage: [unfilled] [90: Able to carry normal activity; minor signs or symptoms of disease.] : 90: Able to carry normal activity; minor signs or symptoms of disease.  [ECOG Performance Status: 1 - Restricted in physically strenuous activity but ambulatory and able to carry out work of a light or sedentary nature] : Performance Status: 1 - Restricted in physically strenuous activity but ambulatory and able to carry out work of a light or sedentary nature, e.g., light house work, office work [de-identified] : Mr. Christopher is a 73 M ex heavy smoker who presents for follow up of stage IV NSCLC (adenocarcinoma) with BMs, PDL1 high.  Oncologic History: - Initially presented to pulmonology regarding approximately 1 year of generalized symptoms such as chest congestion, cough, and weight loss. 40 pack year history, quit 12 years ago. - CT Chest ([facility]) reviwed by pulmonology "CT chest [facility] 2023 personally reviewed : GGO in bilateral apices; RLL spiculated lung mass with some invasion in the right mainstem bronchus. Suspect lymphangitic spread due to some interlobular septal thickening. Emphysema" - PET/CT (2023) hypermetabolic RLL pulmonary mass 4.1x3.9 cm,emphysema, hypermetabolic mediastinal and right hilar lymphadenopathy, consistent with metastatic jose disease - Bronchoscopy (10/06/2023) showing mediastinal lymphadenopathy with extrinsic compression of right upper, right middle, and right lower lobe segments, mucosal infiltration.  - Lymph node level 7 (10/6/2023), positive for malignant cells, metastatic non small cell carcinoma with features of adenocarcinoma. Level 11L negative, 4L lymph node negative. TT VUS: ALK N411L DDR2 TMB-HIGH [place] PD-L1 90% positive - MR Brain (10/16/2023) demonstrates at least 3 intracranial metastases with the largest measuring 7 mm within the right temporal lobe. 2023: Foundation on original tissue resulted: PD-L1 22c3 80%  (PD-L1) amplification - equivocal [id number] (PD-L2) amplification - equivocal NF1 * CREBBP * IKZF1 L330* NFE2L2 L30P NFKBIA amplification - equivocal NKX2-1 amplification - equivocal This Amended Report has been issued to reflect a change in reportable variants with the removal of the EGFR L858R alteration on the reanalyzed sample, resulting in the removal of the associated therapies afatinib, dacomitinib, erlotinib, gefitinib, and osimertinib, and to remove the TP53 L257R alteration on the reanalyzed sample. Please note that other aspects of this report may have changed from the previous version to reflect the most up-to-date reporting information. 2024: MRIB: Since 11/15/2023 there has been interval decrease in size and surrounding vasogenic edema of previously noted right temporal ring-enhancing lesion that is now an approximately 4 mm nodule. The previously seen posterior temporal and mesial posterior frontal enhancing nodules are no longer identified. There has been marked improvement in associated edema. Approximately 3 mm nodular focus of enhancement along the right anterior inferior frontal convexity, unchanged; findings may represent a tiny vessel. Less than 1 mm punctate focus of T1 hyperintensity/possible enhancement seen within the right occipital cortex that likely represent artifact.. 2024: CT CAP: 1.  Decreased size of previously noted right perihilar/subcarinal conglomerate mass. 2.  Scattered small pulmonary nodules, some are stable, however, 2 have increased in size as noted above. 3.  Stable mild nodular fullness of the left adrenal gland; cannot exclude metastasis.  2024 CT C/A/P 1.  As noted previously, multiple varied sized small pulmonary nodules. Small new nodules right lower lobe as described above. These are indeterminate, too small to characterize. Recommend continued follow-up. 2.  Slight decrease in size of right perihilar/subcarinal conglomerate mass. 3.  Stable mild nodular fullness of the left adrenal gland.  24 MRI Brain Near complete resolution of metastatic lesion in the posterior RIGHT temporal lobe now measuring 1 to 2 mm. No other metastatic lesions are identified. Mild pontine, periventricular and scattered deep and subcortical white matter ischemia. Tiny old cortical infarction about the RIGHT medial RIGHT central sulcus at the convexity.  MRI Brain 24 Since 2024, continued interval decrease in now punctate focus of enhancement in the right temporal lobe corresponding to previous rim-enhancing metastatic lesion. No new area of enhancement are signal abnormality to suggest new metastases.  CT/A/P 7/15/24  1. Since 2024, essentially unchanged size of right perihilar/subcarinal conglomerate mass. 2. Overall increase in pulmonary nodule burden, with several new nodules appearing in the right lower lobe superior segment measuring up to 8 mm. Consider short interval 3 month CT follow-up.  24 onc clinic    73 M ex heavy smoker who presents for follow up of stage IV NSCLC (adenocarcinoma) with BMs, PDL1 high. Patient presents for follow up and tx. He states of feeling well. and has no ac complaints. Denies , dizziness, n, v, d, or neuropathy. No worsening sob. States he feel fine.   [de-identified] : Adenocarcinoma - PD-L1 90% [de-identified] : Pulm:  Neurosurgery: Dr. D'Amico Radiation: Dr. Wernicke [FreeTextEntry1] : 10/30/2023: C1 pembrolizumab 11/20/23: C2 pembrolizumab 12/11/23: C3 pembrolizumab 1/9/24: C4 pembrolizumab 1/29/2024  C5; Pembro  2/20/24: C6 Pembro  3/11/24: C7 Pembro 4/1/2024  C8 Pembro  4/22/2024  C9 pembro 5/13/2024  C10 pembro 6/3/2024    C11  pembro  6/24/2024   C12  pembro  7/15/2024   C13 pembrolizumab 8/5/2024     C14 pembrolizumab    [de-identified] : Manageable body itching - takes benadryl as needed Denies any acute symptoms.  denies chest pain, shortenss of breath.  Trying to eat multiple meals daily Weight loss of 2 pounds since last appt

## 2024-08-09 NOTE — ASSESSMENT
[FreeTextEntry1] : 73 M ex heavy smoker who presents for follow up of stage IV NSCLC (adenocarcinoma) with BMs, PDL1 high. Started on pembrolizumab 10/30/2023.  NSCLC, adenocarcinoma [id number] - stage IV PD-L1 90% positive, DDR2 mutant, TP53 mutant. EGFR was noted to be contaminant  - started pembrolizumab 10/2023 - s/p SRS to brain metastases 11/16/23 - CT C/A/P 7/15/24  1. Since 4/5/2024, essentially unchanged size of right perihilar/subcarinal conglomerate mass. 2. Overall increase in pulmonary nodule burden, with several new nodules appearing in the right lower lobe superior segment measuring up to 8 mm. Consider short interval 3 month CT follow-up. - MRI Brain 7/12/24 Since 4/5/2024, continued interval decrease in now punctate focus of enhancement in the right temporal lobe corresponding to previous rim-enhancing metastatic lesion. No new area of enhancement are signal abnormality to suggest new metastases. - scans appear to overall be responding to treatment. Suspect new pulmonary burden may be 2/2 to immunotherapy/inflammation rather than true metastases. We will present his case in Tumor Board 7/18/24 - okay for treatment today C13 pembrolizumab  - follows with Dr. Sanchez for pacemaker  Discussed with Dr. Tovar.   8/5/24  onc clinic  a/p NSCLC stage IV  Labs Stable Proceed with Cycle 14 pembrolizumab  Continue to monitor cbc, cmp , TSh/ft4  Continue to monitor s./s  Pending rpt scans 8/23  RTC in 3 wks for F/u and tx Appt 8/26 for scan review w/ Dr. Tovar.  .Encourage to contact the office for any concerns or worsening symptoms. Pt verbalizes understanding

## 2024-08-09 NOTE — PHYSICAL EXAM
[Normal] : affect appropriate [de-identified] : normal gait,. . FROM , no motor or sensory deficits [de-identified] : no gross deficits

## 2024-08-09 NOTE — PHYSICAL EXAM
[Normal] : affect appropriate [de-identified] : normal gait,. . FROM , no motor or sensory deficits [de-identified] : no gross deficits

## 2024-08-23 ENCOUNTER — APPOINTMENT (OUTPATIENT)
Dept: CT IMAGING | Facility: HOSPITAL | Age: 74
End: 2024-08-23

## 2024-08-23 ENCOUNTER — OUTPATIENT (OUTPATIENT)
Dept: OUTPATIENT SERVICES | Facility: HOSPITAL | Age: 74
LOS: 1 days | End: 2024-08-23
Payer: MEDICARE

## 2024-08-23 PROCEDURE — 71260 CT THORAX DX C+: CPT

## 2024-08-23 PROCEDURE — 71260 CT THORAX DX C+: CPT | Mod: 26,MH

## 2024-08-23 PROCEDURE — 82565 ASSAY OF CREATININE: CPT

## 2024-08-23 NOTE — PHYSICAL EXAM
[Normal] : affect appropriate [de-identified] : normal gait,. . FROM , no motor or sensory deficits [de-identified] : no gross deficits

## 2024-08-23 NOTE — END OF VISIT
[] : Fellow [FreeTextEntry3] : Seen with oncology fellow, .  74 yo M with stage IV PDL1 high NSCLC with brain mets. S/p SRS to BMs. CT CAP with possibly new lung nodes in RLL - will review at [facility] this week. Would be unusual to get response in LAD and new mets. Reviewed images in detail with patient. No evidence of irAEs. Favor repeat CT CAP in 3 months with MRIB. Proceed with pemrbolizumab today. [Time Spent: ___ minutes] : I have spent [unfilled] minutes of time on the encounter which excludes teaching and separately reported services.

## 2024-08-23 NOTE — HISTORY OF PRESENT ILLNESS
[Disease: _____________________] : Disease: [unfilled] [T: ___] : T[unfilled] [N: ___] : N[unfilled] [M: ___] : M[unfilled] [AJCC Stage: ____] : AJCC Stage: [unfilled] [de-identified] : Mr. Christopher is a 73 M ex heavy smoker who presents for follow up of stage IV NSCLC (adenocarcinoma) with BMs, PDL1 high.  Oncologic History: - Initially presented to pulmonology regarding approximately 1 year of generalized symptoms such as chest congestion, cough, and weight loss. 40 pack year history, quit 12 years ago. - CT Chest ([facility]) reviwed by pulmonology "CT chest [facility] 2023 personally reviewed : GGO in bilateral apices; RLL spiculated lung mass with some invasion in the right mainstem bronchus. Suspect lymphangitic spread due to some interlobular septal thickening. Emphysema" - PET/CT (2023) hypermetabolic RLL pulmonary mass 4.1x3.9 cm,emphysema, hypermetabolic mediastinal and right hilar lymphadenopathy, consistent with metastatic jose disease - Bronchoscopy (10/06/2023) showing mediastinal lymphadenopathy with extrinsic compression of right upper, right middle, and right lower lobe segments, mucosal infiltration.  - Lymph node level 7 (10/6/2023), positive for malignant cells, metastatic non small cell carcinoma with features of adenocarcinoma. Level 11L negative, 4L lymph node negative. TT VUS: ALK N411L DDR2 TMB-HIGH [place] PD-L1 90% positive - MR Brain (10/16/2023) demonstrates at least 3 intracranial metastases with the largest measuring 7 mm within the right temporal lobe. 2023: Foundation on original tissue resulted: PD-L1 22c3 80%  (PD-L1) amplification - equivocal [id number] (PD-L2) amplification - equivocal NF1 * CREBBP * IKZF1 L330* NFE2L2 L30P NFKBIA amplification - equivocal NKX2-1 amplification - equivocal This Amended Report has been issued to reflect a change in reportable variants with the removal of the EGFR L858R alteration on the reanalyzed sample, resulting in the removal of the associated therapies afatinib, dacomitinib, erlotinib, gefitinib, and osimertinib, and to remove the TP53 L257R alteration on the reanalyzed sample. Please note that other aspects of this report may have changed from the previous version to reflect the most up-to-date reporting information. 2024: MRIB: Since 11/15/2023 there has been interval decrease in size and surrounding vasogenic edema of previously noted right temporal ring-enhancing lesion that is now an approximately 4 mm nodule. The previously seen posterior temporal and mesial posterior frontal enhancing nodules are no longer identified. There has been marked improvement in associated edema. Approximately 3 mm nodular focus of enhancement along the right anterior inferior frontal convexity, unchanged; findings may represent a tiny vessel. Less than 1 mm punctate focus of T1 hyperintensity/possible enhancement seen within the right occipital cortex that likely represent artifact.. 2024: CT CAP: 1.  Decreased size of previously noted right perihilar/subcarinal conglomerate mass. 2.  Scattered small pulmonary nodules, some are stable, however, 2 have increased in size as noted above. 3.  Stable mild nodular fullness of the left adrenal gland; cannot exclude metastasis.  2024 CT C/A/P 1.  As noted previously, multiple varied sized small pulmonary nodules. Small new nodules right lower lobe as described above. These are indeterminate, too small to characterize. Recommend continued follow-up. 2.  Slight decrease in size of right perihilar/subcarinal conglomerate mass. 3.  Stable mild nodular fullness of the left adrenal gland.  24 MRI Brain Near complete resolution of metastatic lesion in the posterior RIGHT temporal lobe now measuring 1 to 2 mm. No other metastatic lesions are identified. Mild pontine, periventricular and scattered deep and subcortical white matter ischemia. Tiny old cortical infarction about the RIGHT medial RIGHT central sulcus at the convexity.  MRI Brain 24 Since 2024, continued interval decrease in now punctate focus of enhancement in the right temporal lobe corresponding to previous rim-enhancing metastatic lesion. No new area of enhancement are signal abnormality to suggest new metastases.  CT/A/P 7/15/24  1. Since 2024, essentially unchanged size of right perihilar/subcarinal conglomerate mass. 2. Overall increase in pulmonary nodule burden, with several new nodules appearing in the right lower lobe superior segment measuring up to 8 mm. Consider short interval 3 month CT follow-up.  24 onc clinic    73 M ex heavy smoker who presents for follow up of stage IV NSCLC (adenocarcinoma) with BMs, PDL1 high. Patient presents for follow up and tx. He states of feeling well. and has no ac complaints. Denies , dizziness, n, v, d, or neuropathy. No worsening sob. States he feel fine.   [de-identified] : Adenocarcinoma - PD-L1 90% [de-identified] : Pulm:  Neurosurgery: Dr. D'Amico Radiation: Dr. Wernicke [FreeTextEntry1] : 10/30/2023: C1 pembrolizumab 11/20/23: C2 pembrolizumab 12/11/23: C3 pembrolizumab 1/9/24: C4 pembrolizumab 1/29/2024  C5; Pembro  2/20/24: C6 Pembro  3/11/24: C7 Pembro 4/1/2024  C8 Pembro  4/22/2024  C9 pembro 5/13/2024  C10 pembro 6/3/2024    C11  pembro  6/24/2024   C12  pembro  7/15/2024   C13 pembrolizumab 8/5/2024     C14 pembrolizumab    [de-identified] : Manageable body itching - takes benadryl as needed Denies any acute symptoms.  denies chest pain, shortenss of breath.  Trying to eat multiple meals daily Weight loss of 2 pounds since last appt   [90: Able to carry normal activity; minor signs or symptoms of disease.] : 90: Able to carry normal activity; minor signs or symptoms of disease.  [ECOG Performance Status: 1 - Restricted in physically strenuous activity but ambulatory and able to carry out work of a light or sedentary nature] : Performance Status: 1 - Restricted in physically strenuous activity but ambulatory and able to carry out work of a light or sedentary nature, e.g., light house work, office work

## 2024-08-26 ENCOUNTER — APPOINTMENT (OUTPATIENT)
Dept: HEMATOLOGY ONCOLOGY | Facility: CLINIC | Age: 74
End: 2024-08-26
Payer: MEDICARE

## 2024-08-26 ENCOUNTER — APPOINTMENT (OUTPATIENT)
Dept: INFUSION THERAPY | Facility: CLINIC | Age: 74
End: 2024-08-26

## 2024-08-26 VITALS
SYSTOLIC BLOOD PRESSURE: 135 MMHG | OXYGEN SATURATION: 97 % | DIASTOLIC BLOOD PRESSURE: 76 MMHG | HEIGHT: 66 IN | BODY MASS INDEX: 22.5 KG/M2 | TEMPERATURE: 97.7 F | WEIGHT: 140 LBS | HEART RATE: 73 BPM | RESPIRATION RATE: 18 BRPM

## 2024-08-26 DIAGNOSIS — R91.8 OTHER NONSPECIFIC ABNORMAL FINDING OF LUNG FIELD: ICD-10-CM

## 2024-08-26 DIAGNOSIS — C34.90 MALIGNANT NEOPLASM OF UNSPECIFIED PART OF UNSPECIFIED BRONCHUS OR LUNG: ICD-10-CM

## 2024-08-26 DIAGNOSIS — C79.31 MALIGNANT NEOPLASM OF UNSPECIFIED PART OF UNSPECIFIED BRONCHUS OR LUNG: ICD-10-CM

## 2024-08-26 LAB
ALBUMIN SERPL ELPH-MCNC: 3.7 G/DL
ALP BLD-CCNC: 36 U/L
ALT SERPL-CCNC: 17 U/L
ANION GAP SERPL CALC-SCNC: -1 MMOL/L
APTT BLD: 32.8 SEC
AST SERPL-CCNC: 20 U/L
BILIRUB SERPL-MCNC: 0.6 MG/DL
BUN SERPL-MCNC: 15 MG/DL
CALCIUM SERPL-MCNC: 9.8 MG/DL
CHLORIDE SERPL-SCNC: 107 MMOL/L
CO2 SERPL-SCNC: 33 MMOL/L
CREAT SERPL-MCNC: 0.9 MG/DL
EGFR: 89 ML/MIN/1.73M2
GLUCOSE SERPL-MCNC: 139 MG/DL
HCT VFR BLD CALC: 43.1 %
HGB BLD-MCNC: 14.1 G/DL
INR PPP: 0.94
LYMPHOCYTES # BLD AUTO: 2 K/UL
LYMPHOCYTES NFR BLD AUTO: 16.2 %
MAN DIFF?: NO
MCHC RBC-ENTMCNC: 28.1 PG
MCHC RBC-ENTMCNC: 32.7 GM/DL
MCV RBC AUTO: 85.9 FL
NEUTROPHILS # BLD AUTO: 9.2 K/UL
NEUTROPHILS NFR BLD AUTO: 76.1 %
PLATELET # BLD AUTO: 228 K/UL
POTASSIUM SERPL-SCNC: 4.3 MMOL/L
PROT SERPL-MCNC: 7.1 G/DL
PT BLD: 10.7 SEC
RBC # BLD: 5.02 M/UL
RBC # FLD: 15.2 %
SODIUM SERPL-SCNC: 139 MMOL/L
WBC # FLD AUTO: 12.1 K/UL

## 2024-08-26 PROCEDURE — G2211 COMPLEX E/M VISIT ADD ON: CPT

## 2024-08-26 PROCEDURE — 99215 OFFICE O/P EST HI 40 MIN: CPT

## 2024-08-26 NOTE — HISTORY OF PRESENT ILLNESS
[de-identified] : Mr. Christopher is a 73 M ex heavy smoker who presents for follow up of stage IV NSCLC (adenocarcinoma) with BMs, PDL1 high.  Oncologic History: - Initially presented to pulmonology regarding approximately 1 year of generalized symptoms such as chest congestion, cough, and weight loss. 40 pack year history, quit 12 years ago. - CT Chest ([facility]) reviwed by pulmonology "CT chest [facility] 2023 personally reviewed : GGO in bilateral apices; RLL spiculated lung mass with some invasion in the right mainstem bronchus. Suspect lymphangitic spread due to some interlobular septal thickening. Emphysema" - PET/CT (2023) hypermetabolic RLL pulmonary mass 4.1x3.9 cm,emphysema, hypermetabolic mediastinal and right hilar lymphadenopathy, consistent with metastatic jose disease - Bronchoscopy (10/06/2023) showing mediastinal lymphadenopathy with extrinsic compression of right upper, right middle, and right lower lobe segments, mucosal infiltration.  - Lymph node level 7 (10/6/2023), positive for malignant cells, metastatic non small cell carcinoma with features of adenocarcinoma. Level 11L negative, 4L lymph node negative. TT VUS: ALK N411L DDR2 TMB-HIGH PEYMAN PD-L1 90% positive - MR Brain (10/16/2023) demonstrates at least 3 intracranial metastases with the largest measuring 7 mm within the right temporal lobe. 2023: Foundation on original tissue resulted: PD-L1 22c3 80%  (PD-L1) amplification - equivocal [id number] (PD-L2) amplification - equivocal NF1 * CREBBP * IKZF1 L330* NFE2L2 L30P NFKBIA amplification - equivocal NKX2-1 amplification - equivocal This Amended Report has been issued to reflect a change in reportable variants with the removal of the EGFR L858R alteration on the reanalyzed sample, resulting in the removal of the associated therapies afatinib, dacomitinib, erlotinib, gefitinib, and osimertinib, and to remove the TP53 L257R alteration on the reanalyzed sample. Please note that other aspects of this report may have changed from the previous version to reflect the most up-to-date reporting information. 2024: MRIB: Since 11/15/2023 there has been interval decrease in size and surrounding vasogenic edema of previously noted right temporal ring-enhancing lesion that is now an approximately 4 mm nodule. The previously seen posterior temporal and mesial posterior frontal enhancing nodules are no longer identified. There has been marked improvement in associated edema. Approximately 3 mm nodular focus of enhancement along the right anterior inferior frontal convexity, unchanged; findings may represent a tiny vessel. Less than 1 mm punctate focus of T1 hyperintensity/possible enhancement seen within the right occipital cortex that likely represent artifact.. 2024: CT CAP: 1.  Decreased size of previously noted right perihilar/subcarinal conglomerate mass. 2.  Scattered small pulmonary nodules, some are stable, however, 2 have increased in size as noted above. 3.  Stable mild nodular fullness of the left adrenal gland; cannot exclude metastasis.  2024 CT C/A/P 1.  As noted previously, multiple varied sized small pulmonary nodules. Small new nodules right lower lobe as described above. These are indeterminate, too small to characterize. Recommend continued follow-up. 2.  Slight decrease in size of right perihilar/subcarinal conglomerate mass. 3.  Stable mild nodular fullness of the left adrenal gland.  24 MRI Brain Near complete resolution of metastatic lesion in the posterior RIGHT temporal lobe now measuring 1 to 2 mm. No other metastatic lesions are identified. Mild pontine, periventricular and scattered deep and subcortical white matter ischemia. Tiny old cortical infarction about the RIGHT medial RIGHT central sulcus at the convexity.  MRI Brain 24 Since 2024, continued interval decrease in now punctate focus of enhancement in the right temporal lobe corresponding to previous rim-enhancing metastatic lesion. No new area of enhancement are signal abnormality to suggest new metastases.  7/15/24 CT CAP: 1. Since 2024, essentially unchanged size of right perihilar/subcarinal conglomerate mass. 2. Overall increase in pulmonary nodule burden, with several new nodules appearing in the right lower lobe superior segment measuring up to 8 mm. Consider short interval 3 month CT follow-up.  2024: CT Chest: 1. Findings concerning for hematogenous as well as lymphangitic spread of disease involving predominantly the superior segment to the right lower lobe. 2. Continued evidence of encasement of the right hilum and tracheobronchial tree by confluent soft tissue attenuation/lymphadenopathy. 3. Correlation with whole-body PET/CT and histological sampling is suggested [de-identified] : Adenocarcinoma - PD-L1 90% [de-identified] : Pulm:  Neurosurgery: Dr. D'Amico Radiation: Dr. Wernicke [FreeTextEntry1] : 10/30/2023: C1 pembrolizumab 11/20/23: C2 pembrolizumab 12/11/23: C3 pembrolizumab 1/9/24: C4 pembrolizumab 1/29/2024  C5; Pembro  2/20/24: C6 Pembro  3/11/24: C7 Pembro 4/1/2024  C8 Pembro  4/22/2024  C9 pembro 5/13/2024  C10 pembro 6/3/2024    C11  pembro  6/24/2024   C12  pembro  7/15/2024   C13 pembrolizumab 8/5/2024     C14 pembrolizumab    [de-identified] : Manageable body itching - takes benadryl as needed Denies any acute symptoms.  denies chest pain, shortenss of breath.  Trying to eat multiple meals daily

## 2024-08-26 NOTE — ASSESSMENT
[FreeTextEntry1] : 73 M ex heavy smoker who presents for follow up of stage IV NSCLC (adenocarcinoma) with BMs, PDL1 high. Started on pembrolizumab 10/30/2023.  NSCLC, adenocarcinoma T4N2M1 - stage IV PD-L1 90% positive, DDR2 mutant, TP53 mutant. EGFR was noted to be contaminant  -- started pembrolizumab 10/2023 -- s/p SRS to brain metastases 11/16/23 -- I reviewed his images with  from radiology. SUmmary of the discussion was that there are several nodules in all lung lobes now, and the prior RLL conglomerate of GGO is more solid c/f disease progression and hematogenous spread of disease. --I discussed also with  from pulmonary medicine, who recommends an IR guided biopsy instead of a bronhcopscopy given the patient has no signs or symptoms of an infection. --labs obtained today: CBC, CMP, TSH --the patient is on therapy with pembrolizumab requiring intensive monitoring with CBC, CMP, TSH ever 3 wks and physical exam to monitor for irAE --will hold his pembrolizumab today, obtain coags in addition to regular labs --plan for IR guided biopsy of increasing pulmonary nodule conglomerate  RTC after biopsy.

## 2024-08-26 NOTE — PHYSICAL EXAM
[de-identified] : normal gait,. . FROM , no motor or sensory deficits [de-identified] : no gross deficits

## 2024-09-04 ENCOUNTER — APPOINTMENT (OUTPATIENT)
Dept: PULMONOLOGY | Facility: CLINIC | Age: 74
End: 2024-09-04
Payer: MEDICARE

## 2024-09-04 VITALS
TEMPERATURE: 97.1 F | HEART RATE: 88 BPM | HEIGHT: 66 IN | SYSTOLIC BLOOD PRESSURE: 120 MMHG | DIASTOLIC BLOOD PRESSURE: 70 MMHG | WEIGHT: 140 LBS | OXYGEN SATURATION: 96 % | BODY MASS INDEX: 22.5 KG/M2

## 2024-09-04 PROCEDURE — 99213 OFFICE O/P EST LOW 20 MIN: CPT

## 2024-09-04 PROCEDURE — G2211 COMPLEX E/M VISIT ADD ON: CPT

## 2024-09-04 NOTE — CONSULT LETTER
[Dear  ___] : Dear  [unfilled], [Courtesy Letter:] : I had the pleasure of seeing your patient, [unfilled], in my office today. [Please see my note below.] : Please see my note below. [Consult Closing:] : Thank you very much for allowing me to participate in the care of this patient.  If you have any questions, please do not hesitate to contact me. [Sincerely,] : Sincerely, [FreeTextEntry2] : Pal Manzo  [address], [address] [place] [address] [FreeTextEntry3] : Kierra Landaverde MD, [facility]

## 2024-09-04 NOTE — HISTORY OF PRESENT ILLNESS
[Former] : former [>= 20 pack years] : >= 20 pack years [TextBox_4] : Met by me in 9/2023 when he presented w large R lung adenocarcinoma metastatic to brain at presentation. Referred to care of med onc and rad onc here. Has been on pembro since 10/2023 and s/p SRS to brain mets 11/2023.  9/4/2024: Sent back today by Dr Tovar for pulmonary nodules. He had his CT on 8/23 and then he had Covid 3-4 days after the scan. Feeling better now, back to normal. First time having Covid. At the time of the scan he felt fine. No history of other cancer. On no COPD meds, not dyspneic. [TextBox_11] : 2 [TextBox_13] : 20 [YearQuit] : 2008

## 2024-09-04 NOTE — CONSULT LETTER
[Dear  ___] : Dear  [unfilled], [Courtesy Letter:] : I had the pleasure of seeing your patient, [unfilled], in my office today. [Please see my note below.] : Please see my note below. [Consult Closing:] : Thank you very much for allowing me to participate in the care of this patient.  If you have any questions, please do not hesitate to contact me. [Sincerely,] : Sincerely, [FreeTextEntry2] : Pal Manzo  [address], [address] [place] [address] [FreeTextEntry3] : Kierra Landaverde MD, [facility]

## 2024-09-04 NOTE — ASSESSMENT
[FreeTextEntry1] : Data reviewed:  CT chest [facility] 8/2024 personally reviewed and compared to 7/2024 and 4/2024 and also the original scan in 9/2023 : there is an increasing group of nodules in the RLL from April to July to August, and improvement in the main mass from 9/2023  PFT 9/13/2023: mod fixed obstruction, FEV1 58%, %, DLCO 56%  Assessment: New lung nodules Metastatic lung adeno Moderate COPD w emphysema Former smoker  Plan: TTNB is a simpler procedure to biopsy one of these nodules as previously discussed w Dr Tovar. If he had infectious symptoms this might favor bronch but he does not. TTNB being arranged by her office but glad to follow w him as needed. Risks/benefits reviewed w him. He has the untreated, asymptomatic COPD.

## 2024-09-04 NOTE — ASSESSMENT
[FreeTextEntry1] : Data reviewed:  CT chest [facility] 8/2024 personally reviewed and compared to 7/2024 and 4/2024 and also the original scan in 9/2023 : there is an increasing group of nodules in the RLL from April to July to August, and improvement in the main mass from 9/2023  PFT 9/13/2023: mod fixed obstruction, FEV1 58%, %, DLCO 56%  Assessment: New lung nodules Metastatic lung adeno Moderate COPD w emphysema Former smoker  Plan: TTNB is a simpler procedure to biopsy one of these nodules as previously discussed w Dr Tovar. If he had infectious symptoms this might favor bronch but he does not. TTNB being arranged by her office but glad to follow w him as needed. Risks/benefits reviewed w him. He has the untreated, asymptomatic COPD.

## 2024-09-20 ENCOUNTER — APPOINTMENT (OUTPATIENT)
Dept: INTERVENTIONAL RADIOLOGY/VASCULAR | Facility: HOSPITAL | Age: 74
End: 2024-09-20

## 2024-09-20 ENCOUNTER — OUTPATIENT (OUTPATIENT)
Dept: OUTPATIENT SERVICES | Facility: HOSPITAL | Age: 74
LOS: 1 days | End: 2024-09-20

## 2024-09-30 ENCOUNTER — RESULT REVIEW (OUTPATIENT)
Age: 74
End: 2024-09-30

## 2024-09-30 ENCOUNTER — APPOINTMENT (OUTPATIENT)
Dept: CT IMAGING | Facility: HOSPITAL | Age: 74
End: 2024-09-30

## 2024-09-30 ENCOUNTER — OUTPATIENT (OUTPATIENT)
Dept: OUTPATIENT SERVICES | Facility: HOSPITAL | Age: 74
LOS: 1 days | End: 2024-09-30
Payer: MEDICARE

## 2024-09-30 LAB — GLUCOSE BLDC GLUCOMTR-MCNC: 115 MG/DL — HIGH (ref 70–99)

## 2024-09-30 PROCEDURE — 82962 GLUCOSE BLOOD TEST: CPT

## 2024-09-30 PROCEDURE — 71045 X-RAY EXAM CHEST 1 VIEW: CPT

## 2024-09-30 PROCEDURE — 71045 X-RAY EXAM CHEST 1 VIEW: CPT | Mod: 26

## 2024-09-30 PROCEDURE — 32408 CORE NDL BX LNG/MED PERQ: CPT

## 2024-09-30 PROCEDURE — 87116 MYCOBACTERIA CULTURE: CPT

## 2024-09-30 PROCEDURE — 87015 SPECIMEN INFECT AGNT CONCNTJ: CPT

## 2024-09-30 PROCEDURE — 88305 TISSUE EXAM BY PATHOLOGIST: CPT

## 2024-09-30 PROCEDURE — 87075 CULTR BACTERIA EXCEPT BLOOD: CPT

## 2024-09-30 PROCEDURE — C2613: CPT

## 2024-09-30 PROCEDURE — 87070 CULTURE OTHR SPECIMN AEROBIC: CPT

## 2024-09-30 PROCEDURE — 87102 FUNGUS ISOLATION CULTURE: CPT

## 2024-09-30 PROCEDURE — 88305 TISSUE EXAM BY PATHOLOGIST: CPT | Mod: 26

## 2024-09-30 PROCEDURE — 87206 SMEAR FLUORESCENT/ACID STAI: CPT

## 2024-09-30 PROCEDURE — 88333 PATH CONSLTJ SURG CYTO XM 1: CPT | Mod: 26

## 2024-09-30 PROCEDURE — 88333 PATH CONSLTJ SURG CYTO XM 1: CPT

## 2024-10-01 LAB
GRAM STN FLD: SIGNIFICANT CHANGE UP
NIGHT BLUE STAIN TISS: SIGNIFICANT CHANGE UP
SPECIMEN SOURCE: SIGNIFICANT CHANGE UP
SPECIMEN SOURCE: SIGNIFICANT CHANGE UP

## 2024-10-04 DIAGNOSIS — R91.8 OTHER NONSPECIFIC ABNORMAL FINDING OF LUNG FIELD: ICD-10-CM

## 2024-10-04 DIAGNOSIS — C34.91 MALIGNANT NEOPLASM OF UNSPECIFIED PART OF RIGHT BRONCHUS OR LUNG: ICD-10-CM

## 2024-10-04 DIAGNOSIS — Z98.890 OTHER SPECIFIED POSTPROCEDURAL STATES: ICD-10-CM

## 2024-10-04 LAB — SURGICAL PATHOLOGY STUDY: SIGNIFICANT CHANGE UP

## 2024-10-05 LAB
CULTURE RESULTS: SIGNIFICANT CHANGE UP
SPECIMEN SOURCE: SIGNIFICANT CHANGE UP

## 2024-10-08 ENCOUNTER — APPOINTMENT (OUTPATIENT)
Dept: CT IMAGING | Facility: CLINIC | Age: 74
End: 2024-10-08
Payer: MEDICARE

## 2024-10-08 ENCOUNTER — NON-APPOINTMENT (OUTPATIENT)
Age: 74
End: 2024-10-08

## 2024-10-08 ENCOUNTER — APPOINTMENT (OUTPATIENT)
Dept: PULMONOLOGY | Facility: CLINIC | Age: 74
End: 2024-10-08
Payer: MEDICARE

## 2024-10-08 VITALS
WEIGHT: 140 LBS | SYSTOLIC BLOOD PRESSURE: 120 MMHG | HEART RATE: 83 BPM | DIASTOLIC BLOOD PRESSURE: 80 MMHG | BODY MASS INDEX: 22.5 KG/M2 | TEMPERATURE: 96.3 F | HEIGHT: 66 IN | OXYGEN SATURATION: 95 %

## 2024-10-08 DIAGNOSIS — J44.9 CHRONIC OBSTRUCTIVE PULMONARY DISEASE, UNSPECIFIED: ICD-10-CM

## 2024-10-08 DIAGNOSIS — C34.91 MALIGNANT NEOPLASM OF UNSPECIFIED PART OF RIGHT BRONCHUS OR LUNG: ICD-10-CM

## 2024-10-08 PROCEDURE — 99214 OFFICE O/P EST MOD 30 MIN: CPT | Mod: 25

## 2024-10-08 PROCEDURE — 94727 GAS DIL/WSHOT DETER LNG VOL: CPT

## 2024-10-08 PROCEDURE — 94060 EVALUATION OF WHEEZING: CPT

## 2024-10-08 PROCEDURE — 94729 DIFFUSING CAPACITY: CPT

## 2024-10-08 PROCEDURE — 71250 CT THORAX DX C-: CPT

## 2024-10-10 ENCOUNTER — NON-APPOINTMENT (OUTPATIENT)
Age: 74
End: 2024-10-10

## 2024-10-11 DIAGNOSIS — J98.4 OTHER DISORDERS OF LUNG: ICD-10-CM

## 2024-10-11 RX ORDER — PREDNISONE 10 MG/1
10 TABLET ORAL
Qty: 120 | Refills: 0 | Status: ACTIVE | COMMUNITY
Start: 2024-10-11 | End: 1900-01-01

## 2024-10-14 ENCOUNTER — APPOINTMENT (OUTPATIENT)
Dept: PULMONOLOGY | Facility: CLINIC | Age: 74
End: 2024-10-14
Payer: MEDICARE

## 2024-10-14 PROCEDURE — 94618 PULMONARY STRESS TESTING: CPT

## 2024-10-15 LAB
CRP SERPL-MCNC: 5 MG/L
LDH SERPL-CCNC: 195 U/L

## 2024-10-17 DIAGNOSIS — E11.9 TYPE 2 DIABETES MELLITUS W/OUT COMPLICATIONS: ICD-10-CM

## 2024-10-21 ENCOUNTER — APPOINTMENT (OUTPATIENT)
Dept: PULMONOLOGY | Facility: CLINIC | Age: 74
End: 2024-10-21
Payer: MEDICARE

## 2024-10-21 VITALS
HEIGHT: 66 IN | HEART RATE: 88 BPM | DIASTOLIC BLOOD PRESSURE: 64 MMHG | OXYGEN SATURATION: 96 % | WEIGHT: 140 LBS | BODY MASS INDEX: 22.5 KG/M2 | TEMPERATURE: 98 F | SYSTOLIC BLOOD PRESSURE: 140 MMHG

## 2024-10-21 PROCEDURE — 99213 OFFICE O/P EST LOW 20 MIN: CPT | Mod: 25

## 2024-10-21 PROCEDURE — 36415 COLL VENOUS BLD VENIPUNCTURE: CPT

## 2024-10-22 LAB — CRP SERPL-MCNC: <3 MG/L

## 2024-10-24 LAB — ERYTHROCYTE [SEDIMENTATION RATE] IN BLOOD BY WESTERGREN METHOD: 20 MM/HR

## 2024-10-31 ENCOUNTER — APPOINTMENT (OUTPATIENT)
Dept: PULMONOLOGY | Facility: CLINIC | Age: 74
End: 2024-10-31
Payer: MEDICARE

## 2024-10-31 VITALS
OXYGEN SATURATION: 96 % | TEMPERATURE: 97.5 F | BODY MASS INDEX: 22.5 KG/M2 | DIASTOLIC BLOOD PRESSURE: 80 MMHG | SYSTOLIC BLOOD PRESSURE: 168 MMHG | HEIGHT: 66 IN | WEIGHT: 140 LBS | HEART RATE: 77 BPM

## 2024-10-31 DIAGNOSIS — J98.4 OTHER DISORDERS OF LUNG: ICD-10-CM

## 2024-10-31 PROCEDURE — 99213 OFFICE O/P EST LOW 20 MIN: CPT

## 2024-10-31 PROCEDURE — G2211 COMPLEX E/M VISIT ADD ON: CPT

## 2024-11-05 ENCOUNTER — APPOINTMENT (OUTPATIENT)
Dept: UROLOGY | Facility: CLINIC | Age: 74
End: 2024-11-05
Payer: MEDICARE

## 2024-11-05 DIAGNOSIS — R30.0 DYSURIA: ICD-10-CM

## 2024-11-05 DIAGNOSIS — R81 GLYCOSURIA: ICD-10-CM

## 2024-11-05 PROCEDURE — 99214 OFFICE O/P EST MOD 30 MIN: CPT

## 2024-11-05 RX ORDER — CEFPODOXIME PROXETIL 200 MG/1
200 TABLET, FILM COATED ORAL
Qty: 14 | Refills: 0 | Status: ACTIVE | COMMUNITY
Start: 2024-11-05 | End: 1900-01-01

## 2024-11-07 ENCOUNTER — NON-APPOINTMENT (OUTPATIENT)
Age: 74
End: 2024-11-07

## 2024-11-07 LAB
ANION GAP SERPL CALC-SCNC: 15 MMOL/L
BACTERIA UR CULT: NORMAL
BILIRUB UR QL STRIP: NORMAL
BUN SERPL-MCNC: 21 MG/DL
CALCIUM SERPL-MCNC: 8.9 MG/DL
CHLORIDE SERPL-SCNC: 98 MMOL/L
CLARITY UR: CLEAR
CO2 SERPL-SCNC: 26 MMOL/L
COLLECTION METHOD: NORMAL
CREAT SERPL-MCNC: 1.04 MG/DL
EGFR: 75 ML/MIN/1.73M2
ESTIMATED AVERAGE GLUCOSE: 148 MG/DL
GLUCOSE SERPL-MCNC: 170 MG/DL
GLUCOSE UR-MCNC: 100
HBA1C MFR BLD HPLC: 6.8 %
HCG UR QL: 0.2 EU/DL
HGB UR QL STRIP.AUTO: NORMAL
KETONES UR-MCNC: NORMAL
LEUKOCYTE ESTERASE UR QL STRIP: NORMAL
NITRITE UR QL STRIP: NORMAL
PH UR STRIP: 5.5
POTASSIUM SERPL-SCNC: 3.9 MMOL/L
PROT UR STRIP-MCNC: 100
SODIUM SERPL-SCNC: 139 MMOL/L
SP GR UR STRIP: 1.03

## 2024-11-18 ENCOUNTER — APPOINTMENT (OUTPATIENT)
Dept: CT IMAGING | Facility: CLINIC | Age: 74
End: 2024-11-18
Payer: MEDICARE

## 2024-11-18 PROCEDURE — 71250 CT THORAX DX C-: CPT

## 2024-11-25 ENCOUNTER — OFFICE (OUTPATIENT)
Dept: URBAN - METROPOLITAN AREA CLINIC 92 | Facility: CLINIC | Age: 74
Setting detail: OPHTHALMOLOGY
End: 2024-11-25
Payer: MEDICARE

## 2024-11-25 DIAGNOSIS — H11.823: ICD-10-CM

## 2024-11-25 DIAGNOSIS — H01.111: ICD-10-CM

## 2024-11-25 DIAGNOSIS — H02.89: ICD-10-CM

## 2024-11-25 DIAGNOSIS — H11.442: ICD-10-CM

## 2024-11-25 DIAGNOSIS — H01.114: ICD-10-CM

## 2024-11-25 PROCEDURE — 99213 OFFICE O/P EST LOW 20 MIN: CPT | Performed by: SPECIALIST

## 2024-11-25 ASSESSMENT — REFRACTION_CURRENTRX
OD_OVR_VA: 20/
OD_CYLINDER: +2.50
OS_SPHERE: +0.50
OD_SPHERE: +1.75
OS_OVR_VA: 20/
OS_SPHERE: -0.75
OS_ADD: +2.50
OS_AXIS: 162
OS_CYLINDER: -2.00
OS_ADD: +2.00
OD_AXIS: 108
OS_OVR_VA: 20/
OD_AXIS: 011
OS_SPHERE: +2.00
OS_AXIS: 86+
OD_AXIS: 94+
OD_OVR_VA: 20/
OS_VPRISM_DIRECTION: PROGS
OS_AXIS: 71
OS_CYLINDER: -2.00
OD_SPHERE: +2.00
OD_CYLINDER: -2.75
OD_SPHERE: -0.50
OD_ADD: +2.00
OD_CYLINDER: -2.75
OD_OVR_VA: 20/
OS_CYLINDER: +2.00
OD_ADD: +2.00
OD_VPRISM_DIRECTION: PROGS
OS_ADD: +2.25
OS_OVR_VA: 20/
OD_ADD: +2.50

## 2024-11-25 ASSESSMENT — REFRACTION_MANIFEST
OD_AXIS: 180
OD_ADD: +2.75
OS_AXIS: 163
OD_AXIS: 175
OD_CYLINDER: +2.50
OD_ADD: +2.75
OD_SPHERE: -0.75
OS_ADD: +2.75
OS_AXIS: 165
OS_VA1: 20/30
OS_SPHERE: -0.75
OS_ADD: +2.75
OD_CYLINDER: +2.25
OS_SPHERE: -0.75
OS_CYLINDER: +1.75
OS_VA1: 20/30
OD_VA1: 20/30
OD_VA1: 20/20
OS_CYLINDER: +2.00
OD_SPHERE: -1.00

## 2024-11-25 ASSESSMENT — REFRACTION_AUTOREFRACTION
OD_CYLINDER: +2.25
OS_AXIS: 151
OD_SPHERE: -1.00
OS_CYLINDER: +2.25
OD_AXIS: 10
OS_SPHERE: -1.25

## 2024-11-25 ASSESSMENT — CORNEAL DYSTROPHY - POSTERIOR
OS_POSTERIORDYSTROPHY: GUTTATA
OD_POSTERIORDYSTROPHY: T GUTTATA

## 2024-11-25 ASSESSMENT — CONFRONTATIONAL VISUAL FIELD TEST (CVF)
OS_FINDINGS: FULL
OD_FINDINGS: FULL

## 2024-11-25 ASSESSMENT — KERATOMETRY
OS_K1POWER_DIOPTERS: 45.00
OS_AXISANGLE_DEGREES: 124
OD_K1POWER_DIOPTERS: 44.50
OD_AXISANGLE_DEGREES: 20
METHOD_AUTO_MANUAL: AUTO
OS_K2POWER_DIOPTERS: 6.00
OD_K2POWER_DIOPTERS: 45.75

## 2024-11-25 ASSESSMENT — VISUAL ACUITY
OD_BCVA: 20/50
OS_BCVA: 20/40

## 2024-11-25 ASSESSMENT — LID EXAM ASSESSMENTS
OD_MEIBOMITIS: RLL RUL 2+
OS_MEIBOMITIS: LLL LUL 2+

## 2024-11-25 ASSESSMENT — CORNEAL DYSTROPHY: OS_DYSTROPHY: RARE

## 2024-12-02 ENCOUNTER — APPOINTMENT (OUTPATIENT)
Dept: HEMATOLOGY ONCOLOGY | Facility: CLINIC | Age: 74
End: 2024-12-02
Payer: MEDICARE

## 2024-12-02 VITALS
RESPIRATION RATE: 18 BRPM | TEMPERATURE: 97.9 F | HEIGHT: 66 IN | OXYGEN SATURATION: 96 % | HEART RATE: 94 BPM | WEIGHT: 140 LBS | BODY MASS INDEX: 22.5 KG/M2 | DIASTOLIC BLOOD PRESSURE: 91 MMHG | SYSTOLIC BLOOD PRESSURE: 160 MMHG

## 2024-12-02 DIAGNOSIS — J44.9 CHRONIC OBSTRUCTIVE PULMONARY DISEASE, UNSPECIFIED: ICD-10-CM

## 2024-12-02 DIAGNOSIS — C34.90 MALIGNANT NEOPLASM OF UNSPECIFIED PART OF UNSPECIFIED BRONCHUS OR LUNG: ICD-10-CM

## 2024-12-02 DIAGNOSIS — C79.31 MALIGNANT NEOPLASM OF UNSPECIFIED PART OF UNSPECIFIED BRONCHUS OR LUNG: ICD-10-CM

## 2024-12-02 LAB
ALBUMIN SERPL ELPH-MCNC: 4 G/DL
ALP BLD-CCNC: 42 U/L
ALT SERPL-CCNC: 24 U/L
ANION GAP SERPL CALC-SCNC: 8 MMOL/L
AST SERPL-CCNC: 24 U/L
BILIRUB SERPL-MCNC: 0.7 MG/DL
BUN SERPL-MCNC: 16 MG/DL
CALCIUM SERPL-MCNC: 10.1 MG/DL
CHLORIDE SERPL-SCNC: 102 MMOL/L
CO2 SERPL-SCNC: 32 MMOL/L
CREAT SERPL-MCNC: 0.9 MG/DL
EGFR: 89 ML/MIN/1.73M2
GLUCOSE SERPL-MCNC: 143 MG/DL
HCT VFR BLD CALC: 47.4 %
HGB BLD-MCNC: 15.5 G/DL
LYMPHOCYTES # BLD AUTO: 2.5 K/UL
LYMPHOCYTES NFR BLD AUTO: 23.3 %
MAN DIFF?: NO
MCHC RBC-ENTMCNC: 28.2 PG
MCHC RBC-ENTMCNC: 32.7 G/DL
MCV RBC AUTO: 86.2 FL
NEUTROPHILS # BLD AUTO: 7.6 K/UL
NEUTROPHILS NFR BLD AUTO: 69.2 %
PLATELET # BLD AUTO: 233 K/UL
POTASSIUM SERPL-SCNC: 4.3 MMOL/L
PROT SERPL-MCNC: 7.4 G/DL
RBC # BLD: 5.5 M/UL
RBC # FLD: 16.5 %
SODIUM SERPL-SCNC: 142 MMOL/L
WBC # FLD AUTO: 10.9 K/UL

## 2024-12-02 PROCEDURE — G2211 COMPLEX E/M VISIT ADD ON: CPT

## 2024-12-02 PROCEDURE — 99215 OFFICE O/P EST HI 40 MIN: CPT

## 2024-12-02 RX ORDER — EZETIMIBE 10 MG/1
10 TABLET ORAL
Refills: 0 | Status: ACTIVE | COMMUNITY
Start: 2024-12-02

## 2024-12-03 LAB — TSH SERPL-ACNC: 0.99 UIU/ML

## 2024-12-16 ENCOUNTER — OUTPATIENT (OUTPATIENT)
Dept: OUTPATIENT SERVICES | Facility: HOSPITAL | Age: 74
LOS: 1 days | End: 2024-12-16
Payer: MEDICARE

## 2024-12-16 ENCOUNTER — APPOINTMENT (OUTPATIENT)
Dept: INFUSION THERAPY | Facility: CLINIC | Age: 74
End: 2024-12-16

## 2024-12-16 VITALS
HEIGHT: 67 IN | RESPIRATION RATE: 18 BRPM | HEART RATE: 94 BPM | OXYGEN SATURATION: 95 % | TEMPERATURE: 98 F | DIASTOLIC BLOOD PRESSURE: 91 MMHG | WEIGHT: 145.95 LBS | SYSTOLIC BLOOD PRESSURE: 163 MMHG

## 2024-12-16 VITALS
HEART RATE: 78 BPM | TEMPERATURE: 98 F | SYSTOLIC BLOOD PRESSURE: 165 MMHG | DIASTOLIC BLOOD PRESSURE: 81 MMHG | OXYGEN SATURATION: 95 % | RESPIRATION RATE: 18 BRPM

## 2024-12-16 DIAGNOSIS — C34.90 MALIGNANT NEOPLASM OF UNSPECIFIED PART OF UNSPECIFIED BRONCHUS OR LUNG: ICD-10-CM

## 2024-12-16 PROCEDURE — 96413 CHEMO IV INFUSION 1 HR: CPT

## 2024-12-16 RX ORDER — PEMBROLIZUMAB 25 MG/ML
200 INJECTION, SOLUTION INTRAVENOUS ONCE
Refills: 0 | Status: COMPLETED | OUTPATIENT
Start: 2024-12-16 | End: 2024-12-16

## 2024-12-16 RX ADMIN — PEMBROLIZUMAB 200 MILLIGRAM(S): 25 INJECTION, SOLUTION INTRAVENOUS at 17:58

## 2024-12-16 RX ADMIN — PEMBROLIZUMAB 200 MILLIGRAM(S): 25 INJECTION, SOLUTION INTRAVENOUS at 18:28

## 2024-12-17 ENCOUNTER — APPOINTMENT (OUTPATIENT)
Dept: UROLOGY | Facility: CLINIC | Age: 74
End: 2024-12-17

## 2024-12-19 ENCOUNTER — OUTPATIENT (OUTPATIENT)
Dept: OUTPATIENT SERVICES | Facility: HOSPITAL | Age: 74
LOS: 1 days | End: 2024-12-19
Payer: MEDICARE

## 2024-12-19 ENCOUNTER — RESULT REVIEW (OUTPATIENT)
Age: 74
End: 2024-12-19

## 2024-12-19 ENCOUNTER — APPOINTMENT (OUTPATIENT)
Dept: MRI IMAGING | Facility: HOSPITAL | Age: 74
End: 2024-12-19

## 2024-12-19 ENCOUNTER — APPOINTMENT (OUTPATIENT)
Dept: HEART AND VASCULAR | Facility: CLINIC | Age: 74
End: 2024-12-19

## 2024-12-19 PROCEDURE — A9585: CPT

## 2024-12-19 PROCEDURE — 93280 PM DEVICE PROGR EVAL DUAL: CPT

## 2024-12-19 PROCEDURE — 70553 MRI BRAIN STEM W/O & W/DYE: CPT

## 2024-12-19 PROCEDURE — 70553 MRI BRAIN STEM W/O & W/DYE: CPT | Mod: 26,MH

## 2024-12-23 ENCOUNTER — APPOINTMENT (OUTPATIENT)
Dept: NEUROSURGERY | Facility: CLINIC | Age: 74
End: 2024-12-23
Payer: MEDICARE

## 2024-12-23 DIAGNOSIS — C79.31 MALIGNANT NEOPLASM OF UNSPECIFIED PART OF UNSPECIFIED BRONCHUS OR LUNG: ICD-10-CM

## 2024-12-23 DIAGNOSIS — C34.91 MALIGNANT NEOPLASM OF UNSPECIFIED PART OF RIGHT BRONCHUS OR LUNG: ICD-10-CM

## 2024-12-23 DIAGNOSIS — C34.90 MALIGNANT NEOPLASM OF UNSPECIFIED PART OF UNSPECIFIED BRONCHUS OR LUNG: ICD-10-CM

## 2024-12-23 PROCEDURE — 99212 OFFICE O/P EST SF 10 MIN: CPT

## 2025-01-06 ENCOUNTER — OUTPATIENT (OUTPATIENT)
Dept: OUTPATIENT SERVICES | Facility: HOSPITAL | Age: 75
LOS: 1 days | End: 2025-01-06
Payer: MEDICARE

## 2025-01-06 ENCOUNTER — APPOINTMENT (OUTPATIENT)
Dept: HEMATOLOGY ONCOLOGY | Facility: CLINIC | Age: 75
End: 2025-01-06
Payer: MEDICARE

## 2025-01-06 ENCOUNTER — APPOINTMENT (OUTPATIENT)
Dept: INFUSION THERAPY | Facility: CLINIC | Age: 75
End: 2025-01-06

## 2025-01-06 VITALS
DIASTOLIC BLOOD PRESSURE: 79 MMHG | HEIGHT: 66 IN | WEIGHT: 141 LBS | SYSTOLIC BLOOD PRESSURE: 145 MMHG | BODY MASS INDEX: 22.66 KG/M2 | OXYGEN SATURATION: 95 % | RESPIRATION RATE: 18 BRPM | HEART RATE: 104 BPM | TEMPERATURE: 96.4 F

## 2025-01-06 VITALS
DIASTOLIC BLOOD PRESSURE: 79 MMHG | OXYGEN SATURATION: 97 % | SYSTOLIC BLOOD PRESSURE: 150 MMHG | HEART RATE: 96 BPM | RESPIRATION RATE: 18 BRPM | HEIGHT: 67 IN | WEIGHT: 141.1 LBS | TEMPERATURE: 97 F

## 2025-01-06 DIAGNOSIS — C79.31 MALIGNANT NEOPLASM OF UNSPECIFIED PART OF UNSPECIFIED BRONCHUS OR LUNG: ICD-10-CM

## 2025-01-06 DIAGNOSIS — C79.31 SECONDARY MALIGNANT NEOPLASM OF BRAIN: ICD-10-CM

## 2025-01-06 DIAGNOSIS — C34.90 MALIGNANT NEOPLASM OF UNSPECIFIED PART OF UNSPECIFIED BRONCHUS OR LUNG: ICD-10-CM

## 2025-01-06 LAB
ALBUMIN SERPL ELPH-MCNC: 3.9 G/DL
ALP BLD-CCNC: 45 U/L
ALT SERPL-CCNC: 19 U/L
ANION GAP SERPL CALC-SCNC: 0 MMOL/L
AST SERPL-CCNC: 22 U/L
BILIRUB SERPL-MCNC: 0.6 MG/DL
BUN SERPL-MCNC: 17 MG/DL
CALCIUM SERPL-MCNC: 10.2 MG/DL
CHLORIDE SERPL-SCNC: 107 MMOL/L
CO2 SERPL-SCNC: 29 MMOL/L
CREAT SERPL-MCNC: 0.9 MG/DL
EGFR: 89 ML/MIN/1.73M2
GLUCOSE SERPL-MCNC: 111 MG/DL
HCT VFR BLD CALC: 45.1 %
HGB BLD-MCNC: 15 G/DL
LYMPHOCYTES # BLD AUTO: 1.6 K/UL
LYMPHOCYTES NFR BLD AUTO: 15.4 %
MAN DIFF?: NO
MCHC RBC-ENTMCNC: 27.7 PG
MCHC RBC-ENTMCNC: 33.3 G/DL
MCV RBC AUTO: 83.2 FL
NEUTROPHILS # BLD AUTO: 8 K/UL
NEUTROPHILS NFR BLD AUTO: 76.8 %
PLATELET # BLD AUTO: 229 K/UL
POTASSIUM SERPL-SCNC: 4.2 MMOL/L
PROT SERPL-MCNC: 7.2 G/DL
RBC # BLD: 5.42 M/UL
RBC # FLD: 15.2 %
SODIUM SERPL-SCNC: 136 MMOL/L
WBC # FLD AUTO: 10.4 K/UL

## 2025-01-06 PROCEDURE — 99214 OFFICE O/P EST MOD 30 MIN: CPT

## 2025-01-06 PROCEDURE — 96413 CHEMO IV INFUSION 1 HR: CPT

## 2025-01-06 PROCEDURE — G2211 COMPLEX E/M VISIT ADD ON: CPT

## 2025-01-06 RX ORDER — PEMBROLIZUMAB 50 MG/2ML
200 INJECTION, POWDER, LYOPHILIZED, FOR SOLUTION INTRAVENOUS ONCE
Refills: 0 | Status: COMPLETED | OUTPATIENT
Start: 2025-01-06 | End: 2025-01-06

## 2025-01-06 RX ADMIN — PEMBROLIZUMAB 200 MILLIGRAM(S): 50 INJECTION, POWDER, LYOPHILIZED, FOR SOLUTION INTRAVENOUS at 17:00

## 2025-01-06 RX ADMIN — PEMBROLIZUMAB 200 MILLIGRAM(S): 50 INJECTION, POWDER, LYOPHILIZED, FOR SOLUTION INTRAVENOUS at 16:27

## 2025-01-07 LAB — TSH SERPL-ACNC: 0.91 UIU/ML

## 2025-01-23 RX ORDER — PEMBROLIZUMAB 50 MG/2ML
200 INJECTION, POWDER, LYOPHILIZED, FOR SOLUTION INTRAVENOUS ONCE
Refills: 0 | Status: COMPLETED | OUTPATIENT
Start: 2025-01-27 | End: 2025-01-27

## 2025-01-27 ENCOUNTER — APPOINTMENT (OUTPATIENT)
Dept: INFUSION THERAPY | Facility: CLINIC | Age: 75
End: 2025-01-27

## 2025-01-27 ENCOUNTER — OUTPATIENT (OUTPATIENT)
Dept: OUTPATIENT SERVICES | Facility: HOSPITAL | Age: 75
LOS: 1 days | End: 2025-01-27
Payer: MEDICARE

## 2025-01-27 VITALS
RESPIRATION RATE: 18 BRPM | DIASTOLIC BLOOD PRESSURE: 72 MMHG | TEMPERATURE: 98 F | HEART RATE: 77 BPM | SYSTOLIC BLOOD PRESSURE: 129 MMHG | OXYGEN SATURATION: 96 %

## 2025-01-27 VITALS
HEIGHT: 67 IN | RESPIRATION RATE: 18 BRPM | WEIGHT: 139.99 LBS | SYSTOLIC BLOOD PRESSURE: 103 MMHG | TEMPERATURE: 98 F | OXYGEN SATURATION: 97 % | HEART RATE: 76 BPM | DIASTOLIC BLOOD PRESSURE: 57 MMHG

## 2025-01-27 DIAGNOSIS — C34.90 MALIGNANT NEOPLASM OF UNSPECIFIED PART OF UNSPECIFIED BRONCHUS OR LUNG: ICD-10-CM

## 2025-01-27 LAB
ALBUMIN SERPL ELPH-MCNC: 4 G/DL — SIGNIFICANT CHANGE UP (ref 3.3–5)
ALP SERPL-CCNC: 54 U/L — SIGNIFICANT CHANGE UP (ref 40–120)
ALT FLD-CCNC: 20 U/L — SIGNIFICANT CHANGE UP (ref 10–45)
ANION GAP SERPL CALC-SCNC: 2 MMOL/L — LOW (ref 5–17)
AST SERPL-CCNC: 22 U/L — SIGNIFICANT CHANGE UP (ref 10–40)
BILIRUB SERPL-MCNC: 0.8 MG/DL — SIGNIFICANT CHANGE UP (ref 0.2–1.2)
BUN SERPL-MCNC: 16 MG/DL — SIGNIFICANT CHANGE UP (ref 7–23)
CALCIUM SERPL-MCNC: 10.1 MG/DL — SIGNIFICANT CHANGE UP (ref 8.4–10.5)
CHLORIDE SERPL-SCNC: 107 MMOL/L — SIGNIFICANT CHANGE UP (ref 96–108)
CO2 SERPL-SCNC: 32 MMOL/L — HIGH (ref 22–31)
CREAT SERPL-MCNC: 1 MG/DL — SIGNIFICANT CHANGE UP (ref 0.5–1.3)
EGFR: 78 ML/MIN/1.73M2 — SIGNIFICANT CHANGE UP
GLUCOSE SERPL-MCNC: 147 MG/DL — HIGH (ref 70–99)
HCT VFR BLD CALC: 46 % — SIGNIFICANT CHANGE UP (ref 39–50)
HGB BLD-MCNC: 15 G/DL — SIGNIFICANT CHANGE UP (ref 13–17)
LYMPHOCYTES # BLD AUTO: 18.3 % — SIGNIFICANT CHANGE UP (ref 13–44)
LYMPHOCYTES # BLD AUTO: 2.1 K/UL — SIGNIFICANT CHANGE UP (ref 1–3.3)
MCHC RBC-ENTMCNC: 27.4 PG — SIGNIFICANT CHANGE UP (ref 27–34)
MCHC RBC-ENTMCNC: 32.6 G/DL — SIGNIFICANT CHANGE UP (ref 32–36)
MCV RBC AUTO: 84.1 FL — SIGNIFICANT CHANGE UP (ref 80–100)
NEUTROPHILS # BLD AUTO: 8.3 K/UL — HIGH (ref 1.8–7.4)
NEUTROPHILS NFR BLD AUTO: 73.8 % — SIGNIFICANT CHANGE UP (ref 43–77)
PLATELET # BLD AUTO: 253 K/UL — SIGNIFICANT CHANGE UP (ref 150–400)
POTASSIUM SERPL-MCNC: 4.5 MMOL/L — SIGNIFICANT CHANGE UP (ref 3.5–5.3)
POTASSIUM SERPL-SCNC: 4.5 MMOL/L — SIGNIFICANT CHANGE UP (ref 3.5–5.3)
PROT SERPL-MCNC: 7.6 G/DL — SIGNIFICANT CHANGE UP (ref 6–8.3)
RBC # BLD: 5.47 M/UL — SIGNIFICANT CHANGE UP (ref 4.2–5.8)
RBC # FLD: 15.2 % — HIGH (ref 10.3–14.5)
SODIUM SERPL-SCNC: 141 MMOL/L — SIGNIFICANT CHANGE UP (ref 135–145)
T4 AB SER-ACNC: 7.9 UG/DL — SIGNIFICANT CHANGE UP (ref 4.5–11.7)
TSH SERPL-MCNC: 0.79 UIU/ML — SIGNIFICANT CHANGE UP (ref 0.27–4.2)
WBC # BLD: 11.3 K/UL — HIGH (ref 3.8–10.5)
WBC # FLD AUTO: 11.3 K/UL — HIGH (ref 3.8–10.5)

## 2025-01-27 PROCEDURE — 96413 CHEMO IV INFUSION 1 HR: CPT

## 2025-01-27 PROCEDURE — 36415 COLL VENOUS BLD VENIPUNCTURE: CPT

## 2025-01-27 PROCEDURE — 85025 COMPLETE CBC W/AUTO DIFF WBC: CPT

## 2025-01-27 PROCEDURE — 84443 ASSAY THYROID STIM HORMONE: CPT

## 2025-01-27 PROCEDURE — 80053 COMPREHEN METABOLIC PANEL: CPT

## 2025-01-27 PROCEDURE — 84436 ASSAY OF TOTAL THYROXINE: CPT

## 2025-01-27 RX ADMIN — PEMBROLIZUMAB 200 MILLIGRAM(S): 50 INJECTION, POWDER, LYOPHILIZED, FOR SOLUTION INTRAVENOUS at 11:29

## 2025-01-27 RX ADMIN — PEMBROLIZUMAB 200 MILLIGRAM(S): 50 INJECTION, POWDER, LYOPHILIZED, FOR SOLUTION INTRAVENOUS at 10:59

## 2025-01-30 ENCOUNTER — OUTPATIENT (OUTPATIENT)
Dept: OUTPATIENT SERVICES | Facility: HOSPITAL | Age: 75
LOS: 1 days | End: 2025-01-30
Payer: MEDICARE

## 2025-01-30 ENCOUNTER — APPOINTMENT (OUTPATIENT)
Dept: MRI IMAGING | Facility: HOSPITAL | Age: 75
End: 2025-01-30

## 2025-01-30 ENCOUNTER — NON-APPOINTMENT (OUTPATIENT)
Age: 75
End: 2025-01-30

## 2025-01-30 ENCOUNTER — APPOINTMENT (OUTPATIENT)
Dept: HEART AND VASCULAR | Facility: CLINIC | Age: 75
End: 2025-01-30
Payer: MEDICARE

## 2025-01-30 PROCEDURE — A9585: CPT

## 2025-01-30 PROCEDURE — 93280 PM DEVICE PROGR EVAL DUAL: CPT

## 2025-01-30 PROCEDURE — 70553 MRI BRAIN STEM W/O & W/DYE: CPT

## 2025-01-30 PROCEDURE — 70553 MRI BRAIN STEM W/O & W/DYE: CPT | Mod: 26

## 2025-02-03 ENCOUNTER — APPOINTMENT (OUTPATIENT)
Dept: HEMATOLOGY ONCOLOGY | Facility: CLINIC | Age: 75
End: 2025-02-03
Payer: MEDICARE

## 2025-02-03 ENCOUNTER — NON-APPOINTMENT (OUTPATIENT)
Age: 75
End: 2025-02-03

## 2025-02-03 VITALS
TEMPERATURE: 95.7 F | DIASTOLIC BLOOD PRESSURE: 77 MMHG | RESPIRATION RATE: 18 BRPM | WEIGHT: 136 LBS | OXYGEN SATURATION: 95 % | BODY MASS INDEX: 21.86 KG/M2 | HEART RATE: 93 BPM | HEIGHT: 66 IN | SYSTOLIC BLOOD PRESSURE: 136 MMHG

## 2025-02-03 PROCEDURE — 99214 OFFICE O/P EST MOD 30 MIN: CPT

## 2025-02-03 PROCEDURE — G2211 COMPLEX E/M VISIT ADD ON: CPT

## 2025-02-12 ENCOUNTER — APPOINTMENT (OUTPATIENT)
Dept: NEUROSURGERY | Facility: CLINIC | Age: 75
End: 2025-02-12
Payer: MEDICARE

## 2025-02-12 ENCOUNTER — APPOINTMENT (OUTPATIENT)
Dept: PULMONOLOGY | Facility: CLINIC | Age: 75
End: 2025-02-12
Payer: MEDICARE

## 2025-02-12 VITALS
RESPIRATION RATE: 18 BRPM | HEIGHT: 66 IN | SYSTOLIC BLOOD PRESSURE: 127 MMHG | HEART RATE: 80 BPM | BODY MASS INDEX: 21.86 KG/M2 | DIASTOLIC BLOOD PRESSURE: 73 MMHG | OXYGEN SATURATION: 98 % | WEIGHT: 136 LBS

## 2025-02-12 VITALS
OXYGEN SATURATION: 94 % | DIASTOLIC BLOOD PRESSURE: 78 MMHG | HEIGHT: 66 IN | WEIGHT: 136 LBS | TEMPERATURE: 97.6 F | HEART RATE: 74 BPM | BODY MASS INDEX: 21.86 KG/M2 | SYSTOLIC BLOOD PRESSURE: 120 MMHG

## 2025-02-12 DIAGNOSIS — J44.9 CHRONIC OBSTRUCTIVE PULMONARY DISEASE, UNSPECIFIED: ICD-10-CM

## 2025-02-12 DIAGNOSIS — C79.31 SECONDARY MALIGNANT NEOPLASM OF BRAIN: ICD-10-CM

## 2025-02-12 DIAGNOSIS — J98.4 OTHER DISORDERS OF LUNG: ICD-10-CM

## 2025-02-12 DIAGNOSIS — C79.31 MALIGNANT NEOPLASM OF UNSPECIFIED PART OF UNSPECIFIED BRONCHUS OR LUNG: ICD-10-CM

## 2025-02-12 DIAGNOSIS — C34.90 MALIGNANT NEOPLASM OF UNSPECIFIED PART OF UNSPECIFIED BRONCHUS OR LUNG: ICD-10-CM

## 2025-02-12 PROCEDURE — G2211 COMPLEX E/M VISIT ADD ON: CPT

## 2025-02-12 PROCEDURE — 99212 OFFICE O/P EST SF 10 MIN: CPT

## 2025-02-12 PROCEDURE — 99214 OFFICE O/P EST MOD 30 MIN: CPT

## 2025-02-12 RX ORDER — ALBUTEROL SULFATE 90 UG/1
108 (90 BASE) INHALANT RESPIRATORY (INHALATION)
Qty: 1 | Refills: 3 | Status: ACTIVE | COMMUNITY
Start: 2025-02-12 | End: 1900-01-01

## 2025-02-21 ENCOUNTER — APPOINTMENT (OUTPATIENT)
Dept: INFUSION THERAPY | Facility: CLINIC | Age: 75
End: 2025-02-21

## 2025-02-21 ENCOUNTER — APPOINTMENT (OUTPATIENT)
Dept: HEMATOLOGY ONCOLOGY | Facility: CLINIC | Age: 75
End: 2025-02-21
Payer: MEDICARE

## 2025-02-21 ENCOUNTER — OUTPATIENT (OUTPATIENT)
Dept: OUTPATIENT SERVICES | Facility: HOSPITAL | Age: 75
LOS: 1 days | End: 2025-02-21
Payer: MEDICARE

## 2025-02-21 VITALS
DIASTOLIC BLOOD PRESSURE: 84 MMHG | TEMPERATURE: 97.6 F | WEIGHT: 135 LBS | SYSTOLIC BLOOD PRESSURE: 158 MMHG | HEIGHT: 66 IN | RESPIRATION RATE: 18 BRPM | BODY MASS INDEX: 21.69 KG/M2 | OXYGEN SATURATION: 96 % | HEART RATE: 92 BPM

## 2025-02-21 VITALS
RESPIRATION RATE: 18 BRPM | HEIGHT: 67 IN | TEMPERATURE: 97 F | SYSTOLIC BLOOD PRESSURE: 132 MMHG | WEIGHT: 134.92 LBS | DIASTOLIC BLOOD PRESSURE: 83 MMHG | OXYGEN SATURATION: 95 % | HEART RATE: 82 BPM

## 2025-02-21 VITALS
SYSTOLIC BLOOD PRESSURE: 132 MMHG | TEMPERATURE: 98 F | OXYGEN SATURATION: 95 % | DIASTOLIC BLOOD PRESSURE: 70 MMHG | HEART RATE: 83 BPM | RESPIRATION RATE: 16 BRPM

## 2025-02-21 DIAGNOSIS — C34.90 MALIGNANT NEOPLASM OF UNSPECIFIED PART OF UNSPECIFIED BRONCHUS OR LUNG: ICD-10-CM

## 2025-02-21 DIAGNOSIS — C34.91 MALIGNANT NEOPLASM OF UNSPECIFIED PART OF RIGHT BRONCHUS OR LUNG: ICD-10-CM

## 2025-02-21 LAB
ALBUMIN SERPL ELPH-MCNC: 4.4 G/DL
ALP BLD-CCNC: 61 U/L
ALT SERPL-CCNC: 18 U/L
ANION GAP SERPL CALC-SCNC: 4 MMOL/L
AST SERPL-CCNC: 25 U/L
BILIRUB SERPL-MCNC: 0.8 MG/DL
BUN SERPL-MCNC: 13 MG/DL
CALCIUM SERPL-MCNC: 9.9 MG/DL
CHLORIDE SERPL-SCNC: 107 MMOL/L
CO2 SERPL-SCNC: 32 MMOL/L
CREAT SERPL-MCNC: 0.8 MG/DL
EGFR: 92 ML/MIN/1.73M2
GLUCOSE SERPL-MCNC: 137 MG/DL
HCT VFR BLD CALC: 47.4 %
HGB BLD-MCNC: 15.6 G/DL
LYMPHOCYTES # BLD AUTO: 1.6 K/UL
LYMPHOCYTES NFR BLD AUTO: 17.4 %
MAN DIFF?: NO
MCHC RBC-ENTMCNC: 27.5 PG
MCHC RBC-ENTMCNC: 32.9 G/DL
MCV RBC AUTO: 83.6 FL
NEUTROPHILS # BLD AUTO: 6.9 K/UL
NEUTROPHILS NFR BLD AUTO: 73.5 %
PLATELET # BLD AUTO: 239 K/UL
POTASSIUM SERPL-SCNC: 4.9 MMOL/L
PROT SERPL-MCNC: 8.2 G/DL
RBC # BLD: 5.67 M/UL
RBC # FLD: 14.8 %
SODIUM SERPL-SCNC: 143 MMOL/L
WBC # FLD AUTO: 9.3 K/UL

## 2025-02-21 PROCEDURE — 36415 COLL VENOUS BLD VENIPUNCTURE: CPT

## 2025-02-21 PROCEDURE — 96413 CHEMO IV INFUSION 1 HR: CPT

## 2025-02-21 PROCEDURE — 99215 OFFICE O/P EST HI 40 MIN: CPT | Mod: 25

## 2025-02-21 RX ORDER — PEMBROLIZUMAB 50 MG/2ML
200 INJECTION, POWDER, LYOPHILIZED, FOR SOLUTION INTRAVENOUS ONCE
Refills: 0 | Status: COMPLETED | OUTPATIENT
Start: 2025-02-21 | End: 2025-02-21

## 2025-02-21 RX ADMIN — PEMBROLIZUMAB 200 MILLIGRAM(S): 50 INJECTION, POWDER, LYOPHILIZED, FOR SOLUTION INTRAVENOUS at 15:10

## 2025-02-21 RX ADMIN — PEMBROLIZUMAB 200 MILLIGRAM(S): 50 INJECTION, POWDER, LYOPHILIZED, FOR SOLUTION INTRAVENOUS at 14:40

## 2025-02-26 LAB — TSH SERPL-ACNC: 0.83 UIU/ML

## 2025-03-03 ENCOUNTER — NON-APPOINTMENT (OUTPATIENT)
Age: 75
End: 2025-03-03

## 2025-03-03 ENCOUNTER — APPOINTMENT (OUTPATIENT)
Dept: SURGERY | Facility: CLINIC | Age: 75
End: 2025-03-03
Payer: MEDICARE

## 2025-03-03 VITALS
TEMPERATURE: 97.5 F | HEART RATE: 101 BPM | OXYGEN SATURATION: 95 % | DIASTOLIC BLOOD PRESSURE: 79 MMHG | WEIGHT: 134 LBS | BODY MASS INDEX: 21.53 KG/M2 | SYSTOLIC BLOOD PRESSURE: 141 MMHG | HEIGHT: 66 IN

## 2025-03-03 DIAGNOSIS — Z80.42 FAMILY HISTORY OF MALIGNANT NEOPLASM OF PROSTATE: ICD-10-CM

## 2025-03-03 DIAGNOSIS — J44.9 CHRONIC OBSTRUCTIVE PULMONARY DISEASE, UNSPECIFIED: ICD-10-CM

## 2025-03-03 DIAGNOSIS — Z82.49 FAMILY HISTORY OF ISCHEMIC HEART DISEASE AND OTHER DISEASES OF THE CIRCULATORY SYSTEM: ICD-10-CM

## 2025-03-03 DIAGNOSIS — I10 ESSENTIAL (PRIMARY) HYPERTENSION: ICD-10-CM

## 2025-03-03 DIAGNOSIS — Z87.891 PERSONAL HISTORY OF NICOTINE DEPENDENCE: ICD-10-CM

## 2025-03-03 DIAGNOSIS — C79.31 MALIGNANT NEOPLASM OF UNSPECIFIED PART OF UNSPECIFIED BRONCHUS OR LUNG: ICD-10-CM

## 2025-03-03 DIAGNOSIS — Z95.0 PRESENCE OF CARDIAC PACEMAKER: ICD-10-CM

## 2025-03-03 DIAGNOSIS — K40.90 UNILATERAL INGUINAL HERNIA, W/OUT OBSTRUCTION OR GANGRENE, NOT SPECIFIED AS RECURRENT: ICD-10-CM

## 2025-03-03 DIAGNOSIS — C34.90 MALIGNANT NEOPLASM OF UNSPECIFIED PART OF UNSPECIFIED BRONCHUS OR LUNG: ICD-10-CM

## 2025-03-03 DIAGNOSIS — C34.91 MALIGNANT NEOPLASM OF UNSPECIFIED PART OF RIGHT BRONCHUS OR LUNG: ICD-10-CM

## 2025-03-03 DIAGNOSIS — Z80.3 FAMILY HISTORY OF MALIGNANT NEOPLASM OF BREAST: ICD-10-CM

## 2025-03-03 DIAGNOSIS — Z86.39 PERSONAL HISTORY OF OTHER ENDOCRINE, NUTRITIONAL AND METABOLIC DISEASE: ICD-10-CM

## 2025-03-03 DIAGNOSIS — C79.31 SECONDARY MALIGNANT NEOPLASM OF BRAIN: ICD-10-CM

## 2025-03-03 DIAGNOSIS — K42.9 UMBILICAL HERNIA W/OUT OBSTRUCTION OR GANGRENE: ICD-10-CM

## 2025-03-03 DIAGNOSIS — E11.9 TYPE 2 DIABETES MELLITUS W/OUT COMPLICATIONS: ICD-10-CM

## 2025-03-03 PROCEDURE — G2211 COMPLEX E/M VISIT ADD ON: CPT

## 2025-03-03 PROCEDURE — 99204 OFFICE O/P NEW MOD 45 MIN: CPT

## 2025-03-03 RX ORDER — PEMBROLIZUMAB 25 MG/ML
INJECTION, SOLUTION INTRAVENOUS
Refills: 0 | Status: ACTIVE | COMMUNITY

## 2025-03-05 PROBLEM — K42.9 UMBILICAL HERNIA WITHOUT OBSTRUCTION OR GANGRENE: Status: ACTIVE | Noted: 2025-03-05

## 2025-03-05 PROBLEM — K40.90 RIGHT INGUINAL HERNIA: Status: ACTIVE | Noted: 2025-03-05

## 2025-03-11 ENCOUNTER — APPOINTMENT (OUTPATIENT)
Dept: CT IMAGING | Facility: HOSPITAL | Age: 75
End: 2025-03-11

## 2025-03-11 ENCOUNTER — OUTPATIENT (OUTPATIENT)
Dept: OUTPATIENT SERVICES | Facility: HOSPITAL | Age: 75
LOS: 1 days | End: 2025-03-11
Payer: MEDICARE

## 2025-03-11 PROCEDURE — 71260 CT THORAX DX C+: CPT

## 2025-03-11 PROCEDURE — 74177 CT ABD & PELVIS W/CONTRAST: CPT

## 2025-03-11 PROCEDURE — 82565 ASSAY OF CREATININE: CPT

## 2025-03-11 PROCEDURE — 74177 CT ABD & PELVIS W/CONTRAST: CPT | Mod: 26

## 2025-03-11 PROCEDURE — 71260 CT THORAX DX C+: CPT | Mod: 26

## 2025-03-14 ENCOUNTER — APPOINTMENT (OUTPATIENT)
Dept: HEMATOLOGY ONCOLOGY | Facility: CLINIC | Age: 75
End: 2025-03-14
Payer: MEDICARE

## 2025-03-14 ENCOUNTER — OUTPATIENT (OUTPATIENT)
Dept: OUTPATIENT SERVICES | Facility: HOSPITAL | Age: 75
LOS: 1 days | End: 2025-03-14
Payer: MEDICARE

## 2025-03-14 ENCOUNTER — APPOINTMENT (OUTPATIENT)
Dept: INFUSION THERAPY | Facility: CLINIC | Age: 75
End: 2025-03-14

## 2025-03-14 VITALS
DIASTOLIC BLOOD PRESSURE: 64 MMHG | HEIGHT: 67 IN | WEIGHT: 134.92 LBS | HEART RATE: 88 BPM | TEMPERATURE: 98 F | SYSTOLIC BLOOD PRESSURE: 136 MMHG | RESPIRATION RATE: 16 BRPM | OXYGEN SATURATION: 96 %

## 2025-03-14 VITALS
RESPIRATION RATE: 18 BRPM | HEART RATE: 94 BPM | BODY MASS INDEX: 21.86 KG/M2 | HEIGHT: 66 IN | OXYGEN SATURATION: 95 % | DIASTOLIC BLOOD PRESSURE: 73 MMHG | TEMPERATURE: 96.3 F | WEIGHT: 136 LBS | SYSTOLIC BLOOD PRESSURE: 144 MMHG

## 2025-03-14 VITALS
DIASTOLIC BLOOD PRESSURE: 78 MMHG | HEART RATE: 78 BPM | RESPIRATION RATE: 18 BRPM | SYSTOLIC BLOOD PRESSURE: 128 MMHG | OXYGEN SATURATION: 98 % | TEMPERATURE: 98 F

## 2025-03-14 DIAGNOSIS — C79.31 MALIGNANT NEOPLASM OF UNSPECIFIED PART OF UNSPECIFIED BRONCHUS OR LUNG: ICD-10-CM

## 2025-03-14 DIAGNOSIS — C34.90 MALIGNANT NEOPLASM OF UNSPECIFIED PART OF UNSPECIFIED BRONCHUS OR LUNG: ICD-10-CM

## 2025-03-14 LAB
ALBUMIN SERPL ELPH-MCNC: 3.9 G/DL
ALP BLD-CCNC: 60 U/L
ALT SERPL-CCNC: 16 U/L
ANION GAP SERPL CALC-SCNC: 16 MMOL/L
AST SERPL-CCNC: 22 U/L
BILIRUB SERPL-MCNC: 0.7 MG/DL
BUN SERPL-MCNC: 18 MG/DL
CALCIUM SERPL-MCNC: 9.8 MG/DL
CHLORIDE SERPL-SCNC: 99 MMOL/L
CO2 SERPL-SCNC: 32 MMOL/L
CREAT SERPL-MCNC: 1 MG/DL
EGFRCR SERPLBLD CKD-EPI 2021: 78 ML/MIN/1.73M2
GLUCOSE SERPL-MCNC: 168 MG/DL
HCT VFR BLD CALC: 43.6 %
HGB BLD-MCNC: 14.5 G/DL
LYMPHOCYTES # BLD AUTO: 1.8 K/UL
LYMPHOCYTES NFR BLD AUTO: 20 %
MAN DIFF?: NO
MCHC RBC-ENTMCNC: 27.8 PG
MCHC RBC-ENTMCNC: 33.3 G/DL
MCV RBC AUTO: 83.5 FL
NEUTROPHILS # BLD AUTO: 6.4 K/UL
NEUTROPHILS NFR BLD AUTO: 72.8 %
PLATELET # BLD AUTO: 250 K/UL
POTASSIUM SERPL-SCNC: 4 MMOL/L
PROT SERPL-MCNC: 7.2 G/DL
RBC # BLD: 5.22 M/UL
RBC # FLD: 14.7 %
SODIUM SERPL-SCNC: 147 MMOL/L
WBC # FLD AUTO: 8.8 K/UL

## 2025-03-14 PROCEDURE — 99215 OFFICE O/P EST HI 40 MIN: CPT | Mod: 25

## 2025-03-14 PROCEDURE — 36415 COLL VENOUS BLD VENIPUNCTURE: CPT

## 2025-03-14 PROCEDURE — 96413 CHEMO IV INFUSION 1 HR: CPT

## 2025-03-14 RX ORDER — PEMBROLIZUMAB 50 MG/2ML
200 INJECTION, POWDER, LYOPHILIZED, FOR SOLUTION INTRAVENOUS ONCE
Refills: 0 | Status: COMPLETED | OUTPATIENT
Start: 2025-03-14 | End: 2025-03-14

## 2025-03-14 RX ADMIN — PEMBROLIZUMAB 200 MILLIGRAM(S): 50 INJECTION, POWDER, LYOPHILIZED, FOR SOLUTION INTRAVENOUS at 14:29

## 2025-03-14 RX ADMIN — PEMBROLIZUMAB 200 MILLIGRAM(S): 50 INJECTION, POWDER, LYOPHILIZED, FOR SOLUTION INTRAVENOUS at 13:43

## 2025-03-15 LAB — TSH SERPL-ACNC: 0.58 UIU/ML

## 2025-04-07 ENCOUNTER — OUTPATIENT (OUTPATIENT)
Dept: OUTPATIENT SERVICES | Facility: HOSPITAL | Age: 75
LOS: 1 days | End: 2025-04-07
Payer: MEDICARE

## 2025-04-07 ENCOUNTER — APPOINTMENT (OUTPATIENT)
Dept: HEMATOLOGY ONCOLOGY | Facility: CLINIC | Age: 75
End: 2025-04-07
Payer: MEDICARE

## 2025-04-07 ENCOUNTER — APPOINTMENT (OUTPATIENT)
Dept: INFUSION THERAPY | Facility: CLINIC | Age: 75
End: 2025-04-07

## 2025-04-07 VITALS
TEMPERATURE: 98 F | RESPIRATION RATE: 18 BRPM | DIASTOLIC BLOOD PRESSURE: 78 MMHG | SYSTOLIC BLOOD PRESSURE: 125 MMHG | HEART RATE: 74 BPM | OXYGEN SATURATION: 99 %

## 2025-04-07 VITALS
BODY MASS INDEX: 21.86 KG/M2 | WEIGHT: 136 LBS | HEIGHT: 66 IN | OXYGEN SATURATION: 94 % | HEART RATE: 80 BPM | RESPIRATION RATE: 18 BRPM | DIASTOLIC BLOOD PRESSURE: 77 MMHG | SYSTOLIC BLOOD PRESSURE: 131 MMHG | TEMPERATURE: 98.6 F

## 2025-04-07 VITALS
HEIGHT: 67 IN | SYSTOLIC BLOOD PRESSURE: 133 MMHG | WEIGHT: 134.04 LBS | TEMPERATURE: 98 F | DIASTOLIC BLOOD PRESSURE: 75 MMHG | OXYGEN SATURATION: 98 % | HEART RATE: 76 BPM | RESPIRATION RATE: 18 BRPM

## 2025-04-07 DIAGNOSIS — C34.90 MALIGNANT NEOPLASM OF UNSPECIFIED PART OF UNSPECIFIED BRONCHUS OR LUNG: ICD-10-CM

## 2025-04-07 DIAGNOSIS — C34.91 MALIGNANT NEOPLASM OF UNSPECIFIED PART OF RIGHT BRONCHUS OR LUNG: ICD-10-CM

## 2025-04-07 PROCEDURE — 96413 CHEMO IV INFUSION 1 HR: CPT

## 2025-04-07 PROCEDURE — 36415 COLL VENOUS BLD VENIPUNCTURE: CPT

## 2025-04-07 PROCEDURE — 99215 OFFICE O/P EST HI 40 MIN: CPT | Mod: 25

## 2025-04-07 RX ORDER — PEMBROLIZUMAB 50 MG/2ML
200 INJECTION, POWDER, LYOPHILIZED, FOR SOLUTION INTRAVENOUS ONCE
Refills: 0 | Status: COMPLETED | OUTPATIENT
Start: 2025-04-07 | End: 2025-04-07

## 2025-04-07 RX ADMIN — PEMBROLIZUMAB 200 MILLIGRAM(S): 50 INJECTION, POWDER, LYOPHILIZED, FOR SOLUTION INTRAVENOUS at 17:40

## 2025-04-07 RX ADMIN — PEMBROLIZUMAB 200 MILLIGRAM(S): 50 INJECTION, POWDER, LYOPHILIZED, FOR SOLUTION INTRAVENOUS at 17:04

## 2025-04-08 LAB
ALBUMIN SERPL ELPH-MCNC: 3.9 G/DL
ALP BLD-CCNC: 55 U/L
ALT SERPL-CCNC: 17 U/L
ANION GAP SERPL CALC-SCNC: 8 MMOL/L
AST SERPL-CCNC: 24 U/L
BILIRUB SERPL-MCNC: 0.7 MG/DL
BUN SERPL-MCNC: 15 MG/DL
CALCIUM SERPL-MCNC: 10 MG/DL
CHLORIDE SERPL-SCNC: 100 MMOL/L
CO2 SERPL-SCNC: 34 MMOL/L
CREAT SERPL-MCNC: 1.1 MG/DL
EGFRCR SERPLBLD CKD-EPI 2021: 70 ML/MIN/1.73M2
GLUCOSE SERPL-MCNC: 179 MG/DL
HCT VFR BLD CALC: 45.7 %
HGB BLD-MCNC: 15.1 G/DL
LYMPHOCYTES # BLD AUTO: 1.4 K/UL
LYMPHOCYTES NFR BLD AUTO: 20.4 %
MAN DIFF?: NO
MCHC RBC-ENTMCNC: 27.6 PG
MCHC RBC-ENTMCNC: 33 G/DL
MCV RBC AUTO: 83.5 FL
NEUTROPHILS # BLD AUTO: 4.8 K/UL
NEUTROPHILS NFR BLD AUTO: 72 %
PLATELET # BLD AUTO: 243 K/UL
POTASSIUM SERPL-SCNC: 4.9 MMOL/L
PROT SERPL-MCNC: 7.3 G/DL
RBC # BLD: 5.47 M/UL
RBC # FLD: 14.7 %
SODIUM SERPL-SCNC: 142 MMOL/L
TSH SERPL-ACNC: 0.68 UIU/ML
WBC # FLD AUTO: 6.7 K/UL

## 2025-04-28 ENCOUNTER — APPOINTMENT (OUTPATIENT)
Dept: INFUSION THERAPY | Facility: CLINIC | Age: 75
End: 2025-04-28

## 2025-04-28 ENCOUNTER — APPOINTMENT (OUTPATIENT)
Dept: HEMATOLOGY ONCOLOGY | Facility: CLINIC | Age: 75
End: 2025-04-28

## 2025-04-28 ENCOUNTER — OUTPATIENT (OUTPATIENT)
Dept: OUTPATIENT SERVICES | Facility: HOSPITAL | Age: 75
LOS: 1 days | End: 2025-04-28
Payer: MEDICARE

## 2025-04-28 DIAGNOSIS — C34.90 MALIGNANT NEOPLASM OF UNSPECIFIED PART OF UNSPECIFIED BRONCHUS OR LUNG: ICD-10-CM

## 2025-04-28 LAB
ALBUMIN SERPL ELPH-MCNC: 4 G/DL — SIGNIFICANT CHANGE UP (ref 3.3–5)
ALP SERPL-CCNC: 61 U/L — SIGNIFICANT CHANGE UP (ref 40–120)
ALT FLD-CCNC: 20 U/L — SIGNIFICANT CHANGE UP (ref 10–45)
ANION GAP SERPL CALC-SCNC: -2 MMOL/L — LOW (ref 5–17)
AST SERPL-CCNC: 25 U/L — SIGNIFICANT CHANGE UP (ref 10–40)
BILIRUB SERPL-MCNC: 0.9 MG/DL — SIGNIFICANT CHANGE UP (ref 0.2–1.2)
BUN SERPL-MCNC: 19 MG/DL — SIGNIFICANT CHANGE UP (ref 7–23)
CALCIUM SERPL-MCNC: 10.3 MG/DL — SIGNIFICANT CHANGE UP (ref 8.4–10.5)
CHLORIDE SERPL-SCNC: 103 MMOL/L — SIGNIFICANT CHANGE UP (ref 96–108)
CO2 SERPL-SCNC: 33 MMOL/L — HIGH (ref 22–31)
CREAT SERPL-MCNC: 0.9 MG/DL — SIGNIFICANT CHANGE UP (ref 0.5–1.3)
EGFR: 89 ML/MIN/1.73M2 — SIGNIFICANT CHANGE UP
EGFR: 89 ML/MIN/1.73M2 — SIGNIFICANT CHANGE UP
GLUCOSE SERPL-MCNC: 161 MG/DL — HIGH (ref 70–99)
HCT VFR BLD CALC: 45.2 % — SIGNIFICANT CHANGE UP (ref 39–50)
HGB BLD-MCNC: 15.1 G/DL — SIGNIFICANT CHANGE UP (ref 13–17)
LYMPHOCYTES # BLD AUTO: 1.7 K/UL — SIGNIFICANT CHANGE UP (ref 1–3.3)
LYMPHOCYTES # BLD AUTO: 18.2 % — SIGNIFICANT CHANGE UP (ref 13–44)
MCHC RBC-ENTMCNC: 27.6 PG — SIGNIFICANT CHANGE UP (ref 27–34)
MCHC RBC-ENTMCNC: 33.4 G/DL — SIGNIFICANT CHANGE UP (ref 32–36)
MCV RBC AUTO: 82.6 FL — SIGNIFICANT CHANGE UP (ref 80–100)
NEUTROPHILS # BLD AUTO: 6.8 K/UL — SIGNIFICANT CHANGE UP (ref 1.8–7.4)
NEUTROPHILS NFR BLD AUTO: 71.7 % — SIGNIFICANT CHANGE UP (ref 43–77)
PLATELET # BLD AUTO: 215 K/UL — SIGNIFICANT CHANGE UP (ref 150–400)
POTASSIUM SERPL-MCNC: 4.1 MMOL/L — SIGNIFICANT CHANGE UP (ref 3.5–5.3)
POTASSIUM SERPL-SCNC: 4.1 MMOL/L — SIGNIFICANT CHANGE UP (ref 3.5–5.3)
PROT SERPL-MCNC: 7.1 G/DL — SIGNIFICANT CHANGE UP (ref 6–8.3)
RBC # BLD: 5.47 M/UL — SIGNIFICANT CHANGE UP (ref 4.2–5.8)
RBC # FLD: 15 % — HIGH (ref 10.3–14.5)
SODIUM SERPL-SCNC: 134 MMOL/L — LOW (ref 135–145)
TSH SERPL-MCNC: 0.77 UIU/ML — SIGNIFICANT CHANGE UP (ref 0.27–4.2)
WBC # BLD: 9.4 K/UL — SIGNIFICANT CHANGE UP (ref 3.8–10.5)
WBC # FLD AUTO: 9.4 K/UL — SIGNIFICANT CHANGE UP (ref 3.8–10.5)

## 2025-04-28 PROCEDURE — 36415 COLL VENOUS BLD VENIPUNCTURE: CPT

## 2025-04-28 PROCEDURE — 80053 COMPREHEN METABOLIC PANEL: CPT

## 2025-04-28 PROCEDURE — 96413 CHEMO IV INFUSION 1 HR: CPT

## 2025-04-28 PROCEDURE — 84443 ASSAY THYROID STIM HORMONE: CPT

## 2025-04-28 PROCEDURE — 85025 COMPLETE CBC W/AUTO DIFF WBC: CPT

## 2025-04-28 RX ORDER — PEMBROLIZUMAB 50 MG/2ML
200 INJECTION, POWDER, LYOPHILIZED, FOR SOLUTION INTRAVENOUS ONCE
Refills: 0 | Status: COMPLETED | OUTPATIENT
Start: 2025-04-28 | End: 2025-04-28

## 2025-04-28 RX ADMIN — PEMBROLIZUMAB 200 MILLIGRAM(S): 50 INJECTION, POWDER, LYOPHILIZED, FOR SOLUTION INTRAVENOUS at 16:20

## 2025-04-28 RX ADMIN — PEMBROLIZUMAB 200 MILLIGRAM(S): 50 INJECTION, POWDER, LYOPHILIZED, FOR SOLUTION INTRAVENOUS at 15:33

## 2025-05-19 ENCOUNTER — APPOINTMENT (OUTPATIENT)
Dept: HEMATOLOGY ONCOLOGY | Facility: CLINIC | Age: 75
End: 2025-05-19
Payer: MEDICARE

## 2025-05-19 ENCOUNTER — OUTPATIENT (OUTPATIENT)
Dept: OUTPATIENT SERVICES | Facility: HOSPITAL | Age: 75
LOS: 1 days | End: 2025-05-19
Payer: MEDICARE

## 2025-05-19 ENCOUNTER — APPOINTMENT (OUTPATIENT)
Dept: INFUSION THERAPY | Facility: CLINIC | Age: 75
End: 2025-05-19

## 2025-05-19 ENCOUNTER — NON-APPOINTMENT (OUTPATIENT)
Age: 75
End: 2025-05-19

## 2025-05-19 VITALS
TEMPERATURE: 96.7 F | OXYGEN SATURATION: 94 % | RESPIRATION RATE: 18 BRPM | BODY MASS INDEX: 20.41 KG/M2 | WEIGHT: 127 LBS | SYSTOLIC BLOOD PRESSURE: 107 MMHG | HEART RATE: 85 BPM | DIASTOLIC BLOOD PRESSURE: 65 MMHG | HEIGHT: 66 IN

## 2025-05-19 VITALS
DIASTOLIC BLOOD PRESSURE: 72 MMHG | HEIGHT: 67 IN | RESPIRATION RATE: 18 BRPM | OXYGEN SATURATION: 99 % | TEMPERATURE: 98 F | WEIGHT: 128.09 LBS | HEART RATE: 82 BPM | SYSTOLIC BLOOD PRESSURE: 132 MMHG

## 2025-05-19 DIAGNOSIS — C34.91 MALIGNANT NEOPLASM OF UNSPECIFIED PART OF RIGHT BRONCHUS OR LUNG: ICD-10-CM

## 2025-05-19 DIAGNOSIS — C34.90 MALIGNANT NEOPLASM OF UNSPECIFIED PART OF UNSPECIFIED BRONCHUS OR LUNG: ICD-10-CM

## 2025-05-19 LAB
ALBUMIN SERPL ELPH-MCNC: 3.5 G/DL
ALP BLD-CCNC: 55 U/L
ALT SERPL-CCNC: 14 U/L
ANION GAP SERPL CALC-SCNC: 6 MMOL/L
AST SERPL-CCNC: 20 U/L
BILIRUB SERPL-MCNC: 0.6 MG/DL
BUN SERPL-MCNC: 12 MG/DL
CALCIUM SERPL-MCNC: 9.5 MG/DL
CHLORIDE SERPL-SCNC: 99 MMOL/L
CO2 SERPL-SCNC: 30 MMOL/L
CREAT SERPL-MCNC: 1.3 MG/DL
EGFRCR SERPLBLD CKD-EPI 2021: 57 ML/MIN/1.73M2
GLUCOSE SERPL-MCNC: 129 MG/DL
HCT VFR BLD CALC: 44.9 %
HGB BLD-MCNC: 15.4 G/DL
LYMPHOCYTES # BLD AUTO: 1.6 K/UL
LYMPHOCYTES NFR BLD AUTO: 12.6 %
MAN DIFF?: NO
MCHC RBC-ENTMCNC: 28.1 PG
MCHC RBC-ENTMCNC: 34.3 G/DL
MCV RBC AUTO: 81.9 FL
NEUTROPHILS # BLD AUTO: 10.3 K/UL
NEUTROPHILS NFR BLD AUTO: 80.5 %
PLATELET # BLD AUTO: 320 K/UL
POTASSIUM SERPL-SCNC: 3.3 MMOL/L
PROT SERPL-MCNC: 6.3 G/DL
RBC # BLD: 5.48 M/UL
RBC # FLD: 14.8 %
SODIUM SERPL-SCNC: 135 MMOL/L
WBC # FLD AUTO: 12.8 K/UL

## 2025-05-19 PROCEDURE — 99215 OFFICE O/P EST HI 40 MIN: CPT | Mod: 25

## 2025-05-19 PROCEDURE — 36415 COLL VENOUS BLD VENIPUNCTURE: CPT

## 2025-05-19 RX ORDER — PREDNISONE 20 MG/1
20 TABLET ORAL
Qty: 42 | Refills: 0 | Status: ACTIVE | COMMUNITY
Start: 2025-05-19 | End: 1900-01-01

## 2025-05-19 RX ORDER — PANTOPRAZOLE 40 MG/1
40 TABLET, DELAYED RELEASE ORAL DAILY
Qty: 14 | Refills: 0 | Status: ACTIVE | COMMUNITY
Start: 2025-05-19 | End: 1900-01-01

## 2025-05-19 RX ADMIN — Medication 1000 MILLILITER(S): at 17:30

## 2025-05-19 RX ADMIN — Medication 1000 MILLILITER(S): at 16:30

## 2025-05-20 LAB — TSH SERPL-ACNC: 0.97 UIU/ML

## 2025-05-21 ENCOUNTER — INPATIENT (INPATIENT)
Facility: HOSPITAL | Age: 75
LOS: 5 days | Discharge: HOME | End: 2025-05-27
Attending: STUDENT IN AN ORGANIZED HEALTH CARE EDUCATION/TRAINING PROGRAM | Admitting: STUDENT IN AN ORGANIZED HEALTH CARE EDUCATION/TRAINING PROGRAM
Payer: MEDICARE

## 2025-05-21 VITALS
DIASTOLIC BLOOD PRESSURE: 81 MMHG | TEMPERATURE: 98 F | RESPIRATION RATE: 18 BRPM | HEART RATE: 95 BPM | HEIGHT: 67 IN | SYSTOLIC BLOOD PRESSURE: 140 MMHG | WEIGHT: 130.07 LBS | OXYGEN SATURATION: 95 %

## 2025-05-21 DIAGNOSIS — I10 ESSENTIAL (PRIMARY) HYPERTENSION: ICD-10-CM

## 2025-05-21 DIAGNOSIS — Z29.9 ENCOUNTER FOR PROPHYLACTIC MEASURES, UNSPECIFIED: ICD-10-CM

## 2025-05-21 DIAGNOSIS — Z86.79 PERSONAL HISTORY OF OTHER DISEASES OF THE CIRCULATORY SYSTEM: ICD-10-CM

## 2025-05-21 DIAGNOSIS — E11.9 TYPE 2 DIABETES MELLITUS WITHOUT COMPLICATIONS: ICD-10-CM

## 2025-05-21 DIAGNOSIS — K52.9 NONINFECTIVE GASTROENTERITIS AND COLITIS, UNSPECIFIED: ICD-10-CM

## 2025-05-21 DIAGNOSIS — J44.9 CHRONIC OBSTRUCTIVE PULMONARY DISEASE, UNSPECIFIED: ICD-10-CM

## 2025-05-21 DIAGNOSIS — R19.7 DIARRHEA, UNSPECIFIED: ICD-10-CM

## 2025-05-21 DIAGNOSIS — E78.5 HYPERLIPIDEMIA, UNSPECIFIED: ICD-10-CM

## 2025-05-21 DIAGNOSIS — Z98.890 OTHER SPECIFIED POSTPROCEDURAL STATES: ICD-10-CM

## 2025-05-21 DIAGNOSIS — N17.9 ACUTE KIDNEY FAILURE, UNSPECIFIED: ICD-10-CM

## 2025-05-21 DIAGNOSIS — C34.90 MALIGNANT NEOPLASM OF UNSPECIFIED PART OF UNSPECIFIED BRONCHUS OR LUNG: ICD-10-CM

## 2025-05-21 LAB
ADD ON TEST-SPECIMEN IN LAB: SIGNIFICANT CHANGE UP
ALBUMIN SERPL ELPH-MCNC: 3.5 G/DL — SIGNIFICANT CHANGE UP (ref 3.3–5)
ALBUMIN SERPL ELPH-MCNC: 3.7 G/DL — SIGNIFICANT CHANGE UP (ref 3.3–5)
ALP SERPL-CCNC: 45 U/L — SIGNIFICANT CHANGE UP (ref 40–120)
ALP SERPL-CCNC: 49 U/L — SIGNIFICANT CHANGE UP (ref 40–120)
ALT FLD-CCNC: 16 U/L — SIGNIFICANT CHANGE UP (ref 10–45)
ALT FLD-CCNC: 19 U/L — SIGNIFICANT CHANGE UP (ref 10–45)
ANION GAP SERPL CALC-SCNC: 10 MMOL/L — SIGNIFICANT CHANGE UP (ref 5–17)
ANION GAP SERPL CALC-SCNC: 12 MMOL/L — SIGNIFICANT CHANGE UP (ref 5–17)
AST SERPL-CCNC: 18 U/L — SIGNIFICANT CHANGE UP (ref 10–40)
AST SERPL-CCNC: 21 U/L — SIGNIFICANT CHANGE UP (ref 10–40)
BASOPHILS # BLD AUTO: 0.01 K/UL — SIGNIFICANT CHANGE UP (ref 0–0.2)
BASOPHILS # BLD AUTO: 0.02 K/UL — SIGNIFICANT CHANGE UP (ref 0–0.2)
BASOPHILS NFR BLD AUTO: 0.1 % — SIGNIFICANT CHANGE UP (ref 0–2)
BASOPHILS NFR BLD AUTO: 0.2 % — SIGNIFICANT CHANGE UP (ref 0–2)
BILIRUB SERPL-MCNC: 0.3 MG/DL — SIGNIFICANT CHANGE UP (ref 0.2–1.2)
BILIRUB SERPL-MCNC: 0.3 MG/DL — SIGNIFICANT CHANGE UP (ref 0.2–1.2)
BUN SERPL-MCNC: 20 MG/DL — SIGNIFICANT CHANGE UP (ref 7–23)
BUN SERPL-MCNC: 20 MG/DL — SIGNIFICANT CHANGE UP (ref 7–23)
C DIFF GDH STL QL: NEGATIVE — SIGNIFICANT CHANGE UP
C DIFF GDH STL QL: SIGNIFICANT CHANGE UP
CALCIUM SERPL-MCNC: 8.6 MG/DL — SIGNIFICANT CHANGE UP (ref 8.4–10.5)
CALCIUM SERPL-MCNC: 8.7 MG/DL — SIGNIFICANT CHANGE UP (ref 8.4–10.5)
CHLORIDE SERPL-SCNC: 101 MMOL/L — SIGNIFICANT CHANGE UP (ref 96–108)
CHLORIDE SERPL-SCNC: 103 MMOL/L — SIGNIFICANT CHANGE UP (ref 96–108)
CO2 SERPL-SCNC: 28 MMOL/L — SIGNIFICANT CHANGE UP (ref 22–31)
CO2 SERPL-SCNC: 29 MMOL/L — SIGNIFICANT CHANGE UP (ref 22–31)
CREAT ?TM UR-MCNC: 90 MG/DL — SIGNIFICANT CHANGE UP
CREAT SERPL-MCNC: 1.05 MG/DL — SIGNIFICANT CHANGE UP (ref 0.5–1.3)
CREAT SERPL-MCNC: 1.2 MG/DL — SIGNIFICANT CHANGE UP (ref 0.5–1.3)
EGFR: 63 ML/MIN/1.73M2 — SIGNIFICANT CHANGE UP
EGFR: 63 ML/MIN/1.73M2 — SIGNIFICANT CHANGE UP
EGFR: 74 ML/MIN/1.73M2 — SIGNIFICANT CHANGE UP
EGFR: 74 ML/MIN/1.73M2 — SIGNIFICANT CHANGE UP
EOSINOPHIL # BLD AUTO: 0 K/UL — SIGNIFICANT CHANGE UP (ref 0–0.5)
EOSINOPHIL # BLD AUTO: 0 K/UL — SIGNIFICANT CHANGE UP (ref 0–0.5)
EOSINOPHIL NFR BLD AUTO: 0 % — SIGNIFICANT CHANGE UP (ref 0–6)
EOSINOPHIL NFR BLD AUTO: 0 % — SIGNIFICANT CHANGE UP (ref 0–6)
GI PCR PANEL: SIGNIFICANT CHANGE UP
GLUCOSE SERPL-MCNC: 122 MG/DL — HIGH (ref 70–99)
GLUCOSE SERPL-MCNC: 169 MG/DL — HIGH (ref 70–99)
HCT VFR BLD CALC: 37.4 % — LOW (ref 39–50)
HCT VFR BLD CALC: 42.5 % — SIGNIFICANT CHANGE UP (ref 39–50)
HGB BLD-MCNC: 12.9 G/DL — LOW (ref 13–17)
HGB BLD-MCNC: 14.1 G/DL — SIGNIFICANT CHANGE UP (ref 13–17)
IMM GRANULOCYTES NFR BLD AUTO: 0.4 % — SIGNIFICANT CHANGE UP (ref 0–0.9)
IMM GRANULOCYTES NFR BLD AUTO: 0.5 % — SIGNIFICANT CHANGE UP (ref 0–0.9)
LYMPHOCYTES # BLD AUTO: 1.49 K/UL — SIGNIFICANT CHANGE UP (ref 1–3.3)
LYMPHOCYTES # BLD AUTO: 1.93 K/UL — SIGNIFICANT CHANGE UP (ref 1–3.3)
LYMPHOCYTES # BLD AUTO: 13.9 % — SIGNIFICANT CHANGE UP (ref 13–44)
LYMPHOCYTES # BLD AUTO: 15.3 % — SIGNIFICANT CHANGE UP (ref 13–44)
MAGNESIUM SERPL-MCNC: 2 MG/DL — SIGNIFICANT CHANGE UP (ref 1.6–2.6)
MAGNESIUM SERPL-MCNC: 2 MG/DL — SIGNIFICANT CHANGE UP (ref 1.6–2.6)
MCHC RBC-ENTMCNC: 27.9 PG — SIGNIFICANT CHANGE UP (ref 27–34)
MCHC RBC-ENTMCNC: 28 PG — SIGNIFICANT CHANGE UP (ref 27–34)
MCHC RBC-ENTMCNC: 33.2 G/DL — SIGNIFICANT CHANGE UP (ref 32–36)
MCHC RBC-ENTMCNC: 34.5 G/DL — SIGNIFICANT CHANGE UP (ref 32–36)
MCV RBC AUTO: 81.3 FL — SIGNIFICANT CHANGE UP (ref 80–100)
MCV RBC AUTO: 84.2 FL — SIGNIFICANT CHANGE UP (ref 80–100)
MONOCYTES # BLD AUTO: 0.87 K/UL — SIGNIFICANT CHANGE UP (ref 0–0.9)
MONOCYTES # BLD AUTO: 1.02 K/UL — HIGH (ref 0–0.9)
MONOCYTES NFR BLD AUTO: 8.1 % — SIGNIFICANT CHANGE UP (ref 2–14)
MONOCYTES NFR BLD AUTO: 8.1 % — SIGNIFICANT CHANGE UP (ref 2–14)
NEUTROPHILS # BLD AUTO: 8.27 K/UL — HIGH (ref 1.8–7.4)
NEUTROPHILS # BLD AUTO: 9.57 K/UL — HIGH (ref 1.8–7.4)
NEUTROPHILS NFR BLD AUTO: 76 % — SIGNIFICANT CHANGE UP (ref 43–77)
NEUTROPHILS NFR BLD AUTO: 77.4 % — HIGH (ref 43–77)
NRBC BLD AUTO-RTO: 0 /100 WBCS — SIGNIFICANT CHANGE UP (ref 0–0)
NRBC BLD AUTO-RTO: 0 /100 WBCS — SIGNIFICANT CHANGE UP (ref 0–0)
OSMOLALITY UR: 546 MOSM/KG — SIGNIFICANT CHANGE UP (ref 300–900)
PHOSPHATE SERPL-MCNC: 2.1 MG/DL — LOW (ref 2.5–4.5)
PHOSPHATE SERPL-MCNC: 2.7 MG/DL — SIGNIFICANT CHANGE UP (ref 2.5–4.5)
PLATELET # BLD AUTO: 277 K/UL — SIGNIFICANT CHANGE UP (ref 150–400)
PLATELET # BLD AUTO: 301 K/UL — SIGNIFICANT CHANGE UP (ref 150–400)
POTASSIUM SERPL-MCNC: 3.3 MMOL/L — LOW (ref 3.5–5.3)
POTASSIUM SERPL-MCNC: 3.4 MMOL/L — LOW (ref 3.5–5.3)
POTASSIUM SERPL-SCNC: 3.3 MMOL/L — LOW (ref 3.5–5.3)
POTASSIUM SERPL-SCNC: 3.4 MMOL/L — LOW (ref 3.5–5.3)
POTASSIUM UR-SCNC: 18 MMOL/L — SIGNIFICANT CHANGE UP
PROT ?TM UR-MCNC: 20 MG/DL — HIGH (ref 0–12)
PROT SERPL-MCNC: 5.7 G/DL — LOW (ref 6–8.3)
PROT SERPL-MCNC: 6.3 G/DL — SIGNIFICANT CHANGE UP (ref 6–8.3)
PROT/CREAT UR-RTO: 0.2 RATIO — SIGNIFICANT CHANGE UP (ref 0–0.2)
RBC # BLD: 4.6 M/UL — SIGNIFICANT CHANGE UP (ref 4.2–5.8)
RBC # BLD: 5.05 M/UL — SIGNIFICANT CHANGE UP (ref 4.2–5.8)
RBC # FLD: 14.5 % — SIGNIFICANT CHANGE UP (ref 10.3–14.5)
RBC # FLD: 14.8 % — HIGH (ref 10.3–14.5)
SODIUM SERPL-SCNC: 139 MMOL/L — SIGNIFICANT CHANGE UP (ref 135–145)
SODIUM SERPL-SCNC: 144 MMOL/L — SIGNIFICANT CHANGE UP (ref 135–145)
SODIUM UR-SCNC: 84 MMOL/L — SIGNIFICANT CHANGE UP
UUN UR-MCNC: 758 MG/DL — SIGNIFICANT CHANGE UP
WBC # BLD: 10.69 K/UL — HIGH (ref 3.8–10.5)
WBC # BLD: 12.59 K/UL — HIGH (ref 3.8–10.5)
WBC # FLD AUTO: 10.69 K/UL — HIGH (ref 3.8–10.5)
WBC # FLD AUTO: 12.59 K/UL — HIGH (ref 3.8–10.5)

## 2025-05-21 PROCEDURE — 99221 1ST HOSP IP/OBS SF/LOW 40: CPT

## 2025-05-21 PROCEDURE — 99223 1ST HOSP IP/OBS HIGH 75: CPT | Mod: GC,AI

## 2025-05-21 PROCEDURE — 74177 CT ABD & PELVIS W/CONTRAST: CPT | Mod: 26

## 2025-05-21 PROCEDURE — 99285 EMERGENCY DEPT VISIT HI MDM: CPT

## 2025-05-21 PROCEDURE — 99222 1ST HOSP IP/OBS MODERATE 55: CPT

## 2025-05-21 PROCEDURE — 93010 ELECTROCARDIOGRAM REPORT: CPT

## 2025-05-21 PROCEDURE — 71260 CT THORAX DX C+: CPT | Mod: 26

## 2025-05-21 RX ORDER — DEXTROSE 50 % IN WATER 50 %
12.5 SYRINGE (ML) INTRAVENOUS ONCE
Refills: 0 | Status: DISCONTINUED | OUTPATIENT
Start: 2025-05-21 | End: 2025-05-27

## 2025-05-21 RX ORDER — ATORVASTATIN CALCIUM 80 MG/1
1 TABLET, FILM COATED ORAL
Refills: 0 | DISCHARGE

## 2025-05-21 RX ORDER — ALBUTEROL SULFATE 2.5 MG/3ML
2 VIAL, NEBULIZER (ML) INHALATION EVERY 6 HOURS
Refills: 0 | Status: DISCONTINUED | OUTPATIENT
Start: 2025-05-21 | End: 2025-05-27

## 2025-05-21 RX ORDER — DEXTROSE 50 % IN WATER 50 %
25 SYRINGE (ML) INTRAVENOUS ONCE
Refills: 0 | Status: DISCONTINUED | OUTPATIENT
Start: 2025-05-21 | End: 2025-05-27

## 2025-05-21 RX ORDER — ALBUTEROL SULFATE 2.5 MG/3ML
2 VIAL, NEBULIZER (ML) INHALATION
Refills: 0 | DISCHARGE

## 2025-05-21 RX ORDER — SODIUM CHLORIDE 9 G/1000ML
1000 INJECTION, SOLUTION INTRAVENOUS
Refills: 0 | Status: DISCONTINUED | OUTPATIENT
Start: 2025-05-21 | End: 2025-05-27

## 2025-05-21 RX ORDER — EZETIMIBE 10 MG/1
1 TABLET ORAL
Refills: 0 | DISCHARGE

## 2025-05-21 RX ORDER — GLUCAGON 3 MG/1
1 POWDER NASAL ONCE
Refills: 0 | Status: DISCONTINUED | OUTPATIENT
Start: 2025-05-21 | End: 2025-05-27

## 2025-05-21 RX ORDER — AMLODIPINE BESYLATE 10 MG/1
1 TABLET ORAL
Refills: 0 | DISCHARGE

## 2025-05-21 RX ORDER — ENOXAPARIN SODIUM 100 MG/ML
40 INJECTION SUBCUTANEOUS EVERY 24 HOURS
Refills: 0 | Status: DISCONTINUED | OUTPATIENT
Start: 2025-05-21 | End: 2025-05-22

## 2025-05-21 RX ORDER — ACETAMINOPHEN 500 MG/5ML
650 LIQUID (ML) ORAL EVERY 6 HOURS
Refills: 0 | Status: DISCONTINUED | OUTPATIENT
Start: 2025-05-21 | End: 2025-05-27

## 2025-05-21 RX ORDER — METOPROLOL SUCCINATE 50 MG/1
1 TABLET, EXTENDED RELEASE ORAL
Refills: 0 | DISCHARGE

## 2025-05-21 RX ORDER — ATORVASTATIN CALCIUM 80 MG/1
80 TABLET, FILM COATED ORAL AT BEDTIME
Refills: 0 | Status: DISCONTINUED | OUTPATIENT
Start: 2025-05-21 | End: 2025-05-27

## 2025-05-21 RX ORDER — EZETIMIBE 10 MG/1
10 TABLET ORAL DAILY
Refills: 0 | Status: DISCONTINUED | OUTPATIENT
Start: 2025-05-21 | End: 2025-05-23

## 2025-05-21 RX ORDER — INSULIN LISPRO 100 U/ML
INJECTION, SOLUTION INTRAVENOUS; SUBCUTANEOUS
Refills: 0 | Status: DISCONTINUED | OUTPATIENT
Start: 2025-05-21 | End: 2025-05-25

## 2025-05-21 RX ORDER — LISINOPRIL 5 MG/1
1 TABLET ORAL
Refills: 0 | DISCHARGE

## 2025-05-21 RX ORDER — METHYLPREDNISOLONE ACETATE 80 MG/ML
120 INJECTION, SUSPENSION INTRA-ARTICULAR; INTRALESIONAL; INTRAMUSCULAR; SOFT TISSUE DAILY
Refills: 0 | Status: DISCONTINUED | OUTPATIENT
Start: 2025-05-21 | End: 2025-05-21

## 2025-05-21 RX ORDER — METOPROLOL SUCCINATE 50 MG/1
100 TABLET, EXTENDED RELEASE ORAL EVERY 24 HOURS
Refills: 0 | Status: DISCONTINUED | OUTPATIENT
Start: 2025-05-21 | End: 2025-05-27

## 2025-05-21 RX ORDER — POTASSIUM PHOSPHATE, MONOBASIC POTASSIUM PHOSPHATE, DIBASIC INJECTION, 236; 224 MG/ML; MG/ML
30 SOLUTION, CONCENTRATE INTRAVENOUS ONCE
Refills: 0 | Status: COMPLETED | OUTPATIENT
Start: 2025-05-21 | End: 2025-05-21

## 2025-05-21 RX ORDER — AMLODIPINE BESYLATE 10 MG/1
10 TABLET ORAL EVERY 24 HOURS
Refills: 0 | Status: DISCONTINUED | OUTPATIENT
Start: 2025-05-21 | End: 2025-05-27

## 2025-05-21 RX ORDER — SOD PHOS DI, MONO/K PHOS MONO 250 MG
1 TABLET ORAL ONCE
Refills: 0 | Status: COMPLETED | OUTPATIENT
Start: 2025-05-21 | End: 2025-05-21

## 2025-05-21 RX ORDER — METHYLPREDNISOLONE ACETATE 80 MG/ML
125 INJECTION, SUSPENSION INTRA-ARTICULAR; INTRALESIONAL; INTRAMUSCULAR; SOFT TISSUE DAILY
Refills: 0 | Status: DISCONTINUED | OUTPATIENT
Start: 2025-05-21 | End: 2025-05-25

## 2025-05-21 RX ORDER — MELATONIN 5 MG
3 TABLET ORAL AT BEDTIME
Refills: 0 | Status: DISCONTINUED | OUTPATIENT
Start: 2025-05-21 | End: 2025-05-27

## 2025-05-21 RX ORDER — ASPIRIN 325 MG
81 TABLET ORAL DAILY
Refills: 0 | Status: DISCONTINUED | OUTPATIENT
Start: 2025-05-21 | End: 2025-05-27

## 2025-05-21 RX ORDER — ONDANSETRON HCL/PF 4 MG/2 ML
4 VIAL (ML) INJECTION EVERY 8 HOURS
Refills: 0 | Status: DISCONTINUED | OUTPATIENT
Start: 2025-05-21 | End: 2025-05-27

## 2025-05-21 RX ORDER — HYDROCHLOROTHIAZIDE 50 MG/1
1 TABLET ORAL
Refills: 0 | DISCHARGE

## 2025-05-21 RX ORDER — DEXTROSE 50 % IN WATER 50 %
15 SYRINGE (ML) INTRAVENOUS ONCE
Refills: 0 | Status: DISCONTINUED | OUTPATIENT
Start: 2025-05-21 | End: 2025-05-27

## 2025-05-21 RX ORDER — MAGNESIUM, ALUMINUM HYDROXIDE 200-200 MG
30 TABLET,CHEWABLE ORAL EVERY 4 HOURS
Refills: 0 | Status: DISCONTINUED | OUTPATIENT
Start: 2025-05-21 | End: 2025-05-27

## 2025-05-21 RX ORDER — ASPIRIN 325 MG
1 TABLET ORAL
Refills: 0 | DISCHARGE

## 2025-05-21 RX ADMIN — Medication 40 MILLIEQUIVALENT(S): at 12:23

## 2025-05-21 RX ADMIN — METHYLPREDNISOLONE ACETATE 125 MILLIGRAM(S): 80 INJECTION, SUSPENSION INTRA-ARTICULAR; INTRALESIONAL; INTRAMUSCULAR; SOFT TISSUE at 18:47

## 2025-05-21 RX ADMIN — ATORVASTATIN CALCIUM 80 MILLIGRAM(S): 80 TABLET, FILM COATED ORAL at 21:11

## 2025-05-21 RX ADMIN — Medication 1000 MILLILITER(S): at 11:33

## 2025-05-21 RX ADMIN — Medication 1 PACKET(S): at 23:29

## 2025-05-21 RX ADMIN — Medication 40 MILLIEQUIVALENT(S): at 22:42

## 2025-05-21 RX ADMIN — METOPROLOL SUCCINATE 100 MILLIGRAM(S): 50 TABLET, EXTENDED RELEASE ORAL at 21:09

## 2025-05-21 RX ADMIN — ENOXAPARIN SODIUM 40 MILLIGRAM(S): 100 INJECTION SUBCUTANEOUS at 21:10

## 2025-05-21 NOTE — H&P ADULT - PROBLEM SELECTOR PLAN 2
Home med: prednisone Patient with stage IV NSCLC  adenocarcinoma with metastesis to the brain (T4N2M1 with BMs, PD-L1 90% positive, DDR2 mutant, TP53 mutant) Started on pembrolizumab 10/30/2023 with a treatment break from August to December 2024 due to organizing pneumonia, likely immune related adverse event. Last infucion was 5/16  Recent CTAP on 3/11/25 showed stable disease  Heme Onc  following, recs appreciated     - obtain CTAP and CT chest for interval scans   - plan as above Patient with stage IV NSCLC  adenocarcinoma with metastasis to the brain (T4N2M1 with BMs, PD-L1 90% positive, DDR2 mutant, TP53 mutant). Oncologist: Dr. Pratt  Started on pembrolizumab 10/30/2023 with a treatment break from August to December 2024 s/p steriod taper and resumption of medication. Last infusion was 5/16; patient tolerated well  Recent CTAP on 3/11/25 showed stable disease  Repeat CTAP 5/21 :Unchanged 21 x 21 mm superior right lower lobe mass surrounding the segmental airway and contiguous with the mediastinal border. No new suspicious lung lesions.  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 Oncologist: Dr. Pratt  Started on pembrolizumab 10/30/2023 with a treatment break from August to December 2024 s/p steroid taper and resumption of medication. Last infusion was 5/16; patient tolerated well  Recent CTAP on 3/11/25 showed stable disease  Repeat CTAP 5/21 :Unchanged 21 x 21 mm superior right lower lobe mass surrounding the segmental airway and contiguous with the mediastinal border. No new suspicious lung lesions.  Heme Onc  following, recs appreciated     - plan as above  - f/u heme onc recs

## 2025-05-21 NOTE — ED PROVIDER NOTE - PROGRESS NOTE DETAILS
[place]: K 3.3, hgb 12.9, Mild cr increase to 1.2, other labs grossly wnl.   D/w Dr. Pratt - concerned that symptoms are related to his cancer therapy. Recommending medicine admission/GI eval, also requesting cT chest a/p (as pt is due for one anyways). GI consulted as well. Will admit for further care. Updated pt.

## 2025-05-21 NOTE — H&P ADULT - PROBLEM SELECTOR PLAN 1
Patient with hx of NSCLC on Keytruda/ pembrolizumab last infusion on 5/16 presenting with three week history of watery, non bloody voluminous stool. Associated with poor appetite and generalized weakness. Denies fever, chills, nausea, vomiting, recent dietary changes, recent travel, recent hospitalizations, new antibiotic use or sick contacts  Etiology included immunotherapy induced colitis vs infections gastroenteritis   GI consulted, recs appreciated     - obtain cardiology clearance for possibly colonoscopy   - obtain fecal calprotectin, gi pcr and c. diff, stool pH  - encourage oral intake, replete electrolytes as needed   - start prednisone 2mg/kg - 120mg qd  - continue with protonix 40mg qd Etiology includes immunotherapy induced colitis vs infections gastroenteritis   Patient with hx of NSCLC on Keytruda/ pembrolizumab last infusion on 5/16 presenting with three week history of watery, non bloody voluminous stool associated with poor appetite and generalized weakness. Denies fever, chills, nausea, vomiting, recent dietary changes, recent travel, recent hospitalizations, new antibiotic use or sick contacts.  Recently prescribed prednisone 60mg qd and pantoprazole 40mg qd by oncologist    Patient not currently meeting SIRS criteria. CTAP significant for wall thickening of the left hemicolon and ascending colon.  GI consulted, recs appreciated; s/p KCL 50 and 1L NS in ED     - obtain cardiology clearance for possibly colonoscopy   - obtain fecal calprotectin, gi pcr and c. diff, stool pH  - encourage oral intake with low fiber diet, replete electrolytes as needed   - start prednisone 2mg/kg - 120mg qd  - continue with Protonix 40mg qd Etiology includes immunotherapy induced colitis vs infections gastroenteritis   Patient with hx of NSCLC on Keytruda/ pembrolizumab last infusion on 5/16 presenting with three week history of watery, non bloody voluminous stool associated with poor appetite and generalized weakness.   Denies fever, chills, nausea, vomiting, recent dietary changes, recent travel, recent hospitalizations, new antibiotic use or sick contacts.  Prescribed prednisone 60mg qd and pantoprazole 40mg qd by oncologist  for relief two days prior to admission  Patient not currently meeting SIRS criteria. PE only significant to moderate abdominal distention; CTAP significant for wall thickening of the left hemicolon and ascending colon.  GI consulted, recs appreciated; s/p KCL 50 and 1L NS in ED     - obtain cardiology clearance for colonoscopy   - obtain fecal calprotectin, gi pcr and c. diff, stool pH  - encourage oral intake with low fiber diet, replete electrolytes as needed   - start  methylprednisolone 120mg IV daily  - continue with Protonix 40mg qd

## 2025-05-21 NOTE — CONSULT NOTE ADULT - ASSESSMENT
74 M ex heavy smoker who presents for follow up of stage IV NSCLC, adenocarcinoma T4N2M1 with BMs, PD-L1 90% positive, DDR2 mutant, TP53 mutant. EGFR was noted to be contaminant Started on pembrolizumab 10/30/2023 with a treatment break from August to December 2024 due to organizing pneumonia, likely immune related adverse event per Dr. Landaverde. He has since completed the steroid taper and had a repeat CT chest which showed resolution of the prior infiltration. s/p SRS to brain metastases 11/16/23. Interim MRIB which was done while off pembrolizumab showed development of new enhancing lesions in the brain stem, however now the MRI is showing resolution of these indicating response to pembrolizumab. Last dose of pembro was 5/16.     Patient now presents to the ED w/ persistent diarrhea x3 weeks, diarrhea has been >7 stools per day (~10 times).  Patient was started on 60mg of pred as an outpatient by his oncologist on Monday however has not experienced much relief since then.  Seen and examined at bedside in the ED, denies nausea/vomiting, denied use of laxatives or other supplements.      #stage IV NSCLC, adenocarcinoma, metastatic disease to brain, PD-L1 90%    --care per Dr. Pratt  --currently on pembro alone, LD 5/16 (was originally started 10/30/2023)   --Recent interval imaging on 03/11/25 CT c/a/p with stable disease  --now presents with diarrhea x 3 weeks, grade III  --recommend CT CAP while admitted, patient due for interval scans as an outpatient and also to evaluate abdomen  --GI evaluation: please send fecal calpro, crp, gi pcr and c. diff   --please obtain cardiology clearance in prep for colonoscopy later this week per GI (patient with hx PPM-D ([employer]) implanted 8/27/2020)  --patient started on 1mg/kg prednisone on Monday per oncologist with minimal sx relief, can continue for now pending additional GI workup     to be discussed with inpatient attending Dr. Woodruff  note considered final after attending attestation  74 M ex heavy smoker who presents for follow up of stage IV NSCLC, adenocarcinoma T4N2M1 with BMs, PD-L1 90% positive, DDR2 mutant, TP53 mutant. EGFR was noted to be contaminant Started on pembrolizumab 10/30/2023 with a treatment break from August to December 2024 due to organizing pneumonia, likely immune related adverse event per Dr. Landaverde. He has since completed the steroid taper and had a repeat CT chest which showed resolution of the prior infiltration. s/p SRS to brain metastases 11/16/23. Interim MRIB which was done while off pembrolizumab showed development of new enhancing lesions in the brain stem, however now the MRI is showing resolution of these indicating response to pembrolizumab. Last dose of pembro was 5/16.     Patient now presents to the ED w/ persistent diarrhea x3 weeks, diarrhea has been >7 stools per day (~10 times).  Patient was started on 60mg of pred as an outpatient by his oncologist on Monday however has not experienced much relief since then.  Seen and examined at bedside in the ED, denies nausea/vomiting, denied use of laxatives or other supplements.      #stage IV NSCLC, adenocarcinoma, metastatic disease to brain, PD-L1 90%    --care per Dr. Pratt  --currently on pembro alone, LD 5/16 (was originally started 10/30/2023)   --Recent interval imaging on 03/11/25 CT c/a/p with stable disease  --now presents with diarrhea x 3 weeks, grade III  --recommend CT CAP while admitted, patient due for interval scans as an outpatient and also to evaluate abdomen  --GI evaluation: please send fecal calpro, crp, gi pcr and c. diff   --please obtain cardiology clearance in prep for colonoscopy later this week per GI (patient with hx PPM-D ([employer]) implanted 8/27/2020)  --patient started on 1mg/kg prednisone on Monday per oncologist with minimal sx relief, please increase dosing to to 2 mg/kg     to be discussed with inpatient attending Dr. Woodruff  note considered final after attending attestation  74 M ex heavy smoker who presents for follow up of stage IV NSCLC, adenocarcinoma T4N2M1 with BMs, PD-L1 90% positive, DDR2 mutant, TP53 mutant. EGFR was noted to be contaminant Started on pembrolizumab 10/30/2023 with a treatment break from August to December 2024 due to organizing pneumonia, likely immune related adverse event per Dr. Landaverde. He has since completed the steroid taper and had a repeat CT chest which showed resolution of the prior infiltration. s/p SRS to brain metastases 11/16/23. Interim MRIB which was done while off pembrolizumab showed development of new enhancing lesions in the brain stem, however now the MRI is showing resolution of these indicating response to pembrolizumab. Last dose of pembro was 5/16.     Patient now presents to the ED w/ persistent diarrhea x3 weeks, diarrhea has been >7 stools per day (~10 times).  Patient was started on 60mg of pred as an outpatient by his oncologist on Monday however has not experienced much relief since then.  Seen and examined at bedside in the ED, denies nausea/vomiting, denied use of laxatives or other supplements.      #stage IV NSCLC, adenocarcinoma, metastatic disease to brain, PD-L1 90%    --care per Dr. Pratt  --currently on pembro alone, LD 5/16 (was originally started 10/30/2023)   --Recent interval imaging on 03/11/25 CT c/a/p with stable disease  --now presents with diarrhea x 3 weeks, grade III  --recommend CT CAP while admitted, patient due for interval scans as an outpatient and also to evaluate abdomen  --GI evaluation: please send fecal calpro, crp, gi pcr and c. diff   --please obtain cardiology clearance in prep for colonoscopy later this week to r/o immunotherapy induced colitis (patient with hx PPM-D implanted 8/27/20)   --patient started on 1mg/kg prednisone on Monday per oncologist with minimal sx relief, please increase dosing to to 2 mg/kg     to be discussed with inpatient attending Dr. Woodruff  note considered final after attending attestation  74 M ex heavy smoker who presents for follow up of stage IV NSCLC, adenocarcinoma T4N2M1 with BMs, PD-L1 90% positive, DDR2 mutant, TP53 mutant. EGFR was noted to be contaminant Started on pembrolizumab 10/30/2023 with a treatment break from August to December 2024 due to organizing pneumonia, likely immune related adverse event per Dr. Landaverde. He has since completed the steroid taper and had a repeat CT chest which showed resolution of the prior infiltration. s/p SRS to brain metastases 11/16/23. Interim MRIB which was done while off pembrolizumab showed development of new enhancing lesions in the brain stem, however now the MRI is showing resolution of these indicating response to pembrolizumab. Last dose of pembro was 5/16.     Patient now presents to the ED w/ persistent diarrhea x3 weeks, diarrhea has been >7 stools per day (~10 times).  Patient was started on 60mg of pred as an outpatient by his oncologist on Monday however has not experienced much relief since then.  Seen and examined at bedside in the ED, denies nausea/vomiting, denied use of laxatives or other supplements.      #stage IV NSCLC, adenocarcinoma, metastatic disease to brain, PD-L1 90%    --care per Dr. Pratt  --currently on pembro alone, LD 5/16 (was originally started 10/30/2023)   --Recent interval imaging on 03/11/25 CT c/a/p with stable disease  --now presents with diarrhea x 3 weeks, grade III  --recommend CT CAP while admitted, patient due for interval scans as an outpatient and also to evaluate abdomen  --GI evaluation: please send fecal calpro, crp, gi pcr and c. diff   --please obtain cardiology clearance in prep for colonoscopy later this week to r/o immunotherapy induced colitis (patient with hx PPM-D implanted 8/27/20)   --patient started on 1mg/kg prednisone on Monday per oncologist with minimal sx relief, please switch to 120mg of IV Methylprednisolone daily while admitted to aid in absorption     to be discussed with inpatient attending Dr. Woodruff  note considered final after attending attestation

## 2025-05-21 NOTE — H&P ADULT - TIME BILLING
coordination of care; preparing to see Pt.; interviewing Pt.; examining Pt.; reviewing labs and images; documentation in [place]; d/w Medicine resident on rounds

## 2025-05-21 NOTE — ED PROVIDER NOTE - OBJECTIVE STATEMENT
74M former smoker PMH HTN, HLD, COPD, DM, PPM, stage IV NSCLC (adenocarcinoma, brain mets, chemo q3 weeks, Dr. Pratt) p/w diarrhea. Has 2-3 weeks of diarrhea, multiple episodes per day, watery/loose. Was feeling intermittent lightheaded, now resolved (did get IVF yesterday). Occasional feeling of abd bloating. Had mild L abd pain, now completely resolved. No other systemic symptoms. States he was advised to come to ED by oncology because of his persistent symptoms. He reports that his PMD sent off stool studies (unknown which ones) that were negative.  Started prednisone 2d ago, states he had mild improvement in diarrhea yesterday, worse today.   Denies HA, f/c, NV, URI symptoms, SOB/CP, urinary symptoms, focal weakness/numbness, black/bloody stool, LE pain/swelling.

## 2025-05-21 NOTE — ED ADULT NURSE NOTE - NSFALLRISKASMT_ED_ALL_ED_DT
Protecting Yourself From the Sun    Apply an over-the-counter broad spectrum water resistant sunscreen with an SPF of at least 30 to exposed areas of the skin. Don’t forget the ears and lips! Remember to reapply sunscreen about every 2 hours and after swimming or sweating.     Wear sun protective clothing. Swim shirts (aka. rash guards) are a great idea and negates the need to reapply sunscreen in those areas.     Seek the shade whenever possible especially between the hours of 10 am and 4 pm when the sun’s rays are the strongest.     Avoid tanning beds       Vanicream, cerave, cetaphil - all good brands for sensitive skin.          Biopsy Wound Care Instructions    Keep the bandage in place for 24 hours.   Cleanse the wound with mild soapy water daily  Gently dry the area.  Apply Vaseline or petroleum jelly to the wound using a cotton tipped applicator.  Cover with a clean bandage.  Repeat this process until the biopsy site is healed.  If you had stitches placed, continue treating the site until the stitches are removed. Remember to make an appointment to return to have your stitches removed by our staff.  You may shower and bathe as usual.       ** Biopsy results generally take around 7 business days to come back.  If you have not heard from us by then, please call the office at [phone].    *Please note that biopsy results are released to both the patient and physician at the same time in [facility].  Please allow time for your physician to review the results.  One of our staff members will reach out to you with the results and plan.     21-May-2025 10:23

## 2025-05-21 NOTE — H&P ADULT - HISTORY OF PRESENT ILLNESS
HPI:  74 M ex heavy smoker who presents     Patient now presents to the ED w/ persistent diarrhea x3 weeks, diarrhea has been >7 stools per day (~10 times).  Patient was started on 60mg of pred as an outpatient by his oncologist on Monday however has not experienced much relief since then.  Seen and examined at bedside in the ED, denies nausea/vomiting, denied use of laxatives or other supplements.      In the ED,  VS: T  97.9 , HR 95  , BP  140/81  , RR   18 , SpO2    95 % RA  Labs: WBC 10.69 Hbg 12.9 (from 15.1 in April '25), K 3.3  Urine:  EKG:  CXR:  Imaging: Other  Orders: KCl 40mg; pending CTAP and CT chest    Patient is a 74M with PMHx of tobacco use with stage IV metastatic non-small cell lung cancer (diagnosed in September 2023  on Keytruda/ pembrolizumab last infusion on 5/16), COPD, HTN, HLD presenting with three week history of watery, non bloody voluminous stool; approximately ten  bowel movements per day. Patient denies fever, chills, nausea, vomiting, recent dietary changes, recent travel, recent hospitalizations, new antibiotic use or sick contacts. Symptoms are associated with mild abdominal pain for which he has take Tylenol intermittently for, quantified as one 500mg tablet every 3-4 days.  He also reports generalized weakness with reduced appetite for which he has began to supplement meals with Gatorade and fluids to stay hydrated. Despite this, patient has had recurrent episodes of lightheadedness and dizziness but denies headaches or recent falls. Patient visited his oncologist Dr. Pratt for these symptoms and was prescribed prednisone 60mg qd and pantoprazole 40mg qd, he has been taking these medications for the last two days but his symptoms have persisted.  Otherwise patient denies chest pain, palpitations,     In the ED,  VS: T  97.9 , HR 95  , BP  140/81  , RR   18 , SpO2    95 % RA  Labs: WBC 10.69 Hbg 12.9 (from 15.1 in April '25), K 3.3  EKG: pending   CTAP and CT chest pending  Orders: KCl 40mg; pending CTAP and CT chest    Patient is a 74M with PMHx of tobacco use with stage IV metastatic non-small cell lung cancer (diagnosed in September 2023; on Keytruda/ pembrolizumab, last infusion on 5/16; patient denies hx of radiation), COPD, HTN, HLD, PFO, (denies further cardiac hx but PCCM noted on CXR) presenting with three week history of watery, non bloody voluminous stool; approximately ten  bowel movements per day. Patient denies fever, chills, nausea, vomiting, recent dietary changes, recent travel, recent hospitalizations, new antibiotic use or sick contacts. Symptoms are associated with mild abdominal pain for which he has take Tylenol intermittently for, quantified as one 500mg tablet every 3-4 days.  He also reports generalized weakness with reduced appetite and five pound weight loss for which he has began to supplement meals with Gatorade and fluids to stay hydrated. Despite this, patient has had recurrent episodes of lightheadedness and dizziness but denies headaches or recent falls. Patient visited his oncologist Dr. Pratt for these symptoms and was prescribed prednisone 60mg qd and pantoprazole 40mg qd, he has been taking these medications for the last two days but his symptoms have persisted.  Otherwise patient denies chest pain, palpitations,     In the ED,  VS: T  97.9 , HR 95  , BP  140/81  , RR   18 , SpO2    95 % RA  Labs: WBC 10.69 Hbg 12.9 (from 15.1 in April '25), K 3.3  EKG: Atrial-sensed ventricular-paced rhythm with LVH  CT Chest/AP:   1. Unchanged 21 x 21 mm superior right lower lobe mass surrounding the segmental airway and contiguous with the mediastinal border. Mild large airway disease at the right lung base. No significant change in scattered reticulonodular opacities with a peripheral distribution. No new suspicious lung lesions.  2. Mild bladder wall thickening. Correlate for cystitis.  3. Wall thickening of the left hemicolon and ascending colon. Correlate for colitis.  4. 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 Patient denies fever, chills, nausea, vomiting, recent dietary changes, recent travel, recent hospitalizations, new antibiotic use or sick contacts. Symptoms are associated with mild abdominal pain for which he has take Tylenol intermittently for, quantified as one 500mg tablet every 3-4 days.  He also reports generalized weakness with reduced appetite and five pound weight loss for which he has began to supplement meals with Gatorade and fluids to stay hydrated. Despite this, patient has had recurrent episodes of lightheadedness and dizziness but denies headaches or recent falls. Patient visited his oncologist Dr. Pratt for these symptoms and was prescribed prednisone 60mg qd and pantoprazole 40mg qd, he has been taking these medications for the last two days but his symptoms have persisted.  Otherwise patient denies chest pain, palpitations,     In the ED,  VS: T  97.9 , HR 95  , BP  140/81  , RR   18 , SpO2    95 % RA  Labs: WBC 10.69 Hbg 12.9 (from 15.1 in April '25), K 3.3  EKG: Atrial-sensed ventricular-paced rhythm with LVH  CT Chest/AP:   1. Unchanged 21 x 21 mm superior right lower lobe mass surrounding the segmental airway and contiguous with the mediastinal border. Mild large airway disease at the right lung base. No significant change in scattered reticulonodular opacities with a peripheral distribution. No new suspicious lung lesions.  2. Mild bladder wall thickening. Correlate for cystitis.  3. Wall thickening of the left hemicolon and ascending colon. Correlate for colitis.  4. 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 Patient denies fever, chills, nausea, vomiting, recent dietary changes, recent travel, recent hospitalizations, new antibiotic use or sick contacts. Symptoms are associated with mild abdominal pain for which he has take Tylenol intermittently for, quantified as one 500mg tablet every 3-4 days.  He also reports generalized weakness with reduced appetite and five pound weight loss for which he has began to supplement meals with Gatorade and fluids to stay hydrated. Despite this, patient has had recurrent episodes of lightheadedness and dizziness but denies headaches or recent falls. Patient visited his oncologist Dr. Pratt for these symptoms and was prescribed prednisone 60mg qd and pantoprazole 40mg qd, he has been taking these medications for the last two days but his symptoms have persisted.  Otherwise patient denies chest pain, palpitations,     In the ED,  VS: T  97.9 , HR 95  , BP  140/81  , RR   18 , SpO2    95 % RA  Labs: WBC 10.69 Hbg 12.9 (from 15.1 in April '25), K 3.3  EKG: Atrial-sensed ventricular-paced rhythm with LVH  CT Chest/AP:   1. Unchanged 21 x 21 mm superior right lower lobe mass surrounding the segmental airway and contiguous with the mediastinal border. Mild large airway disease at the right lung base. No significant change in scattered reticulonodular opacities with a peripheral distribution. No new suspicious lung lesions.  2. Mild bladder wall thickening. Correlate for cystitis.  3. Wall thickening of the left hemicolon and ascending colon. Correlate for colitis.  4. 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

## 2025-05-21 NOTE — ED ADULT NURSE NOTE - PLAN OF CARE DISCUSSED WITH:
[de-identified] : Options were discussed. Plan is for CSI Left shoulder SA space and f/u in1 mos. 
Patient

## 2025-05-21 NOTE — PATIENT PROFILE ADULT - FUNCTIONAL ASSESSMENT - DAILY ACTIVITY 1.
"Pt calling for CT results.  The info in Epic is the CT is still in process.    Pt expressed her dissatisfaction and not being pleased when I told her this.  She proceeded to let me know this was unacceptable and it is a \"simple test that should have been completed by now\".      I informed pt I have no way of knowing when Radiology will complete but they are very competent in doing so.  I also informed pt I would send a message to Dr Washburn to she was aware that the pt is calling for her results and would like to be notified ASAP.  "
I called the patient to go over her results from her CT abdomen pelvis on 4/15/2025.  There is no evidence of inguinal hernia.  I recommended the patient follow-up with her PCP and orthopedic surgery for evaluation of her symptoms which I suspect are musculoskeletal in nature.  No plan for inguinal surgery.  Patient agreeable to recommendations.    Ofelia Washburn M.D.  General, Robotic, and Endoscopic Surgery  [facility]    [address], [address]  [place], [place], [address]  P: [phone]  F: [phone]     
PT calling to go over the CT results, looks like they are finalized   
4 = No assist / stand by assistance

## 2025-05-21 NOTE — H&P ADULT - PROBLEM SELECTOR PLAN 8
F: none  E: replete as needed  N: Low fiber diet   VTE ppx: Lovenox  GI ppx: Protonix  Dispo: [facility]  Code status: Full Patient shares he has variable compliance with BP medications   Home med: metoprolol succinate 100mgqd , amlodipine 10mg qd, HCTZ 25mg qd, Lisinopril 40mg qd    - resume home meds with strict holding parameters  - hold home  lisinopril iso slight MARYAN

## 2025-05-21 NOTE — CONSULT NOTE ADULT - SUBJECTIVE AND OBJECTIVE BOX
[facility] GI SERVICE INITIAL CONSULT NOTE    CC:  HPI:   74-year-old man with stage IV metastatic non-small cell lung cancer (on Keytruda/ pembrolizumab) presenting with three-week history of diarrhea. Patient endorses volumes watery, brown stools up to 10 times per day. No hematochezia or mucus. Associated with intermittent mild abdominal pain, reduced appetite, few episodes of lightheadedness, 5lb weight loss, bloating and gas. Patient initially had mild constipation, for which he was prescribed Mirlax by PCP. Shortly after, diarrhea begun. Patient took Imodium once but   Patient denies fever, chills, nausea, vomiting, recent dietary changes, recent travel, recent hospitalizations, recent infections, sick contacts, new antibiotic use, changes in dose of Keytruda, rashes, joint pain and current laxative use.   Taken prednisone 60mg qd and pantoprazole 40mh QD (prescribed by Dr. King, his oncologist) for the past 2 days.  No reported relief.     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     Recommendations:  1. Obtain cardiology clearance prior to colonoscopy  2. Order GI PCR and c. diff, stool pH, and fecal calprotectin, CRP  3. Low fiber diet today       PAST MEDICAL & SURGICAL HISTORY: COPD, HTN, HLD, No surgical history       FAMILY HISTORY: No family history of gastrointestinal disorders in the family.       SOCIAL HISTORY: Patient is a former smoker, and former social drinker. Denied drug use.       MEDICATIONS:  Pulmonary:    Antimicrobials:    Anticoagulants:    Onc:    GI/:    Endocrine:    Cardiac:    Other Medications:  acetaminophen     Tablet .. 650 milliGRAM(s) Oral every 6 hours PRN      Allergies    No Known Allergies    Intolerances        Vital Signs Last 24 Hrs  T(C): 36.8 (21 May 2025 14:44), Max: 36.8 (21 May 2025 14:44)  T(F): 98.2 (21 May 2025 14:44), Max: 98.2 (21 May 2025 14:44)  HR: 88 (21 May 2025 14:44) (88 - 95)  BP: 133/71 (21 May 2025 14:44) (133/71 - 140/81)  BP(mean): --  RR: 18 (21 May 2025 14:44) (18 - 18)  SpO2: 97% (21 May 2025 14:44) (95% - 97%)    Parameters below as of 21 May 2025 14:44  Patient On (Oxygen Delivery Method): room air      PHYSICAL EXAM:  Constitutional: well-appearing, in no apparent distress  HEENT: NC/AT, anicteric sclera; oropharynx clear with moist mucous membranes  Neck: supple, ROM intact, no appreciable JVD or LAD  Gastrointestinal: active bowel sounds, soft, non-tender, non distended, no rebound or guarding  Extremities: no edema, clubbing or cyanosis  Neurological: awake and alert    LABS:      CBC Full  -  ( 21 May 2025 10:46 )  WBC Count : 10.69 K/uL  RBC Count : 4.60 M/uL  Hemoglobin : 12.9 g/dL  Hematocrit : 37.4 %  Platelet Count - Automated : 277 K/uL  Mean Cell Volume : 81.3 fl  Mean Cell Hemoglobin : 28.0 pg  Mean Cell Hemoglobin Concentration : 34.5 g/dL  Auto Neutrophil # : x  Auto Lymphocyte # : x  Auto Monocyte # : x  Auto Eosinophil # : x  Auto Basophil # : x  Auto Neutrophil % : x  Auto Lymphocyte % : x  Auto Monocyte % : x  Auto Eosinophil % : x  Auto Basophil % : x    05-21    139  |  101  |  20  ----------------------------<  122[H]  3.3[L]   |  28  |  1.20    Ca    8.7      21 May 2025 10:46  Phos  2.7     05-21  Mg     2.0     05-21    TPro  5.7[L]  /  Alb  3.5  /  TBili  0.3  /  DBili  x   /  AST  18  /  ALT  16  /  AlkPhos  45  05-21      RADIOLOGY & ADDITIONAL STUDIES: reviewed GI SERVICE INITIAL CONSULT NOTE    CC: Diarrhea for 3 weeks  HPI:   74-year-old male with a history of stage IV metastatic non-small cell lung cancer, currently being treated with pembrolizumab (Keytruda). He presents with a 3-week history of profuse, watery diarrhea occurring up to 10 times per day. The stools are brown, without blood or mucus. The diarrhea began shortly after starting Miralax for mild constipation. The patient reports intermittent mild abdominal pain, bloating, gas, reduced appetite, and has experienced a 5-pound weight loss. There have been a few episodes of lightheadedness. He took Imodium once with no relief.  Denies fever, chills, nausea, vomiting, recent dietary changes, travel history, recent hospitalizations, infections, sick contacts, new antibiotic use, changes in the dose of pembrolizumab, rashes, joint pain, and current laxative use. For the past two days, he has been on prednisone 60 mg daily and pantoprazole 40 mg daily, prescribed by his oncologist, Dr. King, without improvement in symptoms.     PAST MEDICAL & SURGICAL HISTORY: COPD, HTN, HLD, No surgical history       FAMILY HISTORY: No family history of gastrointestinal disorders in the family.       SOCIAL HISTORY: Patient is a former smoker, and former social drinker. Denied drug use.       MEDICATIONS:  Pulmonary:    Antimicrobials:    Anticoagulants:    Onc:    GI/:    Endocrine:    Cardiac:    Other Medications:  acetaminophen     Tablet .. 650 milliGRAM(s) Oral every 6 hours PRN      Allergies    No Known Allergies    Intolerances        Vital Signs Last 24 Hrs  T(C): 36.8 (21 May 2025 14:44), Max: 36.8 (21 May 2025 14:44)  T(F): 98.2 (21 May 2025 14:44), Max: 98.2 (21 May 2025 14:44)  HR: 88 (21 May 2025 14:44) (88 - 95)  BP: 133/71 (21 May 2025 14:44) (133/71 - 140/81)  BP(mean): --  RR: 18 (21 May 2025 14:44) (18 - 18)  SpO2: 97% (21 May 2025 14:44) (95% - 97%)    Parameters below as of 21 May 2025 14:44  Patient On (Oxygen Delivery Method): room air      PHYSICAL EXAM:  Constitutional: well-appearing, in no apparent distress  HEENT: NC/AT, anicteric sclera; oropharynx clear with moist mucous membranes  Neck: supple, ROM intact, no appreciable JVD or LAD  Gastrointestinal: active bowel sounds, soft, non-tender, non distended, no rebound or guarding  Extremities: no edema, clubbing or cyanosis  Neurological: awake and alert    LABS:      CBC Full  -  ( 21 May 2025 10:46 )  WBC Count : 10.69 K/uL  RBC Count : 4.60 M/uL  Hemoglobin : 12.9 g/dL  Hematocrit : 37.4 %  Platelet Count - Automated : 277 K/uL  Mean Cell Volume : 81.3 fl  Mean Cell Hemoglobin : 28.0 pg  Mean Cell Hemoglobin Concentration : 34.5 g/dL  Auto Neutrophil # : x  Auto Lymphocyte # : x  Auto Monocyte # : x  Auto Eosinophil # : x  Auto Basophil # : x  Auto Neutrophil % : x  Auto Lymphocyte % : x  Auto Monocyte % : x  Auto Eosinophil % : x  Auto Basophil % : x    05-21    139  |  101  |  20  ----------------------------<  122[H]  3.3[L]   |  28  |  1.20    Ca    8.7      21 May 2025 10:46  Phos  2.7     05-21  Mg     2.0     05-21    TPro  5.7[L]  /  Alb  3.5  /  TBili  0.3  /  DBili  x   /  AST  18  /  ALT  16  /  AlkPhos  45  05-21      RADIOLOGY & ADDITIONAL STUDIES: reviewed GI SERVICE INITIAL CONSULT NOTE    CC: Diarrhea for 3 weeks  HPI:   74-year-old male with a history of stage IV metastatic non-small cell lung cancer, currently being treated with pembrolizumab (Keytruda). He presents with a 3-week history of profuse, watery diarrhea occurring up to 10 times per day. The stools are brown, without blood or mucus. The diarrhea began shortly after starting Miralax for mild constipation. The patient reports intermittent mild abdominal pain, bloating, gas, reduced appetite, and has experienced a 5-pound weight loss. There have been a few episodes of lightheadedness. He took Imodium once with no relief. For the past two days, he has been on prednisone 60 mg daily and pantoprazole 40 mg daily, prescribed by his oncologist, Dr. King, without improvement in symptoms.     Denies fever, chills, nausea, vomiting, recent dietary changes, travel history, recent hospitalizations, infections, sick contacts, new antibiotic use, changes in the dose of pembrolizumab, rashes, joint pain, and current laxative use.     PAST MEDICAL & SURGICAL HISTORY: COPD, HTN, HLD, No surgical history       FAMILY HISTORY: No family history of gastrointestinal disorders in the family.       SOCIAL HISTORY: Patient is a former smoker, and former social drinker. Denied drug use.       MEDICATIONS:  Pulmonary:    Antimicrobials:    Anticoagulants:    Onc:    GI/:    Endocrine:    Cardiac:    Other Medications:  acetaminophen     Tablet .. 650 milliGRAM(s) Oral every 6 hours PRN      Allergies    No Known Allergies    Intolerances        Vital Signs Last 24 Hrs  T(C): 36.8 (21 May 2025 14:44), Max: 36.8 (21 May 2025 14:44)  T(F): 98.2 (21 May 2025 14:44), Max: 98.2 (21 May 2025 14:44)  HR: 88 (21 May 2025 14:44) (88 - 95)  BP: 133/71 (21 May 2025 14:44) (133/71 - 140/81)  BP(mean): --  RR: 18 (21 May 2025 14:44) (18 - 18)  SpO2: 97% (21 May 2025 14:44) (95% - 97%)    Parameters below as of 21 May 2025 14:44  Patient On (Oxygen Delivery Method): room air      PHYSICAL EXAM:  Constitutional: well-appearing, in no apparent distress  HEENT: NC/AT, anicteric sclera; oropharynx clear with moist mucous membranes  Neck: supple, ROM intact, no appreciable JVD or LAD  Gastrointestinal: active bowel sounds, soft, non-tender, non distended, no rebound or guarding  Extremities: no edema, clubbing or cyanosis  Neurological: awake and alert    LABS:      CBC Full  -  ( 21 May 2025 10:46 )  WBC Count : 10.69 K/uL  RBC Count : 4.60 M/uL  Hemoglobin : 12.9 g/dL  Hematocrit : 37.4 %  Platelet Count - Automated : 277 K/uL  Mean Cell Volume : 81.3 fl  Mean Cell Hemoglobin : 28.0 pg  Mean Cell Hemoglobin Concentration : 34.5 g/dL  Auto Neutrophil # : x  Auto Lymphocyte # : x  Auto Monocyte # : x  Auto Eosinophil # : x  Auto Basophil # : x  Auto Neutrophil % : x  Auto Lymphocyte % : x  Auto Monocyte % : x  Auto Eosinophil % : x  Auto Basophil % : x    05-21    139  |  101  |  20  ----------------------------<  122[H]  3.3[L]   |  28  |  1.20    Ca    8.7      21 May 2025 10:46  Phos  2.7     05-21  Mg     2.0     05-21    TPro  5.7[L]  /  Alb  3.5  /  TBili  0.3  /  DBili  x   /  AST  18  /  ALT  16  /  AlkPhos  45  05-21      RADIOLOGY & ADDITIONAL STUDIES: reviewed GASTROENTEROLOGY SERVICE INITIAL CONSULT NOTE    CC: Diarrhea for 3 weeks  HPI:   74-year-old male with a history of stage IV metastatic non-small cell lung cancer, currently being treated with pembrolizumab (Keytruda). He presents with a 3-week history of profuse, watery diarrhea occurring up to 10 times per day. The stools are brown, without blood or mucus. The diarrhea began shortly after starting Miralax for mild constipation. The patient reports intermittent mild abdominal pain, bloating, gas, reduced appetite, and has experienced a 5-pound weight loss. There have been a few episodes of lightheadedness. He took Imodium once with no relief. For the past two days, he has been on prednisone 60 mg daily and pantoprazole 40 mg daily, prescribed by his oncologist, Dr. King, without improvement in symptoms.     Denies fever, chills, nausea, vomiting, recent dietary changes, travel history, recent hospitalizations, infections, sick contacts, new antibiotic use, changes in the dose of pembrolizumab, rashes, joint pain, and current laxative use.     PAST MEDICAL & SURGICAL HISTORY: COPD, HTN, HLD, No surgical history       FAMILY HISTORY: No family history of gastrointestinal disorders in the family.       SOCIAL HISTORY: Patient is a former smoker, and former social drinker. Denied drug use.       MEDICATIONS:  Pulmonary:    Antimicrobials:    Anticoagulants:    Onc:    GI/:    Endocrine:    Cardiac:    Other Medications:  acetaminophen     Tablet .. 650 milliGRAM(s) Oral every 6 hours PRN      Allergies    No Known Allergies    Intolerances        Vital Signs Last 24 Hrs  T(C): 36.8 (21 May 2025 14:44), Max: 36.8 (21 May 2025 14:44)  T(F): 98.2 (21 May 2025 14:44), Max: 98.2 (21 May 2025 14:44)  HR: 88 (21 May 2025 14:44) (88 - 95)  BP: 133/71 (21 May 2025 14:44) (133/71 - 140/81)  BP(mean): --  RR: 18 (21 May 2025 14:44) (18 - 18)  SpO2: 97% (21 May 2025 14:44) (95% - 97%)    Parameters below as of 21 May 2025 14:44  Patient On (Oxygen Delivery Method): room air      PHYSICAL EXAM:  Constitutional: well-appearing, in no apparent distress  HEENT: NC/AT, anicteric sclera; oropharynx clear with moist mucous membranes  Neck: supple, ROM intact, no appreciable JVD or LAD  Gastrointestinal: active bowel sounds, soft, non-tender, non distended, no rebound or guarding  Extremities: no edema, clubbing or cyanosis  Neurological: awake and alert    LABS:      CBC Full  -  ( 21 May 2025 10:46 )  WBC Count : 10.69 K/uL  RBC Count : 4.60 M/uL  Hemoglobin : 12.9 g/dL  Hematocrit : 37.4 %  Platelet Count - Automated : 277 K/uL  Mean Cell Volume : 81.3 fl  Mean Cell Hemoglobin : 28.0 pg  Mean Cell Hemoglobin Concentration : 34.5 g/dL  Auto Neutrophil # : x  Auto Lymphocyte # : x  Auto Monocyte # : x  Auto Eosinophil # : x  Auto Basophil # : x  Auto Neutrophil % : x  Auto Lymphocyte % : x  Auto Monocyte % : x  Auto Eosinophil % : x  Auto Basophil % : x    05-21    139  |  101  |  20  ----------------------------<  122[H]  3.3[L]   |  28  |  1.20    Ca    8.7      21 May 2025 10:46  Phos  2.7     05-21  Mg     2.0     05-21    TPro  5.7[L]  /  Alb  3.5  /  TBili  0.3  /  DBili  x   /  AST  18  /  ALT  16  /  AlkPhos  45  05-21      RADIOLOGY & ADDITIONAL STUDIES: reviewed GASTROENTEROLOGY SERVICE INITIAL CONSULT NOTE    CC: Diarrhea for 3 weeks  HPI:   74-year-old male with a history of stage IV metastatic non-small cell lung cancer, currently being treated with pembrolizumab (Keytruda). He presents with a 3-week history of profuse, watery diarrhea occurring up to 10 times per day. The stools are brown, without blood or mucus. The diarrhea began shortly after starting Miralax for mild constipation. The patient reports intermittent mild abdominal pain, bloating, gas, reduced appetite, and has experienced a 5-pound weight loss. There have been a few episodes of lightheadedness. He took Imodium once with no relief. For the past two days, he has been on prednisone 60 mg daily and pantoprazole 40 mg daily, prescribed by his oncologist, Dr. King, without improvement in symptoms.     Denies fever, chills, nausea, vomiting, recent dietary changes, travel history, recent hospitalizations, infections, sick contacts, new antibiotic use, changes in the dose of pembrolizumab, rashes, joint pain, and current laxative use.     PAST MEDICAL & SURGICAL HISTORY: COPD, HTN, HLD, No surgical history.      FAMILY HISTORY: No family history of gastrointestinal disorders in the family.       SOCIAL HISTORY: Patient is a former smoker, and former social drinker. Denied drug use.       MEDICATIONS:  Pulmonary:    Antimicrobials:    Anticoagulants:    Onc:    GI/:    Endocrine:    Cardiac:    Other Medications:  acetaminophen     Tablet .. 650 milliGRAM(s) Oral every 6 hours PRN      Allergies    No Known Allergies    Intolerances        Vital Signs Last 24 Hrs  T(C): 36.8 (21 May 2025 14:44), Max: 36.8 (21 May 2025 14:44)  T(F): 98.2 (21 May 2025 14:44), Max: 98.2 (21 May 2025 14:44)  HR: 88 (21 May 2025 14:44) (88 - 95)  BP: 133/71 (21 May 2025 14:44) (133/71 - 140/81)  BP(mean): --  RR: 18 (21 May 2025 14:44) (18 - 18)  SpO2: 97% (21 May 2025 14:44) (95% - 97%)    Parameters below as of 21 May 2025 14:44  Patient On (Oxygen Delivery Method): room air      PHYSICAL EXAM:  Constitutional: well-appearing, in no apparent distress  HEENT: NC/AT, anicteric sclera; oropharynx clear with moist mucous membranes  Neck: supple, ROM intact, no appreciable JVD or LAD  Gastrointestinal: active bowel sounds, soft, non-tender, non distended, no rebound or guarding  Extremities: no edema, clubbing or cyanosis  Neurological: awake and alert    LABS:      CBC Full  -  ( 21 May 2025 10:46 )  WBC Count : 10.69 K/uL  RBC Count : 4.60 M/uL  Hemoglobin : 12.9 g/dL  Hematocrit : 37.4 %  Platelet Count - Automated : 277 K/uL  Mean Cell Volume : 81.3 fl  Mean Cell Hemoglobin : 28.0 pg  Mean Cell Hemoglobin Concentration : 34.5 g/dL  Auto Neutrophil # : x  Auto Lymphocyte # : x  Auto Monocyte # : x  Auto Eosinophil # : x  Auto Basophil # : x  Auto Neutrophil % : x  Auto Lymphocyte % : x  Auto Monocyte % : x  Auto Eosinophil % : x  Auto Basophil % : x    05-21    139  |  101  |  20  ----------------------------<  122[H]  3.3[L]   |  28  |  1.20    Ca    8.7      21 May 2025 10:46  Phos  2.7     05-21  Mg     2.0     05-21    TPro  5.7[L]  /  Alb  3.5  /  TBili  0.3  /  DBili  x   /  AST  18  /  ALT  16  /  AlkPhos  45  05-21      RADIOLOGY & ADDITIONAL STUDIES: reviewed GASTROENTEROLOGY SERVICE INITIAL CONSULT NOTE    CC: Diarrhea for 3 weeks  HPI:   74-year-old male with a history of stage IV metastatic non-small cell lung cancer, currently being treated with pembrolizumab (Keytruda). He presents with a 3-week history of profuse, watery diarrhea occurring up to 10 times per day. The stools are brown, without blood or mucus. The diarrhea began shortly after starting Miralax for mild constipation. The patient reports intermittent mild abdominal pain, bloating, gas, reduced appetite, and has experienced a 5-pound weight loss. There have been a few episodes of lightheadedness. He took Imodium once with no relief. For the past two days, he has been on prednisone 60 mg daily and pantoprazole 40 mg daily, prescribed by his oncologist, Dr. King, without improvement in symptoms.     Denies fever, chills, nausea, vomiting, recent dietary changes, travel history, recent hospitalizations, infections, sick contacts, new antibiotic use, changes in the dose of pembrolizumab, rashes, joint pain, and current laxative use.     PAST MEDICAL & SURGICAL HISTORY: COPD, HTN, HLD, No surgical history.      FAMILY HISTORY: No family history of gastrointestinal disorders.       SOCIAL HISTORY: Patient is a former smoker, and social drinker. Denied drug use.       MEDICATIONS:  Pulmonary:    Antimicrobials:    Anticoagulants:    Onc:    GI/:    Endocrine:    Cardiac:    Other Medications:  acetaminophen     Tablet .. 650 milliGRAM(s) Oral every 6 hours PRN      Allergies    No Known Allergies    Intolerances        Vital Signs Last 24 Hrs  T(C): 36.8 (21 May 2025 14:44), Max: 36.8 (21 May 2025 14:44)  T(F): 98.2 (21 May 2025 14:44), Max: 98.2 (21 May 2025 14:44)  HR: 88 (21 May 2025 14:44) (88 - 95)  BP: 133/71 (21 May 2025 14:44) (133/71 - 140/81)  BP(mean): --  RR: 18 (21 May 2025 14:44) (18 - 18)  SpO2: 97% (21 May 2025 14:44) (95% - 97%)    Parameters below as of 21 May 2025 14:44  Patient On (Oxygen Delivery Method): room air      PHYSICAL EXAM:  Constitutional: well-appearing, in no apparent distress  HEENT: NC/AT, anicteric sclera; oropharynx clear with moist mucous membranes  Neck: supple, ROM intact, no appreciable JVD or LAD  Gastrointestinal: active bowel sounds, soft, non-tender, non distended, no rebound or guarding  Extremities: no edema, clubbing or cyanosis  Neurological: awake and alert    LABS:      CBC Full  -  ( 21 May 2025 10:46 )  WBC Count : 10.69 K/uL  RBC Count : 4.60 M/uL  Hemoglobin : 12.9 g/dL  Hematocrit : 37.4 %  Platelet Count - Automated : 277 K/uL  Mean Cell Volume : 81.3 fl  Mean Cell Hemoglobin : 28.0 pg  Mean Cell Hemoglobin Concentration : 34.5 g/dL  Auto Neutrophil # : x  Auto Lymphocyte # : x  Auto Monocyte # : x  Auto Eosinophil # : x  Auto Basophil # : x  Auto Neutrophil % : x  Auto Lymphocyte % : x  Auto Monocyte % : x  Auto Eosinophil % : x  Auto Basophil % : x    05-21    139  |  101  |  20  ----------------------------<  122[H]  3.3[L]   |  28  |  1.20    Ca    8.7      21 May 2025 10:46  Phos  2.7     05-21  Mg     2.0     05-21    TPro  5.7[L]  /  Alb  3.5  /  TBili  0.3  /  DBili  x   /  AST  18  /  ALT  16  /  AlkPhos  45  05-21      RADIOLOGY & ADDITIONAL STUDIES: reviewed

## 2025-05-21 NOTE — ED ADULT NURSE REASSESSMENT NOTE - NS ED NURSE REASSESS COMMENT FT1
Patient aoX3, ambulatory, c/o of intermittent diarrhea episodes, non-bloody, none since arrival to ED, no abdominal pain.  Vital signs stable.  Right AC PIV #20 in place, all labs sent,no complications.  NSS 1 L bolus ongoing , tolerating well.  Seen by Heme/oncology team.  For admit.

## 2025-05-21 NOTE — H&P ADULT - PROBLEM SELECTOR PLAN 4
Home med: atovostatin 80, ezetiminde Home med: atorvastatin 80mg qd, ezetimibe 10mg qd    - resume home meds   - obtain lipid panel Patient with mild COPD prescribed PRN albuterol for symptoms of bronchospasm, states he had not needed the inhaler recently  CTAP: Mild large airway disease at the right lung base.  Home med: Albuterol HFA 90    - resume PRN albuterol Per PCP patient was found to be in complete heart block after stress test in 2020 and subsequently underwent pacemaker placement later that year. EKG on admission: atrial-sensed ventricular-paced rhythm with LVH    -cardiology consult   - obtain TTE   - obtain collateral from Dr. Jeana Berry, last known cardiologist per PCP, however patient has not seen a cardiologist in several years  - per [facility] vist on Jan '25 for device management prior to MRI

## 2025-05-21 NOTE — H&P ADULT - NSHPSOCIALHISTORY_GEN_ALL_CORE
Patient lives with brother, feels safe. Former smoker of 50 + years. Denies current use of tobacco, alcohol or recreational substances

## 2025-05-21 NOTE — H&P ADULT - NSHPLABSRESULTS_GEN_ALL_CORE
.  LABS:                         12.9   10.69 )-----------( 277      ( 21 May 2025 10:46 )             37.4     05-21    139  |  101  |  20  ----------------------------<  122[H]  3.3[L]   |  28  |  1.20    Ca    8.7      21 May 2025 10:46  Phos  2.7     05-21  Mg     2.0     05-21    TPro  5.7[L]  /  Alb  3.5  /  TBili  0.3  /  DBili  x   /  AST  18  /  ALT  16  /  AlkPhos  45  05-21      Urinalysis Basic - ( 21 May 2025 10:46 )    Color: x / Appearance: x / SG: x / pH: x  Gluc: 122 mg/dL / Ketone: x  / Bili: x / Urobili: x   Blood: x / Protein: x / Nitrite: x   Leuk Esterase: x / RBC: x / WBC x   Sq Epi: x / Non Sq Epi: x / Bacteria: x            RADIOLOGY, EKG & ADDITIONAL TESTS: Reviewed.

## 2025-05-21 NOTE — PATIENT PROFILE ADULT - FALL HARM RISK - HARM RISK INTERVENTIONS
Assistance with ambulation/Assistance OOB with selected safe patient handling equipment/Communicate Risk of Fall with Harm to all staff/Monitor for mental status changes/Monitor gait and stability/Reinforce activity limits and safety measures with patient and family/Reorient to person, place and time as needed/Tailored Fall Risk Interventions/Visual Cue: Yellow wristband and red socks/Bed in lowest position, wheels locked, appropriate side rails in place/Call bell, personal items and telephone in reach/Instruct patient to call for assistance before getting out of bed or chair/Non-slip footwear when patient is out of bed/[place] to call system/Physically safe environment - no spills, clutter or unnecessary equipment/Purposeful Proactive Rounding/Room/bathroom lighting operational, light cord in reach

## 2025-05-21 NOTE — ED ADULT NURSE NOTE - OBJECTIVE STATEMENT
Patient c/o of 3 weeks intermittent soft to watery diarrhea, non-bloody, states 1 episode in the past 24 hours, had improved when prescribed Prednisone, denies any abdominal pain, no nausea/vomitting, no fever/chills.   had stool exam done in outside lab 2 days ago, results negative.   went to an ED in [place] yesterday, all work up negative. Sent by PMD to ED. Patient c/o of 3 weeks intermittent soft to watery diarrhea, non-bloody, states 1 episode in the past 24 hours, had improved when prescribed Prednisone, denies any abdominal pain, no nausea/vomitting, no fever/chills.  States had stool exam done in outside lab 2 days ago, results negative.  States went to an ED in [place] yesterday, all work up negative. Sent by PMD to ED. PMH HTN, HLD, COPD, DM, PPM, stage IV NSCLC (adenocarcinoma, brain mets, chemo q3 weeks

## 2025-05-21 NOTE — H&P ADULT - PROBLEM SELECTOR PLAN 6
home med: metoformin 1000mg home med: metformin 1000mg; however patient shares he only take 500mg qd     -obtain A1c   - Patient shares he has variable compliance with BP medications   Home med: metoprolol succinate 100mgqd , amlodipine 10mg qd, HCTZ 25mg qd, Lisinopril 40mg qd    - resume home meds with strict holding parameters  - hold home  lisinopril iso slight MARYAN Home med: atorvastatin 80mg qd, ezetimibe 10mg qd    - resume home meds   - obtain lipid panel

## 2025-05-21 NOTE — H&P ADULT - PROBLEM SELECTOR PLAN 3
Home med: Albuterol HFA 90 Patient with mild COPD prescribed PRN albuterol for symptoms of bronchospasm, states he had not needed the inhaler recently  Home med: Albuterol HFA 90  - resume PRN albuterol On admission Cr. 1.2 from 0.9 April '25; likely prerenal from poor oral intake   - obtain urine studies   - encourage oral intake   - trend Cr and avoid nephrotoxic agents

## 2025-05-21 NOTE — ED ADULT NURSE NOTE - NSFALLUNIVINTERV_ED_ALL_ED
Bed/Stretcher in lowest position, wheels locked, appropriate side rails in place/Call bell, personal items and telephone in reach/Instruct patient to call for assistance before getting out of bed/chair/stretcher/Non-slip footwear applied when patient is off stretcher/[place] to call system/Physically safe environment - no spills, clutter or unnecessary equipment/Purposeful proactive rounding/Room/bathroom lighting operational, light cord in reach

## 2025-05-21 NOTE — H&P ADULT - ATTENDING COMMENTS
Pt. seen and examined by me earlier today; I have read Dr. Santiago's H&P, I agree w/ her findings and plan of care as documented; CT imaging results reviewed and discussed w/ Pt., shows "unchanged 21 x 21 mm superior right lower lobe mass" and "wall thickening of the left hemicolon and ascending colon" c/w colitis; ddx includes infectious etiology vs. immune checkpoint inhibitor colitis; cont. work-up and mgmt per GI and Heme-Onc recs; will check GI PCR, C. diff GDH and toxins A/B, stool pH, fecal calprotectin, and CRP; will start methylprednisolone 120 mg IV daily, per Heme-Onc recs; cont. supportive care, low-fiber diet as tolerated; PPI and BIB while on high-dose steroids; if plan for colonoscopy, will consult Cardiology for cardiac risk stratification

## 2025-05-21 NOTE — H&P ADULT - ASSESSMENT
Patient is a 74M with PMHx of tobacco use with stage IV metastatic non-small cell lung cancer (diagnosed in September 2023  on Keytruda/ pembrolizumab last infusion on 5/16), COPD, HTN, HLD presenting with three week history of watery, non bloody voluminous stool admitted for further workup. Patient is a 74M with PMHx of tobacco use with stage IV metastatic non-small cell lung cancer (diagnosed in September 2023  on Keytruda/ pembrolizumab last infusion on 5/16), COPD, HTN, HLD presenting with three week history of watery, non bloody voluminous stool admitted for further workup for possible immunotherapy induced colitis

## 2025-05-21 NOTE — ED PROVIDER NOTE - CLINICAL SUMMARY MEDICAL DECISION MAKING FREE TEXT BOX
74M former smoker PMH HTN, HLD, COPD, DM, PPM, stage IV NSCLC (adenocarcinoma, brain mets, chemo q3 weeks, Dr. Pratt) p/w diarrhea. Has 2-3 weeks of diarrhea, multiple episodes per day, watery/loose. Was feeling intermittent lightheaded, now resolved (did get IVF yesterday). Occasional feeling of abd bloating. Had mild L abd pain, now completely resolved. No other systemic symptoms. States he was advised to come to ED by oncology because of his persistent symptoms. He reports that his PMD sent off stool studies (unknown which ones) that were negative.  Vitals wnl, exam as above.   ddx: Diarrhea, unclear etiology. Benign abdomen. Possible colitis.   Labs, IVF, attempt to contact onc, reassess.

## 2025-05-21 NOTE — CONSULT NOTE ADULT - NS ATTEND AMEND GEN_ALL_CORE FT
I evaluated the patient with the Oncology physician assistant, Autumn Nugent.  The patient is a 74 year old man with stage IV NSCLC on pembrolizumab.  He developed diarrhea and was started on prednisone 60 mg daily on 5/19/25.  He has continued frequent watery stools despite taking prednsione and came to the ER for medical attention.  CT scan shows evidence of colitis with stable findings for malignancy.  Concern for immune-mediated colitis.    Plan:  - methylprednisolone 120 mg IV daily  - GI prophylaxis with PPI  - consult GI for consideration of colonoscopy/flex sig  - r/o other causes with GI pcr, C diff testing  - stool count  - if pt's symptoms are refractory to steroids, then treatment w/ infliximab should be considered.  In anticipation of that:  check quantiferon gold.  note negative hepatitis B/C testing in [facility] from 10/23.

## 2025-05-21 NOTE — ED ADULT NURSE NOTE - TEMPLATE LIST FOR HEAD TO TOE ASSESSMENT
PAST MEDICAL HISTORY:  Altered gait     Bone disorder     Language barrier     Pre B-cell acute lymphoblastic leukemia (ALL) in remission     
General

## 2025-05-21 NOTE — CONSULT NOTE ADULT - ASSESSMENT
A 74-year-old male with stage IV metastatic non-small cell lung cancer, currently undergoing treatment with pembrolizumab, presents with a 3-week history of significant diarrhea, abdominal discomfort, and weight loss.     The clinical presentation raises suspicion for immune checkpoint inhibitor-induced colitis, given the use of pembrolizumab. However, infectious gastroenteritis remains a differential diagnosis and should be thoroughly evaluated.    #infectious gastroenteritis   # immune checkpoint inhibitor colitis     Recommendations:  1. Obtain cardiology clearance prior to colonoscopy  2. Order GI PCR and c. diff, stool pH, and fecal calprotectin, CRP  3. Low fiber diet today    A 74-year-old male with stage IV metastatic non-small cell lung cancer, currently undergoing treatment with pembrolizumab, presents with a 3-week history of significant diarrhea, abdominal discomfort, and weight loss.     The clinical presentation raises suspicion for immune checkpoint inhibitor-induced colitis, given the use of pembrolizumab. However, infectious gastroenteritis remains a differential diagnosis and should be thoroughly evaluated.    # immune checkpoint inhibitor colitis   #infectious gastroenteritis     Recommendations:  1. Obtain cardiology clearance prior to colonoscopy  2. Order GI PCR and c. diff, stool pH, fecal calprotectin, and CRP  3. Low fiber diet today    A 74-year-old male with stage IV metastatic non-small cell lung cancer, currently undergoing treatment with pembrolizumab, presents with a 3-week history of significant diarrhea, abdominal discomfort, and weight loss.     The clinical presentation raises suspicion for immune checkpoint inhibitor-induced colitis, given the use of pembrolizumab. However, infectious gastroenteritis remains a differential diagnosis and should be thoroughly evaluated.    # immune checkpoint inhibitor colitis   # infectious gastroenteritis     Recommendations:  1. Obtain cardiology clearance prior to colonoscopy  2. Order GI PCR and c. diff, stool pH, fecal calprotectin, and CRP  3. CT w/ contrast  3. Low fiber diet today    A 74-year-old male with stage IV metastatic non-small cell lung cancer, currently undergoing treatment with pembrolizumab, presents with a 3-week history of significant diarrhea, abdominal discomfort, and weight loss.     The clinical presentation raises suspicion for immune checkpoint inhibitor-induced colitis, given the use of pembrolizumab. However, infectious gastroenteritis remains as a differential diagnosis.     # immune checkpoint inhibitor colitis   # infectious gastroenteritis     Recommendations:  1. Obtain cardiology clearance prior to colonoscopy  2. Order GI PCR and c. diff, stool pH, fecal calprotectin, and CRP  3. CT abdomen and pelvis w/ contrast  3. Low fiber diet today    A 74-year-old male with stage IV metastatic non-small cell lung cancer, currently undergoing treatment with pembrolizumab, presents with a 3-week history of significant diarrhea, abdominal discomfort, and weight loss.     The clinical presentation raises suspicion for immune checkpoint inhibitor-induced colitis, given the use of pembrolizumab. However, infectious gastroenteritis remains as a differential diagnosis.     # immune checkpoint inhibitor colitis   # infectious gastroenteritis     Recommendations:  1. Please cardiac risk assessment from cardiology   2. Please order GI PCR and c. diff, stool pH, fecal calprotectin, and CRP  3.  Please order CT abdomen and pelvis w/ contrast  3. Low fiber diet today

## 2025-05-21 NOTE — ED ADULT TRIAGE NOTE - ESI TRIAGE ACUITY LEVEL, MLM
3 Birth Control Pills Pregnancy And Lactation Text: This medication should be avoided if pregnant and for the first 30 days post-partum.

## 2025-05-21 NOTE — H&P ADULT - NSHPPHYSICALEXAM_GEN_ALL_CORE
.  VITAL SIGNS:  T(C): 36.6 (05-21-25 @ 10:12), Max: 36.6 (05-21-25 @ 10:12)  T(F): 97.9 (05-21-25 @ 10:12), Max: 97.9 (05-21-25 @ 10:12)  HR: 95 (05-21-25 @ 10:12) (95 - 95)  BP: 140/81 (05-21-25 @ 10:12) (140/81 - 140/81)  BP(mean): --  RR: 18 (05-21-25 @ 10:12) (18 - 18)  SpO2: 95% (05-21-25 @ 10:12) (95% - 95%)  Wt(kg): --    PHYSICAL EXAM:    Constitutional: Resting comfortably in bed; NAD  Head: NC/AT  Eyes: PERRL, EOMI, clear conjunctiva  ENT: no nasal discharge; uvula midline, no oropharyngeal erythema or exudates; MMM  Neck: supple; no JVD or thyromegaly  Respiratory: CTA B/L; no W/R/R, no retractions  Cardiac: +S1/S2; RRR; no M/R/G;  Gastrointestinal: soft, NT/ND; no rebound or guarding; +BSx4  Extremities: WWP, no clubbing or cyanosis; no peripheral edema  Musculoskeletal: NROM x4; no joint swelling, tenderness or erythema  Vascular: 2+ radial, femoral, DP/PT pulses B/L  Dermatologic: skin warm, dry and intact; no rashes, wounds, or scars  Neurologic: AAOx3; CNII-XII grossly intact; no focal deficits  Psychiatric: affect and characteristics of appearance, verbalizations, behaviors are appropriate .  VITAL SIGNS:  T(C): 36.6 (05-21-25 @ 10:12), Max: 36.6 (05-21-25 @ 10:12)  T(F): 97.9 (05-21-25 @ 10:12), Max: 97.9 (05-21-25 @ 10:12)  HR: 95 (05-21-25 @ 10:12) (95 - 95)  BP: 140/81 (05-21-25 @ 10:12) (140/81 - 140/81)  BP(mean): --  RR: 18 (05-21-25 @ 10:12) (18 - 18)  SpO2: 95% (05-21-25 @ 10:12) (95% - 95%)  Wt(kg): --    PHYSICAL EXAM:    Constitutional: Resting comfortably in bed; NAD  Head: NC/AT  Eyes: PERRL, EOMI, clear conjunctiva  ENT: no nasal discharge; uvula midline, no oropharyngeal erythema or exudates; dry mucous membranes   Neck: supple; no JVD or thyromegaly  Respiratory: CTA B/L; no W/R/R, no retractions  Cardiac: +S1/S2; RRR; no M/R/G;  Gastrointestinal: mildly distended abdomen without tenderness; no rebound or guarding; +BSx4  Extremities: WWP, no clubbing or cyanosis; no peripheral edema  Musculoskeletal: NROM x4; no joint swelling, tenderness or erythema  Vascular: 2+ radial, femoral, DP/PT pulses B/L  Dermatologic: skin warm, dry and intact; no rashes, wounds, or scars  Neurologic: AAOx3; CNII-XII grossly intact; no focal deficits  Psychiatric: affect and characteristics of appearance, verbalizations, behaviors are appropriate

## 2025-05-21 NOTE — H&P ADULT - PROBLEM SELECTOR PLAN 9
home med: metformin 1000mg; however patient shares he only take 500mg qd     -obtain A1c   - start mISS while on steroids

## 2025-05-21 NOTE — H&P ADULT - PROBLEM SELECTOR PLAN 5
Home med: metoprolol succ 100mg, amlodipine, hctz Patient shares he has variable compliance with BP medications   Home med: metoprolol succinate 100mgqd , amlodipine 10mg qd, HCTZ 25mg qd, Lisinopril 40mg qd    - resume home meds with strict holding parameters Home med: atorvastatin 80mg qd, ezetimibe 10mg qd    - resume home meds   - obtain lipid panel Per PCP patient was found to be in complete heart block after stress test in 2020 and subsequently underwent pacemaker placements. EKG on admission: atrial-sensed ventricular-paced rhythm with LVH    - obtain TTE   - obtain collateral from Dr. Jeana Berry, last cardiologist per PCP Collateral obtained by PCP Dr. Manzo: Patient with prior history of CVA and carotid stenosis s/p endarterectomy in 2020. Does not follow with cardiologist (last seen by Dr. Jeana Berry)   Home medications: aspirin 81 and atorvastatin 80qd    - continue with aspirin and statin

## 2025-05-21 NOTE — H&P ADULT - PROBLEM SELECTOR PLAN 7
F: none  E: replete as needed  N: regular diet ***  VTE ppx: ***  GI ppx: none ***  Dispo: [facility]  Code status: *** F: none  E: replete as needed  N: regular diet ***  VTE ppx: Lovenox  GI ppx: Protonix  Dispo: [facility]  Code status: Full home med: metformin 1000mg; however patient shares he only take 500mg qd     -obtain A1c   - start mISS while on steroids Patient with prior history of CVA and carotid stenosis s/p endarterectomy in 2020. Does not follow with cardiologist; collateral obtain from PCP Dr. Pal Manzo. Has been seen by Dr. Jeana Berry in the past.  - continue with high dose stain therpay Patient with mild COPD prescribed PRN albuterol for symptoms of bronchospasm, states he had not needed the inhaler recently  CTAP: Mild large airway disease at the right lung base.  Home med: Albuterol HFA 90    - resume PRN albuterol

## 2025-05-22 ENCOUNTER — RESULT REVIEW (OUTPATIENT)
Age: 75
End: 2025-05-22

## 2025-05-22 LAB
A1C WITH ESTIMATED AVERAGE GLUCOSE RESULT: 7.3 % — HIGH (ref 4–5.6)
ALBUMIN SERPL ELPH-MCNC: 3.2 G/DL — LOW (ref 3.3–5)
ALP SERPL-CCNC: 40 U/L — SIGNIFICANT CHANGE UP (ref 40–120)
ALT FLD-CCNC: 16 U/L — SIGNIFICANT CHANGE UP (ref 10–45)
ANION GAP SERPL CALC-SCNC: 10 MMOL/L — SIGNIFICANT CHANGE UP (ref 5–17)
AST SERPL-CCNC: 16 U/L — SIGNIFICANT CHANGE UP (ref 10–40)
BASOPHILS # BLD AUTO: 0.01 K/UL — SIGNIFICANT CHANGE UP (ref 0–0.2)
BASOPHILS NFR BLD AUTO: 0.1 % — SIGNIFICANT CHANGE UP (ref 0–2)
BILIRUB SERPL-MCNC: 0.2 MG/DL — SIGNIFICANT CHANGE UP (ref 0.2–1.2)
BLD GP AB SCN SERPL QL: NEGATIVE — SIGNIFICANT CHANGE UP
BUN SERPL-MCNC: 18 MG/DL — SIGNIFICANT CHANGE UP (ref 7–23)
CALCIUM SERPL-MCNC: 8.1 MG/DL — LOW (ref 8.4–10.5)
CHLORIDE SERPL-SCNC: 105 MMOL/L — SIGNIFICANT CHANGE UP (ref 96–108)
CHOLEST SERPL-MCNC: 151 MG/DL — SIGNIFICANT CHANGE UP
CO2 SERPL-SCNC: 27 MMOL/L — SIGNIFICANT CHANGE UP (ref 22–31)
CREAT SERPL-MCNC: 0.89 MG/DL — SIGNIFICANT CHANGE UP (ref 0.5–1.3)
EGFR: 89 ML/MIN/1.73M2 — SIGNIFICANT CHANGE UP
EGFR: 89 ML/MIN/1.73M2 — SIGNIFICANT CHANGE UP
EOSINOPHIL # BLD AUTO: 0 K/UL — SIGNIFICANT CHANGE UP (ref 0–0.5)
EOSINOPHIL NFR BLD AUTO: 0 % — SIGNIFICANT CHANGE UP (ref 0–6)
ESTIMATED AVERAGE GLUCOSE: 163 MG/DL — HIGH (ref 68–114)
GLUCOSE SERPL-MCNC: 158 MG/DL — HIGH (ref 70–99)
HCT VFR BLD CALC: 39.3 % — SIGNIFICANT CHANGE UP (ref 39–50)
HDLC SERPL-MCNC: 41 MG/DL — SIGNIFICANT CHANGE UP
HGB BLD-MCNC: 12.9 G/DL — LOW (ref 13–17)
IMM GRANULOCYTES NFR BLD AUTO: 0.5 % — SIGNIFICANT CHANGE UP (ref 0–0.9)
LDLC SERPL-MCNC: 76 MG/DL — SIGNIFICANT CHANGE UP
LIPID PNL WITH DIRECT LDL SERPL: 76 MG/DL — SIGNIFICANT CHANGE UP
LYMPHOCYTES # BLD AUTO: 1.16 K/UL — SIGNIFICANT CHANGE UP (ref 1–3.3)
LYMPHOCYTES # BLD AUTO: 14 % — SIGNIFICANT CHANGE UP (ref 13–44)
MAGNESIUM SERPL-MCNC: 1.8 MG/DL — SIGNIFICANT CHANGE UP (ref 1.6–2.6)
MCHC RBC-ENTMCNC: 27.6 PG — SIGNIFICANT CHANGE UP (ref 27–34)
MCHC RBC-ENTMCNC: 32.8 G/DL — SIGNIFICANT CHANGE UP (ref 32–36)
MCV RBC AUTO: 84.2 FL — SIGNIFICANT CHANGE UP (ref 80–100)
MONOCYTES # BLD AUTO: 0.26 K/UL — SIGNIFICANT CHANGE UP (ref 0–0.9)
MONOCYTES NFR BLD AUTO: 3.1 % — SIGNIFICANT CHANGE UP (ref 2–14)
NEUTROPHILS # BLD AUTO: 6.83 K/UL — SIGNIFICANT CHANGE UP (ref 1.8–7.4)
NEUTROPHILS NFR BLD AUTO: 82.3 % — HIGH (ref 43–77)
NONHDLC SERPL-MCNC: 110 MG/DL — SIGNIFICANT CHANGE UP
NRBC BLD AUTO-RTO: 0 /100 WBCS — SIGNIFICANT CHANGE UP (ref 0–0)
PHOSPHATE SERPL-MCNC: 2.6 MG/DL — SIGNIFICANT CHANGE UP (ref 2.5–4.5)
PLATELET # BLD AUTO: 234 K/UL — SIGNIFICANT CHANGE UP (ref 150–400)
POTASSIUM SERPL-MCNC: 3.9 MMOL/L — SIGNIFICANT CHANGE UP (ref 3.5–5.3)
POTASSIUM SERPL-SCNC: 3.9 MMOL/L — SIGNIFICANT CHANGE UP (ref 3.5–5.3)
PROT SERPL-MCNC: 5.4 G/DL — LOW (ref 6–8.3)
RBC # BLD: 4.67 M/UL — SIGNIFICANT CHANGE UP (ref 4.2–5.8)
RBC # FLD: 14.8 % — HIGH (ref 10.3–14.5)
RH IG SCN BLD-IMP: POSITIVE — SIGNIFICANT CHANGE UP
SODIUM SERPL-SCNC: 142 MMOL/L — SIGNIFICANT CHANGE UP (ref 135–145)
TRIGL SERPL-MCNC: 206 MG/DL — HIGH
WBC # BLD: 8.3 K/UL — SIGNIFICANT CHANGE UP (ref 3.8–10.5)
WBC # FLD AUTO: 8.3 K/UL — SIGNIFICANT CHANGE UP (ref 3.8–10.5)

## 2025-05-22 PROCEDURE — 93306 TTE W/DOPPLER COMPLETE: CPT | Mod: 26

## 2025-05-22 PROCEDURE — 99232 SBSQ HOSP IP/OBS MODERATE 35: CPT | Mod: GC

## 2025-05-22 PROCEDURE — 99233 SBSQ HOSP IP/OBS HIGH 50: CPT | Mod: GC

## 2025-05-22 RX ORDER — ENOXAPARIN SODIUM 100 MG/ML
40 INJECTION SUBCUTANEOUS ONCE
Refills: 0 | Status: COMPLETED | OUTPATIENT
Start: 2025-05-22 | End: 2025-05-22

## 2025-05-22 RX ORDER — SODIUM PHOSPHATE,DIBASIC DIHYD
15 POWDER (GRAM) MISCELLANEOUS ONCE
Refills: 0 | Status: COMPLETED | OUTPATIENT
Start: 2025-05-22 | End: 2025-05-22

## 2025-05-22 RX ORDER — MAGNESIUM SULFATE 500 MG/ML
2 SYRINGE (ML) INJECTION ONCE
Refills: 0 | Status: COMPLETED | OUTPATIENT
Start: 2025-05-22 | End: 2025-05-22

## 2025-05-22 RX ORDER — POLYETHYLENE GLYCOL-3350 AND ELECTROLYTES 236; 6.74; 5.86; 2.97; 22.74 G/274.31G; G/274.31G; G/274.31G; G/274.31G; G/274.31G
4000 POWDER, FOR SOLUTION ORAL ONCE
Refills: 0 | Status: COMPLETED | OUTPATIENT
Start: 2025-05-22 | End: 2025-05-22

## 2025-05-22 RX ADMIN — INSULIN LISPRO 2: 100 INJECTION, SOLUTION INTRAVENOUS; SUBCUTANEOUS at 18:18

## 2025-05-22 RX ADMIN — METOPROLOL SUCCINATE 100 MILLIGRAM(S): 50 TABLET, EXTENDED RELEASE ORAL at 22:40

## 2025-05-22 RX ADMIN — Medication 81 MILLIGRAM(S): at 11:15

## 2025-05-22 RX ADMIN — Medication 3 MILLIGRAM(S): at 22:40

## 2025-05-22 RX ADMIN — METHYLPREDNISOLONE ACETATE 125 MILLIGRAM(S): 80 INJECTION, SUSPENSION INTRA-ARTICULAR; INTRALESIONAL; INTRAMUSCULAR; SOFT TISSUE at 06:25

## 2025-05-22 RX ADMIN — EZETIMIBE 10 MILLIGRAM(S): 10 TABLET ORAL at 11:15

## 2025-05-22 RX ADMIN — AMLODIPINE BESYLATE 10 MILLIGRAM(S): 10 TABLET ORAL at 06:25

## 2025-05-22 RX ADMIN — POLYETHYLENE GLYCOL-3350 AND ELECTROLYTES 4000 MILLILITER(S): 236; 6.74; 5.86; 2.97; 22.74 POWDER, FOR SOLUTION ORAL at 15:54

## 2025-05-22 RX ADMIN — INSULIN LISPRO 6: 100 INJECTION, SOLUTION INTRAVENOUS; SUBCUTANEOUS at 13:05

## 2025-05-22 RX ADMIN — Medication 25 GRAM(S): at 21:00

## 2025-05-22 RX ADMIN — ATORVASTATIN CALCIUM 80 MILLIGRAM(S): 80 TABLET, FILM COATED ORAL at 22:40

## 2025-05-22 RX ADMIN — Medication 40 MILLIGRAM(S): at 06:25

## 2025-05-22 RX ADMIN — ENOXAPARIN SODIUM 40 MILLIGRAM(S): 100 INJECTION SUBCUTANEOUS at 22:40

## 2025-05-22 RX ADMIN — Medication 62.5 MILLIMOLE(S): at 21:00

## 2025-05-22 NOTE — PROGRESS NOTE ADULT - PROBLEM SELECTOR PLAN 2
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 Oncologist: Dr. Pratt  Started on pembrolizumab 10/30/2023 with a treatment break from August to December 2024 s/p steroid taper and resumption of medication. Last infusion was 5/16; patient tolerated well  Recent CTAP on 3/11/25 showed stable disease  Repeat CTAP 5/21 :Unchanged 21 x 21 mm superior right lower lobe mass surrounding the segmental airway and contiguous with the mediastinal border. No new suspicious lung lesions.  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 Oncologist: Dr. Pratt  Started on pembrolizumab 10/30/2023 with a treatment break from August to December 2024 s/p steroid taper and resumption of medication. Last infusion was 5/16; patient tolerated well  Recent CTAP on 3/11/25 showed stable disease  Repeat CTAP 5/21 :Unchanged 21 x 21 mm superior right lower lobe mass surrounding the segmental airway and contiguous with the mediastinal border. No new suspicious lung lesions.  Heme Onc  following, recs appreciated   - plan as above  - f/u heme onc recs

## 2025-05-22 NOTE — DIETITIAN NUTRITION RISK NOTIFICATION - TREATMENT: THE FOLLOWING DIET HAS BEEN RECOMMENDED
Diet, Clear Liquid (05-22-25 @ 11:25) [Active]  Patient to be NPO @ midnight for colonoscopy 5/23    Diet advancement per GI, RD recommendations below.

## 2025-05-22 NOTE — PROGRESS NOTE ADULT - PROBLEM SELECTOR PLAN 1
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   Denies fever, chills, nausea, vomiting, recent dietary changes, recent travel, recent hospitalizations, new antibiotic use or sick contacts.  Prescribed prednisone 60mg qd and pantoprazole 40mg qd by oncologist  for relief two days prior to admission  Patient not currently meeting SIRS criteria. PE only significant to moderate abdominal distention; CTAP significant for wall thickening of the left hemicolon and ascending colon.  GI consulted, recs appreciated; s/p KCL 50 and 1L NS in ED     - obtain cardiology clearance for colonoscopy   - obtain fecal calprotectin, gi pcr and c. diff, stool pH  - encourage oral intake with low fiber diet, replete electrolytes as needed   - start  methylprednisolone 120mg IV daily  - continue with Protonix 40mg qd Etiology includes immunotherapy induced colitis vs infections gastroenteritis   Patient with hx of NSCLC on Keytruda/ pembrolizumab last infusion on 5/16 presenting with three week history of watery, non bloody voluminous stool associated with poor appetite and generalized weakness.   Denies fever, chills, nausea, vomiting, recent dietary changes, recent travel, recent hospitalizations, new antibiotic use or sick contacts.  Prescribed prednisone 60mg qd and pantoprazole 40mg qd by oncologist  for relief two days prior to admission  Patient not currently meeting SIRS criteria. PE only significant to moderate abdominal distention; CTAP significant for wall thickening of the left hemicolon and ascending colon.  GI consulted, recs appreciated; s/p KCL 50 and 1L NS in ED   - f/u cardiology clearance for colonoscopy   - f/u fecal calprotectin  - encourage oral intake with low fiber diet, replete electrolytes as needed   - c/w methylprednisolone 120mg IV daily  - continue with Protonix 40mg qd  - clear liquid diet  - golytely for bowel prep  - NPO at midnight

## 2025-05-22 NOTE — DIETITIAN INITIAL EVALUATION ADULT - OTHER INFO
Patient is a 74M with PMHx of tobacco use with stage IV metastatic non-small cell lung cancer (diagnosed in September 2023  on Keytruda/ pembrolizumab last infusion on 5/16), COPD, HTN, HLD presenting with three week history of watery, non bloody voluminous stool admitted for further workup for possible immunotherapy induced colitis.      Patient is a 74M with PMHx of tobacco use with stage IV metastatic non-small cell lung cancer (diagnosed in September 2023  on Keytruda/ pembrolizumab last infusion on 5/16), COPD, HTN, HLD presenting with three week history of watery, non bloody voluminous stool admitted for further workup for possible immunotherapy induced colitis.     Patient visited this AM @ 7WO. PT on low fiber diet at time of visit. Patient observed to have consumed 100% of breakfast tray. Last bowel movement yesterday 5/21 per patient reports, loose stool. Patient reports diarrhea has persisted x 3 weeks (with 10 bowel movements daily). Reports fair appetite, consuming small frequent meals with 1 boost supplement daily at home. Patient feels meals are 'going right through him'. Patient reports limiting protein and meat at home. Diet goals reviewed with ptaitent, encouraged to consume adequate protein. Low fiber diet reviewed - discussed diet at times of GI distress. Patient endorses weight loss; stating usual body weight 134 pounds (~ 4months ago); confirmed with chart review; weight history of 135.7 pounds (4/7/25), 126.7 pounds (5/19/25). Current/dosing weight 60.6kg (133.3 pounds), ?accuracy. Obtain new bed scale weight when able. Nutritionally focused physical exam conducted; Per ASPEN guidelines, pt meets criteria for moderate chronic protein calorie malnutrition. Patient amenable to addition to addition of chocolate oral nutrition supplements, and fortified mashed potatoes. Since visit, patient has been put on clear liquids, to be NPO at midnight for plan of GI imaging. Jae score of 21. A1C 7.3. POC glucose 158, 155, 145. Corrective lispro ordered, patient also received solu-medrol. Other nutrition related meds include protonix, PhosNaK, and PRN zofran.  Patient is a 74M with PMHx of tobacco use with stage IV metastatic non-small cell lung cancer (diagnosed in September 2023  on Keytruda/ pembrolizumab last infusion on 5/16), COPD, HTN, HLD presenting with three week history of watery, non bloody voluminous stool admitted for further workup for possible immunotherapy induced colitis.     Patient visited this AM @ 7WO. Pt on low fiber diet at time of visit. Patient observed to have consumed 100% of breakfast tray. Last bowel movement yesterday 5/21 per patient reports, loose stool. Patient reports diarrhea has persisted x 3 weeks (with 10 bowel movements daily). Reports fair appetite, consuming small frequent meals with 1 boost supplement daily at home. Patient feels meals are 'going right through him'. Stool Cdiff and GI PCR tests both resulted negative. ?malabsorption 2/2 immunotherapy related GI distress. Patient reports limiting protein and meat at home per preference. Diet goals reviewed with patient, encouraged to consume adequate protein. Low fiber diet reviewed - discussed diet at times of GI distress. Patient endorses weight loss; stating usual body weight 134 pounds (~ 4months ago); [facility]; weight history of 135.7 pounds (4/7/25), 126.7 pounds (5/19/25). Current/dosing weight 60.6kg (133.3 pounds), ?accuracy. Obtain new bed scale weight when able. Nutritionally focused physical exam conducted; Per ASPEN guidelines, pt meets criteria for moderate chronic protein calorie malnutrition. Patient amenable to addition to addition of chocolate oral nutrition supplements, and fortified mashed potatoes. Since visit, patient has been put on clear liquids, to be NPO at midnight for plan of GI imaging. Jae score of 21. No edema noted. Skin intact. A1C 7.3. POCT glucose 158, 155, 145. Corrective lispro ordered, patient also received solu-medrol. Other nutrition related meds include protonix, PhosNaK, and PRN zofran.

## 2025-05-22 NOTE — DIETITIAN INITIAL EVALUATION ADULT - PERSON TAUGHT/METHOD
Reviewed low fiber diet and increased protein needs. All patient questions answered./patient instructed

## 2025-05-22 NOTE — PROGRESS NOTE ADULT - PROBLEM SELECTOR PLAN 4
Per PCP patient was found to be in complete heart block after stress test in 2020 and subsequently underwent pacemaker placement later that year. EKG on admission: atrial-sensed ventricular-paced rhythm with LVH    -cardiology consult   - obtain TTE   - obtain collateral from Dr. Jeana Berry, last known cardiologist per PCP, however patient has not seen a cardiologist in several years  - per [facility] vist on Jan '25 for device management prior to MRI Per PCP patient was found to be in complete heart block after stress test in 2020 and subsequently underwent pacemaker placement later that year. EKG on admission: atrial-sensed ventricular-paced rhythm with LVH  - cardiology consulted   - obtain TTE   - obtain collateral from Dr. Jeana Berry, last known cardiologist per PCP, however patient has not seen a cardiologist in several years  - per [facility] vist on Jan '25 for device management prior to MRI

## 2025-05-22 NOTE — DIETITIAN INITIAL EVALUATION ADULT - PROBLEM SELECTOR PLAN 10
F: none  E: replete as needed  N: Low fiber diet   VTE ppx: Lovenox  GI ppx: Protonix  Dispo: [facility]  Code status: Full

## 2025-05-22 NOTE — PROGRESS NOTE ADULT - PROBLEM SELECTOR PLAN 6
Home med: atorvastatin 80mg qd, ezetimibe 10mg qd    - resume home meds   - obtain lipid panel Home med: atorvastatin 80mg qd, ezetimibe 10mg qd  - resume home meds   - obtain lipid panel

## 2025-05-22 NOTE — PROGRESS NOTE ADULT - ASSESSMENT
A 74-year-old male with stage IV metastatic non-small cell lung cancer, currently undergoing treatment with pembrolizumab, presents with a 3-week history of significant diarrhea, abdominal discomfort, and weight loss. CT abdomen & pelvis consistent with colitis. Patient improving on steroids.     The clinical presentation raises suspicion for immune checkpoint inhibitor-induced colitis, given the use of pembrolizumab. However, infectious gastroenteritis remains as a differential diagnosis.     # immune checkpoint inhibitor colitis   # infectious gastroenteritis     Recommendations:  1. Liquid diet today  2. Pending colonoscopy tomorrow 5/23  3. Please start patient on bowel prep  4. Continue with steroids     A 74-year-old male with stage IV metastatic non-small cell lung cancer, currently undergoing treatment with pembrolizumab, presents with a 3-week history of significant diarrhea, abdominal discomfort, and weight loss. CT abdomen & pelvis consistent with colitis. Patient improving on steroids.     The clinical presentation raises suspicion for immune checkpoint inhibitor-induced colitis, given the use of pembrolizumab. However, infectious gastroenteritis remains as a differential diagnosis.     #Possible immune checkpoint inhibitor colitis   #Possible infectious gastroenteritis     Recommendations:  1. Liquid diet today  2. Pending colonoscopy tomorrow 5/23  3. Please start patient on bowel prep  4. Continue with steroids  5. GI PCR and C. diff negative      Lloyd Hurd DO  Gastroenterology Fellow  GI Consult Pager Weekdays 7am-5pm: [phone]  Weeknights/Weekend/Holiday Coverage: Please Call the  for Contact Information  Follow Up Questions Welcome via [place] Microsoft Teams Messenger

## 2025-05-22 NOTE — DIETITIAN INITIAL EVALUATION ADULT - PROBLEM SELECTOR PLAN 4
Per PCP patient was found to be in complete heart block after stress test in 2020 and subsequently underwent pacemaker placement later that year. EKG on admission: atrial-sensed ventricular-paced rhythm with LVH    -cardiology consult   - obtain TTE   - obtain collateral from Dr. Jeana Berry, last known cardiologist per PCP, however patient has not seen a cardiologist in several years  - per [facility] vist on Jan '25 for device management prior to MRI

## 2025-05-22 NOTE — DIETITIAN INITIAL EVALUATION ADULT - PROBLEM SELECTOR PLAN 1
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   Denies fever, chills, nausea, vomiting, recent dietary changes, recent travel, recent hospitalizations, new antibiotic use or sick contacts.  Prescribed prednisone 60mg qd and pantoprazole 40mg qd by oncologist  for relief two days prior to admission  Patient not currently meeting SIRS criteria. PE only significant to moderate abdominal distention; CTAP significant for wall thickening of the left hemicolon and ascending colon.  GI consulted, recs appreciated; s/p KCL 50 and 1L NS in ED     - obtain cardiology clearance for colonoscopy   - obtain fecal calprotectin, gi pcr and c. diff, stool pH  - encourage oral intake with low fiber diet, replete electrolytes as needed   - start  methylprednisolone 120mg IV daily  - continue with Protonix 40mg qd

## 2025-05-22 NOTE — PROGRESS NOTE ADULT - SUBJECTIVE AND OBJECTIVE BOX
OVERNIGHT EVENTS:    SUBJECTIVE / INTERVAL HPI: Patient seen and examined at bedside.     VITAL SIGNS:  Vital Signs Last 24 Hrs  T(C): 36.8 (22 May 2025 11:12), Max: 36.8 (21 May 2025 14:44)  T(F): 98.2 (22 May 2025 11:12), Max: 98.2 (21 May 2025 14:44)  HR: 79 (22 May 2025 11:12) (71 - 88)  BP: 126/70 (22 May 2025 11:12) (126/70 - 152/79)  BP(mean): 102 (22 May 2025 05:50) (99 - 103)  RR: 18 (22 May 2025 11:12) (18 - 18)  SpO2: 92% (22 May 2025 11:12) (90% - 97%)    Parameters below as of 22 May 2025 11:12  Patient On (Oxygen Delivery Method): room air      I&O's Summary      PHYSICAL EXAM:  General: WDWN  HEENT: NC/AT; PERRL, anicteric sclera; MMM  Neck: supple  Cardiovascular: +S1/S2; RRR  Respiratory: CTA B/L; no W/R/R  Gastrointestinal: soft, NT/ND; +BSx4  Extremities: WWP; no edema, clubbing or cyanosis  Vascular: 2+ radial, DP/PT pulses B/L  Neurological: AAOx3; no focal deficits    MEDICATIONS:  MEDICATIONS  (STANDING):  amLODIPine   Tablet 10 milliGRAM(s) Oral every 24 hours  aspirin enteric coated 81 milliGRAM(s) Oral daily  atorvastatin 80 milliGRAM(s) Oral at bedtime  dextrose 5%. 1000 milliLiter(s) (50 mL/Hr) IV Continuous <Continuous>  dextrose 5%. 1000 milliLiter(s) (100 mL/Hr) IV Continuous <Continuous>  dextrose 50% Injectable 25 Gram(s) IV Push once  dextrose 50% Injectable 12.5 Gram(s) IV Push once  dextrose 50% Injectable 25 Gram(s) IV Push once  enoxaparin Injectable 40 milliGRAM(s) SubCutaneous once  ezetimibe 10 milliGRAM(s) Oral daily  glucagon  Injectable 1 milliGRAM(s) IntraMuscular once  hydrochlorothiazide 25 milliGRAM(s) Oral every 24 hours  insulin lispro (ADMELOG) corrective regimen sliding scale   SubCutaneous Before meals and at bedtime  magnesium sulfate  IVPB 2 Gram(s) IV Intermittent once  methylPREDNISolone sodium succinate Injectable 125 milliGRAM(s) IV Push daily  metoprolol succinate  milliGRAM(s) Oral every 24 hours  pantoprazole    Tablet 40 milliGRAM(s) Oral before breakfast  polyethylene glycol/electrolyte Solution. 4000 milliLiter(s) Oral once  sodium phosphate 15 milliMole(s)/250 mL IVPB 15 milliMole(s) IV Intermittent once    MEDICATIONS  (PRN):  acetaminophen     Tablet .. 650 milliGRAM(s) Oral every 6 hours PRN Temp greater or equal to 38C (100.4F), Mild Pain (1 - 3)  albuterol    90 MICROgram(s) HFA Inhaler 2 Puff(s) Inhalation every 6 hours PRN Bronchospasm  aluminum hydroxide/magnesium hydroxide/simethicone Suspension 30 milliLiter(s) Oral every 4 hours PRN Dyspepsia  dextrose Oral Gel 15 Gram(s) Oral once PRN Blood Glucose LESS THAN 70 milliGRAM(s)/deciliter  melatonin 3 milliGRAM(s) Oral at bedtime PRN Insomnia  ondansetron Injectable 4 milliGRAM(s) IV Push every 8 hours PRN Nausea and/or Vomiting      ALLERGIES:  Allergies    No Known Allergies    Intolerances        LABS:                        12.9   8.30  )-----------( 234      ( 22 May 2025 06:55 )             39.3     05-22    142  |  105  |  18  ----------------------------<  158[H]  3.9   |  27  |  0.89    Ca    8.1[L]      22 May 2025 06:55  Phos  2.6     05-22  Mg     1.8     05-22    TPro  5.4[L]  /  Alb  3.2[L]  /  TBili  0.2  /  DBili  x   /  AST  16  /  ALT  16  /  AlkPhos  40  05-22      Urinalysis Basic - ( 22 May 2025 06:55 )    Color: x / Appearance: x / SG: x / pH: x  Gluc: 158 mg/dL / Ketone: x  / Bili: x / Urobili: x   Blood: x / Protein: x / Nitrite: x   Leuk Esterase: x / RBC: x / WBC x   Sq Epi: x / Non Sq Epi: x / Bacteria: x      CAPILLARY BLOOD GLUCOSE      POCT Blood Glucose.: 158 mg/dL (22 May 2025 08:10)      RADIOLOGY & ADDITIONAL TESTS: Reviewed.   OVERNIGHT EVENTS: no overnight events    SUBJECTIVE / INTERVAL HPI: Patient seen and examined at bedside. Feels well, no complaints at this time.     VITAL SIGNS:  Vital Signs Last 24 Hrs  T(C): 36.8 (22 May 2025 11:12), Max: 36.8 (21 May 2025 14:44)  T(F): 98.2 (22 May 2025 11:12), Max: 98.2 (21 May 2025 14:44)  HR: 79 (22 May 2025 11:12) (71 - 88)  BP: 126/70 (22 May 2025 11:12) (126/70 - 152/79)  BP(mean): 102 (22 May 2025 05:50) (99 - 103)  RR: 18 (22 May 2025 11:12) (18 - 18)  SpO2: 92% (22 May 2025 11:12) (90% - 97%)    Parameters below as of 22 May 2025 11:12  Patient On (Oxygen Delivery Method): room air      I&O's Summary      PHYSICAL EXAM:  Resting comfortably in bed; NAD  Head: NC/AT  Eyes: PERRL, EOMI, clear conjunctiva  ENT: no nasal discharge; uvula midline, no oropharyngeal erythema or exudates; dry mucous membranes   Neck: supple; no JVD or thyromegaly  Respiratory: CTA B/L; no W/R/R, no retractions  Cardiac: +S1/S2; RRR; no M/R/G;  Gastrointestinal: mildly distended abdomen without tenderness; no rebound or guarding; +BSx4  Extremities: WWP, no clubbing or cyanosis; no peripheral edema  Musculoskeletal: NROM x4; no joint swelling, tenderness or erythema  Vascular: 2+ radial, femoral, DP/PT pulses B/L  Dermatologic: skin warm, dry and intact; no rashes, wounds, or scars  Neurologic: AAOx3; CNII-XII grossly intact; no focal deficits  Psychiatric: affect and characteristics of appearance, verbalizations, behaviors are appropriate    MEDICATIONS:  MEDICATIONS  (STANDING):  amLODIPine   Tablet 10 milliGRAM(s) Oral every 24 hours  aspirin enteric coated 81 milliGRAM(s) Oral daily  atorvastatin 80 milliGRAM(s) Oral at bedtime  dextrose 5%. 1000 milliLiter(s) (50 mL/Hr) IV Continuous <Continuous>  dextrose 5%. 1000 milliLiter(s) (100 mL/Hr) IV Continuous <Continuous>  dextrose 50% Injectable 25 Gram(s) IV Push once  dextrose 50% Injectable 12.5 Gram(s) IV Push once  dextrose 50% Injectable 25 Gram(s) IV Push once  enoxaparin Injectable 40 milliGRAM(s) SubCutaneous once  ezetimibe 10 milliGRAM(s) Oral daily  glucagon  Injectable 1 milliGRAM(s) IntraMuscular once  hydrochlorothiazide 25 milliGRAM(s) Oral every 24 hours  insulin lispro (ADMELOG) corrective regimen sliding scale   SubCutaneous Before meals and at bedtime  magnesium sulfate  IVPB 2 Gram(s) IV Intermittent once  methylPREDNISolone sodium succinate Injectable 125 milliGRAM(s) IV Push daily  metoprolol succinate  milliGRAM(s) Oral every 24 hours  pantoprazole    Tablet 40 milliGRAM(s) Oral before breakfast  polyethylene glycol/electrolyte Solution. 4000 milliLiter(s) Oral once  sodium phosphate 15 milliMole(s)/250 mL IVPB 15 milliMole(s) IV Intermittent once    MEDICATIONS  (PRN):  acetaminophen     Tablet .. 650 milliGRAM(s) Oral every 6 hours PRN Temp greater or equal to 38C (100.4F), Mild Pain (1 - 3)  albuterol    90 MICROgram(s) HFA Inhaler 2 Puff(s) Inhalation every 6 hours PRN Bronchospasm  aluminum hydroxide/magnesium hydroxide/simethicone Suspension 30 milliLiter(s) Oral every 4 hours PRN Dyspepsia  dextrose Oral Gel 15 Gram(s) Oral once PRN Blood Glucose LESS THAN 70 milliGRAM(s)/deciliter  melatonin 3 milliGRAM(s) Oral at bedtime PRN Insomnia  ondansetron Injectable 4 milliGRAM(s) IV Push every 8 hours PRN Nausea and/or Vomiting      ALLERGIES:  Allergies    No Known Allergies    Intolerances        LABS:                        12.9   8.30  )-----------( 234      ( 22 May 2025 06:55 )             39.3     05-22    142  |  105  |  18  ----------------------------<  158[H]  3.9   |  27  |  0.89    Ca    8.1[L]      22 May 2025 06:55  Phos  2.6     05-22  Mg     1.8     05-22    TPro  5.4[L]  /  Alb  3.2[L]  /  TBili  0.2  /  DBili  x   /  AST  16  /  ALT  16  /  AlkPhos  40  05-22      Urinalysis Basic - ( 22 May 2025 06:55 )    Color: x / Appearance: x / SG: x / pH: x  Gluc: 158 mg/dL / Ketone: x  / Bili: x / Urobili: x   Blood: x / Protein: x / Nitrite: x   Leuk Esterase: x / RBC: x / WBC x   Sq Epi: x / Non Sq Epi: x / Bacteria: x      CAPILLARY BLOOD GLUCOSE      POCT Blood Glucose.: 158 mg/dL (22 May 2025 08:10)      RADIOLOGY & ADDITIONAL TESTS: Reviewed.

## 2025-05-22 NOTE — PROGRESS NOTE ADULT - PROBLEM SELECTOR PLAN 9
home med: metformin 1000mg; however patient shares he only take 500mg qd     -obtain A1c   - start mISS while on steroids home med: metformin 1000mg; however patient shares he only take 500mg qd   - obtain A1c   - start mISS while on steroids

## 2025-05-22 NOTE — PROGRESS NOTE ADULT - PROBLEM SELECTOR PLAN 10
Diagnosis: Right knee osteoarthritis     Procedure: Right knee Visco supplementation injection #2     The patient returns today for their second Euflexxa injection in the right knee. The risks, benefits, and complications of the injections were again discussed in detail with the patient. The risks discussed included but are not limited to infection, skin reactions, hot swollen joints, and anaphylaxis. The patient gave verbal informed consent for the injection. The patient skin was prepped with 3 sterile gauze pads soaked with alcohol solution and the knee joint was injected with 2 mL of Euflexxa intra-articularly under sterile conditions . Technique: Under sterile conditions a [employer] ultrasound unit with a variable frequency (6.0-15.0 MHz) linear transducer was used to localize the placement of a 22-gauge needle into the janusz joint. Findings: Successful needle placement for intra-articular Visco supplementation injection. Final images were taken and saved for permanent record. The patient tolerated the injection reasonably well. The patient was given instructions to ice the the knee and avoid strenuous activities for 24-48 hours. The patient was instructed to call the office immediately if there is increased pain, redness, warmth, fever, or chills. We will see the patient back in one week for the third injection. F: none  E: replete as needed  N: Low fiber diet   VTE ppx: Lovenox  GI ppx: Protonix  Dispo: [facility]  Code status: Full

## 2025-05-22 NOTE — DIETITIAN INITIAL EVALUATION ADULT - OTHER CALCULATIONS
Actual body weight used as pt is within % ideal body weight. Needs adjusted for malnutrition and CA dx. Actual body weight used as pt is within % ideal body weight (154 pounds); 86.6% ideal body weight. Needs adjusted for malnutrition and CA dx.

## 2025-05-22 NOTE — CONSULT NOTE ADULT - SUBJECTIVE AND OBJECTIVE BOX
Cardiology Consult      HPI:  Patient is a 74M with PMHx of tobacco use with stage IV metastatic non-small cell lung cancer (diagnosed in September 2023; on Keytruda/ pembrolizumab, last infusion on 5/16; patient denies hx of radiation), COPD, HTN, HLD, PFO, CVA in 2012, PCCM placement in 2020 due to complete heart block s/p stress testing, s/p carotid endarterectomy in 2020 presenting with three week history of watery, non bloody voluminous stool; approximately ten  bowel movements per day. Patient denies fever, chills, nausea, vomiting, recent dietary changes, recent travel, recent hospitalizations, new antibiotic use or sick contacts. Symptoms are associated with mild abdominal pain for which he has take Tylenol intermittently for, quantified as one 500mg tablet every 3-4 days.  He also reports generalized weakness with reduced appetite and five pound weight loss for which he has began to supplement meals with Gatorade and fluids to stay hydrated. Despite this, patient has had recurrent episodes of lightheadedness and dizziness but denies headaches or recent falls. Patient visited his oncologist Dr. Pratt for these symptoms and was prescribed prednisone 60mg qd and pantoprazole 40mg qd, he has been taking these medications for the last two days but his symptoms have persisted.  Otherwise patient denies chest pain, palpitations,     In the ED,  VS: T  97.9 , HR 95  , BP  140/81  , RR   18 , SpO2    95 % RA  Labs: WBC 10.69 Hbg 12.9 (from 15.1 in April '25), K 3.3  EKG: Atrial-sensed ventricular-paced rhythm with LVH  CT Chest/AP:   1. Unchanged 21 x 21 mm superior right lower lobe mass surrounding the segmental airway and contiguous with the mediastinal border. Mild large airway disease at the right lung base. No significant change in scattered reticulonodular opacities with a peripheral distribution. No new suspicious lung lesions.  2. Mild bladder wall thickening. Correlate for cystitis.  3. Wall thickening of the left hemicolon and ascending colon. Correlate for colitis.  4. 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 He can walk 2-3 flights of stairs without difficulty. He has a cardiologist Dr. Ok Shepard who put his PPM. He denies orthopnea, leg edema, VALDEZ, CP, palpitations.       Review of Systems:  CONSTITUTIONAL:  No weight loss, fever, chills, weakness or fatigue.  HEENT:  Eyes:  No visual loss, blurred vision, double vision or yellow sclerae. Ears, Nose, Throat:  No hearing loss, sneezing, congestion, runny nose or sore throat.  SKIN:  No rash or itching.  CARDIOVASCULAR:  No chest pain, chest pressure or chest discomfort. No palpitations or edema.  RESPIRATORY:  No shortness of breath, cough or sputum.  GASTROINTESTINAL:  No anorexia, nausea, vomiting or diarrhea. No abdominal pain or blood.  GENITOURINARY: No Burning on urination.   NEUROLOGICAL:  see HPI  MUSCULOSKELETAL:  No muscle, back pain, joint pain or stiffness.  HEMATOLOGIC:  No anemia, bleeding or bruising.  LYMPHATICS:  No enlarged nodes. No history of splenectomy.  PSYCHIATRIC:  No history of depression or anxiety.  ENDOCRINOLOGIC:  No reports of sweating, cold or heat intolerance. No polyuria or polydipsia.  ALLERGIES:  No history of asthma, hives, eczema or rhinitis.    PAST MEDICAL & SURGICAL HISTORY:    SOCIAL HISTORY:  FAMILY HISTORY:    ALLERGIES: 	  No Known Allergies          MEDICATIONS:  acetaminophen     Tablet .. 650 milliGRAM(s) Oral every 6 hours PRN  albuterol    90 MICROgram(s) HFA Inhaler 2 Puff(s) Inhalation every 6 hours PRN  aluminum hydroxide/magnesium hydroxide/simethicone Suspension 30 milliLiter(s) Oral every 4 hours PRN  amLODIPine   Tablet 10 milliGRAM(s) Oral every 24 hours  aspirin enteric coated 81 milliGRAM(s) Oral daily  atorvastatin 80 milliGRAM(s) Oral at bedtime  dextrose 5%. 1000 milliLiter(s) IV Continuous <Continuous>  dextrose 5%. 1000 milliLiter(s) IV Continuous <Continuous>  dextrose 50% Injectable 25 Gram(s) IV Push once  dextrose 50% Injectable 12.5 Gram(s) IV Push once  dextrose 50% Injectable 25 Gram(s) IV Push once  dextrose Oral Gel 15 Gram(s) Oral once PRN  ezetimibe 10 milliGRAM(s) Oral daily  glucagon  Injectable 1 milliGRAM(s) IntraMuscular once  hydrochlorothiazide 25 milliGRAM(s) Oral every 24 hours  insulin lispro (ADMELOG) corrective regimen sliding scale   SubCutaneous Before meals and at bedtime  melatonin 3 milliGRAM(s) Oral at bedtime PRN  methylPREDNISolone sodium succinate Injectable 125 milliGRAM(s) IV Push daily  metoprolol succinate  milliGRAM(s) Oral every 24 hours  ondansetron Injectable 4 milliGRAM(s) IV Push every 8 hours PRN  pantoprazole    Tablet 40 milliGRAM(s) Oral before breakfast      T(C): 36.7 (05-22-25 @ 22:47), Max: 36.8 (05-22-25 @ 11:12)  HR: 76 (05-22-25 @ 22:47) (73 - 79)  BP: 136/76 (05-22-25 @ 22:47) (126/70 - 151/77)  RR: 18 (05-22-25 @ 22:47) (18 - 18)  SpO2: 94% (05-22-25 @ 22:47) (90% - 94%)      PHYSICAL EXAM:    Constitutional: resting comfortably in bed; NAD  HEENT: NC/AT, PERRL, EOMI, anicteric sclera, no nasal discharge; uvula midline, no oropharyngeal erythema or exudates; MMM  Neck: supple; no thyromegaly, JVP ? cm H20, JVD +/-  Respiratory: CTA B/L; no W/R/R, no retractions  Cardiac: +S1/S2; RRR; no M/R/G; PMI non-displaced  Gastrointestinal: soft, NT/ND; no rebound or guarding; +BSx4  Extremities: WWP, no clubbing or cyanosis; no peripheral edema  Musculoskeletal: NROM x4; no joint swelling, tenderness or erythema  Vascular: 2+ radial, DP/PT pulses B/L  Dermatologic: skin warm, dry and intact; no rashes, wounds, or scars  Lymphatic: no submandibular or cervical LAD  Neurologic: AAOx3; CNII-XII grossly intact; no focal deficits        I&O's Summary      LABS:	 	                        12.9   8.30  )-----------( 234      ( 22 May 2025 06:55 )             39.3     05-22    142  |  105  |  18  ----------------------------<  158[H]  3.9   |  27  |  0.89    Ca    8.1[L]      22 May 2025 06:55  Phos  2.6     05-22  Mg     1.8     05-22    TPro  5.4[L]  /  Alb  3.2[L]  /  TBili  0.2  /  DBili  x   /  AST  16  /  ALT  16  /  AlkPhos  40  05-22        proBNP:   Lipid Profile:   HgA1c:   TSH:

## 2025-05-22 NOTE — DIETITIAN INITIAL EVALUATION ADULT - NUTRITIONGOAL OUTCOME1
Pt to meet >75% of estimated protein and energy needs. Reduce or show no further signs of protein calorie malnutrition.

## 2025-05-22 NOTE — DIETITIAN INITIAL EVALUATION ADULT - PROBLEM SELECTOR PLAN 7
Patient with mild COPD prescribed PRN albuterol for symptoms of bronchospasm, states he had not needed the inhaler recently  CTAP: Mild large airway disease at the right lung base.  Home med: Albuterol HFA 90    - resume PRN albuterol

## 2025-05-22 NOTE — DIETITIAN INITIAL EVALUATION ADULT - PERTINENT LABORATORY DATA
05-22    142  |  105  |  18  ----------------------------<  158[H]  3.9   |  27  |  0.89    Ca    8.1[L]      22 May 2025 06:55  Phos  2.6     05-22  Mg     1.8     05-22    TPro  5.4[L]  /  Alb  3.2[L]  /  TBili  0.2  /  DBili  x   /  AST  16  /  ALT  16  /  AlkPhos  40  05-22  POCT Blood Glucose.: 158 mg/dL (05-22-25 @ 08:10)  A1C with Estimated Average Glucose Result: 7.3 % (05-22-25 @ 06:55)

## 2025-05-22 NOTE — DIETITIAN NUTRITION RISK NOTIFICATION - PHYSICAL ASSESSMENT TRICEPS
moderate
Vaccinations/[facility]  Screening Program/  Immunization Record/Breastfeeding Log/Breastfeeding Mother’s Support Group Information/Guide to Postpartum Care/[facility] Hearing Screen Program/Back To Sleep Handout/Shaken Baby Prevention Handout/Breastfeeding Guide and Packet/Birth Certificate Instructions

## 2025-05-22 NOTE — DIETITIAN INITIAL EVALUATION ADULT - PROBLEM SELECTOR PLAN 3
On admission Cr. 1.2 from 0.9 April '25; likely prerenal from poor oral intake   - obtain urine studies   - encourage oral intake   - trend Cr and avoid nephrotoxic agents

## 2025-05-22 NOTE — PROGRESS NOTE ADULT - SUBJECTIVE AND OBJECTIVE BOX
[facility] CONSULT SERVICE FOLLOW-UP NOTE    INTERVAL HPI:  Reviewed chart and overnight events; patient seen and examined.     No acute complaints. Patient endorsed improvement in diarrhea, decreased frequency and improved consistency of stool. Denied nausea vomiting, hematochezia and melena.     MEDICATIONS:  Pulmonary:  albuterol    90 MICROgram(s) HFA Inhaler 2 Puff(s) Inhalation every 6 hours PRN    Antimicrobials:    Anticoagulants:  aspirin enteric coated 81 milliGRAM(s) Oral daily  enoxaparin Injectable 40 milliGRAM(s) SubCutaneous once    Cardiac:  amLODIPine   Tablet 10 milliGRAM(s) Oral every 24 hours  hydrochlorothiazide 25 milliGRAM(s) Oral every 24 hours  metoprolol succinate  milliGRAM(s) Oral every 24 hours      Allergies    No Known Allergies    Intolerances        Vital Signs Last 24 Hrs  T(C): 36.8 (22 May 2025 11:12), Max: 36.8 (21 May 2025 14:44)  T(F): 98.2 (22 May 2025 11:12), Max: 98.2 (21 May 2025 14:44)  HR: 79 (22 May 2025 11:12) (71 - 88)  BP: 126/70 (22 May 2025 11:12) (126/70 - 152/79)  BP(mean): 102 (22 May 2025 05:50) (99 - 103)  RR: 18 (22 May 2025 11:12) (18 - 18)  SpO2: 92% (22 May 2025 11:12) (90% - 97%)    Parameters below as of 22 May 2025 11:12  Patient On (Oxygen Delivery Method): room air            PHYSICAL EXAM:  Constitutional: well-appearing, in no apparent respiratory distress  HEENT: NC/AT; PERRL, anicteric sclera; moist mucous membranes  Neck: supple, no JVD or LAD appreciated  Cardiovascular: +S1/S2, Regular rate and rhythm, no murmurs appreciated   Respiratory: Clear breath sounds bilaterally. No wheezes, rhonchi or rales   Gastrointestinal: soft, non-tender, non-distended. Normoactive bowel sounds.   Extremities: no edema, clubbing or cyanosis  Vascular: 2+ radial pulses B/L  Neurological: awake and alert; oriented x3    LABS:      CBC Full  -  ( 22 May 2025 06:55 )  WBC Count : 8.30 K/uL  RBC Count : 4.67 M/uL  Hemoglobin : 12.9 g/dL  Hematocrit : 39.3 %  Platelet Count - Automated : 234 K/uL  Mean Cell Volume : 84.2 fl  Mean Cell Hemoglobin : 27.6 pg  Mean Cell Hemoglobin Concentration : 32.8 g/dL  Auto Neutrophil # : x  Auto Lymphocyte # : x  Auto Monocyte # : x  Auto Eosinophil # : x  Auto Basophil # : x  Auto Neutrophil % : x  Auto Lymphocyte % : x  Auto Monocyte % : x  Auto Eosinophil % : x  Auto Basophil % : x    05-22    142  |  105  |  18  ----------------------------<  158[H]  3.9   |  27  |  0.89    Ca    8.1[L]      22 May 2025 06:55  Phos  2.6     05-22  Mg     1.8     05-22    TPro  5.4[L]  /  Alb  3.2[L]  /  TBili  0.2  /  DBili  x   /  AST  16  /  ALT  16  /  AlkPhos  40  05-22                    RADIOLOGY & ADDITIONAL STUDIES:

## 2025-05-22 NOTE — DIETITIAN INITIAL EVALUATION ADULT - NS FNS DIET ORDER
Diet, NPO:   Except Medications (05-22-25 @ 11:25)  Diet, Clear Liquid (05-22-25 @ 11:25)   Diet, Clear Liquid (05-22-25 @ 11:25)  Plan for NPO @ 11:59PM

## 2025-05-22 NOTE — DIETITIAN INITIAL EVALUATION ADULT - PHYSICAL ASSESSMENT CLAVICLES
Patient mobility status  with mild difficulty. Provider aware     I have reviewed discharge instructions with the patient and spouse.  The patient and spouse verbalized understanding.    Patient left ED via Discharge Method: wheelchair to Home with Spouse.    Opportunity for questions and clarification provided.     Patient given 0 scripts.           Evin Wagner RN  05/14/24 [id number]    
moderate

## 2025-05-22 NOTE — PROGRESS NOTE ADULT - PROBLEM SELECTOR PLAN 8
Patient shares he has variable compliance with BP medications   Home med: metoprolol succinate 100mgqd , amlodipine 10mg qd, HCTZ 25mg qd, Lisinopril 40mg qd    - resume home meds with strict holding parameters  - hold home  lisinopril iso slight MARYAN Patient shares he has variable compliance with BP medications   Home med: metoprolol succinate 100mgqd , amlodipine 10mg qd, HCTZ 25mg qd, Lisinopril 40mg qd  - resume home meds with strict holding parameters  - hold home  lisinopril iso slight MARYAN

## 2025-05-22 NOTE — DIETITIAN INITIAL EVALUATION ADULT - ADD RECOMMEND
Plan:   1. Continue clear liquid diet, plan for NPO at midnight for GI imaging.   2. Resume low fiber diet as appropriate; advance per GI.  3. Recommend addition of Ensure Max BID to support nutritional status when medically appropriate (300kcal, 60g protein/day).  4. Recommend addition of fortified mashed potatoes daily when medically appropriate (240kcal, 14g protein, 27g carbs).   5. Monitor electrolytes, glucose, diet status, GI symptoms and intake.  6. RD to follow clinical course. Plan:   1. Continue clear liquid diet, plan for NPO at midnight for GI imaging.   2. Resume low fiber diet, as appropriate; advance per GI. Consider addition of carb consistent restriction should PO intake remain >75%  3. Recommend addition of Ensure Max BID to support nutritional status when medically appropriate (300kcal, 60g protein/day).  4. Recommend addition of fortified mashed potatoes daily when medically appropriate (240kcal, 14g protein, 27g carbs).   5. Monitor diet status, GI symptoms and intake.  6. RD to follow clinical course.

## 2025-05-22 NOTE — DIETITIAN INITIAL EVALUATION ADULT - PROBLEM SELECTOR PLAN 5
Collateral obtained by PCP Dr. Manzo: Patient with prior history of CVA and carotid stenosis s/p endarterectomy in 2020. Does not follow with cardiologist (last seen by Dr. Jeana Berry)   Home medications: aspirin 81 and atorvastatin 80qd    - continue with aspirin and statin

## 2025-05-22 NOTE — DIETITIAN INITIAL EVALUATION ADULT - PROBLEM SELECTOR PLAN 8
Patient shares he has variable compliance with BP medications   Home med: metoprolol succinate 100mgqd , amlodipine 10mg qd, HCTZ 25mg qd, Lisinopril 40mg qd    - resume home meds with strict holding parameters  - hold home  lisinopril iso slight MARYAN

## 2025-05-22 NOTE — PROGRESS NOTE ADULT - ASSESSMENT
74 M ex heavy smoker who presents for follow up of stage IV NSCLC, adenocarcinoma T4N2M1 with BMs, PD-L1 90% positive, DDR2 mutant, TP53 mutant. EGFR was noted to be contaminant Started on pembrolizumab 10/30/2023 with a treatment break from August to December 2024 due to organizing pneumonia, likely immune related adverse event per Dr. Landaverde. He has since completed the steroid taper and had a repeat CT chest which showed resolution of the prior infiltration. s/p SRS to brain metastases 11/16/23. Interim MRIB which was done while off pembrolizumab showed development of new enhancing lesions in the brain stem, however now the MRI is showing resolution of these indicating response to pembrolizumab. Last dose of pembro was 5/16.     Patient now presents to the ED w/ persistent diarrhea x3 weeks, diarrhea has been >7 stools per day (~10 times).  Patient was started on 60mg of pred as an outpatient by his oncologist on Monday however has not experienced much relief since then.  Seen and examined at bedside in the ED, denies nausea/vomiting, denied use of laxatives or other supplements.      #stage IV NSCLC, adenocarcinoma, metastatic disease to brain, PD-L1 90%    --care per Dr. Pratt  --currently on pembro alone, LD 5/16 (was originally started 10/30/2023)   --Recent interval imaging on 03/11/25 CT c/a/p with stable disease  --now presents with diarrhea x 3 weeks, grade III (7-10x/day) improving (5/22)  --CT CAP 5/22 w/ evidence of colitis   --GI evaluation: please send fecal calpro (pending), crp (6.1), gi pcr & c. diff negative    --cardiology clearance obtained 5/22, pending colonoscopy tomorrow with GI  --continue methylprednisolone (on 2mg/kg)

## 2025-05-22 NOTE — PROGRESS NOTE ADULT - PROBLEM SELECTOR PLAN 7
Patient with mild COPD prescribed PRN albuterol for symptoms of bronchospasm, states he had not needed the inhaler recently  CTAP: Mild large airway disease at the right lung base.  Home med: Albuterol HFA 90    - resume PRN albuterol Patient with mild COPD prescribed PRN albuterol for symptoms of bronchospasm, states he had not needed the inhaler recently  CTAP: Mild large airway disease at the right lung base.  Home med: Albuterol HFA 90  - resume PRN albuterol

## 2025-05-22 NOTE — PROGRESS NOTE ADULT - SUBJECTIVE AND OBJECTIVE BOX
patient seen and examined at bedside  reports bowel movements are less frequent and more formed since increased steroid dosing  otherwise stable without new complaints, pending colonoscopy tomorrow    ANTIBIOTICS/RELEVANT:    MEDICATIONS  (STANDING):  amLODIPine   Tablet 10 milliGRAM(s) Oral every 24 hours  aspirin enteric coated 81 milliGRAM(s) Oral daily  atorvastatin 80 milliGRAM(s) Oral at bedtime  dextrose 5%. 1000 milliLiter(s) (50 mL/Hr) IV Continuous <Continuous>  dextrose 5%. 1000 milliLiter(s) (100 mL/Hr) IV Continuous <Continuous>  dextrose 50% Injectable 25 Gram(s) IV Push once  dextrose 50% Injectable 12.5 Gram(s) IV Push once  dextrose 50% Injectable 25 Gram(s) IV Push once  enoxaparin Injectable 40 milliGRAM(s) SubCutaneous once  ezetimibe 10 milliGRAM(s) Oral daily  glucagon  Injectable 1 milliGRAM(s) IntraMuscular once  hydrochlorothiazide 25 milliGRAM(s) Oral every 24 hours  insulin lispro (ADMELOG) corrective regimen sliding scale   SubCutaneous Before meals and at bedtime  magnesium sulfate  IVPB 2 Gram(s) IV Intermittent once  methylPREDNISolone sodium succinate Injectable 125 milliGRAM(s) IV Push daily  metoprolol succinate  milliGRAM(s) Oral every 24 hours  pantoprazole    Tablet 40 milliGRAM(s) Oral before breakfast  polyethylene glycol/electrolyte Solution. 4000 milliLiter(s) Oral once  sodium phosphate 15 milliMole(s)/250 mL IVPB 15 milliMole(s) IV Intermittent once    MEDICATIONS  (PRN):  acetaminophen     Tablet .. 650 milliGRAM(s) Oral every 6 hours PRN Temp greater or equal to 38C (100.4F), Mild Pain (1 - 3)  albuterol    90 MICROgram(s) HFA Inhaler 2 Puff(s) Inhalation every 6 hours PRN Bronchospasm  aluminum hydroxide/magnesium hydroxide/simethicone Suspension 30 milliLiter(s) Oral every 4 hours PRN Dyspepsia  dextrose Oral Gel 15 Gram(s) Oral once PRN Blood Glucose LESS THAN 70 milliGRAM(s)/deciliter  melatonin 3 milliGRAM(s) Oral at bedtime PRN Insomnia  ondansetron Injectable 4 milliGRAM(s) IV Push every 8 hours PRN Nausea and/or Vomiting      Vital Signs Last 24 Hrs  T(C): 36.8 (22 May 2025 11:12), Max: 36.8 (21 May 2025 14:44)  T(F): 98.2 (22 May 2025 11:12), Max: 98.2 (21 May 2025 14:44)  HR: 79 (22 May 2025 11:12) (71 - 88)  BP: 126/70 (22 May 2025 11:12) (126/70 - 152/79)  BP(mean): 102 (22 May 2025 05:50) (99 - 103)  RR: 18 (22 May 2025 11:12) (18 - 18)  SpO2: 92% (22 May 2025 11:12) (90% - 97%)    Parameters below as of 22 May 2025 11:12  Patient On (Oxygen Delivery Method): room air    PHYSICAL EXAM:  General: in no acute distress  Lungs: CTA B/L. No wheezes, rales, or rhonchi  Cardiovascular: RRR. No murmurs, rubs, or gallops  Abdomen: Soft, non-tender non-distended; No rebound or guarding  Nuero: A&OX3    LABS:                        12.9   8.30  )-----------( 234      ( 22 May 2025 06:55 )             39.3     05-22    142  |  105  |  18  ----------------------------<  158[H]  3.9   |  27  |  0.89    Ca    8.1[L]      22 May 2025 06:55  Phos  2.6     05-22  Mg     1.8     05-22    TPro  5.4[L]  /  Alb  3.2[L]  /  TBili  0.2  /  DBili  x   /  AST  16  /  ALT  16  /  AlkPhos  40  05-22      Urinalysis Basic - ( 22 May 2025 06:55 )

## 2025-05-22 NOTE — DIETITIAN INITIAL EVALUATION ADULT - PROBLEM SELECTOR PLAN 2
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 Oncologist: Dr. Pratt  Started on pembrolizumab 10/30/2023 with a treatment break from August to December 2024 s/p steroid taper and resumption of medication. Last infusion was 5/16; patient tolerated well  Recent CTAP on 3/11/25 showed stable disease  Repeat CTAP 5/21 :Unchanged 21 x 21 mm superior right lower lobe mass surrounding the segmental airway and contiguous with the mediastinal border. No new suspicious lung lesions.  Heme Onc  following, recs appreciated     - plan as above  - f/u heme onc recs

## 2025-05-22 NOTE — PROGRESS NOTE ADULT - PROBLEM SELECTOR PLAN 5
Collateral obtained by PCP Dr. Manzo: Patient with prior history of CVA and carotid stenosis s/p endarterectomy in 2020. Does not follow with cardiologist (last seen by Dr. Jeana Berry)   Home medications: aspirin 81 and atorvastatin 80qd    - continue with aspirin and statin Collateral obtained by PCP Dr. Manzo: Patient with prior history of CVA and carotid stenosis s/p endarterectomy in 2020. Does not follow with cardiologist (last seen by Dr. Jeana Berry)   Home medications: aspirin 81 and atorvastatin 80qd  - continue with aspirin and statin

## 2025-05-22 NOTE — DIETITIAN INITIAL EVALUATION ADULT - PERTINENT MEDS FT
MEDICATIONS  (STANDING):  amLODIPine   Tablet 10 milliGRAM(s) Oral every 24 hours  aspirin enteric coated 81 milliGRAM(s) Oral daily  atorvastatin 80 milliGRAM(s) Oral at bedtime  dextrose 5%. 1000 milliLiter(s) (50 mL/Hr) IV Continuous <Continuous>  dextrose 5%. 1000 milliLiter(s) (100 mL/Hr) IV Continuous <Continuous>  dextrose 50% Injectable 25 Gram(s) IV Push once  dextrose 50% Injectable 12.5 Gram(s) IV Push once  dextrose 50% Injectable 25 Gram(s) IV Push once  enoxaparin Injectable 40 milliGRAM(s) SubCutaneous once  ezetimibe 10 milliGRAM(s) Oral daily  glucagon  Injectable 1 milliGRAM(s) IntraMuscular once  hydrochlorothiazide 25 milliGRAM(s) Oral every 24 hours  insulin lispro (ADMELOG) corrective regimen sliding scale   SubCutaneous Before meals and at bedtime  magnesium sulfate  IVPB 2 Gram(s) IV Intermittent once  methylPREDNISolone sodium succinate Injectable 125 milliGRAM(s) IV Push daily  metoprolol succinate  milliGRAM(s) Oral every 24 hours  pantoprazole    Tablet 40 milliGRAM(s) Oral before breakfast  polyethylene glycol/electrolyte Solution. 4000 milliLiter(s) Oral once  sodium phosphate 15 milliMole(s)/250 mL IVPB 15 milliMole(s) IV Intermittent once    MEDICATIONS  (PRN):  acetaminophen     Tablet .. 650 milliGRAM(s) Oral every 6 hours PRN Temp greater or equal to 38C (100.4F), Mild Pain (1 - 3)  albuterol    90 MICROgram(s) HFA Inhaler 2 Puff(s) Inhalation every 6 hours PRN Bronchospasm  aluminum hydroxide/magnesium hydroxide/simethicone Suspension 30 milliLiter(s) Oral every 4 hours PRN Dyspepsia  dextrose Oral Gel 15 Gram(s) Oral once PRN Blood Glucose LESS THAN 70 milliGRAM(s)/deciliter  melatonin 3 milliGRAM(s) Oral at bedtime PRN Insomnia  ondansetron Injectable 4 milliGRAM(s) IV Push every 8 hours PRN Nausea and/or Vomiting

## 2025-05-22 NOTE — PROGRESS NOTE ADULT - ASSESSMENT
Patient is a 74M with PMHx of tobacco use with stage IV metastatic non-small cell lung cancer (diagnosed in September 2023  on Keytruda/ pembrolizumab last infusion on 5/16), COPD, HTN, HLD presenting with three week history of watery, non bloody voluminous stool admitted for further workup for possible immunotherapy induced colitis

## 2025-05-22 NOTE — DIETITIAN INITIAL EVALUATION ADULT - ETIOLOGY-BASIS
Quality 110: Preventive Care And Screening: Influenza Immunization: Influenza Immunization Administered during Influenza season Quality 111:Pneumonia Vaccination Status For Older Adults: Pneumococcal Vaccination not Administered or Previously Received, Reason not Otherwise Specified Quality 226: Preventive Care And Screening: Tobacco Use: Screening And Cessation Intervention: Patient screened for tobacco use and is an ex/non-smoker Detail Level: Detailed Quality 130: Documentation Of Current Medications In The Medical Record: Current Medications Documented Chronic illness

## 2025-05-23 ENCOUNTER — TRANSCRIPTION ENCOUNTER (OUTPATIENT)
Age: 75
End: 2025-05-23

## 2025-05-23 ENCOUNTER — RESULT REVIEW (OUTPATIENT)
Age: 75
End: 2025-05-23

## 2025-05-23 DIAGNOSIS — D72.829 ELEVATED WHITE BLOOD CELL COUNT, UNSPECIFIED: ICD-10-CM

## 2025-05-23 DIAGNOSIS — E87.8 OTHER DISORDERS OF ELECTROLYTE AND FLUID BALANCE, NOT ELSEWHERE CLASSIFIED: ICD-10-CM

## 2025-05-23 LAB
ANION GAP SERPL CALC-SCNC: 10 MMOL/L — SIGNIFICANT CHANGE UP (ref 5–17)
APTT BLD: 25.2 SEC — LOW (ref 26.1–36.8)
BLD GP AB SCN SERPL QL: NEGATIVE — SIGNIFICANT CHANGE UP
BUN SERPL-MCNC: 12 MG/DL — SIGNIFICANT CHANGE UP (ref 7–23)
CALCIUM SERPL-MCNC: 8.5 MG/DL — SIGNIFICANT CHANGE UP (ref 8.4–10.5)
CHLORIDE SERPL-SCNC: 96 MMOL/L — SIGNIFICANT CHANGE UP (ref 96–108)
CO2 SERPL-SCNC: 32 MMOL/L — HIGH (ref 22–31)
CREAT SERPL-MCNC: 0.77 MG/DL — SIGNIFICANT CHANGE UP (ref 0.5–1.3)
CULTURE RESULTS: SIGNIFICANT CHANGE UP
EGFR: 93 ML/MIN/1.73M2 — SIGNIFICANT CHANGE UP
EGFR: 93 ML/MIN/1.73M2 — SIGNIFICANT CHANGE UP
GLUCOSE SERPL-MCNC: 108 MG/DL — HIGH (ref 70–99)
HCT VFR BLD CALC: 46.5 % — SIGNIFICANT CHANGE UP (ref 39–50)
HGB BLD-MCNC: 15.2 G/DL — SIGNIFICANT CHANGE UP (ref 13–17)
INR BLD: 0.93 — SIGNIFICANT CHANGE UP (ref 0.85–1.16)
MAGNESIUM SERPL-MCNC: 2.1 MG/DL — SIGNIFICANT CHANGE UP (ref 1.6–2.6)
MCHC RBC-ENTMCNC: 27.2 PG — SIGNIFICANT CHANGE UP (ref 27–34)
MCHC RBC-ENTMCNC: 32.7 G/DL — SIGNIFICANT CHANGE UP (ref 32–36)
MCV RBC AUTO: 83.3 FL — SIGNIFICANT CHANGE UP (ref 80–100)
NRBC BLD AUTO-RTO: 0 /100 WBCS — SIGNIFICANT CHANGE UP (ref 0–0)
PHOSPHATE SERPL-MCNC: 2 MG/DL — LOW (ref 2.5–4.5)
PLATELET # BLD AUTO: 316 K/UL — SIGNIFICANT CHANGE UP (ref 150–400)
POTASSIUM SERPL-MCNC: 3.2 MMOL/L — LOW (ref 3.5–5.3)
POTASSIUM SERPL-SCNC: 3.2 MMOL/L — LOW (ref 3.5–5.3)
PROTHROM AB SERPL-ACNC: 10.9 SEC — SIGNIFICANT CHANGE UP (ref 9.9–13.4)
RBC # BLD: 5.58 M/UL — SIGNIFICANT CHANGE UP (ref 4.2–5.8)
RBC # FLD: 14.5 % — SIGNIFICANT CHANGE UP (ref 10.3–14.5)
RH IG SCN BLD-IMP: POSITIVE — SIGNIFICANT CHANGE UP
SODIUM SERPL-SCNC: 138 MMOL/L — SIGNIFICANT CHANGE UP (ref 135–145)
SPECIMEN SOURCE: SIGNIFICANT CHANGE UP
WBC # BLD: 14.72 K/UL — HIGH (ref 3.8–10.5)
WBC # FLD AUTO: 14.72 K/UL — HIGH (ref 3.8–10.5)

## 2025-05-23 PROCEDURE — 99221 1ST HOSP IP/OBS SF/LOW 40: CPT

## 2025-05-23 PROCEDURE — 45380 COLONOSCOPY AND BIOPSY: CPT | Mod: GC

## 2025-05-23 PROCEDURE — 88305 TISSUE EXAM BY PATHOLOGIST: CPT | Mod: 26

## 2025-05-23 PROCEDURE — 99233 SBSQ HOSP IP/OBS HIGH 50: CPT | Mod: GC

## 2025-05-23 RX ORDER — POTASSIUM PHOSPHATE, MONOBASIC POTASSIUM PHOSPHATE, DIBASIC INJECTION, 236; 224 MG/ML; MG/ML
15 SOLUTION, CONCENTRATE INTRAVENOUS ONCE
Refills: 0 | Status: COMPLETED | OUTPATIENT
Start: 2025-05-23 | End: 2025-05-23

## 2025-05-23 RX ORDER — POTASSIUM PHOSPHATE, MONOBASIC POTASSIUM PHOSPHATE, DIBASIC INJECTION, 236; 224 MG/ML; MG/ML
15 SOLUTION, CONCENTRATE INTRAVENOUS ONCE
Refills: 0 | Status: DISCONTINUED | OUTPATIENT
Start: 2025-05-23 | End: 2025-05-23

## 2025-05-23 RX ORDER — LISINOPRIL 5 MG/1
10 TABLET ORAL EVERY 24 HOURS
Refills: 0 | Status: DISCONTINUED | OUTPATIENT
Start: 2025-05-24 | End: 2025-05-27

## 2025-05-23 RX ORDER — POTASSIUM PHOSPHATE, MONOBASIC POTASSIUM PHOSPHATE, DIBASIC INJECTION, 236; 224 MG/ML; MG/ML
30 SOLUTION, CONCENTRATE INTRAVENOUS ONCE
Refills: 0 | Status: DISCONTINUED | OUTPATIENT
Start: 2025-05-23 | End: 2025-05-23

## 2025-05-23 RX ORDER — FUROSEMIDE 10 MG/ML
20 INJECTION INTRAMUSCULAR; INTRAVENOUS ONCE
Refills: 0 | Status: COMPLETED | OUTPATIENT
Start: 2025-05-23 | End: 2025-05-23

## 2025-05-23 RX ADMIN — AMLODIPINE BESYLATE 10 MILLIGRAM(S): 10 TABLET ORAL at 06:33

## 2025-05-23 RX ADMIN — Medication 50 MILLIEQUIVALENT(S): at 09:22

## 2025-05-23 RX ADMIN — FUROSEMIDE 20 MILLIGRAM(S): 10 INJECTION INTRAMUSCULAR; INTRAVENOUS at 17:57

## 2025-05-23 RX ADMIN — ATORVASTATIN CALCIUM 80 MILLIGRAM(S): 80 TABLET, FILM COATED ORAL at 22:00

## 2025-05-23 RX ADMIN — Medication 81 MILLIGRAM(S): at 11:17

## 2025-05-23 RX ADMIN — METOPROLOL SUCCINATE 100 MILLIGRAM(S): 50 TABLET, EXTENDED RELEASE ORAL at 22:00

## 2025-05-23 RX ADMIN — Medication 100 MILLIEQUIVALENT(S): at 07:50

## 2025-05-23 RX ADMIN — Medication 40 MILLIEQUIVALENT(S): at 08:02

## 2025-05-23 RX ADMIN — Medication 40 MILLIGRAM(S): at 06:33

## 2025-05-23 RX ADMIN — METHYLPREDNISOLONE ACETATE 125 MILLIGRAM(S): 80 INJECTION, SUSPENSION INTRA-ARTICULAR; INTRALESIONAL; INTRAMUSCULAR; SOFT TISSUE at 06:32

## 2025-05-23 RX ADMIN — POTASSIUM PHOSPHATE, MONOBASIC POTASSIUM PHOSPHATE, DIBASIC INJECTION, 62.5 MILLIMOLE(S): 236; 224 SOLUTION, CONCENTRATE INTRAVENOUS at 07:51

## 2025-05-23 NOTE — DISCHARGE NOTE PROVIDER - NSDCMRMEDTOKEN_GEN_ALL_CORE_FT
Albuterol (Eqv-Proventil HFA) 90 mcg/inh inhalation aerosol: 2 puff(s) inhaled 4 times a day  amLODIPine 10 mg oral tablet: 1 tab(s) orally once a day  Aspir 81 oral delayed release tablet: 1 tab(s) orally once a day  ezetimibe 10 mg oral tablet: 1 tab(s) orally once a day  hydroCHLOROthiazide 25 mg oral tablet: 1 tab(s) orally once a day  Lipitor 80 mg oral tablet: 1 tab(s) orally once a day  lisinopril 40 mg oral tablet: 1 tab(s) orally once a day  metFORMIN 1000 mg oral tablet: 1 tab(s) orally once a day  metoprolol succinate 100 mg oral tablet, extended release: 1 tab(s) orally once a day  pantoprazole 40 mg oral delayed release tablet: 1 tab(s) orally once a day   Albuterol (Eqv-Proventil HFA) 90 mcg/inh inhalation aerosol: 2 puff(s) inhaled 4 times a day  alcohol swabs : Apply topically to affected area 4 times a day  amLODIPine 10 mg oral tablet: 1 tab(s) orally once a day  Aspir 81 oral delayed release tablet: 1 tab(s) orally once a day  ezetimibe 10 mg oral tablet: 1 tab(s) orally once a day  glucometer (prodigy): Test blood sugars four times a day. Dispense #1 glucometer.  hydroCHLOROthiazide 25 mg oral tablet: 1 tab(s) orally once a day  insulin lispro 100 units/mL injectable solution: 4 unit(s) injectable 4 times a day (before meals and at bedtime)  lancets: 1 application subcutaneously 4 times a day  Lipitor 80 mg oral tablet: 1 tab(s) orally once a day  lisinopril 40 mg oral tablet: 1 tab(s) orally once a day  metoprolol succinate 100 mg oral tablet, extended release: 1 tab(s) orally once a day  pantoprazole 40 mg oral delayed release tablet: 1 tab(s) orally once a day  predniSONE 20 mg oral tablet: 3 tab(s) orally every 12 hours  sulfamethoxazole-trimethoprim 400 mg-80 mg oral tablet: 1 tab(s) orally once a day  sulfamethoxazole-trimethoprim 400 mg-80 mg oral tablet: 1 tab(s) orally once a day  test strips (prodigy): 1 application subcutaneously 4 times a day. ** Compatible with patient&#x27;s glucometer **

## 2025-05-23 NOTE — PROVIDER CONTACT NOTE (OTHER) - BACKGROUND
Patient is usually a&ox4 and their SPO2 is usually 92% on RA. Patient states he does not feel like himself yet. Patient has history of COPD. Patient with history of HTN, HLD,  and lung cancer

## 2025-05-23 NOTE — PROGRESS NOTE ADULT - PROBLEM SELECTOR PLAN 1
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   Denies fever, chills, nausea, vomiting, recent dietary changes, recent travel, recent hospitalizations, new antibiotic use or sick contacts.  Prescribed prednisone 60mg qd and pantoprazole 40mg qd by oncologist  for relief two days prior to admission  Patient not currently meeting SIRS criteria. PE only significant to moderate abdominal distention; CTAP significant for wall thickening of the left hemicolon and ascending colon.  GI consulted, recs appreciated; s/p KCL 50 and 1L NS in ED   - f/u cardiology clearance for colonoscopy   - f/u fecal calprotectin  - encourage oral intake with low fiber diet, replete electrolytes as needed   - c/w methylprednisolone 120mg IV daily  - continue with Protonix 40mg qd  - clear liquid diet  - golytely for bowel prep  - NPO at midnight Etiology includes immunotherapy induced colitis vs infections gastroenteritis   Patient with hx of NSCLC on Keytruda/ pembrolizumab last infusion on 5/16 presenting with three week history of watery, non bloody voluminous stool associated with poor appetite and generalized weakness.   Denies fever, chills, nausea, vomiting, recent dietary changes, recent travel, recent hospitalizations, new antibiotic use or sick contacts.  Prescribed prednisone 60mg qd and pantoprazole 40mg qd by oncologist  for relief two days prior to admission  Patient not currently meeting SIRS criteria. PE only significant to moderate abdominal distention; CTAP significant for wall thickening of the left hemicolon and ascending colon.  GI consulted, recs appreciated; s/p KCL 50 and 1L NS in ED   - f/u fecal calprotectin  - encourage oral intake with low fiber diet, replete electrolytes as needed   - c/w methylprednisolone 120mg IV daily  - continue with Protonix 40mg qd  - planned for scope 5/23 with GI

## 2025-05-23 NOTE — PROGRESS NOTE ADULT - SUBJECTIVE AND OBJECTIVE BOX
OVERNIGHT EVENTS:    SUBJECTIVE / INTERVAL HPI: Patient seen and examined at bedside.     VITAL SIGNS:  Vital Signs Last 24 Hrs  T(C): 36.6 (23 May 2025 12:00), Max: 36.7 (22 May 2025 22:47)  T(F): 97.9 (23 May 2025 12:00), Max: 98.1 (22 May 2025 22:47)  HR: 71 (23 May 2025 12:00) (70 - 76)  BP: 130/79 (23 May 2025 12:00) (130/79 - 165/88)  BP(mean): 114 (23 May 2025 06:33) (114 - 114)  RR: 18 (23 May 2025 12:00) (17 - 18)  SpO2: 92% (23 May 2025 12:00) (90% - 94%)    Parameters below as of 23 May 2025 12:00  Patient On (Oxygen Delivery Method): room air      I&O's Summary      PHYSICAL EXAM:  General: WDWN  HEENT: NC/AT; PERRL, anicteric sclera; MMM  Neck: supple  Cardiovascular: +S1/S2; RRR  Respiratory: CTA B/L; no W/R/R  Gastrointestinal: soft, NT/ND; +BSx4  Extremities: WWP; no edema, clubbing or cyanosis  Vascular: 2+ radial, DP/PT pulses B/L  Neurological: AAOx3; no focal deficits    MEDICATIONS:  MEDICATIONS  (STANDING):  amLODIPine   Tablet 10 milliGRAM(s) Oral every 24 hours  aspirin enteric coated 81 milliGRAM(s) Oral daily  atorvastatin 80 milliGRAM(s) Oral at bedtime  dextrose 5%. 1000 milliLiter(s) (50 mL/Hr) IV Continuous <Continuous>  dextrose 5%. 1000 milliLiter(s) (100 mL/Hr) IV Continuous <Continuous>  dextrose 50% Injectable 25 Gram(s) IV Push once  dextrose 50% Injectable 12.5 Gram(s) IV Push once  dextrose 50% Injectable 25 Gram(s) IV Push once  glucagon  Injectable 1 milliGRAM(s) IntraMuscular once  hydrochlorothiazide 25 milliGRAM(s) Oral every 24 hours  insulin lispro (ADMELOG) corrective regimen sliding scale   SubCutaneous Before meals and at bedtime  methylPREDNISolone sodium succinate Injectable 125 milliGRAM(s) IV Push daily  metoprolol succinate  milliGRAM(s) Oral every 24 hours  pantoprazole    Tablet 40 milliGRAM(s) Oral before breakfast  potassium chloride  20 mEq/100 mL IVPB 20 milliEquivalent(s) IV Intermittent every 2 hours    MEDICATIONS  (PRN):  acetaminophen     Tablet .. 650 milliGRAM(s) Oral every 6 hours PRN Temp greater or equal to 38C (100.4F), Mild Pain (1 - 3)  albuterol    90 MICROgram(s) HFA Inhaler 2 Puff(s) Inhalation every 6 hours PRN Bronchospasm  aluminum hydroxide/magnesium hydroxide/simethicone Suspension 30 milliLiter(s) Oral every 4 hours PRN Dyspepsia  dextrose Oral Gel 15 Gram(s) Oral once PRN Blood Glucose LESS THAN 70 milliGRAM(s)/deciliter  melatonin 3 milliGRAM(s) Oral at bedtime PRN Insomnia  ondansetron Injectable 4 milliGRAM(s) IV Push every 8 hours PRN Nausea and/or Vomiting      ALLERGIES:  Allergies    No Known Allergies    Intolerances        LABS:                        15.2   14.72 )-----------( 316      ( 23 May 2025 06:20 )             46.5     05-23    138  |  96  |  12  ----------------------------<  108[H]  3.2[L]   |  32[H]  |  0.77    Ca    8.5      23 May 2025 06:20  Phos  2.0     05-23  Mg     2.1     05-23    TPro  5.4[L]  /  Alb  3.2[L]  /  TBili  0.2  /  DBili  x   /  AST  16  /  ALT  16  /  AlkPhos  40  05-22    PT/INR - ( 23 May 2025 06:20 )   PT: 10.9 sec;   INR: 0.93          PTT - ( 23 May 2025 06:20 )  PTT:25.2 sec  Urinalysis Basic - ( 23 May 2025 06:20 )    Color: x / Appearance: x / SG: x / pH: x  Gluc: 108 mg/dL / Ketone: x  / Bili: x / Urobili: x   Blood: x / Protein: x / Nitrite: x   Leuk Esterase: x / RBC: x / WBC x   Sq Epi: x / Non Sq Epi: x / Bacteria: x      CAPILLARY BLOOD GLUCOSE      POCT Blood Glucose.: 141 mg/dL (23 May 2025 08:44)      RADIOLOGY & ADDITIONAL TESTS: Reviewed.   OVERNIGHT EVENTS: no overnight events    SUBJECTIVE / INTERVAL HPI: Patient seen and examined at bedside. Feels well, no complaints at this time. Had 30+ BM from [facility], states they have started to become clear.     VITAL SIGNS:  Vital Signs Last 24 Hrs  T(C): 36.6 (23 May 2025 12:00), Max: 36.7 (22 May 2025 22:47)  T(F): 97.9 (23 May 2025 12:00), Max: 98.1 (22 May 2025 22:47)  HR: 71 (23 May 2025 12:00) (70 - 76)  BP: 130/79 (23 May 2025 12:00) (130/79 - 165/88)  BP(mean): 114 (23 May 2025 06:33) (114 - 114)  RR: 18 (23 May 2025 12:00) (17 - 18)  SpO2: 92% (23 May 2025 12:00) (90% - 94%)    Parameters below as of 23 May 2025 12:00  Patient On (Oxygen Delivery Method): room air      I&O's Summary      PHYSICAL EXAM:  Resting comfortably in bed; NAD  Head: NC/AT  Eyes: PERRL, EOMI, clear conjunctiva  ENT: no nasal discharge; uvula midline, no oropharyngeal erythema or exudates; dry mucous membranes   Neck: supple; no JVD or thyromegaly  Respiratory: CTA B/L; no W/R/R, no retractions  Cardiac: +S1/S2; RRR; no M/R/G;  Gastrointestinal: mildly distended abdomen without tenderness; no rebound or guarding; +BSx4  Extremities: WWP, no clubbing or cyanosis; no peripheral edema  Musculoskeletal: NROM x4; no joint swelling, tenderness or erythema  Vascular: 2+ radial, femoral, DP/PT pulses B/L  Dermatologic: skin warm, dry and intact; no rashes, wounds, or scars  Neurologic: AAOx3; CNII-XII grossly intact; no focal deficits  Psychiatric: affect and characteristics of appearance, verbalizations, behaviors are appropriate      MEDICATIONS:  MEDICATIONS  (STANDING):  amLODIPine   Tablet 10 milliGRAM(s) Oral every 24 hours  aspirin enteric coated 81 milliGRAM(s) Oral daily  atorvastatin 80 milliGRAM(s) Oral at bedtime  dextrose 5%. 1000 milliLiter(s) (50 mL/Hr) IV Continuous <Continuous>  dextrose 5%. 1000 milliLiter(s) (100 mL/Hr) IV Continuous <Continuous>  dextrose 50% Injectable 25 Gram(s) IV Push once  dextrose 50% Injectable 12.5 Gram(s) IV Push once  dextrose 50% Injectable 25 Gram(s) IV Push once  glucagon  Injectable 1 milliGRAM(s) IntraMuscular once  hydrochlorothiazide 25 milliGRAM(s) Oral every 24 hours  insulin lispro (ADMELOG) corrective regimen sliding scale   SubCutaneous Before meals and at bedtime  methylPREDNISolone sodium succinate Injectable 125 milliGRAM(s) IV Push daily  metoprolol succinate  milliGRAM(s) Oral every 24 hours  pantoprazole    Tablet 40 milliGRAM(s) Oral before breakfast  potassium chloride  20 mEq/100 mL IVPB 20 milliEquivalent(s) IV Intermittent every 2 hours    MEDICATIONS  (PRN):  acetaminophen     Tablet .. 650 milliGRAM(s) Oral every 6 hours PRN Temp greater or equal to 38C (100.4F), Mild Pain (1 - 3)  albuterol    90 MICROgram(s) HFA Inhaler 2 Puff(s) Inhalation every 6 hours PRN Bronchospasm  aluminum hydroxide/magnesium hydroxide/simethicone Suspension 30 milliLiter(s) Oral every 4 hours PRN Dyspepsia  dextrose Oral Gel 15 Gram(s) Oral once PRN Blood Glucose LESS THAN 70 milliGRAM(s)/deciliter  melatonin 3 milliGRAM(s) Oral at bedtime PRN Insomnia  ondansetron Injectable 4 milliGRAM(s) IV Push every 8 hours PRN Nausea and/or Vomiting      ALLERGIES:  Allergies    No Known Allergies    Intolerances        LABS:                        15.2   14.72 )-----------( 316      ( 23 May 2025 06:20 )             46.5     05-23    138  |  96  |  12  ----------------------------<  108[H]  3.2[L]   |  32[H]  |  0.77    Ca    8.5      23 May 2025 06:20  Phos  2.0     05-23  Mg     2.1     05-23    TPro  5.4[L]  /  Alb  3.2[L]  /  TBili  0.2  /  DBili  x   /  AST  16  /  ALT  16  /  AlkPhos  40  05-22    PT/INR - ( 23 May 2025 06:20 )   PT: 10.9 sec;   INR: 0.93          PTT - ( 23 May 2025 06:20 )  PTT:25.2 sec  Urinalysis Basic - ( 23 May 2025 06:20 )    Color: x / Appearance: x / SG: x / pH: x  Gluc: 108 mg/dL / Ketone: x  / Bili: x / Urobili: x   Blood: x / Protein: x / Nitrite: x   Leuk Esterase: x / RBC: x / WBC x   Sq Epi: x / Non Sq Epi: x / Bacteria: x      CAPILLARY BLOOD GLUCOSE      POCT Blood Glucose.: 141 mg/dL (23 May 2025 08:44)      RADIOLOGY & ADDITIONAL TESTS: Reviewed.

## 2025-05-23 NOTE — PRE-ANESTHESIA EVALUATION ADULT - NSRADCARDRESULTSFT_GEN_ALL_CORE
All available imaging reviewed.    Transthoracic Echocardiogram 5/22/2025    "CONCLUSIONS:     1. There is normal LV mass and concentric remodeling.   2. Left ventricular cavity is normal in size. Left ventricular systolic function is normal with an ejection fraction of 65 % by Baker's method of disks. There are no regional wall motion abnormalities seen.   3. Normal left ventricular filling pressure.   4. Normal right ventricular cavity size, with normal wall thickness, and normal right ventricular systolic function. There is a 1 cm x 0.8 cm thickening noted on the atrial lead of the device wire in the right atrium, which could represent fibrinous tissue Vs thrombus Vs vegetation. Correlate clinically and with blood cultures.   5. Normal left and right atrial size.   6. Fibrocalcific aortic valve sclerosis without stenosis.   7. Mild tricuspid regurgitation.   8. Estimated pulmonary artery systolic pressure is 32 mmHg.   9. No pericardial effusion seen."

## 2025-05-23 NOTE — PROGRESS NOTE ADULT - ASSESSMENT
Assessment: 73 y/o M with PMHx of stage IV metastatic NSCLC on Keytruda/pembro immunotherapy, COPD, HTN, HLD, PFO, CVA 2012, CHB s/p DCPPM, carotid stenosis s/p endarterectomy, p/w x3 weeks of watery diarrhea since starting immunotherapy with plan for colonoscopy to r/o colitis on 5/23, cardiology consulted for preoperative risk assessment     Review of Studies:  TTE 5-2-2025: LVEF 65%, normal RV size/fxn, 1cmx0.8cm thickening of atrial lead in RA, (fibrinous tissue vs thrombus vs vegetation), normal LA/RA size fxn, no significant valvular disease, no pericardial effusion    Home Medications: Amlodipine 10, ASA 81, ezetimbe 10, HCTZ 25, lipitor 80, lisinopril 40    Primary Cardiologist: Ok Shepard (EP)    Plan:  #Pre-operative risk assessment  RCRI: 1  METS: >4  No active concerns for ACS/ADHF/critical valvulopathies/unstable arrhythmias  Risk assessment:  Pt has a low/intermediate-risk for a low-risk procedure.  -Give lasix 20mg IV x 1    #Atrial lead thickening  -While patient has been culture negative, and labs have been unremarkable for infection, given patient's immunotherapy and pro-thrombotic state with cancer the atrial lead findings warrant further evaluation  -If patient to be discharged over the weekend, he can get PATRICIA on outpatient basis. Please call his cardiologist, Dr. Abreu, to discuss the need for this study    #HTN  -C/w HCTZ 25mg PO daily  -C/w Lisinopril 40mg PO daily, OK to continue through colonoscopy procedure    #HLD  -C/w ASA 81mg daiily  -C/w atorvastatin 80mg daily  -Hold on ezetemibe given frequent diarrhea

## 2025-05-23 NOTE — PROGRESS NOTE ADULT - PROBLEM SELECTOR PLAN 4
Per PCP patient was found to be in complete heart block after stress test in 2020 and subsequently underwent pacemaker placement later that year. EKG on admission: atrial-sensed ventricular-paced rhythm with LVH  - cardiology consulted   - obtain TTE   - obtain collateral from Dr. Jeana Berry, last known cardiologist per PCP, however patient has not seen a cardiologist in several years  - per [facility] vist on Jan '25 for device management prior to MRI Pt with hypokalemia and hypophosphatemia this morning, likely iso diarrhea from bowel prep. Lytes repleted.  - monitor bmp

## 2025-05-23 NOTE — PROGRESS NOTE ADULT - PROBLEM SELECTOR PLAN 7
Patient with mild COPD prescribed PRN albuterol for symptoms of bronchospasm, states he had not needed the inhaler recently  CTAP: Mild large airway disease at the right lung base.  Home med: Albuterol HFA 90  - resume PRN albuterol Collateral obtained by PCP Dr. Manzo: Patient with prior history of CVA and carotid stenosis s/p endarterectomy in 2020. Does not follow with cardiologist (last seen by Dr. Jeana Berry)   Home medications: aspirin 81 and atorvastatin 80qd  - continue with aspirin and statin

## 2025-05-23 NOTE — PROGRESS NOTE ADULT - PROBLEM SELECTOR PROBLEM 7
Recorded Pressure: AoDes, LA, GE=980, Condition=Condition 1 (Descending Thoracic Aorta) AoDes 65/28/43, (Left Atrium) LA 7/7/6 COPD, mild S/P carotid endarterectomy

## 2025-05-23 NOTE — PROGRESS NOTE ADULT - PROBLEM SELECTOR PLAN 5
Collateral obtained by PCP Dr. Manzo: Patient with prior history of CVA and carotid stenosis s/p endarterectomy in 2020. Does not follow with cardiologist (last seen by Dr. Jeana Berry)   Home medications: aspirin 81 and atorvastatin 80qd  - continue with aspirin and statin Leukocytosis 14.72 from 8.30. Likely as pt is on steroids.  - ctm

## 2025-05-23 NOTE — PROGRESS NOTE ADULT - PROBLEM SELECTOR PLAN 10
F: none  E: replete as needed  N: Low fiber diet   VTE ppx: Lovenox  GI ppx: Protonix  Dispo: [facility]  Code status: Full Patient shares he has variable compliance with BP medications   Home med: metoprolol succinate 100mgqd , amlodipine 10mg qd, HCTZ 25mg qd, Lisinopril 40mg qd  - resume home meds with strict holding parameters

## 2025-05-23 NOTE — PROGRESS NOTE ADULT - PROBLEM SELECTOR PLAN 9
home med: metformin 1000mg; however patient shares he only take 500mg qd   - obtain A1c   - start mISS while on steroids Patient with mild COPD prescribed PRN albuterol for symptoms of bronchospasm, states he had not needed the inhaler recently  CTAP: Mild large airway disease at the right lung base.  Home med: Albuterol HFA 90  - resume PRN albuterol No Residual Tumor Seen Histology Text: There were no malignant cells seen in the sections examined.

## 2025-05-23 NOTE — PROGRESS NOTE ADULT - SUBJECTIVE AND OBJECTIVE BOX
INTERVAL EVENTS:    Cardiac medications administered in the last 48h:  hydrochlorothiazide: 25 milliGRAM(s) Oral (05-23-25 @ 06:33)  amLODIPine   Tablet: 10 milliGRAM(s) Oral (05-23-25 @ 06:33)  metoprolol succinate ER: 100 milliGRAM(s) Oral (05-22-25 @ 22:40)  hydrochlorothiazide: 25 milliGRAM(s) Oral (05-22-25 @ 06:25)  amLODIPine   Tablet: 10 milliGRAM(s) Oral (05-22-25 @ 06:25)  metoprolol succinate ER: 100 milliGRAM(s) Oral (05-21-25 @ 21:09)      MEDICATIONS  (STANDING):  amLODIPine   Tablet 10 milliGRAM(s) Oral every 24 hours  aspirin enteric coated 81 milliGRAM(s) Oral daily  atorvastatin 80 milliGRAM(s) Oral at bedtime  dextrose 5%. 1000 milliLiter(s) (50 mL/Hr) IV Continuous <Continuous>  dextrose 5%. 1000 milliLiter(s) (100 mL/Hr) IV Continuous <Continuous>  dextrose 50% Injectable 25 Gram(s) IV Push once  dextrose 50% Injectable 12.5 Gram(s) IV Push once  dextrose 50% Injectable 25 Gram(s) IV Push once  furosemide    Tablet 20 milliGRAM(s) Oral once  glucagon  Injectable 1 milliGRAM(s) IntraMuscular once  hydrochlorothiazide 25 milliGRAM(s) Oral every 24 hours  insulin lispro (ADMELOG) corrective regimen sliding scale   SubCutaneous Before meals and at bedtime  methylPREDNISolone sodium succinate Injectable 125 milliGRAM(s) IV Push daily  metoprolol succinate  milliGRAM(s) Oral every 24 hours  pantoprazole    Tablet 40 milliGRAM(s) Oral before breakfast    MEDICATIONS  (PRN):  acetaminophen     Tablet .. 650 milliGRAM(s) Oral every 6 hours PRN Temp greater or equal to 38C (100.4F), Mild Pain (1 - 3)  albuterol    90 MICROgram(s) HFA Inhaler 2 Puff(s) Inhalation every 6 hours PRN Bronchospasm  aluminum hydroxide/magnesium hydroxide/simethicone Suspension 30 milliLiter(s) Oral every 4 hours PRN Dyspepsia  dextrose Oral Gel 15 Gram(s) Oral once PRN Blood Glucose LESS THAN 70 milliGRAM(s)/deciliter  melatonin 3 milliGRAM(s) Oral at bedtime PRN Insomnia  ondansetron Injectable 4 milliGRAM(s) IV Push every 8 hours PRN Nausea and/or Vomiting      VITALS 24H  T(F): 97.9 (05-23-25 @ 12:00), Max: 98.1 (05-22-25 @ 22:47)  HR: 71 (05-23-25 @ 14:10) (70 - 76)  BP: 130/79 (05-23-25 @ 14:10) (130/79 - 165/88)  BP(mean): 114 (05-23-25 @ 14:10) (114 - 114)  ABP: --  ABP(mean): --  RR: 18 (05-23-25 @ 14:10) (17 - 18)  SpO2: 92% (05-23-25 @ 14:10) (90% - 94%)  CVP(mm Hg): --    I/O Detail 24H      PHYSICAL EXAM:  GEN: NAD  HEENT: EOMI   RESP: CTA b/l  CV: RRR. Normal S1/S2. No m/r/g.  ABD: soft, non-distended  EXT: 1+ pitting edema  NEURO: alert and attentive    LABS:  CBC 05-23-25 @ 06:20                        15.2   14.72 )-----------( 316                   46.5     Hgb trend: 15.2 <-- , 12.9 <-- , 14.1 <-- , 12.9 <--   WBC trend: 14.72 <-- , 8.30 <-- , 12.59 <-- , 10.69 <--       CMP 05-23-25 @ 06:20    138  |  96  |  12  ----------------------------<  108[H]  3.2[L]   |  32[H]  |  0.77    Ca    8.5      05-23-25 @ 06:20  Phos  2.0     05-23  Mg     2.1     05-23    TPro  5.4[L]  /  Alb  3.2[L]  /  TBili  0.2  /  DBili  x   /  AST  16  /  ALT  16  /  AlkPhos  40     05-22    Serum Cr (eGFR) trend: 0.77 (93) <-- , 0.89 (89) <-- , 1.05 (74) <-- , 1.20 (63) <--       PT/INR - ( 23 May 2025 06:20 )   PT: 10.9 sec;   INR: 0.93     PTT - ( 23 May 2025 06:20 ):25.2 sec    Cardiac Markers

## 2025-05-23 NOTE — PROVIDER CONTACT NOTE (OTHER) - SITUATION
Patient returned from coloscopy procedure. Patient's vitals were taken. Patient spo2 77-78% on room air.

## 2025-05-23 NOTE — PROGRESS NOTE ADULT - PROBLEM SELECTOR PLAN 6
Home med: atorvastatin 80mg qd, ezetimibe 10mg qd  - resume home meds   - obtain lipid panel Per PCP patient was found to be in complete heart block after stress test in 2020 and subsequently underwent pacemaker placement later that year. EKG on admission: atrial-sensed ventricular-paced rhythm with LVH  - cardiology consulted   - obtain TTE   - obtain collateral from Dr. Jeana Berry, last known cardiologist per PCP, however patient has not seen a cardiologist in several years  - per [facility] vist on Jan '25 for device management prior to MRI

## 2025-05-23 NOTE — PACU DISCHARGE NOTE - MENTAL STATUS: PATIENT PARTICIPATION
If you need to reach the Urologist or Urology Nurses for any health care questions or concerns please call [phone] and ask to speak with the [facility] [place] Urology Nurse. The phone line is open from 8a-430p Monday thru Friday.
Awake

## 2025-05-23 NOTE — PRE-ANESTHESIA EVALUATION ADULT - NSANTHALCOHOLSD_GEN_ALL_CORE
- General Info


Date of Service: 18


Functional Status: Reports: Pain Controlled, Tolerating Diet, Ambulating, 

Urinating





- Review of Systems


General: Denies: Fever, Fatigue


HEENT: Denies: Headaches


Pulmonary: Denies: Shortness of Breath, Pleuritic Chest Pain, Cough


Cardiovascular: Denies: Chest Pain, Palpitations, Dyspnea on Exertion


Gastrointestinal: Denies: Abdominal Pain


Genitourinary: Denies: Dysuria, Incontinence, Flank Pain


Psychiatric: Reports: Depression, Mood Lability, Anxiety





- General Info


Date of Service: 18





- Patient Data


Vital Signs - Most Recent: 


 Last Vital Signs











Temp  36.9 C   18 11:51


 


Pulse  97   18 11:51


 


Resp  16   18 11:51


 


BP  143/75 H  18 11:51


 


Pulse Ox  96   18 11:51











Weight - Most Recent: 420 lb


I&O - Last 24 Hours: 


 Intake & Output











 18





 22:59 06:59 14:59


 


Intake Total 500 2000 


 


Output Total 280 490 


 


Balance 220 1510 











Lab Results - Last 24 Hours: 


 Laboratory Results - last 24 hr











  18 Range/Units





  05:40 


 


Hgb  8.3 L  (12.0-16.0)  g/dL


 


Hct  25.5 L  (36.0-46.0)  %











Med Orders - Current: 


 Current Medications





Bisacodyl (Dulcolax)  10 mg RECTAL .ONCE PRN


   PRN Reason: Constipation


Citric Acid/Sodium Citrate (Bicitra Solution)  30 ml PO .ONCE [facility]


   Last Admin: 18 07:38 Dose:  30 ml


Diphenhydramine HCl (Benadryl)  25 mg IVPUSH Q6H PRN


   PRN Reason: Itching or Nausea


Docusate Sodium (Colace)  100 mg PO BID [facility]


   Last Admin: 18 10:51 Dose:  100 mg


Emollient Ointment (Lansinoh Hpa)  0 gm TOP ASDIRECTED PRN


   PRN Reason: Sore Nipples


Enoxaparin Sodium (Lovenox)  40 mg SUBCUT Q24H [facility]


   Last Admin: 18 20:06 Dose:  40 mg


Oxytocin/Sodium Chloride (Oxytocin 30 Unit/500 Ml-Ns)  30 unit in 500 mls @ 250 

mls/hr IV TITRATE ALYSON


Lactated Ringer's (Ringers, Lactated)  1,000 mls @ 500 mls/hr IV .BOLUS [facility]


   Last Admin: 18 07:26 Dose:  500 mls/hr


Lactated Ringer's (Ringers, Lactated)  1,000 mls @ 125 mls/hr IV ASDIRECTED ALYSON


Ibuprofen (Motrin)  800 mg PO Q8H PRN


   PRN Reason: mild pain or fever


Ondansetron HCl (Zofran)  4 mg IV Q4H PRN


   PRN Reason: Nausea/Vomiting


Oxycodone/Acetaminophen (Percocet 325-5 Mg)  1 tab PO Q4H PRN


   PRN Reason: Pain (moderate 4-6)


   Last Admin: 18 11:18 Dose:  1 tab


Oxycodone/Acetaminophen (Percocet 325-5 Mg)  2 tab PO Q4H PRN


   PRN Reason: Pain (moderate 4-6)


Sodium Chloride (Saline Flush)  10 ml FLUSH ASDIRECTED PRN


   PRN Reason: Keep Vein Open


Sodium Chloride (Saline Flush)  2.5 ml FLUSH ASDIRECTED PRN


   PRN Reason: Keep Vein Open





Discontinued Medications





Ephedrine Sulfate (Ephedrine Sulfate) Confirm Administered Dose 100 mg .ROUTE 

.STK-MED ONE


   Stop: 18 07:29


Fentanyl (Sublimaze) Confirm Administered Dose 250 mcg .ROUTE .STK-MED ONE


   Stop: 18 07:12


Fentanyl (Sublimaze) Confirm Administered Dose 100 mcg .ROUTE .STK-MED ONE


   Stop: 18 07:13


Lidocaine HCl (Xylocaine-Mpf 1%) Confirm Administered Dose 5 mls @ as directed 

.ROUTE .STK-MED ONE


   Stop: 18 01:52


Ketorolac Tromethamine (Toradol)  30 mg IVPUSH Q6H [facility]


   Stop: 18 09:01


   Last Admin: 18 11:19 Dose:  Not Given


Lidocaine (Xylocaine-Mpf 2%) Confirm Administered Dose 10 ml .ROUTE .STK-MED ONE


   Stop: 18 07:12


Midazolam HCl (Versed 1 Mg/Ml) Confirm Administered Dose 2 mg .ROUTE .STK-MED 

ONE


   Stop: 18 07:12


Morphine Sulfate (Duramorph Pf) Confirm Administered Dose 1 mg .ROUTE .STK-MED 

ONE


   Stop: 18 07:32


Nalbuphine HCl (Nubain)  10 mg IVPUSH Q3H PRN


   PRN Reason: Pruritis


   Stop: 18 08:35


Naloxone HCl (Narcan)  0.1 mg IVPUSH ONETIME PRN


   PRN Reason: Respiratory Depression


   Stop: 18 08:34


Octyl Cyanoacrylate (Dermabond Advance) Confirm Administered Dose 1 applic 

.ROUTE .STK-MED ONE


   Stop: 18 07:20


Ondansetron HCl (Zofran) Confirm Administered Dose 8 mg .ROUTE .STK-MED ONE


   Stop: 18 07:13


Oxytocin (Pitocin) Confirm Administered Dose 20 unit .ROUTE .STK-MED ONE


   Stop: 18 07:13


Propofol (Diprivan  20 Ml) Confirm Administered Dose 400 mg .ROUTE .STK-MED ONE


   Stop: 18 07:12


Succinylcholine Chloride (Quelicin) Confirm Administered Dose 200 mg .ROUTE .STK

-MED ONE


   Stop: 18 07:13











- Infant Interaction


Infant Disposition, Postpartum:  in Room with Family


Infant Feeding:  Infant; Nursed Well, Continues to Breastfeed


Support Person: 





- Postpartum Recovery Exam


Fundal Tone: Firm


Fundal Level: At Umbilicus


Fundal Placement: Midline


Lochia Amount: Scant


Lochia Color: Rubra/Red


Perineum Description: Intact, Minimal Bruising/Swelling


Episiotomy/Laceration: None


Bladder Status: Voiding


Urinary Elimination: Voided





- Exam


General: Alert, Oriented


HEENT: Pupils Equal


Lungs: Clear to Auscultation, Normal Respiratory Effort


Cardiovascular: Regular Rate, Regular Rhythm


GI/Abdominal Exam: Normal Bowel Sounds


Extremities: Non-Tender, Pedal Edema


Skin: Warm


Wound/Incisions: Healing Well


Psy/Mental Status: Alert, Normal Affect, Normal Mood





- Problem List & Annotations


(1) Delivered by  delivery following previous  delivery


SNOMED Code(s): 346378391


   Code(s): WMZ0213 -    Status: Acute   Current Visit: Yes   





(2) Morbid obesity with BMI of 70 and over, adult


SNOMED Code(s): 739447493


   Code(s): E66.01 - MORBID (SEVERE) OBESITY DUE TO EXCESS CALORIES; Z68.45 - 

BODY MASS INDEX (BMI) 70 OR GREATER, ADULT   Status: Acute   Current Visit: Yes

   





- Problem List Review


Problem List Initiated/Reviewed/Updated: Yes





- My Orders


Last 24 Hours: 


My Active Orders





18 20:00


Enoxaparin [Lovenox]   40 mg SUBCUT Q24H 














- Assessment


Assessment:: 


POD#1 s/p RLTCS with bilateral tubal ligation, Stable and afebrile


Anemia- asymptomatic





- Plan


Plan:: 


MOLLY drained removed( less than 50mls within the last 24 hours)


Continue prophylactic lovenox


Continue to ambulate ad jack


Start Iron supplements


Aim for discharge tomorrow No

## 2025-05-23 NOTE — DISCHARGE NOTE PROVIDER - HOSPITAL COURSE
#Discharge: do not delete    Patient is a 74M with PMHx of tobacco use with stage IV metastatic non-small cell lung cancer (diagnosed in September 2023  on Keytruda/ pembrolizumab last infusion on 5/16), COPD, HTN, HLD presenting with three week history of watery, non bloody voluminous stool admitted for further workup for possible immunotherapy induced colitis     Hospital course (by problem):    Patient was discharged to: (home/[facility]/acute rehab/hospice, etc, and with what services – home health PT/RN? Home O2?)    New medications: --  Changes to old medications: --  Medications that were stopped: --    Items to follow up as outpatient:     Physical exam at the time of discharge: AAOx3; NAD; RRR, no murmurs; lungs CLAB; abd NT/ND; extremities wwp, no peripheral edema.     Patient was medically optimized, stable and ready for discharge. Plan of care and return precautions were discussed with the patient who verbally stated understanding. On the day of discharge, the patient was seen and examined. Symptoms improved. Vital signs are stable. Labs and imaging reviewed. Patient is medically optimized and hemodynamically stable. Return precautions discussed, medication teach back done, and importance of physician follow up emphasized. The patient verbalized understanding. #Discharge: do not delete    Patient is a 74M with PMHx of tobacco use with stage IV metastatic non-small cell lung cancer (diagnosed in September 2023  on Keytruda/ pembrolizumab last infusion on 5/16), COPD, HTN, HLD presenting with three week history of watery, non bloody voluminous stool admitted for further workup for possible immunotherapy induced colitis     Hospital course (by problem):  #colitis  Etiology includes immunotherapy induced colitis vs infections gastroenteritis   Patient with hx of NSCLC on Keytruda/ pembrolizumab last infusion on 5/16 presenting with three week history of watery, non bloody voluminous stool associated with poor appetite and generalized weakness.   - continue prednisone 40mg qd, [facility] will do steroid taper outpatient    #Non-small cell carcinoma of lung, stage 4.   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 Oncologist: Dr. Pratt  Started on pembrolizumab 10/30/2023 with a treatment break from August to December 2024 s/p steroid taper and resumption of medication. Last infusion was 5/16; patient tolerated well  Recent CTAP on 3/11/25 showed stable disease  Repeat CTAP 5/21 :Unchanged 21 x 21 mm superior right lower lobe mass surrounding the segmental airway and contiguous with the mediastinal border. No new suspicious lung lesions.  Heme Onc  following, recs appreciated   - f/u with [facility] outpatient    #History of complete heart block.   Per PCP patient was found to be in complete heart block after stress test in 2020 and subsequently underwent pacemaker placement later that year. EKG on admission: atrial-sensed ventricular-paced rhythm with LVH. TTE w/ 1 cm x 0.8 cm thickening noted on the atrial lead of the device wire in the right atrium, which could represent fibrinous tissue vs thrombus vs vegetation. As per cards, will recommend outpatient PATRICIA.   - follow up with cardiology    #s/p carotid endarterectomy  Collateral obtained by PCP Dr. Manzo: Patient with prior history of CVA and carotid stenosis s/p endarterectomy in 2020. Does not follow with cardiologist (last seen by Dr. Jeana Berry)   Home medications: aspirin 81 and atorvastatin 80qd  - continue with aspirin and statin.    #HLD  Home med: atorvastatin 80mg qd  - c/w home statin  - zetia held iso diarrhea, discuss restarting at pcp    #copd  Patient with mild COPD prescribed PRN albuterol for symptoms of bronchospasm, states he had not needed the inhaler recently  CTAP: Mild large airway disease at the right lung base.  Home med: Albuterol HFA 90  - resume PRN albuterol.    #HTN  Patient shares he has variable compliance with BP medications   Home med: metoprolol succinate 100mgqd , amlodipine 10mg qd, HCTZ 25mg qd, Lisinopril 40mg qd  - cw home meds    #Diabetes  Was on metformin at home. Given that patient is on steroids, given new regimen for diabetes management.   - lispro 4u before meals  - sliding scale information provided to patient  - endocrinology follow up      Patient was discharged to: home    New medications: prednisone 40mg daily  Medications that were stopped: metformin    Items to follow up as outpatient:   - hemeon  - cardiology follow up  - endocrine follow up    Physical exam at the time of discharge: AAOx3; NAD; RRR, no murmurs; lungs CLAB; abd NT/ND; extremities wwp, no peripheral edema.     Patient was medically optimized, stable and ready for discharge. Plan of care and return precautions were discussed with the patient who verbally stated understanding. On the day of discharge, the patient was seen and examined. Symptoms improved. Vital signs are stable. Labs and imaging reviewed. Patient is medically optimized and hemodynamically stable. Return precautions discussed, medication teach back done, and importance of physician follow up emphasized. The patient verbalized understanding.

## 2025-05-23 NOTE — PROGRESS NOTE ADULT - PROBLEM SELECTOR PLAN 8
Patient shares he has variable compliance with BP medications   Home med: metoprolol succinate 100mgqd , amlodipine 10mg qd, HCTZ 25mg qd, Lisinopril 40mg qd  - resume home meds with strict holding parameters  - hold home  lisinopril iso slight MARYAN Home med: atorvastatin 80mg qd  - resume home statin  - zetia held iso diarrhea Assistance OOB with selected safe patient handling equipment if applicable/Assistance with ambulation/Communicate fall risk and risk factors to all staff, patient, and family/Monitor gait and stability/Provide patient with walking aids/Provide visual cue: yellow wristband, yellow gown, etc/Reinforce activity limits and safety measures with patient and family/Call bell, personal items and telephone in reach/Instruct patient to call for assistance before getting out of bed/chair/stretcher/Non-slip footwear applied when patient is off stretcher/[place] to call system/Physically safe environment - no spills, clutter or unnecessary equipment/Purposeful Proactive Rounding/Room/bathroom lighting operational, light cord in reach

## 2025-05-23 NOTE — PRE-ANESTHESIA EVALUATION ADULT - NSANTHPMHFT_GEN_ALL_CORE
74 year old male with PMH of Tobacco Use c/b Stage IV metastatic non-small cell lung cancer diagnosed in 9/2023 on Keytruda/Pembrolizumab, last infusion on 5/16/2026, HTN, HLD, PFO, Pacemaker placement 2020 due to complete heart block, carotid endarterectomy 2020, COPD, CVA 2012 presenting with abdominal pain and diarrhea now scheduled for colonoscopy.     Cardiac: Positive for HTN, HLD, PFO, Pacemaker placement 2020 secondary to complete heart block, carotid endarterectomy 2020. Denies MI/Angina/Heart Failure, Murmur/Valvular Disorder. <4 METS  Pulmonary: Positive for prior tobacco use, stage IV metastatic non-small cell lung cancer on Keytruda/Pembrolizumab last infused on 5/16/2025, COPD. Denies Asthma, NEW.  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    Endocrine: Denies DM or thyroid dysfunction.  Neurologic: Denies stroke/seizure disorder  Hematologic: Denies anemia, blood clotting disorder, blood thinning medication.    PSH: Non-contributory.

## 2025-05-23 NOTE — DISCHARGE NOTE PROVIDER - NSDCFUSCHEDAPPT_GEN_ALL_CORE_FT
Kolby Pratt  [facility]  [facility] [address]  Scheduled Appointment: 05/27/2025     Kolby Pratt  [facility]  [facility] [address]  Scheduled Appointment: 05/29/2025

## 2025-05-23 NOTE — PROVIDER CONTACT NOTE (OTHER) - ACTION/TREATMENT ORDERED:
Dr. Vieira aware of situation and patient's oxygen status. Patient still on 3L nasal cannula. Dr. Vieira coming to bedside to assess.

## 2025-05-23 NOTE — DISCHARGE NOTE PROVIDER - NSDCCPCAREPLAN_GEN_ALL_CORE_FT
PRINCIPAL DISCHARGE DIAGNOSIS  Diagnosis: Diarrhea  Assessment and Plan of Treatment: You were admitted for diarrhea which we believe was from your immunotherapy. Given that your diarrhea has resolved and you are feeling better, we are discharging you home on steroid treatment. It is very important that you continue your course of steroids at home and follow up with the oncologist for managing the tapering of the steroids as it is not recommended to stop steroid treatment abruptly.  Because of the steroids, you have high blood sugars and are being discharged on insulin which you should take 4 units before meals and the sliding scale that was explained to you by our team and follow up with endocrinology.

## 2025-05-23 NOTE — DISCHARGE NOTE PROVIDER - NSDCFUADDAPPT_GEN_ALL_CORE_FT
(1) Please follow up with your PCP Dr. Manzo on May 29, 2025 at 12pm.    [facility]) [facility]    (3) Please follow up with Cardiology Dr. Abreu on June 16, 2025 at 2:20pm. Please arrive 15 minutes prior to your appointment.  Location: [address] [place] [address]

## 2025-05-23 NOTE — PROVIDER CONTACT NOTE (OTHER) - RECOMMENDATIONS
Nonbreather placed on patient, patient's o2 status improved. Patient put on nasal cannula on 3 L and SPO2 is now 96%.

## 2025-05-24 LAB
ANION GAP SERPL CALC-SCNC: 8 MMOL/L — SIGNIFICANT CHANGE UP (ref 5–17)
BASOPHILS # BLD AUTO: 0.03 K/UL — SIGNIFICANT CHANGE UP (ref 0–0.2)
BASOPHILS NFR BLD AUTO: 0.2 % — SIGNIFICANT CHANGE UP (ref 0–2)
BUN SERPL-MCNC: 14 MG/DL — SIGNIFICANT CHANGE UP (ref 7–23)
CALCIUM SERPL-MCNC: 8.9 MG/DL — SIGNIFICANT CHANGE UP (ref 8.4–10.5)
CHLORIDE SERPL-SCNC: 95 MMOL/L — LOW (ref 96–108)
CO2 SERPL-SCNC: 33 MMOL/L — HIGH (ref 22–31)
CREAT SERPL-MCNC: 0.81 MG/DL — SIGNIFICANT CHANGE UP (ref 0.5–1.3)
EGFR: 91 ML/MIN/1.73M2 — SIGNIFICANT CHANGE UP
EGFR: 91 ML/MIN/1.73M2 — SIGNIFICANT CHANGE UP
EOSINOPHIL # BLD AUTO: 0.01 K/UL — SIGNIFICANT CHANGE UP (ref 0–0.5)
EOSINOPHIL NFR BLD AUTO: 0.1 % — SIGNIFICANT CHANGE UP (ref 0–6)
GLUCOSE SERPL-MCNC: 105 MG/DL — HIGH (ref 70–99)
HCT VFR BLD CALC: 45.2 % — SIGNIFICANT CHANGE UP (ref 39–50)
HGB BLD-MCNC: 14.8 G/DL — SIGNIFICANT CHANGE UP (ref 13–17)
IMM GRANULOCYTES NFR BLD AUTO: 0.6 % — SIGNIFICANT CHANGE UP (ref 0–0.9)
LYMPHOCYTES # BLD AUTO: 13.6 % — SIGNIFICANT CHANGE UP (ref 13–44)
LYMPHOCYTES # BLD AUTO: 2.4 K/UL — SIGNIFICANT CHANGE UP (ref 1–3.3)
MAGNESIUM SERPL-MCNC: 1.9 MG/DL — SIGNIFICANT CHANGE UP (ref 1.6–2.6)
MCHC RBC-ENTMCNC: 27.7 PG — SIGNIFICANT CHANGE UP (ref 27–34)
MCHC RBC-ENTMCNC: 32.7 G/DL — SIGNIFICANT CHANGE UP (ref 32–36)
MCV RBC AUTO: 84.6 FL — SIGNIFICANT CHANGE UP (ref 80–100)
MONOCYTES # BLD AUTO: 1.05 K/UL — HIGH (ref 0–0.9)
MONOCYTES NFR BLD AUTO: 6 % — SIGNIFICANT CHANGE UP (ref 2–14)
NEUTROPHILS # BLD AUTO: 14.03 K/UL — HIGH (ref 1.8–7.4)
NEUTROPHILS NFR BLD AUTO: 79.5 % — HIGH (ref 43–77)
NRBC BLD AUTO-RTO: 0 /100 WBCS — SIGNIFICANT CHANGE UP (ref 0–0)
PHOSPHATE SERPL-MCNC: 2 MG/DL — LOW (ref 2.5–4.5)
PLATELET # BLD AUTO: 311 K/UL — SIGNIFICANT CHANGE UP (ref 150–400)
POTASSIUM SERPL-MCNC: 4.2 MMOL/L — SIGNIFICANT CHANGE UP (ref 3.5–5.3)
POTASSIUM SERPL-SCNC: 4.2 MMOL/L — SIGNIFICANT CHANGE UP (ref 3.5–5.3)
RBC # BLD: 5.34 M/UL — SIGNIFICANT CHANGE UP (ref 4.2–5.8)
RBC # FLD: 14.4 % — SIGNIFICANT CHANGE UP (ref 10.3–14.5)
SODIUM SERPL-SCNC: 136 MMOL/L — SIGNIFICANT CHANGE UP (ref 135–145)
WBC # BLD: 17.62 K/UL — HIGH (ref 3.8–10.5)
WBC # FLD AUTO: 17.62 K/UL — HIGH (ref 3.8–10.5)

## 2025-05-24 PROCEDURE — 99233 SBSQ HOSP IP/OBS HIGH 50: CPT

## 2025-05-24 PROCEDURE — 99231 SBSQ HOSP IP/OBS SF/LOW 25: CPT | Mod: GC

## 2025-05-24 RX ADMIN — INSULIN LISPRO 2: 100 INJECTION, SOLUTION INTRAVENOUS; SUBCUTANEOUS at 22:41

## 2025-05-24 RX ADMIN — Medication 81 MILLIGRAM(S): at 11:34

## 2025-05-24 RX ADMIN — METHYLPREDNISOLONE ACETATE 125 MILLIGRAM(S): 80 INJECTION, SUSPENSION INTRA-ARTICULAR; INTRALESIONAL; INTRAMUSCULAR; SOFT TISSUE at 06:44

## 2025-05-24 RX ADMIN — LISINOPRIL 10 MILLIGRAM(S): 5 TABLET ORAL at 12:51

## 2025-05-24 RX ADMIN — ATORVASTATIN CALCIUM 80 MILLIGRAM(S): 80 TABLET, FILM COATED ORAL at 22:41

## 2025-05-24 RX ADMIN — INSULIN LISPRO 2: 100 INJECTION, SOLUTION INTRAVENOUS; SUBCUTANEOUS at 01:26

## 2025-05-24 RX ADMIN — AMLODIPINE BESYLATE 10 MILLIGRAM(S): 10 TABLET ORAL at 06:44

## 2025-05-24 RX ADMIN — Medication 40 MILLIGRAM(S): at 06:45

## 2025-05-24 RX ADMIN — METOPROLOL SUCCINATE 100 MILLIGRAM(S): 50 TABLET, EXTENDED RELEASE ORAL at 20:49

## 2025-05-24 RX ADMIN — INSULIN LISPRO 10: 100 INJECTION, SOLUTION INTRAVENOUS; SUBCUTANEOUS at 11:34

## 2025-05-24 NOTE — PROGRESS NOTE ADULT - PROBLEM SELECTOR PLAN 1
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   Denies fever, chills, nausea, vomiting, recent dietary changes, recent travel, recent hospitalizations, new antibiotic use or sick contacts.  Prescribed prednisone 60mg qd and pantoprazole 40mg qd by oncologist  for relief two days prior to admission  Patient not currently meeting SIRS criteria. PE only significant to moderate abdominal distention; CTAP significant for wall thickening of the left hemicolon and ascending colon.  GI consulted, recs appreciated; s/p KCL 50 and 1L NS in ED   - f/u fecal calprotectin  - encourage oral intake with low fiber diet, replete electrolytes as needed   - c/w methylprednisolone 125mg IV daily  - continue with Protonix 40mg qd  - s/p cscope 5/23 with GI concerning for moderately active pancolitis; pathology pending  - obtaining blood cx; holding off on abx at this time

## 2025-05-24 NOTE — PROGRESS NOTE ADULT - SUBJECTIVE AND OBJECTIVE BOX
GASTROENTEROLOGY PROGRESS NOTE  Patient seen and examined at bedside.  Still having diarrhea  less than prior  able to control bowel function now    PERTINENT REVIEW OF SYSTEMS:  CONSTITUTIONAL: No weakness, fevers or chills  HEENT: No visual changes; No vertigo or throat pain   GASTROINTESTINAL: As above.  NEUROLOGICAL: No numbness or weakness  SKIN: No itching, burning, rashes, or lesions     Allergies    No Known Allergies    Intolerances      MEDICATIONS:  MEDICATIONS  (STANDING):  amLODIPine   Tablet 10 milliGRAM(s) Oral every 24 hours  aspirin enteric coated 81 milliGRAM(s) Oral daily  atorvastatin 80 milliGRAM(s) Oral at bedtime  dextrose 5%. 1000 milliLiter(s) (50 mL/Hr) IV Continuous <Continuous>  dextrose 5%. 1000 milliLiter(s) (100 mL/Hr) IV Continuous <Continuous>  dextrose 50% Injectable 25 Gram(s) IV Push once  dextrose 50% Injectable 12.5 Gram(s) IV Push once  dextrose 50% Injectable 25 Gram(s) IV Push once  glucagon  Injectable 1 milliGRAM(s) IntraMuscular once  hydrochlorothiazide 25 milliGRAM(s) Oral every 24 hours  insulin lispro (ADMELOG) corrective regimen sliding scale   SubCutaneous Before meals and at bedtime  lisinopril 10 milliGRAM(s) Oral every 24 hours  methylPREDNISolone sodium succinate Injectable 125 milliGRAM(s) IV Push daily  metoprolol succinate  milliGRAM(s) Oral every 24 hours  pantoprazole    Tablet 40 milliGRAM(s) Oral before breakfast    MEDICATIONS  (PRN):  acetaminophen     Tablet .. 650 milliGRAM(s) Oral every 6 hours PRN Temp greater or equal to 38C (100.4F), Mild Pain (1 - 3)  albuterol    90 MICROgram(s) HFA Inhaler 2 Puff(s) Inhalation every 6 hours PRN Bronchospasm  aluminum hydroxide/magnesium hydroxide/simethicone Suspension 30 milliLiter(s) Oral every 4 hours PRN Dyspepsia  dextrose Oral Gel 15 Gram(s) Oral once PRN Blood Glucose LESS THAN 70 milliGRAM(s)/deciliter  melatonin 3 milliGRAM(s) Oral at bedtime PRN Insomnia  ondansetron Injectable 4 milliGRAM(s) IV Push every 8 hours PRN Nausea and/or Vomiting    Vital Signs Last 24 Hrs  T(C): 36.8 (24 May 2025 20:30), Max: 36.8 (24 May 2025 20:30)  T(F): 98.2 (24 May 2025 20:30), Max: 98.2 (24 May 2025 20:30)  HR: 70 (24 May 2025 20:30) (70 - 73)  BP: 158/85 (24 May 2025 20:30) (130/73 - 158/85)  BP(mean): 92 (24 May 2025 06:40) (92 - 92)  RR: 18 (24 May 2025 20:30) (17 - 18)  SpO2: 91% (24 May 2025 20:30) (90% - 91%)    Parameters below as of 24 May 2025 20:30  Patient On (Oxygen Delivery Method): room air      PHYSICAL EXAM:  General: lying in bed, in no acute distress  HEENT: MMM, conjunctiva and sclera clear  Gastrointestinal: Soft non-tender non-distended; No rebound or guarding  Skin: Warm and dry. No obvious rash    LABS:                        14.8   17.62 )-----------( 311      ( 24 May 2025 08:37 )             45.2     05-24    136  |  95[L]  |  14  ----------------------------<  105[H]  4.2   |  33[H]  |  0.81    Ca    8.9      24 May 2025 08:37  Phos  2.0     05-24  Mg     1.9     05-24      PT/INR - ( 23 May 2025 06:20 )   PT: 10.9 sec;   INR: 0.93          PTT - ( 23 May 2025 06:20 )  PTT:25.2 sec      Urinalysis Basic - ( 24 May 2025 08:37 )    Color: x / Appearance: x / SG: x / pH: x  Gluc: 105 mg/dL / Ketone: x  / Bili: x / Urobili: x   Blood: x / Protein: x / Nitrite: x   Leuk Esterase: x / RBC: x / WBC x   Sq Epi: x / Non Sq Epi: x / Bacteria: x    RADIOLOGY & ADDITIONAL STUDIES:  Reviewed

## 2025-05-24 NOTE — PROGRESS NOTE ADULT - PROBLEM SELECTOR PLAN 6
Per PCP patient was found to be in complete heart block after stress test in 2020 and subsequently underwent pacemaker placement later that year. EKG on admission: atrial-sensed ventricular-paced rhythm with LVH  - cardiology consulted   - TTE w/ 1 cm x 0.8 cm thickening noted on the atrial lead of the device wire in the right atrium, which could represent fibrinous tissue vs thrombus vs vegetation. As per cards, will recommend outpatient PATRICIA  - obtain collateral from Dr. Jeana Berry, last known cardiologist per PCP, however patient has not seen a cardiologist in several years  - per ASHER, visit on Jan '25 for device management prior to MRI Per PCP patient was found to be in complete heart block after stress test in 2020 and subsequently underwent pacemaker placement later that year. EKG on admission: atrial-sensed ventricular-paced rhythm with LVH  - cardiology consulted   - TTE w/ 1 cm x 0.8 cm thickening noted on the atrial lead of the device wire in the right atrium, which could represent fibrinous tissue vs thrombus vs vegetation. As per cards, will recommend outpatient PATRICIA.   - obtain collateral from Dr. Jeana Berry, last known cardiologist per PCP, however patient has not seen a cardiologist in several years  - per HIE, visit on Jan '25 for device management prior to MRI  - low suspicion for device malfunction, however given TTE findings, will obtain device interrogation Per PCP patient was found to be in complete heart block after stress test in 2020 and subsequently underwent pacemaker placement later that year. EKG on admission: atrial-sensed ventricular-paced rhythm with LVH  - cardiology consulted   - TTE w/ 1 cm x 0.8 cm thickening noted on the atrial lead of the device wire in the right atrium, which could represent fibrinous tissue vs thrombus vs vegetation. As per cards, will recommend outpatient PATRICIA.   - obtain collateral from Dr. Jeana Berry, last known cardiologist per PCP, however patient has not seen a cardiologist in several years  - per HIE, visit on Jan '25 for device management prior to MRI  - low suspicion for device malfunction, however given TTE findings, will obtain device interrogation (Medtronic dual chamber PPM)

## 2025-05-24 NOTE — PROGRESS NOTE ADULT - ASSESSMENT
74-year-old male with stage IV metastatic non-small cell lung cancer, currently undergoing treatment with pembrolizumab, presents with a 3-week history of significant diarrhea, abdominal discomfort, and weight loss. CT abdomen & pelvis consistent with colitis. Patient improving on steroids.     5/23/25   colonoscopy  	Granularity, erythema, congestion and abnormal vascularity in the rectal pouch, sigmoid colon, descending colon, transverse colon and ascending colon. (Biopsy).  	Polyps in the ascending colon and sigmoid colon (possibly inflammatory pseudopolyps).  	Polyps in the ascending colon.    Plan  - Findings consistent with moderately active pan-colitis (Claire 2)     - Await pathology results.    - Advance diet as tolerated     - Continue current steroid therapy   - Please start Imodium for ongoing noninfectious diarrhea    Patient seen and discussed with GI Attending on Rounds Dr. Anuja Laws DO, PhD  Gastroenterology Fellow  GI Consult Weekday 7am-5pm Pager: [phone]  Weeknights/Weekend/Holiday Coverage: Please Call the  for contact info

## 2025-05-24 NOTE — PROGRESS NOTE ADULT - ASSESSMENT
74M former heavy smoker [facility] stage IV NSCLC, adenocarcinoma, who presents with 3 weeks of persistent diarrhea (7+ stools per day) despite being started on PO prednisone (60mg daily), found to have evidence on colitis, admitted for further management. Heme/onc consulted for known NSCLC with concern for IO related colitis.    Regarding NSCLC, he is T4N2M1 with BMs, PD-L1 90% positive, DDR2 mutant, TP53 mutant. 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 Started on pembrolizumab 10/30/2023 with a treatment break from August to December 2024 due to organizing pneumonia, likely immune related adverse event per Dr. Landaverde. He has since completed the steroid taper and had a repeat CT chest which showed resolution of the prior infiltration. s/p SRS to brain metastases 11/16/23. Interim MRIB which was done while off pembrolizumab showed development of new enhancing lesions in the brain stem, however now the MRI is showing resolution of these indicating response to pembrolizumab. Last dose of pembro was 5/16.     #stage IV NSCLC, adenocarcinoma, metastatic disease to brain, PD-L1 90%  # diarrhea, grade 2 (CTCAE)    - follows with primary oncologist, Dr. Kolby Pratt  - currently on pembrolizumab monotherapy since 10/30/23 (last dose 5/16/25)   - interval CT CAP (3/11/25) showed stable disease  - repeat CT CAP (5/22/25) w/ evidence of colitis  - crp (6.1), gi pcr & c. diff negative  - s/p colonoscopy on 5/23/25, showing pancolitis and few polyps (pathology sent)    Plan:  - awaiting results of fecal calprotectin  - would continue methylprednisolone (on 2mg/kg) for now  - will monitor bowel movements closely, with tentative plan to transition to PO prednisone    Heme/onc will continue to follow. Case discussed with Dr. Julianne Woodruff.   74M former heavy smoker [facility] stage IV NSCLC, adenocarcinoma, who presents with 3 weeks of persistent diarrhea (7+ stools per day) despite being started on PO prednisone (60mg daily), found to have evidence on colitis, admitted for further management. Heme/onc consulted for known NSCLC with concern for IO related colitis.    Regarding NSCLC, he is T4N2M1 with BMs, PD-L1 90% positive, DDR2 mutant, TP53 mutant. 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 Started on pembrolizumab 10/30/2023 with a treatment break from August to December 2024 due to organizing pneumonia, likely immune related adverse event per Dr. Landaverde. He has since completed the steroid taper and had a repeat CT chest which showed resolution of the prior infiltration. s/p SRS to brain metastases 11/16/23. Interim MRIB which was done while off pembrolizumab showed development of new enhancing lesions in the brain stem, however now the MRI is showing resolution of these indicating response to pembrolizumab. Last dose of pembro was 5/16.     #stage IV NSCLC, adenocarcinoma, metastatic disease to brain, PD-L1 90%  # diarrhea, grade 3 (CTCAE)    - follows with primary oncologist, Dr. Kolby Pratt  - currently on pembrolizumab monotherapy since 10/30/23 (last dose 5/16/25)   - interval CT CAP (3/11/25) showed stable disease  - repeat CT CAP (5/22/25) w/ evidence of colitis  - crp (6.1), gi pcr & c. diff negative  - s/p colonoscopy on 5/23/25, showing pancolitis and few polyps (pathology sent)    Plan:  - awaiting results of fecal calprotectin  - would continue methylprednisolone (on 2mg/kg) for now  - will monitor bowel movements closely, with tentative plan to transition to PO prednisone    Heme/onc will continue to follow. Case discussed with Dr. Julianne Woodruff.

## 2025-05-24 NOTE — PROGRESS NOTE ADULT - SUBJECTIVE AND OBJECTIVE BOX
Patient is a 74y old  Male who presents with a chief complaint of DIARRHEA    INTERVAL HISTORY: Underwent colonoscopy yesterday notable for moderate pancolitis. Diet resumed, tolerated lunch very well. Notes minimal volume watery stool this morning. No significant BM yet. Subjectively feels well.    REVIEW OF SYSTEMS:  All other review of systems is negative unless indicated above.    OBJECTIVE:  T(C): 36.7 (05-24-25 @ 11:38), Max: 36.8 (05-23-25 @ 22:02)  HR: 73 (05-24-25 @ 11:38) (60 - 73)  BP: 148/77 (05-24-25 @ 11:38) (130/73 - 165/78)  RR: 17 (05-24-25 @ 11:38) (17 - 20)  SpO2: 90% (05-24-25 @ 11:38) (77% - 96%)  Daily     Daily     Physical Exam:  General: in no acute distress, speaking in full sentences on room air  Eyes: EOMI intact bilaterally. Anicteric sclerae, moist conjunctivae  HENT: Moist mucous membranes  Neck: Trachea midline, supple  Lungs: Normal respiratory effort, no accessory muscle use  Abdomen: Soft, non-tender non-distended; No rebound or guarding. Normoactive BS  Neurological: Alert and appropriately conversant  Skin: Warm and dry. No obvious rash     Medications:  MEDICATIONS  (STANDING):  amLODIPine   Tablet 10 milliGRAM(s) Oral every 24 hours  aspirin enteric coated 81 milliGRAM(s) Oral daily  atorvastatin 80 milliGRAM(s) Oral at bedtime  dextrose 5%. 1000 milliLiter(s) (50 mL/Hr) IV Continuous <Continuous>  dextrose 5%. 1000 milliLiter(s) (100 mL/Hr) IV Continuous <Continuous>  dextrose 50% Injectable 25 Gram(s) IV Push once  dextrose 50% Injectable 12.5 Gram(s) IV Push once  dextrose 50% Injectable 25 Gram(s) IV Push once  glucagon  Injectable 1 milliGRAM(s) IntraMuscular once  hydrochlorothiazide 25 milliGRAM(s) Oral every 24 hours  insulin lispro (ADMELOG) corrective regimen sliding scale   SubCutaneous Before meals and at bedtime  lisinopril 10 milliGRAM(s) Oral every 24 hours  methylPREDNISolone sodium succinate Injectable 125 milliGRAM(s) IV Push daily  metoprolol succinate  milliGRAM(s) Oral every 24 hours  pantoprazole    Tablet 40 milliGRAM(s) Oral before breakfast    MEDICATIONS  (PRN):  acetaminophen     Tablet .. 650 milliGRAM(s) Oral every 6 hours PRN Temp greater or equal to 38C (100.4F), Mild Pain (1 - 3)  albuterol    90 MICROgram(s) HFA Inhaler 2 Puff(s) Inhalation every 6 hours PRN Bronchospasm  aluminum hydroxide/magnesium hydroxide/simethicone Suspension 30 milliLiter(s) Oral every 4 hours PRN Dyspepsia  dextrose Oral Gel 15 Gram(s) Oral once PRN Blood Glucose LESS THAN 70 milliGRAM(s)/deciliter  melatonin 3 milliGRAM(s) Oral at bedtime PRN Insomnia  ondansetron Injectable 4 milliGRAM(s) IV Push every 8 hours PRN Nausea and/or Vomiting      Labs:                        14.8   17.62 )-----------( 311      ( 24 May 2025 08:37 )             45.2     05-24    136  |  95[L]  |  14  ----------------------------<  105[H]  4.2   |  33[H]  |  0.81    Ca    8.9      24 May 2025 08:37  Phos  2.0     05-24  Mg     1.9     05-24      PT/INR - ( 23 May 2025 06:20 )   PT: 10.9 sec;   INR: 0.93     PTT - ( 23 May 2025 06:20 )  PTT:25.2 sec    Urinalysis Basic - ( 24 May 2025 08:37 )    Color: x / Appearance: x / SG: x / pH: x  Gluc: 105 mg/dL / Ketone: x  / Bili: x / Urobili: x   Blood: x / Protein: x / Nitrite: x   Leuk Esterase: x / RBC: x / WBC x   Sq Epi: x / Non Sq Epi: x / Bacteria: x          Radiology: Reviewed

## 2025-05-24 NOTE — PROGRESS NOTE ADULT - PROBLEM SELECTOR PLAN 4
Pt with hypokalemia and hypophosphatemia this morning, likely iso diarrhea from bowel prep. Lytes repleted.  - monitor bmp

## 2025-05-24 NOTE — PROGRESS NOTE ADULT - SUBJECTIVE AND OBJECTIVE BOX
INTERVAL EVENTS: No o/n events. Still with diarrhea, though improved. Denies CP, dyspnea, palpitations, presyncope, syncope, f/c/n/v.     REVIEW OF SYSTEMS:  Constitutional:     [X] negative [ ] fevers [ ] chills [ ] weight loss [ ] weight gain  HEENT:                  [X] negative [ ] dry eyes [ ] eye irritation [ ] postnasal drip [ ] nasal congestion  CV:                         [X] negative  [ ] chest pain [ ] orthopnea [ ] palpitations [ ] murmur  Resp:                     [X] negative [ ] cough [ ] shortness of breath [ ] wheezing [ ] sputum [ ] hemoptysis  GI:                          [ ] negative [ ] nausea [ ] vomiting [X] diarrhea [ ] constipation [ ] abd pain [ ] dysphagia   :                        [X] negative [ ] dysuria [ ] nocturia [ ] hematuria [ ] increased urinary frequency  MSK:                      [X] negative [ ] back pain [ ] myalgias [ ] arthralgias [ ] fracture  Skin:                       [X] negative [ ] rash [ ] itch  Neuro:                   [X] negative [ ] headache [ ] dizziness [ ] syncope [ ] weakness [ ] numbness  Psych:                    [X] negative [ ] anxiety [ ] depression  Endo:                     [X] negative [ ] diabetes [ ] thyroid problem  Heme/Lymph:      [X] negative [ ] anemia [ ] bleeding problem  Allergic/Immune: [X] negative [ ] itchy eyes [ ] nasal discharge [ ] hives [ ] angioedema    [X] All other systems negative or otherwise described above.  [ ] Unable to assess ROS due to ________.    PAST MEDICAL & SURGICAL HISTORY:    MEDICATIONS  (STANDING):  amLODIPine   Tablet 10 milliGRAM(s) Oral every 24 hours  aspirin enteric coated 81 milliGRAM(s) Oral daily  atorvastatin 80 milliGRAM(s) Oral at bedtime  dextrose 5%. 1000 milliLiter(s) (100 mL/Hr) IV Continuous <Continuous>  dextrose 5%. 1000 milliLiter(s) (50 mL/Hr) IV Continuous <Continuous>  dextrose 50% Injectable 25 Gram(s) IV Push once  dextrose 50% Injectable 12.5 Gram(s) IV Push once  dextrose 50% Injectable 25 Gram(s) IV Push once  glucagon  Injectable 1 milliGRAM(s) IntraMuscular once  hydrochlorothiazide 25 milliGRAM(s) Oral every 24 hours  insulin lispro (ADMELOG) corrective regimen sliding scale   SubCutaneous Before meals and at bedtime  lisinopril 10 milliGRAM(s) Oral every 24 hours  methylPREDNISolone sodium succinate Injectable 125 milliGRAM(s) IV Push daily  metoprolol succinate  milliGRAM(s) Oral every 24 hours  pantoprazole    Tablet 40 milliGRAM(s) Oral before breakfast    MEDICATIONS  (PRN):  acetaminophen     Tablet .. 650 milliGRAM(s) Oral every 6 hours PRN Temp greater or equal to 38C (100.4F), Mild Pain (1 - 3)  albuterol    90 MICROgram(s) HFA Inhaler 2 Puff(s) Inhalation every 6 hours PRN Bronchospasm  aluminum hydroxide/magnesium hydroxide/simethicone Suspension 30 milliLiter(s) Oral every 4 hours PRN Dyspepsia  dextrose Oral Gel 15 Gram(s) Oral once PRN Blood Glucose LESS THAN 70 milliGRAM(s)/deciliter  melatonin 3 milliGRAM(s) Oral at bedtime PRN Insomnia  ondansetron Injectable 4 milliGRAM(s) IV Push every 8 hours PRN Nausea and/or Vomiting    ICU Vital Signs Last 24 Hrs  T(C): 36.7 (24 May 2025 11:38), Max: 36.8 (23 May 2025 22:02)  T(F): 98.1 (24 May 2025 11:38), Max: 98.2 (23 May 2025 22:02)  HR: 73 (24 May 2025 11:38) (60 - 73)  BP: 148/77 (24 May 2025 11:38) (130/73 - 165/78)  BP(mean): 92 (24 May 2025 06:40) (92 - 114)  ABP: --  ABP(mean): --  RR: 17 (24 May 2025 11:38) (17 - 20)  SpO2: 90% (24 May 2025 11:38) (77% - 96%)    O2 Parameters below as of 24 May 2025 11:38  Patient On (Oxygen Delivery Method): room air          Orthostatic VS    Daily Height in cm: 172.72 (23 May 2025 14:10)    Daily   I&O's Summary      PHYSICAL EXAM:  GENERAL: No acute distress, well-developed  ENT: EOMI, conjunctiva and sclera clear, Neck supple, No JVD, moist mucosa  CHEST/LUNG: Clear to auscultation bilaterally; No wheeze, equal breath sounds bilaterally  HEART: Regular rate and rhythm; No murmurs, rubs, or gallops, radial and DP 2+ b/l, euvolemic  ABDOMEN: Soft, Nontender, Nondistended  EXTREMITIES:  No clubbing, cyanosis, or edema  NEUROLOGY: AAOx3, non-focal  SKIN: Normal color, No rashes or lesions    LABS:                        14.8   17.62 )-----------( 311      ( 24 May 2025 08:37 )             45.2     PT/INR - ( 23 May 2025 06:20 )   PT: 10.9 sec;   INR: 0.93          PTT - ( 23 May 2025 06:20 )  PTT:25.2 sec  05-24    136  |  95[L]  |  14  ----------------------------<  105[H]  4.2   |  33[H]  |  0.81    Ca    8.9      24 May 2025 08:37  Phos  2.0     05-24  Mg     1.9     05-24            Urinalysis Basic - ( 24 May 2025 08:37 )    Color: x / Appearance: x / SG: x / pH: x  Gluc: 105 mg/dL / Ketone: x  / Bili: x / Urobili: x   Blood: x / Protein: x / Nitrite: x   Leuk Esterase: x / RBC: x / WBC x   Sq Epi: x / Non Sq Epi: x / Bacteria: x          RADIOLOGY & ADDITIONAL STUDIES: Reviewed

## 2025-05-24 NOTE — PROGRESS NOTE ADULT - PROBLEM SELECTOR PLAN 10
Patient shares he has variable compliance with BP medications   Home med: metoprolol succinate 100mgqd , amlodipine 10mg qd, HCTZ 25mg qd, Lisinopril 40mg qd  - resume home meds with strict holding parameters

## 2025-05-25 LAB — CALPROTECTIN STL-MCNT: 3600 UG/G — HIGH (ref 0–120)

## 2025-05-25 PROCEDURE — 99231 SBSQ HOSP IP/OBS SF/LOW 25: CPT | Mod: GC

## 2025-05-25 PROCEDURE — 99233 SBSQ HOSP IP/OBS HIGH 50: CPT

## 2025-05-25 PROCEDURE — 99232 SBSQ HOSP IP/OBS MODERATE 35: CPT

## 2025-05-25 RX ORDER — PREDNISONE 20 MG/1
60 TABLET ORAL EVERY 12 HOURS
Refills: 0 | Status: DISCONTINUED | OUTPATIENT
Start: 2025-05-26 | End: 2025-05-27

## 2025-05-25 RX ORDER — SULFAMETHOXAZOLE/TRIMETHOPRIM 800-160 MG
1 TABLET ORAL DAILY
Refills: 0 | Status: DISCONTINUED | OUTPATIENT
Start: 2025-05-25 | End: 2025-05-27

## 2025-05-25 RX ORDER — INSULIN LISPRO 100 U/ML
INJECTION, SOLUTION INTRAVENOUS; SUBCUTANEOUS
Refills: 0 | Status: DISCONTINUED | OUTPATIENT
Start: 2025-05-25 | End: 2025-05-27

## 2025-05-25 RX ADMIN — METHYLPREDNISOLONE ACETATE 125 MILLIGRAM(S): 80 INJECTION, SUSPENSION INTRA-ARTICULAR; INTRALESIONAL; INTRAMUSCULAR; SOFT TISSUE at 06:25

## 2025-05-25 RX ADMIN — METOPROLOL SUCCINATE 100 MILLIGRAM(S): 50 TABLET, EXTENDED RELEASE ORAL at 20:49

## 2025-05-25 RX ADMIN — AMLODIPINE BESYLATE 10 MILLIGRAM(S): 10 TABLET ORAL at 06:25

## 2025-05-25 RX ADMIN — Medication 81 MILLIGRAM(S): at 12:01

## 2025-05-25 RX ADMIN — INSULIN LISPRO 2: 100 INJECTION, SOLUTION INTRAVENOUS; SUBCUTANEOUS at 17:45

## 2025-05-25 RX ADMIN — INSULIN LISPRO 8: 100 INJECTION, SOLUTION INTRAVENOUS; SUBCUTANEOUS at 12:21

## 2025-05-25 RX ADMIN — Medication 40 MILLIGRAM(S): at 06:25

## 2025-05-25 RX ADMIN — INSULIN LISPRO 6: 100 INJECTION, SOLUTION INTRAVENOUS; SUBCUTANEOUS at 22:37

## 2025-05-25 RX ADMIN — Medication 1 TABLET(S): at 22:38

## 2025-05-25 RX ADMIN — ATORVASTATIN CALCIUM 80 MILLIGRAM(S): 80 TABLET, FILM COATED ORAL at 22:38

## 2025-05-25 NOTE — PROGRESS NOTE ADULT - ASSESSMENT
74-year-old male with stage IV metastatic non-small cell lung cancer, currently undergoing treatment with pembrolizumab, presents with a 3-week history of significant diarrhea, abdominal discomfort, and weight loss. CT abdomen & pelvis consistent with colitis. Patient improving on steroids.     5/23/25   colonoscopy  	Granularity, erythema, congestion and abnormal vascularity in the rectal pouch, sigmoid colon, descending colon, transverse colon and ascending colon. (Biopsy).  	Polyps in the ascending colon and sigmoid colon (possibly inflammatory pseudopolyps).  	Polyps in the ascending colon.    Plan  - Findings consistent with moderately active pan-colitis (Claire 2)     - Await pathology results.    - Advance diet as tolerated     - Steroid taper per Heme/Onc  - Outpt Appt set up with Dr. Pratt for 5/27  - Use Imodium for ongoing noninfectious diarrhea as hopefully steroids start to work    Patient seen and discussed with GI Attending on Rounds Dr. Anuja Laws DO, PhD  Gastroenterology Fellow  GI Consult Weekday 7am-5pm Pager: [phone]  Weeknights/Weekend/Holiday Coverage: Please Call the  for contact info

## 2025-05-25 NOTE — PROGRESS NOTE ADULT - SUBJECTIVE AND OBJECTIVE BOX
GASTROENTEROLOGY PROGRESS NOTE  Patient seen and examined at bedside.  Doing well   eating well  diarrhea improving  Denies fever, chills, chest pain, shortness of breath, abd pain, N/V.      PERTINENT REVIEW OF SYSTEMS:  CONSTITUTIONAL: No weakness, fevers or chills  HEENT: No visual changes; No vertigo or throat pain   GASTROINTESTINAL: As above.  NEUROLOGICAL: No numbness or weakness  SKIN: No itching, burning, rashes, or lesions     Allergies    No Known Allergies    Intolerances      MEDICATIONS:  MEDICATIONS  (STANDING):  amLODIPine   Tablet 10 milliGRAM(s) Oral every 24 hours  aspirin enteric coated 81 milliGRAM(s) Oral daily  atorvastatin 80 milliGRAM(s) Oral at bedtime  dextrose 5%. 1000 milliLiter(s) (50 mL/Hr) IV Continuous <Continuous>  dextrose 5%. 1000 milliLiter(s) (100 mL/Hr) IV Continuous <Continuous>  dextrose 50% Injectable 25 Gram(s) IV Push once  dextrose 50% Injectable 12.5 Gram(s) IV Push once  dextrose 50% Injectable 25 Gram(s) IV Push once  glucagon  Injectable 1 milliGRAM(s) IntraMuscular once  hydrochlorothiazide 25 milliGRAM(s) Oral every 24 hours  insulin lispro (ADMELOG) corrective regimen sliding scale   SubCutaneous Before meals and at bedtime  lisinopril 10 milliGRAM(s) Oral every 24 hours  metoprolol succinate  milliGRAM(s) Oral every 24 hours  pantoprazole    Tablet 40 milliGRAM(s) Oral before breakfast  trimethoprim   80 mG/sulfamethoxazole 400 mG 1 Tablet(s) Oral daily    MEDICATIONS  (PRN):  acetaminophen     Tablet .. 650 milliGRAM(s) Oral every 6 hours PRN Temp greater or equal to 38C (100.4F), Mild Pain (1 - 3)  albuterol    90 MICROgram(s) HFA Inhaler 2 Puff(s) Inhalation every 6 hours PRN Bronchospasm  aluminum hydroxide/magnesium hydroxide/simethicone Suspension 30 milliLiter(s) Oral every 4 hours PRN Dyspepsia  dextrose Oral Gel 15 Gram(s) Oral once PRN Blood Glucose LESS THAN 70 milliGRAM(s)/deciliter  melatonin 3 milliGRAM(s) Oral at bedtime PRN Insomnia  ondansetron Injectable 4 milliGRAM(s) IV Push every 8 hours PRN Nausea and/or Vomiting    Vital Signs Last 24 Hrs  T(C): 36.8 (25 May 2025 20:30), Max: 36.8 (25 May 2025 20:30)  T(F): 98.2 (25 May 2025 20:30), Max: 98.2 (25 May 2025 20:30)  HR: 78 (25 May 2025 20:30) (62 - 78)  BP: 149/67 (25 May 2025 20:30) (110/57 - 153/81)  BP(mean): 105 (25 May 2025 06:11) (105 - 105)  RR: 18 (25 May 2025 20:30) (17 - 18)  SpO2: 92% (25 May 2025 20:30) (90% - 92%)    Parameters below as of 25 May 2025 20:30  Patient On (Oxygen Delivery Method): room air        PHYSICAL EXAM:  General: lying in bed, in no acute distress  HEENT: MMM, conjunctiva and sclera clear  Gastrointestinal: Soft non-tender non-distended; No rebound or guarding  Skin: Warm and dry. No obvious rash    LABS:                        14.8   17.62 )-----------( 311      ( 24 May 2025 08:37 )             45.2     05-24    136  |  95[L]  |  14  ----------------------------<  105[H]  4.2   |  33[H]  |  0.81    Ca    8.9      24 May 2025 08:37  Phos  2.0     05-24  Mg     1.9     05-24            Urinalysis Basic - ( 24 May 2025 08:37 )    Color: x / Appearance: x / SG: x / pH: x  Gluc: 105 mg/dL / Ketone: x  / Bili: x / Urobili: x   Blood: x / Protein: x / Nitrite: x   Leuk Esterase: x / RBC: x / WBC x   Sq Epi: x / Non Sq Epi: x / Bacteria: x                RADIOLOGY & ADDITIONAL STUDIES:  Reviewed

## 2025-05-25 NOTE — PROGRESS NOTE ADULT - ASSESSMENT
74M former heavy smoker [facility] stage IV NSCLC, adenocarcinoma, who presents with 3 weeks of persistent diarrhea (7+ stools per day) despite being started on PO prednisone (60mg daily), found to have evidence on colitis, admitted for further management. Heme/onc consulted for known NSCLC with concern for IO related colitis.    Regarding NSCLC, he is T4N2M1 with BMs, PD-L1 90% positive, DDR2 mutant, TP53 mutant. 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 Started on pembrolizumab 10/30/2023 with a treatment break from August to December 2024 due to organizing pneumonia, likely immune related adverse event per Dr. Landaverde. He has since completed the steroid taper and had a repeat CT chest which showed resolution of the prior infiltration. s/p SRS to brain metastases 11/16/23. Interim MRIB which was done while off pembrolizumab showed development of new enhancing lesions in the brain stem, however now the MRI is showing resolution of these indicating response to pembrolizumab. Last dose of pembro was 5/16.     #stage IV NSCLC, adenocarcinoma, metastatic disease to brain, PD-L1 90%  # diarrhea, grade 3 (CTCAE)    - follows with primary oncologist, Dr. Kolby Pratt  - currently on pembrolizumab monotherapy since 10/30/23 (last dose 5/16/25)   - interval CT CAP (3/11/25) showed stable disease  - repeat CT CAP (5/22/25) w/ evidence of colitis  - crp (6.1), gi pcr & c. diff negative. fecal calprotectin elevated  - s/p colonoscopy on 5/23/25, showing pancolitis and few polyps (pathology sent)    Plan:  - would continue methylprednisolone (on 2mg/kg) for now  - will monitor bowel movements closely, with tentative plan to transition to PO prednisone    Heme/onc will continue to follow. Case discussed with Dr. Julianne Woodruff.   74M former heavy smoker [facility] stage IV NSCLC, adenocarcinoma, who presents with 3 weeks of persistent diarrhea (7+ stools per day) despite being started on PO prednisone (60mg daily), found to have evidence on colitis, admitted for further management. Heme/onc consulted for known NSCLC with concern for IO related colitis.    Regarding NSCLC, he is T4N2M1 with BMs, PD-L1 90% positive, DDR2 mutant, TP53 mutant. 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 Started on pembrolizumab 10/30/2023 with a treatment break from August to December 2024 due to organizing pneumonia, likely immune related adverse event per Dr. Landaverde. He has since completed the steroid taper and had a repeat CT chest which showed resolution of the prior infiltration. s/p SRS to brain metastases 11/16/23. Interim MRIB which was done while off pembrolizumab showed development of new enhancing lesions in the brain stem, however now the MRI is showing resolution of these indicating response to pembrolizumab. Last dose of pembro was 5/16.     #stage IV NSCLC, adenocarcinoma, metastatic disease to brain, PD-L1 90%  # diarrhea, grade 3 (CTCAE)    - follows with primary oncologist, Dr. Kolby Pratt  - currently on pembrolizumab monotherapy since 10/30/23 (last dose 5/16/25)   - interval CT CAP (3/11/25) showed stable disease  - repeat CT CAP (5/22/25) w/ evidence of colitis  - crp (6.1), gi pcr & c. diff negative. fecal calprotectin elevated  - s/p colonoscopy on 5/23/25, showing pancolitis and few polyps (pathology sent)    Plan:  - last dose of methylprednisolone today  - please START prednisone 60mg BID (first dose tomorrow AM, continue upon discharge)  - recommend GI prophylaxis and PCP prophylaxis while on steroids  - can use Imodium PRN for diarrhea  - has follow up with Dr. Kolby Pratt at 1:20pm on 5/27/25 -- please include in discharge summary    Heme/onc will continue to follow. Case discussed with Dr. Julianne Woodruff.   74M former heavy smoker [facility] stage IV NSCLC, adenocarcinoma, who presents with 3 weeks of persistent diarrhea (7+ stools per day) despite being started on PO prednisone (60mg daily), found to have evidence on colitis, admitted for further management. Heme/onc consulted for known NSCLC with concern for IO related colitis.    Regarding NSCLC, he is T4N2M1 with BMs, PD-L1 90% positive, DDR2 mutant, TP53 mutant. 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 Started on pembrolizumab 10/30/2023 with a treatment break from August to December 2024 due to organizing pneumonia, likely immune related adverse event per Dr. Landaverde. He has since completed the steroid taper and had a repeat CT chest which showed resolution of the prior infiltration. s/p SRS to brain metastases 11/16/23. Interim MRIB which was done while off pembrolizumab showed development of new enhancing lesions in the brain stem, however now the MRI is showing resolution of these indicating response to pembrolizumab. Last dose of pembro was 5/16.     #stage IV NSCLC, adenocarcinoma, metastatic disease to brain, PD-L1 90%  # diarrhea, grade 3 (CTCAE)    - follows with primary oncologist, Dr. Kolby Pratt  - currently on pembrolizumab monotherapy since 10/30/23 (last dose 5/16/25)   - interval CT CAP (3/11/25) showed stable disease  - repeat CT CAP (5/22/25) w/ evidence of colitis  - crp (6.1), gi pcr & c. diff negative. fecal calprotectin elevated  - s/p colonoscopy on 5/23/25, showing pancolitis and few polyps (pathology sent)    Plan:  - last dose of methylprednisolone today  - please START prednisone 60mg BID (first dose tomorrow AM, continue upon discharge)  - recommend GI prophylaxis and PCP prophylaxis (ie. [id number]-[id number]) while on steroids  - can use Imodium PRN for diarrhea  - has follow up with Dr. Kolby Pratt at 1:20pm on 5/27/25 -- please include in discharge summary    Heme/onc will continue to follow. Case discussed with Dr. Julianne Woodruff.

## 2025-05-25 NOTE — PROGRESS NOTE ADULT - PROBLEM SELECTOR PLAN 6
Per PCP patient was found to be in complete heart block after stress test in 2020 and subsequently underwent pacemaker placement later that year. EKG on admission: atrial-sensed ventricular-paced rhythm with LVH  - cardiology consulted   - TTE w/ 1 cm x 0.8 cm thickening noted on the atrial lead of the device wire in the right atrium, which could represent fibrinous tissue vs thrombus vs vegetation. As per cards, will recommend outpatient PATRICIA.   - obtain collateral from Dr. Jeana Berry, last known cardiologist per PCP, however patient has not seen a cardiologist in several years  - per HIE, visit on Jan '25 for device management prior to MRI  - PPM interrogation unremarkable

## 2025-05-25 NOTE — PROGRESS NOTE ADULT - SUBJECTIVE AND OBJECTIVE BOX
INTERVAL EVENTS: No o/n events. No diarrhea today. Denies CP, dyspnea, palpitations, presyncope, syncope, f/c/n/v.     REVIEW OF SYSTEMS:  Constitutional:     [X] negative [ ] fevers [ ] chills [ ] weight loss [ ] weight gain  HEENT:                  [X] negative [ ] dry eyes [ ] eye irritation [ ] postnasal drip [ ] nasal congestion  CV:                         [X] negative  [ ] chest pain [ ] orthopnea [ ] palpitations [ ] murmur  Resp:                     [X] negative [ ] cough [ ] shortness of breath [ ] wheezing [ ] sputum [ ] hemoptysis  GI:                          [X] negative [ ] nausea [ ] vomiting [ ] diarrhea [ ] constipation [ ] abd pain [ ] dysphagia   :                        [X] negative [ ] dysuria [ ] nocturia [ ] hematuria [ ] increased urinary frequency  MSK:                      [X] negative [ ] back pain [ ] myalgias [ ] arthralgias [ ] fracture  Skin:                       [X] negative [ ] rash [ ] itch  Neuro:                   [X] negative [ ] headache [ ] dizziness [ ] syncope [ ] weakness [ ] numbness  Psych:                    [X] negative [ ] anxiety [ ] depression  Endo:                     [X] negative [ ] diabetes [ ] thyroid problem  Heme/Lymph:      [X] negative [ ] anemia [ ] bleeding problem  Allergic/Immune: [X] negative [ ] itchy eyes [ ] nasal discharge [ ] hives [ ] angioedema    [X] All other systems negative or otherwise described above.  [ ] Unable to assess ROS due to ________.    PAST MEDICAL & SURGICAL HISTORY:    MEDICATIONS  (STANDING):  amLODIPine   Tablet 10 milliGRAM(s) Oral every 24 hours  aspirin enteric coated 81 milliGRAM(s) Oral daily  atorvastatin 80 milliGRAM(s) Oral at bedtime  dextrose 5%. 1000 milliLiter(s) (50 mL/Hr) IV Continuous <Continuous>  dextrose 5%. 1000 milliLiter(s) (100 mL/Hr) IV Continuous <Continuous>  dextrose 50% Injectable 25 Gram(s) IV Push once  dextrose 50% Injectable 12.5 Gram(s) IV Push once  dextrose 50% Injectable 25 Gram(s) IV Push once  glucagon  Injectable 1 milliGRAM(s) IntraMuscular once  hydrochlorothiazide 25 milliGRAM(s) Oral every 24 hours  insulin lispro (ADMELOG) corrective regimen sliding scale   SubCutaneous Before meals and at bedtime  lisinopril 10 milliGRAM(s) Oral every 24 hours  methylPREDNISolone sodium succinate Injectable 125 milliGRAM(s) IV Push daily  metoprolol succinate  milliGRAM(s) Oral every 24 hours  pantoprazole    Tablet 40 milliGRAM(s) Oral before breakfast    MEDICATIONS  (PRN):  acetaminophen     Tablet .. 650 milliGRAM(s) Oral every 6 hours PRN Temp greater or equal to 38C (100.4F), Mild Pain (1 - 3)  albuterol    90 MICROgram(s) HFA Inhaler 2 Puff(s) Inhalation every 6 hours PRN Bronchospasm  aluminum hydroxide/magnesium hydroxide/simethicone Suspension 30 milliLiter(s) Oral every 4 hours PRN Dyspepsia  dextrose Oral Gel 15 Gram(s) Oral once PRN Blood Glucose LESS THAN 70 milliGRAM(s)/deciliter  melatonin 3 milliGRAM(s) Oral at bedtime PRN Insomnia  ondansetron Injectable 4 milliGRAM(s) IV Push every 8 hours PRN Nausea and/or Vomiting    ICU Vital Signs Last 24 Hrs  T(C): 36.3 (25 May 2025 12:04), Max: 36.8 (24 May 2025 20:30)  T(F): 97.3 (25 May 2025 12:04), Max: 98.2 (24 May 2025 20:30)  HR: 73 (25 May 2025 12:04) (62 - 73)  BP: 110/57 (25 May 2025 12:04) (110/57 - 158/85)  BP(mean): 105 (25 May 2025 06:11) (105 - 105)  ABP: --  ABP(mean): --  RR: 18 (25 May 2025 12:04) (17 - 18)  SpO2: 92% (25 May 2025 12:04) (90% - 92%)    O2 Parameters below as of 25 May 2025 12:04  Patient On (Oxygen Delivery Method): room air          Orthostatic VS    Daily     Daily   I&O's Summary      PHYSICAL EXAM:  GENERAL: No acute distress, well-developed  ENT: EOMI, conjunctiva and sclera clear, Neck supple, No JVD, moist mucosa  CHEST/LUNG: Clear to auscultation bilaterally; No wheeze, equal breath sounds bilaterally  HEART: Regular rate and rhythm; No murmurs, rubs, or gallops, radial and DP 2+ b/l, euvolemic  ABDOMEN: Soft, Nontender, Nondistended  EXTREMITIES:  No clubbing, cyanosis, or edema  NEUROLOGY: AAOx3, non-focal  SKIN: Normal color, No rashes or lesions    LABS:                        14.8   17.62 )-----------( 311      ( 24 May 2025 08:37 )             45.2       05-24    136  |  95[L]  |  14  ----------------------------<  105[H]  4.2   |  33[H]  |  0.81    Ca    8.9      24 May 2025 08:37  Phos  2.0     05-24  Mg     1.9     05-24            Urinalysis Basic - ( 24 May 2025 08:37 )    Color: x / Appearance: x / SG: x / pH: x  Gluc: 105 mg/dL / Ketone: x  / Bili: x / Urobili: x   Blood: x / Protein: x / Nitrite: x   Leuk Esterase: x / RBC: x / WBC x   Sq Epi: x / Non Sq Epi: x / Bacteria: x          RADIOLOGY & ADDITIONAL STUDIES: Reviewed

## 2025-05-25 NOTE — PROGRESS NOTE ADULT - SUBJECTIVE AND OBJECTIVE BOX
Patient is a 74y old  Male who presents with a chief complaint of DIARRHEA    INTERVAL HISTORY: Remains on high dose steroids, labile FSG. Endorsing strong appetite. No BMs since yesterday AM. No abdominal distension or pain. Passing flatus.    REVIEW OF SYSTEMS:  All other review of systems is negative unless indicated above.    OBJECTIVE:  T(C): 36.3 (05-25-25 @ 12:04), Max: 36.8 (05-24-25 @ 20:30)  HR: 73 (05-25-25 @ 12:04) (62 - 73)  BP: 110/57 (05-25-25 @ 12:04) (110/57 - 158/85)  RR: 18 (05-25-25 @ 12:04) (17 - 18)  SpO2: 92% (05-25-25 @ 12:04) (90% - 92%)  Daily     Daily     Physical Exam:  General: in no acute distress, non-toxic appearing  Eyes: EOMI intact bilaterally. Anicteric sclerae, moist conjunctivae  HENT: Moist mucous membranes  Neck: Trachea midline, supple  Lungs: Normal respiratory effort. No accessory muscle use  Neurological: Alert and appropriately conversant  Skin: Warm and dry. No obvious rash     Medications:  MEDICATIONS  (STANDING):  amLODIPine   Tablet 10 milliGRAM(s) Oral every 24 hours  aspirin enteric coated 81 milliGRAM(s) Oral daily  atorvastatin 80 milliGRAM(s) Oral at bedtime  dextrose 5%. 1000 milliLiter(s) (50 mL/Hr) IV Continuous <Continuous>  dextrose 5%. 1000 milliLiter(s) (100 mL/Hr) IV Continuous <Continuous>  dextrose 50% Injectable 25 Gram(s) IV Push once  dextrose 50% Injectable 12.5 Gram(s) IV Push once  dextrose 50% Injectable 25 Gram(s) IV Push once  glucagon  Injectable 1 milliGRAM(s) IntraMuscular once  hydrochlorothiazide 25 milliGRAM(s) Oral every 24 hours  insulin lispro (ADMELOG) corrective regimen sliding scale   SubCutaneous Before meals and at bedtime  lisinopril 10 milliGRAM(s) Oral every 24 hours  methylPREDNISolone sodium succinate Injectable 125 milliGRAM(s) IV Push daily  metoprolol succinate  milliGRAM(s) Oral every 24 hours  pantoprazole    Tablet 40 milliGRAM(s) Oral before breakfast    MEDICATIONS  (PRN):  acetaminophen     Tablet .. 650 milliGRAM(s) Oral every 6 hours PRN Temp greater or equal to 38C (100.4F), Mild Pain (1 - 3)  albuterol    90 MICROgram(s) HFA Inhaler 2 Puff(s) Inhalation every 6 hours PRN Bronchospasm  aluminum hydroxide/magnesium hydroxide/simethicone Suspension 30 milliLiter(s) Oral every 4 hours PRN Dyspepsia  dextrose Oral Gel 15 Gram(s) Oral once PRN Blood Glucose LESS THAN 70 milliGRAM(s)/deciliter  melatonin 3 milliGRAM(s) Oral at bedtime PRN Insomnia  ondansetron Injectable 4 milliGRAM(s) IV Push every 8 hours PRN Nausea and/or Vomiting    Labs:                      14.8   17.62 )-----------( 311      ( 24 May 2025 08:37 )             45.2     05-24    136  |  95[L]  |  14  ----------------------------<  105[H]  4.2   |  33[H]  |  0.81    Ca    8.9      24 May 2025 08:37  Phos  2.0     05-24  Mg     1.9     05-24    Urinalysis Basic - ( 24 May 2025 08:37 )    Color: x / Appearance: x / SG: x / pH: x  Gluc: 105 mg/dL / Ketone: x  / Bili: x / Urobili: x   Blood: x / Protein: x / Nitrite: x   Leuk Esterase: x / RBC: x / WBC x   Sq Epi: x / Non Sq Epi: x / Bacteria: x    Radiology: Reviewed

## 2025-05-25 NOTE — PROGRESS NOTE ADULT - PROBLEM SELECTOR PLAN 1
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   Denies fever, chills, nausea, vomiting, recent dietary changes, recent travel, recent hospitalizations, new antibiotic use or sick contacts.  Prescribed prednisone 60mg qd and pantoprazole 40mg qd by oncologist  for relief two days prior to admission  Patient not currently meeting SIRS criteria. PE only significant to moderate abdominal distention; CTAP significant for wall thickening of the left hemicolon and ascending colon.  GI consulted, recs appreciated; s/p KCL 50 and 1L NS in ED   - f/u fecal calprotectin  - encourage oral intake with low fiber diet, replete electrolytes as needed   - c/w methylprednisolone 125mg IV daily  - continue with Protonix 40mg qd  - s/p cscope 5/23 with GI concerning for moderately active pancolitis; pathology pending  - obtaining blood cx; remains afebrile and with resolving diarrhea; holding off on abx at this time

## 2025-05-26 LAB
ANION GAP SERPL CALC-SCNC: 8 MMOL/L — SIGNIFICANT CHANGE UP (ref 5–17)
BASOPHILS # BLD AUTO: 0.02 K/UL — SIGNIFICANT CHANGE UP (ref 0–0.2)
BASOPHILS NFR BLD AUTO: 0.1 % — SIGNIFICANT CHANGE UP (ref 0–2)
BUN SERPL-MCNC: 22 MG/DL — SIGNIFICANT CHANGE UP (ref 7–23)
CALCIUM SERPL-MCNC: 9.1 MG/DL — SIGNIFICANT CHANGE UP (ref 8.4–10.5)
CHLORIDE SERPL-SCNC: 99 MMOL/L — SIGNIFICANT CHANGE UP (ref 96–108)
CO2 SERPL-SCNC: 31 MMOL/L — SIGNIFICANT CHANGE UP (ref 22–31)
CREAT SERPL-MCNC: 1 MG/DL — SIGNIFICANT CHANGE UP (ref 0.5–1.3)
EGFR: 78 ML/MIN/1.73M2 — SIGNIFICANT CHANGE UP
EGFR: 78 ML/MIN/1.73M2 — SIGNIFICANT CHANGE UP
EOSINOPHIL # BLD AUTO: 0 K/UL — SIGNIFICANT CHANGE UP (ref 0–0.5)
EOSINOPHIL NFR BLD AUTO: 0 % — SIGNIFICANT CHANGE UP (ref 0–6)
GLUCOSE SERPL-MCNC: 100 MG/DL — HIGH (ref 70–99)
HCT VFR BLD CALC: 46.8 % — SIGNIFICANT CHANGE UP (ref 39–50)
HGB BLD-MCNC: 15.2 G/DL — SIGNIFICANT CHANGE UP (ref 13–17)
IMM GRANULOCYTES NFR BLD AUTO: 0.6 % — SIGNIFICANT CHANGE UP (ref 0–0.9)
LYMPHOCYTES # BLD AUTO: 12.6 % — LOW (ref 13–44)
LYMPHOCYTES # BLD AUTO: 2.16 K/UL — SIGNIFICANT CHANGE UP (ref 1–3.3)
MAGNESIUM SERPL-MCNC: 1.9 MG/DL — SIGNIFICANT CHANGE UP (ref 1.6–2.6)
MCHC RBC-ENTMCNC: 28 PG — SIGNIFICANT CHANGE UP (ref 27–34)
MCHC RBC-ENTMCNC: 32.5 G/DL — SIGNIFICANT CHANGE UP (ref 32–36)
MCV RBC AUTO: 86.2 FL — SIGNIFICANT CHANGE UP (ref 80–100)
MONOCYTES # BLD AUTO: 0.99 K/UL — HIGH (ref 0–0.9)
MONOCYTES NFR BLD AUTO: 5.8 % — SIGNIFICANT CHANGE UP (ref 2–14)
NEUTROPHILS # BLD AUTO: 13.81 K/UL — HIGH (ref 1.8–7.4)
NEUTROPHILS NFR BLD AUTO: 80.9 % — HIGH (ref 43–77)
NRBC BLD AUTO-RTO: 0 /100 WBCS — SIGNIFICANT CHANGE UP (ref 0–0)
PHOSPHATE SERPL-MCNC: 1.8 MG/DL — LOW (ref 2.5–4.5)
PLATELET # BLD AUTO: 310 K/UL — SIGNIFICANT CHANGE UP (ref 150–400)
POTASSIUM SERPL-MCNC: 4.4 MMOL/L — SIGNIFICANT CHANGE UP (ref 3.5–5.3)
POTASSIUM SERPL-SCNC: 4.4 MMOL/L — SIGNIFICANT CHANGE UP (ref 3.5–5.3)
RBC # BLD: 5.43 M/UL — SIGNIFICANT CHANGE UP (ref 4.2–5.8)
RBC # FLD: 14.4 % — SIGNIFICANT CHANGE UP (ref 10.3–14.5)
SODIUM SERPL-SCNC: 138 MMOL/L — SIGNIFICANT CHANGE UP (ref 135–145)
WBC # BLD: 17.08 K/UL — HIGH (ref 3.8–10.5)
WBC # FLD AUTO: 17.08 K/UL — HIGH (ref 3.8–10.5)

## 2025-05-26 PROCEDURE — 99233 SBSQ HOSP IP/OBS HIGH 50: CPT | Mod: GC

## 2025-05-26 RX ORDER — SODIUM PHOSPHATE,DIBASIC DIHYD
15 POWDER (GRAM) MISCELLANEOUS ONCE
Refills: 0 | Status: COMPLETED | OUTPATIENT
Start: 2025-05-26 | End: 2025-05-26

## 2025-05-26 RX ORDER — SOD PHOS DI, MONO/K PHOS MONO 250 MG
1 TABLET ORAL ONCE
Refills: 0 | Status: COMPLETED | OUTPATIENT
Start: 2025-05-26 | End: 2025-05-26

## 2025-05-26 RX ADMIN — LISINOPRIL 10 MILLIGRAM(S): 5 TABLET ORAL at 12:23

## 2025-05-26 RX ADMIN — PREDNISONE 60 MILLIGRAM(S): 20 TABLET ORAL at 17:59

## 2025-05-26 RX ADMIN — Medication 1 TABLET(S): at 12:23

## 2025-05-26 RX ADMIN — Medication 62.5 MILLIMOLE(S): at 16:41

## 2025-05-26 RX ADMIN — AMLODIPINE BESYLATE 10 MILLIGRAM(S): 10 TABLET ORAL at 06:32

## 2025-05-26 RX ADMIN — Medication 40 MILLIGRAM(S): at 06:32

## 2025-05-26 RX ADMIN — METOPROLOL SUCCINATE 100 MILLIGRAM(S): 50 TABLET, EXTENDED RELEASE ORAL at 20:54

## 2025-05-26 RX ADMIN — Medication 1 PACKET(S): at 12:24

## 2025-05-26 RX ADMIN — INSULIN LISPRO 6: 100 INJECTION, SOLUTION INTRAVENOUS; SUBCUTANEOUS at 13:18

## 2025-05-26 RX ADMIN — INSULIN LISPRO 6: 100 INJECTION, SOLUTION INTRAVENOUS; SUBCUTANEOUS at 21:41

## 2025-05-26 RX ADMIN — ATORVASTATIN CALCIUM 80 MILLIGRAM(S): 80 TABLET, FILM COATED ORAL at 20:55

## 2025-05-26 RX ADMIN — PREDNISONE 60 MILLIGRAM(S): 20 TABLET ORAL at 06:32

## 2025-05-26 RX ADMIN — Medication 81 MILLIGRAM(S): at 12:23

## 2025-05-26 NOTE — PROGRESS NOTE ADULT - PROBLEM SELECTOR PLAN 11
home med: metformin 1000mg; however patient shares he only take 500mg qd   - A1c 7.3  - c/w mISS
home med: metformin 1000mg; however patient shares he only take 500mg qd   - obtain A1c   - start mISS while on steroids

## 2025-05-26 NOTE — PROGRESS NOTE ADULT - ATTENDING COMMENTS
Brock Abraham MD
Please see attending attestation to Cardiology consult note on 5/22
A bit better day by day. cont steroids
Better still.  ok for home onsteroids
I evaluated the patient with the Oncology fellow, Dr Carlitos George.  The patient is a 74 year old man with stage IV NSCLC on pembrolizumab.  He developed diarrhea and was started on prednisone 60 mg daily on 5/19/25 given concern for immune mediated colitis.  He continued to have frequent watery stools despite taking prednsione and came to the ER for medical attention.  CT scan showed evidence of colitis with stable findings for malignancy.  Colonoscopy confirmed colitis.  Pt's symptoms are improving on IV solumedrol 125 mg IV daily.  Labs are acceptable with the exception of hyperglycemia.    Plan:  #  immune mediated colitis (G3)  - transition to oral glucocorticoids.  Suggest prednisone PO 60 mg BID  - long term steroid taper to be directed by Dr Pratt  - anticipate several weeks of exposure to steroids.  merits prophylaxis for PCP with bactrim and PPI for stress ulcer prophylaxis.  discharge on this.  - if pt fails steroid taper - next step infliximab or vedolizumab.    #  hyperglycemia  - low carb diet    # Lung cancer  - pembrolizumab discontinued.    Pt has appt scheduled w/ Dr Pratt on 5/27/25.  If stable tomorrow, discharge pt and instruct him to keep the appt with Dr Pratt.
improved on steroids still.  For colonoscopy tomorrow.
Pt. seen and examined by me earlier today; I have read Dr. Vieira’s note, I agree w/ her findings and plan of care as documented; Pt. reports his diarrhea is somewhat improved, more formed; GI PCR and C. diff testing (-), making infectious etiology unlikely; cont. IV steroids for possible  immune checkpoint inhibitor colitis; TTE results reviewed, shows "there is a 1 cm x 0.8 cm thickening noted on the atrial lead of the device wire in the right atrium, which could represent fibrinous tissue vs. thrombus vs. vegetation," will check blood cultures and consult Cardiology re: further work-up; appreciate Heme-Onc and GI recs; plan for colonoscopy, Cardiology also consulted for preoperative cardiac risk stratification; HbA1c 7.3% c/w diabetes mellitus, will monitor blood glucose while on steroids, goal 100-180
Patient is a 74M with PMHx of tobacco use with stage IV metastatic non-small cell lung cancer (diagnosed in September 2023  on Keytruda/ pembrolizumab last infusion on 5/16), COPD, HTN, HLD presenting with three week history of watery, non bloody voluminous stool admitted for further workup for possible immunotherapy induced colitis    Plan  improved diarrhea today  c/w steroids; no need for abx at this time  f/u blood cx; pathology from c-scope pending  PPM interrogation unremarkable  leukocytosis likely in the setting of steroids  patient requesting home glucose monitor and diabetes education if being discharged on insulin
Pt. seen and examined by me earlier today; I have read Dr. Vieira’s note, I agree w/ her findings and plan of care as documented; cont. IV steroids for possible immune checkpoint inhibitor colitis; TTE results reviewed, shows "there is a 1 cm x 0.8 cm thickening noted on the atrial lead of the device wire in the right atrium, which could represent fibrinous tissue vs. thrombus vs. vegetation;" appreciate Cardiology recs, can pursue PATRICIA as outpatient; f/u BCx, low clinical suspicion for IE; appreciate Heme-Onc and GI recs; plan for colonoscopy today, Cardiology also consulted for preoperative cardiac risk stratification; HbA1c 7.3% c/w diabetes mellitus, will monitor blood glucose while on steroids, goal 100-180

## 2025-05-26 NOTE — PROGRESS NOTE ADULT - PROBLEM SELECTOR PROBLEM 2
Non-small cell carcinoma of lung, stage 4

## 2025-05-26 NOTE — PROGRESS NOTE ADULT - TIME BILLING
Review of chart and imaging. Evaluation of the patient. Discussion with the care team members. Documentation.
Review of chart and imaging. Evaluation of the patient. Discussion with the care team members. Documentation.
coordination of care; preparing to see Pt.; interviewing Pt.; examining Pt.; reviewing labs and images; documentation in [place]; d/w Medicine resident on rounds
The necessity of the time spent during the encounter on this date of service was due to:     coordination of care; preparing to see Pt.; interviewing Pt.; examining Pt.; reviewing labs and images; documentation in [place]; d/w Medicine resident on rounds.
coordination of care; preparing to see Pt.; interviewing Pt.; examining Pt.; reviewing labs and images; documentation in [place]; d/w Medicine resident on rounds

## 2025-05-26 NOTE — PROGRESS NOTE ADULT - SUBJECTIVE AND OBJECTIVE BOX
OVERNIGHT EVENTS:    SUBJECTIVE / INTERVAL HPI: Patient seen and examined at bedside.     VITAL SIGNS:  Vital Signs Last 24 Hrs  T(C): 37.1 (26 May 2025 06:12), Max: 37.1 (26 May 2025 06:12)  T(F): 98.7 (26 May 2025 06:12), Max: 98.7 (26 May 2025 06:12)  HR: 62 (26 May 2025 06:12) (62 - 78)  BP: 154/81 (26 May 2025 06:12) (110/57 - 154/81)  BP(mean): 105 (26 May 2025 06:12) (105 - 105)  RR: 18 (26 May 2025 06:12) (18 - 18)  SpO2: 90% (26 May 2025 06:12) (90% - 92%)    Parameters below as of 26 May 2025 06:12  Patient On (Oxygen Delivery Method): room air      I&O's Summary      PHYSICAL EXAM:  General: WDWN  HEENT: NC/AT; PERRL, anicteric sclera; MMM  Neck: supple  Cardiovascular: +S1/S2; RRR  Respiratory: CTA B/L; no W/R/R  Gastrointestinal: soft, NT/ND; +BSx4  Extremities: WWP; no edema, clubbing or cyanosis  Vascular: 2+ radial, DP/PT pulses B/L  Neurological: AAOx3; no focal deficits    MEDICATIONS:  MEDICATIONS  (STANDING):  amLODIPine   Tablet 10 milliGRAM(s) Oral every 24 hours  aspirin enteric coated 81 milliGRAM(s) Oral daily  atorvastatin 80 milliGRAM(s) Oral at bedtime  dextrose 5%. 1000 milliLiter(s) (100 mL/Hr) IV Continuous <Continuous>  dextrose 5%. 1000 milliLiter(s) (50 mL/Hr) IV Continuous <Continuous>  dextrose 50% Injectable 25 Gram(s) IV Push once  dextrose 50% Injectable 12.5 Gram(s) IV Push once  dextrose 50% Injectable 25 Gram(s) IV Push once  glucagon  Injectable 1 milliGRAM(s) IntraMuscular once  hydrochlorothiazide 25 milliGRAM(s) Oral every 24 hours  insulin lispro (ADMELOG) corrective regimen sliding scale   SubCutaneous Before meals and at bedtime  lisinopril 10 milliGRAM(s) Oral every 24 hours  metoprolol succinate  milliGRAM(s) Oral every 24 hours  pantoprazole    Tablet 40 milliGRAM(s) Oral before breakfast  potassium phosphate / sodium phosphate Powder (PHOS-NaK) 1 Packet(s) Oral once  predniSONE   Tablet 60 milliGRAM(s) Oral every 12 hours  sodium phosphate 15 milliMole(s)/250 mL IVPB 15 milliMole(s) IV Intermittent once  trimethoprim   80 mG/sulfamethoxazole 400 mG 1 Tablet(s) Oral daily    MEDICATIONS  (PRN):  acetaminophen     Tablet .. 650 milliGRAM(s) Oral every 6 hours PRN Temp greater or equal to 38C (100.4F), Mild Pain (1 - 3)  albuterol    90 MICROgram(s) HFA Inhaler 2 Puff(s) Inhalation every 6 hours PRN Bronchospasm  aluminum hydroxide/magnesium hydroxide/simethicone Suspension 30 milliLiter(s) Oral every 4 hours PRN Dyspepsia  dextrose Oral Gel 15 Gram(s) Oral once PRN Blood Glucose LESS THAN 70 milliGRAM(s)/deciliter  melatonin 3 milliGRAM(s) Oral at bedtime PRN Insomnia  ondansetron Injectable 4 milliGRAM(s) IV Push every 8 hours PRN Nausea and/or Vomiting      ALLERGIES:  Allergies    No Known Allergies    Intolerances        LABS:                        15.2   17.08 )-----------( 310      ( 26 May 2025 06:10 )             46.8     05-26    138  |  99  |  22  ----------------------------<  100[H]  4.4   |  31  |  1.00    Ca    9.1      26 May 2025 06:10  Phos  1.8     05-26  Mg     1.9     05-26        Urinalysis Basic - ( 26 May 2025 06:10 )    Color: x / Appearance: x / SG: x / pH: x  Gluc: 100 mg/dL / Ketone: x  / Bili: x / Urobili: x   Blood: x / Protein: x / Nitrite: x   Leuk Esterase: x / RBC: x / WBC x   Sq Epi: x / Non Sq Epi: x / Bacteria: x      CAPILLARY BLOOD GLUCOSE      POCT Blood Glucose.: 129 mg/dL (26 May 2025 08:32)      RADIOLOGY & ADDITIONAL TESTS: Reviewed.   OVERNIGHT EVENTS: no overnight events    SUBJECTIVE / INTERVAL HPI: Patient seen and examined at bedside. States that he     VITAL SIGNS:  Vital Signs Last 24 Hrs  T(C): 37.1 (26 May 2025 06:12), Max: 37.1 (26 May 2025 06:12)  T(F): 98.7 (26 May 2025 06:12), Max: 98.7 (26 May 2025 06:12)  HR: 62 (26 May 2025 06:12) (62 - 78)  BP: 154/81 (26 May 2025 06:12) (110/57 - 154/81)  BP(mean): 105 (26 May 2025 06:12) (105 - 105)  RR: 18 (26 May 2025 06:12) (18 - 18)  SpO2: 90% (26 May 2025 06:12) (90% - 92%)    Parameters below as of 26 May 2025 06:12  Patient On (Oxygen Delivery Method): room air      I&O's Summary      PHYSICAL EXAM:  General: WDWN  HEENT: NC/AT; PERRL, anicteric sclera; MMM  Neck: supple  Cardiovascular: +S1/S2; RRR  Respiratory: CTA B/L; no W/R/R  Gastrointestinal: soft, NT/ND; +BSx4  Extremities: WWP; no edema, clubbing or cyanosis  Vascular: 2+ radial, DP/PT pulses B/L  Neurological: AAOx3; no focal deficits    MEDICATIONS:  MEDICATIONS  (STANDING):  amLODIPine   Tablet 10 milliGRAM(s) Oral every 24 hours  aspirin enteric coated 81 milliGRAM(s) Oral daily  atorvastatin 80 milliGRAM(s) Oral at bedtime  dextrose 5%. 1000 milliLiter(s) (100 mL/Hr) IV Continuous <Continuous>  dextrose 5%. 1000 milliLiter(s) (50 mL/Hr) IV Continuous <Continuous>  dextrose 50% Injectable 25 Gram(s) IV Push once  dextrose 50% Injectable 12.5 Gram(s) IV Push once  dextrose 50% Injectable 25 Gram(s) IV Push once  glucagon  Injectable 1 milliGRAM(s) IntraMuscular once  hydrochlorothiazide 25 milliGRAM(s) Oral every 24 hours  insulin lispro (ADMELOG) corrective regimen sliding scale   SubCutaneous Before meals and at bedtime  lisinopril 10 milliGRAM(s) Oral every 24 hours  metoprolol succinate  milliGRAM(s) Oral every 24 hours  pantoprazole    Tablet 40 milliGRAM(s) Oral before breakfast  potassium phosphate / sodium phosphate Powder (PHOS-NaK) 1 Packet(s) Oral once  predniSONE   Tablet 60 milliGRAM(s) Oral every 12 hours  sodium phosphate 15 milliMole(s)/250 mL IVPB 15 milliMole(s) IV Intermittent once  trimethoprim   80 mG/sulfamethoxazole 400 mG 1 Tablet(s) Oral daily    MEDICATIONS  (PRN):  acetaminophen     Tablet .. 650 milliGRAM(s) Oral every 6 hours PRN Temp greater or equal to 38C (100.4F), Mild Pain (1 - 3)  albuterol    90 MICROgram(s) HFA Inhaler 2 Puff(s) Inhalation every 6 hours PRN Bronchospasm  aluminum hydroxide/magnesium hydroxide/simethicone Suspension 30 milliLiter(s) Oral every 4 hours PRN Dyspepsia  dextrose Oral Gel 15 Gram(s) Oral once PRN Blood Glucose LESS THAN 70 milliGRAM(s)/deciliter  melatonin 3 milliGRAM(s) Oral at bedtime PRN Insomnia  ondansetron Injectable 4 milliGRAM(s) IV Push every 8 hours PRN Nausea and/or Vomiting      ALLERGIES:  Allergies    No Known Allergies    Intolerances        LABS:                        15.2   17.08 )-----------( 310      ( 26 May 2025 06:10 )             46.8     05-26    138  |  99  |  22  ----------------------------<  100[H]  4.4   |  31  |  1.00    Ca    9.1      26 May 2025 06:10  Phos  1.8     05-26  Mg     1.9     05-26        Urinalysis Basic - ( 26 May 2025 06:10 )    Color: x / Appearance: x / SG: x / pH: x  Gluc: 100 mg/dL / Ketone: x  / Bili: x / Urobili: x   Blood: x / Protein: x / Nitrite: x   Leuk Esterase: x / RBC: x / WBC x   Sq Epi: x / Non Sq Epi: x / Bacteria: x      CAPILLARY BLOOD GLUCOSE      POCT Blood Glucose.: 129 mg/dL (26 May 2025 08:32)      RADIOLOGY & ADDITIONAL TESTS: Reviewed.   OVERNIGHT EVENTS: no overnight events    SUBJECTIVE / INTERVAL HPI: Patient seen and examined at bedside. States that he feels well, has only had 1-2 small volume watery stool. Denies any other complaints.    VITAL SIGNS:  Vital Signs Last 24 Hrs  T(C): 37.1 (26 May 2025 06:12), Max: 37.1 (26 May 2025 06:12)  T(F): 98.7 (26 May 2025 06:12), Max: 98.7 (26 May 2025 06:12)  HR: 62 (26 May 2025 06:12) (62 - 78)  BP: 154/81 (26 May 2025 06:12) (110/57 - 154/81)  BP(mean): 105 (26 May 2025 06:12) (105 - 105)  RR: 18 (26 May 2025 06:12) (18 - 18)  SpO2: 90% (26 May 2025 06:12) (90% - 92%)    Parameters below as of 26 May 2025 06:12  Patient On (Oxygen Delivery Method): room air      I&O's Summary      PHYSICAL EXAM:  GENERAL: No acute distress, well-developed  ENT: EOMI, conjunctiva and sclera clear, Neck supple, No JVD, moist mucosa  CHEST/LUNG: Clear to auscultation bilaterally; No wheeze, equal breath sounds bilaterally  HEART: Regular rate and rhythm; No murmurs, rubs, or gallops, radial and DP 2+ b/l, euvolemic  ABDOMEN: Soft, Nontender, Nondistended  EXTREMITIES:  No clubbing, cyanosis, or edema  NEUROLOGY: AAOx3, non-focal  SKIN: Normal color, No rashes or lesions      MEDICATIONS:  MEDICATIONS  (STANDING):  amLODIPine   Tablet 10 milliGRAM(s) Oral every 24 hours  aspirin enteric coated 81 milliGRAM(s) Oral daily  atorvastatin 80 milliGRAM(s) Oral at bedtime  dextrose 5%. 1000 milliLiter(s) (100 mL/Hr) IV Continuous <Continuous>  dextrose 5%. 1000 milliLiter(s) (50 mL/Hr) IV Continuous <Continuous>  dextrose 50% Injectable 25 Gram(s) IV Push once  dextrose 50% Injectable 12.5 Gram(s) IV Push once  dextrose 50% Injectable 25 Gram(s) IV Push once  glucagon  Injectable 1 milliGRAM(s) IntraMuscular once  hydrochlorothiazide 25 milliGRAM(s) Oral every 24 hours  insulin lispro (ADMELOG) corrective regimen sliding scale   SubCutaneous Before meals and at bedtime  lisinopril 10 milliGRAM(s) Oral every 24 hours  metoprolol succinate  milliGRAM(s) Oral every 24 hours  pantoprazole    Tablet 40 milliGRAM(s) Oral before breakfast  potassium phosphate / sodium phosphate Powder (PHOS-NaK) 1 Packet(s) Oral once  predniSONE   Tablet 60 milliGRAM(s) Oral every 12 hours  sodium phosphate 15 milliMole(s)/250 mL IVPB 15 milliMole(s) IV Intermittent once  trimethoprim   80 mG/sulfamethoxazole 400 mG 1 Tablet(s) Oral daily    MEDICATIONS  (PRN):  acetaminophen     Tablet .. 650 milliGRAM(s) Oral every 6 hours PRN Temp greater or equal to 38C (100.4F), Mild Pain (1 - 3)  albuterol    90 MICROgram(s) HFA Inhaler 2 Puff(s) Inhalation every 6 hours PRN Bronchospasm  aluminum hydroxide/magnesium hydroxide/simethicone Suspension 30 milliLiter(s) Oral every 4 hours PRN Dyspepsia  dextrose Oral Gel 15 Gram(s) Oral once PRN Blood Glucose LESS THAN 70 milliGRAM(s)/deciliter  melatonin 3 milliGRAM(s) Oral at bedtime PRN Insomnia  ondansetron Injectable 4 milliGRAM(s) IV Push every 8 hours PRN Nausea and/or Vomiting      ALLERGIES:  Allergies    No Known Allergies    Intolerances        LABS:                        15.2   17.08 )-----------( 310      ( 26 May 2025 06:10 )             46.8     05-26    138  |  99  |  22  ----------------------------<  100[H]  4.4   |  31  |  1.00    Ca    9.1      26 May 2025 06:10  Phos  1.8     05-26  Mg     1.9     05-26        Urinalysis Basic - ( 26 May 2025 06:10 )    Color: x / Appearance: x / SG: x / pH: x  Gluc: 100 mg/dL / Ketone: x  / Bili: x / Urobili: x   Blood: x / Protein: x / Nitrite: x   Leuk Esterase: x / RBC: x / WBC x   Sq Epi: x / Non Sq Epi: x / Bacteria: x      CAPILLARY BLOOD GLUCOSE      POCT Blood Glucose.: 129 mg/dL (26 May 2025 08:32)      RADIOLOGY & ADDITIONAL TESTS: Reviewed.

## 2025-05-26 NOTE — PROGRESS NOTE ADULT - PROVIDER SPECIALTY LIST ADULT
Gastroenterology
Heme/Onc
Internal Medicine
Cardiology
Gastroenterology
Heme/Onc
Gastroenterology
Heme/Onc
Hospitalist
Internal Medicine
Internal Medicine
Hospitalist

## 2025-05-26 NOTE — PROGRESS NOTE ADULT - PROBLEM/PLAN-9
DISPLAY PLAN FREE TEXT
ambulatory

## 2025-05-26 NOTE — PROGRESS NOTE ADULT - PROBLEM SELECTOR PROBLEM 3
MARYAN (acute kidney injury)

## 2025-05-26 NOTE — PROGRESS NOTE ADULT - NUTRITIONAL ASSESSMENT
This patient has been assessed with a concern for Malnutrition and has been determined to have a diagnosis/diagnoses of Severe protein-calorie malnutrition.    This patient is being managed with:   Diet Consistent Carbohydrate/No Snacks-  Entered: May 25 2025  2:12PM  
This patient has been assessed with a concern for Malnutrition and has been determined to have a diagnosis/diagnoses of Severe protein-calorie malnutrition.    This patient is being managed with:   Diet NPO after Midnight-     NPO Start Date: 22-May-2025   NPO Start Time: 23:59  Except Medications  Entered: May 22 2025  1:32PM    Diet Clear Liquid-  Entered: May 22 2025 11:23AM  

## 2025-05-26 NOTE — PROGRESS NOTE ADULT - PROBLEM SELECTOR PLAN 1
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   Denies fever, chills, nausea, vomiting, recent dietary changes, recent travel, recent hospitalizations, new antibiotic use or sick contacts.  Prescribed prednisone 60mg qd and pantoprazole 40mg qd by oncologist  for relief two days prior to admission  Patient not currently meeting SIRS criteria. PE only significant to moderate abdominal distention; CTAP significant for wall thickening of the left hemicolon and ascending colon.  GI consulted, recs appreciated; s/p KCL 50 and 1L NS in ED   - encourage oral intake with low fiber diet, replete electrolytes as needed   - c/w PO prednisone daily  - continue with Protonix 40mg qd  - s/p cscope 5/23 with GI concerning for moderately active pancolitis; pathology pending  - obtaining blood cx; remains afebrile and with resolving diarrhea; holding off on abx at this time

## 2025-05-26 NOTE — PROGRESS NOTE ADULT - PROBLEM SELECTOR PLAN 12
F: none  E: replete as needed  N: Low fiber diet   VTE ppx: Lovenox  GI ppx: Protonix  Dispo: [facility]  Code status: Full
F: none  E: replete as needed  N: Low fiber diet   VTE ppx: Lovenox  GI ppx: Protonix  Dispo: [facility]  Code status: Full
F: none  E: replete as needed  N: Low fiber diet   VTE ppx: Lovenox  GI ppx: Protonix  Dispo: [facility]  Code status: Full
F: none  E: replete as needed  N: Low fiber diet   VTE ppx: Lovenox  GI ppx: Protonix  Dispo: [facility]  Code status: Full

## 2025-05-27 ENCOUNTER — TRANSCRIPTION ENCOUNTER (OUTPATIENT)
Age: 75
End: 2025-05-27

## 2025-05-27 VITALS
HEART RATE: 72 BPM | HEIGHT: 62 IN | DIASTOLIC BLOOD PRESSURE: 68 MMHG | WEIGHT: 165.35 LBS | RESPIRATION RATE: 18 BRPM | TEMPERATURE: 98 F | OXYGEN SATURATION: 92 % | SYSTOLIC BLOOD PRESSURE: 124 MMHG

## 2025-05-27 LAB
ANION GAP SERPL CALC-SCNC: 12 MMOL/L — SIGNIFICANT CHANGE UP (ref 5–17)
BUN SERPL-MCNC: 21 MG/DL — SIGNIFICANT CHANGE UP (ref 7–23)
CALCIUM SERPL-MCNC: 8.8 MG/DL — SIGNIFICANT CHANGE UP (ref 8.4–10.5)
CHLORIDE SERPL-SCNC: 98 MMOL/L — SIGNIFICANT CHANGE UP (ref 96–108)
CO2 SERPL-SCNC: 30 MMOL/L — SIGNIFICANT CHANGE UP (ref 22–31)
CREAT SERPL-MCNC: 0.88 MG/DL — SIGNIFICANT CHANGE UP (ref 0.5–1.3)
EGFR: 89 ML/MIN/1.73M2 — SIGNIFICANT CHANGE UP
EGFR: 89 ML/MIN/1.73M2 — SIGNIFICANT CHANGE UP
GLUCOSE SERPL-MCNC: 129 MG/DL — HIGH (ref 70–99)
HCT VFR BLD CALC: 47.7 % — SIGNIFICANT CHANGE UP (ref 39–50)
HGB BLD-MCNC: 15.5 G/DL — SIGNIFICANT CHANGE UP (ref 13–17)
MAGNESIUM SERPL-MCNC: 1.9 MG/DL — SIGNIFICANT CHANGE UP (ref 1.6–2.6)
MCHC RBC-ENTMCNC: 27.9 PG — SIGNIFICANT CHANGE UP (ref 27–34)
MCHC RBC-ENTMCNC: 32.5 G/DL — SIGNIFICANT CHANGE UP (ref 32–36)
MCV RBC AUTO: 85.9 FL — SIGNIFICANT CHANGE UP (ref 80–100)
NRBC BLD AUTO-RTO: 0 /100 WBCS — SIGNIFICANT CHANGE UP (ref 0–0)
PHOSPHATE SERPL-MCNC: 3.1 MG/DL — SIGNIFICANT CHANGE UP (ref 2.5–4.5)
PLATELET # BLD AUTO: 329 K/UL — SIGNIFICANT CHANGE UP (ref 150–400)
POTASSIUM SERPL-MCNC: 3.8 MMOL/L — SIGNIFICANT CHANGE UP (ref 3.5–5.3)
POTASSIUM SERPL-SCNC: 3.8 MMOL/L — SIGNIFICANT CHANGE UP (ref 3.5–5.3)
RBC # BLD: 5.55 M/UL — SIGNIFICANT CHANGE UP (ref 4.2–5.8)
RBC # FLD: 14.4 % — SIGNIFICANT CHANGE UP (ref 10.3–14.5)
SODIUM SERPL-SCNC: 140 MMOL/L — SIGNIFICANT CHANGE UP (ref 135–145)
WBC # BLD: 15.06 K/UL — HIGH (ref 3.8–10.5)
WBC # FLD AUTO: 15.06 K/UL — HIGH (ref 3.8–10.5)

## 2025-05-27 PROCEDURE — 86900 BLOOD TYPING SEROLOGIC ABO: CPT

## 2025-05-27 PROCEDURE — 87040 BLOOD CULTURE FOR BACTERIA: CPT

## 2025-05-27 PROCEDURE — 84133 ASSAY OF URINE POTASSIUM: CPT

## 2025-05-27 PROCEDURE — 85730 THROMBOPLASTIN TIME PARTIAL: CPT

## 2025-05-27 PROCEDURE — 80048 BASIC METABOLIC PNL TOTAL CA: CPT

## 2025-05-27 PROCEDURE — 87449 NOS EACH ORGANISM AG IA: CPT

## 2025-05-27 PROCEDURE — 87507 IADNA-DNA/RNA PROBE TQ 12-25: CPT

## 2025-05-27 PROCEDURE — 86850 RBC ANTIBODY SCREEN: CPT

## 2025-05-27 PROCEDURE — 84156 ASSAY OF PROTEIN URINE: CPT

## 2025-05-27 PROCEDURE — 99239 HOSP IP/OBS DSCHRG MGMT >30: CPT

## 2025-05-27 PROCEDURE — 85610 PROTHROMBIN TIME: CPT

## 2025-05-27 PROCEDURE — 80053 COMPREHEN METABOLIC PANEL: CPT

## 2025-05-27 PROCEDURE — 99222 1ST HOSP IP/OBS MODERATE 55: CPT | Mod: GC

## 2025-05-27 PROCEDURE — 85652 RBC SED RATE AUTOMATED: CPT

## 2025-05-27 PROCEDURE — 84300 ASSAY OF URINE SODIUM: CPT

## 2025-05-27 PROCEDURE — 87177 OVA AND PARASITES SMEARS: CPT

## 2025-05-27 PROCEDURE — 83993 ASSAY FOR CALPROTECTIN FECAL: CPT

## 2025-05-27 PROCEDURE — 83935 ASSAY OF URINE OSMOLALITY: CPT

## 2025-05-27 PROCEDURE — 85025 COMPLETE CBC W/AUTO DIFF WBC: CPT

## 2025-05-27 PROCEDURE — 71260 CT THORAX DX C+: CPT

## 2025-05-27 PROCEDURE — 86480 TB TEST CELL IMMUN MEASURE: CPT

## 2025-05-27 PROCEDURE — 99285 EMERGENCY DEPT VISIT HI MDM: CPT

## 2025-05-27 PROCEDURE — 83735 ASSAY OF MAGNESIUM: CPT

## 2025-05-27 PROCEDURE — 87324 CLOSTRIDIUM AG IA: CPT

## 2025-05-27 PROCEDURE — 82962 GLUCOSE BLOOD TEST: CPT

## 2025-05-27 PROCEDURE — 96360 HYDRATION IV INFUSION INIT: CPT

## 2025-05-27 PROCEDURE — 93005 ELECTROCARDIOGRAM TRACING: CPT

## 2025-05-27 PROCEDURE — 74177 CT ABD & PELVIS W/CONTRAST: CPT

## 2025-05-27 PROCEDURE — 88305 TISSUE EXAM BY PATHOLOGIST: CPT

## 2025-05-27 PROCEDURE — 84100 ASSAY OF PHOSPHORUS: CPT

## 2025-05-27 PROCEDURE — 84540 ASSAY OF URINE/UREA-N: CPT

## 2025-05-27 PROCEDURE — 85027 COMPLETE CBC AUTOMATED: CPT

## 2025-05-27 PROCEDURE — 82570 ASSAY OF URINE CREATININE: CPT

## 2025-05-27 PROCEDURE — 36415 COLL VENOUS BLD VENIPUNCTURE: CPT

## 2025-05-27 PROCEDURE — 80061 LIPID PANEL: CPT

## 2025-05-27 PROCEDURE — 83036 HEMOGLOBIN GLYCOSYLATED A1C: CPT

## 2025-05-27 PROCEDURE — 86901 BLOOD TYPING SEROLOGIC RH(D): CPT

## 2025-05-27 PROCEDURE — 86140 C-REACTIVE PROTEIN: CPT

## 2025-05-27 PROCEDURE — C8929: CPT

## 2025-05-27 RX ORDER — SULFAMETHOXAZOLE/TRIMETHOPRIM 800-160 MG
1 TABLET ORAL
Qty: 0 | Refills: 0 | DISCHARGE
Start: 2025-05-27

## 2025-05-27 RX ORDER — SULFAMETHOXAZOLE/TRIMETHOPRIM 800-160 MG
1 TABLET ORAL
Qty: 90 | Refills: 0
Start: 2025-05-27 | End: 2025-08-24

## 2025-05-27 RX ORDER — METFORMIN HYDROCHLORIDE 850 MG/1
1 TABLET ORAL
Refills: 0 | DISCHARGE

## 2025-05-27 RX ORDER — PREDNISONE 20 MG/1
3 TABLET ORAL
Qty: 540 | Refills: 0
Start: 2025-05-27 | End: 2025-08-24

## 2025-05-27 RX ORDER — ISOPROPYL ALCOHOL, BENZOCAINE .7; .06 ML/ML; ML/ML
0 SWAB TOPICAL
Qty: 100 | Refills: 1
Start: 2025-05-27

## 2025-05-27 RX ORDER — INSULIN LISPRO 100 U/ML
4 INJECTION, SOLUTION INTRAVENOUS; SUBCUTANEOUS
Qty: 1 | Refills: 0
Start: 2025-05-27 | End: 2025-08-24

## 2025-05-27 RX ADMIN — LISINOPRIL 10 MILLIGRAM(S): 5 TABLET ORAL at 09:45

## 2025-05-27 RX ADMIN — PREDNISONE 60 MILLIGRAM(S): 20 TABLET ORAL at 17:04

## 2025-05-27 RX ADMIN — AMLODIPINE BESYLATE 10 MILLIGRAM(S): 10 TABLET ORAL at 06:36

## 2025-05-27 RX ADMIN — PREDNISONE 60 MILLIGRAM(S): 20 TABLET ORAL at 06:36

## 2025-05-27 RX ADMIN — Medication 40 MILLIGRAM(S): at 06:36

## 2025-05-27 RX ADMIN — INSULIN LISPRO 4: 100 INJECTION, SOLUTION INTRAVENOUS; SUBCUTANEOUS at 12:55

## 2025-05-27 RX ADMIN — Medication 81 MILLIGRAM(S): at 09:45

## 2025-05-27 RX ADMIN — Medication 1 TABLET(S): at 09:45

## 2025-05-27 NOTE — CONSULT NOTE ADULT - ATTENDING COMMENTS
Briefly, Patient is a 73 y/o M with PMHx of Stage IV metastatic NSCLC on Keytruda/pembro immunotherapy, CVA 2012, CHB s/p DCPPM, carotid stenosis s/p endarterectomy COPD, HTN, HLD, who presented with 3 weeks of watery diarrhea since starting immunotherapy with concern for Colitis, pending Colonoscopy. Cardiology was consulted for Preoperative CV risk assessment and Optimization  - Patient seen and examined at bedside and all data reviewed  - Prior to Hospitalization he reported good functional status with exertional fatigue but denied chest pain or other anginal equivalents. METS>4 but limited by inguinal hernia  - Echo this hospitalization reviewed. Patient has normal BIV function. There appears to be thickening of the Atrial lead suspicious of Fibrinous tissue vs Thrombus and less likely vegetations  - At this time there are no active CV contraindication to proceeding with colonoscopy. Patient is at intermediate risk for a low risk but necessary procedure. He should proceed without further CV testing  - Prior to would recommend 20 IVP Lasix given mildly elevated JVP and pitting lower extremity edema  - Would consider lower extremity duplex given asymmetric Lower extremity swelling in patient with active malignancy  - Discussed TTE findings with patient. He warrants further evaluation by PATRICIA if this aligns with his GOC.   -If patient defers testing or is to be discharged over the weekend, Prior to PATRICIA, please ensure outpatient follow up with Dr. Abreu, to facilitate and expedite this test as an outpatient  - Please call Cardiology with any questions.
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  Has been treated with steroids--initially SoluMedrol now converted to Prednisone 60 mg PO BID.  A1c level on admission was only mildly above target at 7.3%  Has had surprisingly little hyperglycemia on the steroids, with occasional post-prandial spikes to the 200 range but no apparent need for basal insulin.  Is to be discharged today.  --Given lack of overnight hyperglycemia (glucose fell from 268 last night to 121 this morning with only 4 units of coverage), will not start basal insulin or restart metformin  --HIs lack of significant hyperglycemia is quite surprisingly given the doses of steroids he has received, and as an elderly, non-obese diabetic would be expected to be quite insulinopenic.  (No C-peptide level is available)  --Will start 4 units premeal lispro, plus a low-dose correction scale at home  --Pt was not monitoring fingersticks at home.  A OneTouch meter was obtained, and Dr. Whiting and I taught him how to use it  --Diet was discussed, and he was advised to avoid unduly restricting carbs at home--i.e. needs to have toast when he has eggs in the AM,, and that a sandwich for lunch would be fine
CT subsequently showed left sided colitis.  Drugs vs infection.  Would continue steroids as he has had neg outpt pcr.  Planning colonosocpy if cardiologically stable this week.  thanks

## 2025-05-27 NOTE — CONSULT NOTE ADULT - TIME BILLING
As above
Review of data and hospital course.  Instructed in regimen for discharge.  Taught fingerstick monitoring.  Diet discussed in detail

## 2025-05-27 NOTE — DISCHARGE NOTE NURSING/CASE MANAGEMENT/SOCIAL WORK - PATIENT PORTAL LINK FT
You can access the FollowMyHealth Patient Portal offered by [facility] by registering at the following website: [email or web address]. By joining [facility]’s FollowMyHealth portal, you will also be able to view your health information using other applications (apps) compatible with our system.

## 2025-05-27 NOTE — DISCHARGE NOTE NURSING/CASE MANAGEMENT/SOCIAL WORK - NSDCFUADDAPPT_GEN_ALL_CORE_FT
(1) Please follow up with your PCP Dr. Manzo on May 29, 2025 at 12pm.    [facility]) [facility]    (3) Please follow up with Cardiology Dr. Abreu on June 16, 2025 at 2:20pm. Please arrive 15 minutes prior to your appointment.  Location: [address] [place] [address]

## 2025-05-27 NOTE — DIETITIAN INITIAL EVALUATION ADULT - PROBLEM SELECTOR PROBLEM 10
GI Preoperative History and Physical Note  2025      CHIEF COMPLAINT:  No chief complaint on file.        SUBJECTIVE:    Mansi Maria is a 49 year old female seen today at the request of Mati Maldonado MD for screening colonoscopy      PROBLEM LIST:                  Patient Active Problem List   Diagnosis    CKD (chronic kidney disease), stage II    Benign essential hypertension    Carpal tunnel syndrome on both sides    Type 2 diabetes mellitus without complication, with long-term current use of insulin (CMD)    Mixed hyperlipidemia    Eczema of face    Annual physical exam    Dense breasts    Eczema    Acute idiopathic gout of left foot    Osteoarthritis of left hip    Vitamin D deficiency    Stage 3a chronic kidney disease (CKD)  (CMD)       HISTORIES:    ALLERGIES:   Allergen Reactions    Latex RASH     WITH DIRECT CONTACT ONLY    Losartan Cough     Past Medical History:   Diagnosis Date    Arthritis     Eczema     Essential (primary) hypertension     High cholesterol      Past Surgical History:   Procedure Laterality Date     section, low transverse      X2    Ectopic pregnancy surgery       Social History     Tobacco Use    Smoking status: Never    Smokeless tobacco: Never   Substance Use Topics    Alcohol use: Never     Drug Use:    Never              Family History   Problem Relation Age of Onset    Thyroid Mother     Diabetes Maternal Grandmother     Diabetes Paternal Grandmother         HOME MEDICATIONS:    (Not in a hospital admission)      INPATIENT MEDICATIONS:  Current Outpatient Medications   Medication Sig Dispense Refill    ALLOPURINOL PO Take 1 tablet by mouth daily as needed.      cetirizine (ZyrTEC) 10 MG tablet Take 10 mg by mouth daily.      cholecalciferol (VITAMIN D) 25 mcg(1,000 units) tablet Take 25 mcg by mouth daily.      olmesartan (BENICAR) 40 MG tablet TAKE 1 TABLET BY MOUTH DAILY 90 tablet 0    atorvastatin (LIPITOR) 20 MG tablet Take 1 tablet by mouth daily. 90  tablet 0    bisacodyl (DULCOLAX) 5 MG EC tablet TAKE TWO TAB BY MOUTH AFTER 7PM 2 tablet 0    Semaglutide, 2 MG/DOSE, (Ozempic, 2 MG/DOSE,) 8 MG/3ML Solution Pen-injector Inject 2 mg into the skin every 7 days. Indications: Type 2 Diabetes 3 mL 3    ibuprofen (MOTRIN) 600 MG tablet Take 1 tablet by mouth every 8 hours as needed for Pain. 90 tablet 1    colchicine/probenecid (ColBENEMID) 0.5-500 MG per tablet Take 1 tablet by mouth daily. (Patient taking differently: Take 1 tablet by mouth daily. USING PRN) 90 tablet 3    amLODIPine (NORVASC) 5 MG tablet Take 1 tablet by mouth daily. 90 tablet 3    clobetasol (TEMOVATE) 0.05 % ointment Use to affected area twice daily      Blood Glucose Monitoring Suppl (ONE TOUCH ULTRA 2) w/Device Kit 1 kit by Other route as directed. 1 kit 0    Lancets (OneTouch Delica Plus [id number]) Misc 1 each daily. 100 each 1    Lancet Devices (OneTouch Delica Plus Lancing) Misc 1 each as directed. To use with Delica Plus lancets 1 each 0    OneTouch Ultra test strip Test blood sugar 2 times daily. Diagnosis: Type 2 Diabetes Mellitus Without Complication, With Long-Term Current Use Of Insulin (Cmd) Meter: One Touch Ultra 100 strip 11    Blood Glucose Calibration (OneTouch Ultra) Liquid Use as directed 1 each 0    triamcinolone (ARISTOCORT) 0.1 % cream Apply 1 application. topically in the morning and 1 application. in the evening. 30 g 0     No current facility-administered medications for this encounter.       REVIEW OF SYSTEMS:    All other systems are reviewed and are negative except as documented in the history of present illness.    PHYSICAL EXAM:    Vital Signs: Height 5' 2\" (1.575 m), weight 79.8 kg (176 lb).  General: The patient is well developed, well nourished, in no acute distress, appears stated age.   Neurologic: Alert. Normal mood and affect, normal speech, no gross tremor.  Skin: Warm and dry, normal turgor.  Head: Normocephalic.  Eyes: Normal conjunctivae and sclerae.  Pupils  equal, round, reactive to light.  Extraocular movements intact.  HEENT: Oropharynx clear, moist mucous membranes, external nose is normal to inspection.  Neck: Symmetric without swelling or tenderness.   Respiratory: Respiratory effort normal.   Cardiovascular: Regular rate and rhythm.  Extremities: No swelling or tenderness.  Gastrointestinal:  Soft and nontender.  Normal bowel sounds.  No hepatomegaly or splenomegaly.       LABORATORY DATA and Imaging:   No results found for this or any previous visit (from the past 24 hours).      No orders to display           ASSESSMENT/PLAN:    Screening colonoscopy            Thank you for involving me in the care of this patient.    TAM RODRIGUEZ MD       Prophylactic measure

## 2025-05-27 NOTE — DISCHARGE NOTE NURSING/CASE MANAGEMENT/SOCIAL WORK - FINANCIAL ASSISTANCE
[facility] provides services at a reduced cost to those who are determined to be eligible through [facility]’s financial assistance program. Information regarding [facility]’s financial assistance program can be found by going to [email or web address] or by calling [phone].

## 2025-05-27 NOTE — CONSULT NOTE ADULT - CONSULT REASON
Patient is discharged to home. [facility] TN already informed MD team to write home health orders for discharge. No DME noted.--Home Health orders faxed to [facility] via [facility] care.--SN/PT/OT    Patient's prescriptions called into the [facility].    Outpatient  ordered for discharge.    --10:00 TN went to meet with patient, still resting in bed. She is aware of home health resumed and therapy added. Follow-up appointment scheduled. Patient was notified medications were called into the pharmacy.    --TN called and informed [facility] of discharge today.    Future Appointments  Date Time Provider Department Center   6/21/2018 9:20 AM Mesfin Hodges II, MD [facility] IM [facility] PCW   6/29/2018 1:30 PM Qamar Hope OD [facility] [place]   6/29/2018 2:00 PM Qamar Hope OD [facility] [place]   7/18/2018 9:30 AM Erik Oconnor MD [facility] PSYCH [facility]   7/18/2018 10:00 AM Analilia Mercado, PhD [facility] PSYCHOL [facility] [place]     Follow-up With  Details  Why  Contact Info   Mesfin Hodges II, MD  On 6/21/2018  at 09:20--, Primary Care Physician--No later appointments available.  [address]  [facility] [address]  [phone]   [facility] [place]    [facility]  [address] [place] [id number]  [phone]   Shireen Mayo MD  Call  AS NEEDED  [email or web address]  [facility] [address]  [phone]         06/12/18 0828   Final Note   Assessment Type Final Discharge Note   Discharge Disposition Home-Health   What phone number can be called within the next 1-3 days to see how you are doing after discharge? [phone]   Hospital Follow Up  Appt(s) scheduled? Yes   Discharge plans and expectations educations in teach back method with documentation complete? Yes   Right Care Referral Info   Post Acute Recommendation Home-care   Referral Type [place]   Facility Name RESUME WITH [facility]     Effie Mcgill RN  Transition 
Navigator  [phone]  
diarrhea on immunotherapy
hyperglycemia
pre-op
diarrhea

## 2025-05-27 NOTE — CONSULT NOTE ADULT - ASSESSMENT
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 Endocrinology has been consulted for Diabetes Management with hyperglycemia after patient was started onlong term steroid course for immune mediated colitis managed by his oncologist Dr. Pratt.       # Type 2 diabetes mellitus with hyperglycemia  - Decrease lispro 4 units before each meal.  - Monitor fsg before meals and bedtime  - Continue lispro low dose sliding scale before meals  - No basal insulin needed on discharge  - Please do not continue home metformin on discharge  - Patient's fingerstick glucose goal is 100-180 mg/dL.    - Discharge recommendations to be discussed.   - Patient can follow up at discharge with [facility] by calling [phone] to make an appointment.      Case discussed with Dr. Chambers. Primary team updated.     Service Pager: [phone]

## 2025-05-27 NOTE — CONSULT NOTE ADULT - SUBJECTIVE AND OBJECTIVE BOX
HISTORY OF PRESENT ILLNESS  JOANNE MARTINEZ is a 74y Male with a past medical history of     Endocrinology has been consulted for Diabetes Management.    DIABETES HISTORY  - Age at diagnosis:   - Diabetes managed outpatient by:  - Current Therapy:  - History of other regimens:   - Home glucose readings:  - History of DKA/HHS:   - Diabetic Complications:   - Diet:        FAMILY HISTORY  - Diabetes:    SOCIAL HISTORY  - Work:  - Alcohol:  - Smoking:      CURRENT MEDICATIONS  acetaminophen     Tablet .. 650 milliGRAM(s) Oral every 6 hours PRN  albuterol    90 MICROgram(s) HFA Inhaler 2 Puff(s) Inhalation every 6 hours PRN  aluminum hydroxide/magnesium hydroxide/simethicone Suspension 30 milliLiter(s) Oral every 4 hours PRN  amLODIPine   Tablet 10 milliGRAM(s) Oral every 24 hours  aspirin enteric coated 81 milliGRAM(s) Oral daily  atorvastatin 80 milliGRAM(s) Oral at bedtime  dextrose 5%. 1000 milliLiter(s) IV Continuous <Continuous>  dextrose 5%. 1000 milliLiter(s) IV Continuous <Continuous>  dextrose 50% Injectable 12.5 Gram(s) IV Push once  dextrose 50% Injectable 25 Gram(s) IV Push once  dextrose 50% Injectable 25 Gram(s) IV Push once  dextrose Oral Gel 15 Gram(s) Oral once PRN  glucagon  Injectable 1 milliGRAM(s) IntraMuscular once  hydrochlorothiazide 25 milliGRAM(s) Oral every 24 hours  insulin lispro (ADMELOG) corrective regimen sliding scale   SubCutaneous Before meals and at bedtime  lisinopril 10 milliGRAM(s) Oral every 24 hours  melatonin 3 milliGRAM(s) Oral at bedtime PRN  metoprolol succinate  milliGRAM(s) Oral every 24 hours  ondansetron Injectable 4 milliGRAM(s) IV Push every 8 hours PRN  pantoprazole    Tablet 40 milliGRAM(s) Oral before breakfast  predniSONE   Tablet 60 milliGRAM(s) Oral every 12 hours  trimethoprim   80 mG/sulfamethoxazole 400 mG 1 Tablet(s) Oral daily      REVIEW OF SYSTEMS  Constitutional:  Negative fever, chills   Cardiovascular:  Negative for chest pain or palpitations.  Respiratory:  Negative for cough, wheezing, or shortness of breath.   Gastrointestinal:  Negative for nausea, vomiting, diarrhea, constipation, or abdominal pain.  Genitourinary:  Negative frequency, urgency or dysuria.  Neurologic:  No headache, confusion, dizziness    PHYSICAL EXAM  Vital Signs Last 24 Hrs  T(C): 36.9 (27 May 2025 09:30), Max: 36.9 (27 May 2025 09:30)  T(F): 98.4 (27 May 2025 09:30), Max: 98.4 (27 May 2025 09:30)  HR: 72 (27 May 2025 09:30) (60 - 72)  BP: 124/68 (27 May 2025 09:30) (124/68 - 153/71)  BP(mean): 98 (27 May 2025 06:18) (98 - 98)  RR: 18 (27 May 2025 09:30) (18 - 18)  SpO2: 92% (27 May 2025 09:30) (92% - 95%)    Parameters below as of 27 May 2025 09:30  Patient On (Oxygen Delivery Method): room air      Constitutional: Awake, alert  HEENT: Normocephalic, atraumatic, CARRIE  Neck: supple, no acanthosis, no thyromegaly or palpable thyroid nodules.  Respiratory: Lungs clear to ausculation bilaterally.   Cardiovascular: regular rate and rhythm  GI: soft, non-tender, non-distended, bowel sounds present  Extremities: No lower extremity edema, peripheral pulses present.     LABS  CBC - WBC/HGB/HTC/PLT: 15.06/15.5/47.7/329 (05-27-25)  BMP: Na/K/Cl/Bicarb/BUN/Cr/Gluc: 140/3.8/98/30/21/0.88/129 (05-27-25)  Anion Gap: 12 (05-27-25)  eGFR: 89 (05-27-25)  Calcium: 8.8 (05-27-25)  Phosphorus: 3.1 (05-27-25)  Magnesium: 1.9 (05-27-25)  LFT - Alb/Tprot/Tbili/Dbili/AlkPhos/ALT/AST: 3.2/--/0.2/--/40/16/16 (05-22-25)  PT/aPTT/INR: 10.9/25.2/0.93 (05-23-25)  Thyroid Stimulating Hormone, Serum: 0.769 (04-28-25)  Total T4/Free T4: 7.90/-- (01-27-25)  CBC - WBC/HGB/HTC/PLT: 15.06/15.5/47.7/329 (05-27-25)BMP: Na/K/Cl/Bicarb/BUN/Cr/Gluc: 140/3.8/98/30/21/0.88/129 (05-27-25)  Anion Gap: 12 (05-27-25)  eGFR: 89 (05-27-25)  Calcium: 8.8 (05-27-25)  Phosphorus: 3.1 (05-27-25)  Magnesium: 1.9 (05-27-25)    CAPILLARY BLOOD GLUCOSE & INSULIN RECEIVED  129 mg/dL (05-26 @ 08:32)  256 mg/dL (05-26 @ 12:48)  124 mg/dL (05-26 @ 17:25)  268 mg/dL (05-26 @ 21:32)  121 mg/dL (05-27 @ 08:18)  214 mg/dL (05-27 @ 12:10)          ASSESSMENT / RECOMMENDATIONS    A1C: 7.3 %  BUN: 21  Creatinine: 0.88  GFR: 89  Weight: 60.6  BMI: 20.3  EF:     # Type 2 diabetes mellitus with hyperglycemia  - Please keep lantus   units at bedtime.   - Keep lispro   units before each meal.  - Continue lispro moderate dose sliding scale before meals and at bedtime.  - Patient's fingerstick glucose goal is 100-180 mg/dL.    - Discharge recommendations to be discussed.   - Patient can follow up at discharge with [facility] by calling [phone] to make an appointment.      Case discussed with Dr. Chambers. Primary team updated.       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 CT scan showed evidence of colitis with stable findings for malignancy.  Colonoscopy confirmed colitis.  Pt's symptoms are improved on IV methylpred 125 mg IV (5/21-5/25)daily and currently on prednisone PO 60 mg BID.  Labs are acceptable with the exception of hyperglycemia.    Endocrinology has been consulted for Diabetes Management with hyperglycemia. Patient was started on  long term steroid course for immune mediated colitis managed by his oncologist Dr. Pratt. Patient is currently on metformin 500mg daily at home. He used to take it take 1000mg daily but cut it down due to potential interaction with keytruda.   DIABETES HISTORY  - Age at diagnosis: 69  - Diabetes managed outpatient by: PCP Dr. Manzo  - Current Therapy:metformin 500mg daily   - History of other regimens: none  - Home glucose readings: does not have glucometer  - History of DKA/HHS: none  - Diabetic Complications: none  - Diet:  breakfast (hard boil eggs, cream of wheat) lunch and dinner (soups/ bread/ vegetables/ limited chicken)      FAMILY HISTORY  - Diabetes:    SOCIAL HISTORY  - Work:  - Alcohol:  - Smoking:      CURRENT MEDICATIONS  acetaminophen     Tablet .. 650 milliGRAM(s) Oral every 6 hours PRN  albuterol    90 MICROgram(s) HFA Inhaler 2 Puff(s) Inhalation every 6 hours PRN  aluminum hydroxide/magnesium hydroxide/simethicone Suspension 30 milliLiter(s) Oral every 4 hours PRN  amLODIPine   Tablet 10 milliGRAM(s) Oral every 24 hours  aspirin enteric coated 81 milliGRAM(s) Oral daily  atorvastatin 80 milliGRAM(s) Oral at bedtime  dextrose 5%. 1000 milliLiter(s) IV Continuous <Continuous>  dextrose 5%. 1000 milliLiter(s) IV Continuous <Continuous>  dextrose 50% Injectable 12.5 Gram(s) IV Push once  dextrose 50% Injectable 25 Gram(s) IV Push once  dextrose 50% Injectable 25 Gram(s) IV Push once  dextrose Oral Gel 15 Gram(s) Oral once PRN  glucagon  Injectable 1 milliGRAM(s) IntraMuscular once  hydrochlorothiazide 25 milliGRAM(s) Oral every 24 hours  insulin lispro (ADMELOG) corrective regimen sliding scale   SubCutaneous Before meals and at bedtime  lisinopril 10 milliGRAM(s) Oral every 24 hours  melatonin 3 milliGRAM(s) Oral at bedtime PRN  metoprolol succinate  milliGRAM(s) Oral every 24 hours  ondansetron Injectable 4 milliGRAM(s) IV Push every 8 hours PRN  pantoprazole    Tablet 40 milliGRAM(s) Oral before breakfast  predniSONE   Tablet 60 milliGRAM(s) Oral every 12 hours  trimethoprim   80 mG/sulfamethoxazole 400 mG 1 Tablet(s) Oral daily      REVIEW OF SYSTEMS  Constitutional:  Negative fever, chills   Cardiovascular:  Negative for chest pain or palpitations.  Respiratory:  Negative for cough, wheezing, or shortness of breath.   Gastrointestinal:  Negative for nausea, vomiting, diarrhea, constipation, or abdominal pain.  Genitourinary:  Negative frequency, urgency or dysuria.  Neurologic:  No headache, confusion, dizziness    PHYSICAL EXAM  Vital Signs Last 24 Hrs  T(C): 36.9 (27 May 2025 09:30), Max: 36.9 (27 May 2025 09:30)  T(F): 98.4 (27 May 2025 09:30), Max: 98.4 (27 May 2025 09:30)  HR: 72 (27 May 2025 09:30) (60 - 72)  BP: 124/68 (27 May 2025 09:30) (124/68 - 153/71)  BP(mean): 98 (27 May 2025 06:18) (98 - 98)  RR: 18 (27 May 2025 09:30) (18 - 18)  SpO2: 92% (27 May 2025 09:30) (92% - 95%)    Parameters below as of 27 May 2025 09:30  Patient On (Oxygen Delivery Method): room air      Constitutional: Awake, alert  HEENT: Normocephalic, atraumatic, CARRIE  Neck: supple, no acanthosis, no thyromegaly or palpable thyroid nodules.  Respiratory: Lungs clear to ausculation bilaterally.   Cardiovascular: regular rate and rhythm  GI: soft, non-tender, non-distended, bowel sounds present  Extremities: No lower extremity edema, peripheral pulses present.     LABS  CBC - WBC/HGB/HTC/PLT: 15.06/15.5/47.7/329 (05-27-25)  BMP: Na/K/Cl/Bicarb/BUN/Cr/Gluc: 140/3.8/98/30/21/0.88/129 (05-27-25)  Anion Gap: 12 (05-27-25)  eGFR: 89 (05-27-25)  Calcium: 8.8 (05-27-25)  Phosphorus: 3.1 (05-27-25)  Magnesium: 1.9 (05-27-25)  LFT - Alb/Tprot/Tbili/Dbili/AlkPhos/ALT/AST: 3.2/--/0.2/--/40/16/16 (05-22-25)  PT/aPTT/INR: 10.9/25.2/0.93 (05-23-25)  Thyroid Stimulating Hormone, Serum: 0.769 (04-28-25)  Total T4/Free T4: 7.90/-- (01-27-25)  CBC - WBC/HGB/HTC/PLT: 15.06/15.5/47.7/329 (05-27-25)BMP: Na/K/Cl/Bicarb/BUN/Cr/Gluc: 140/3.8/98/30/21/0.88/129 (05-27-25)  Anion Gap: 12 (05-27-25)  eGFR: 89 (05-27-25)  Calcium: 8.8 (05-27-25)  Phosphorus: 3.1 (05-27-25)  Magnesium: 1.9 (05-27-25)    CAPILLARY BLOOD GLUCOSE & INSULIN RECEIVED  129 mg/dL (05-26 @ 08:32)  256 mg/dL (05-26 @ 12:48)  124 mg/dL (05-26 @ 17:25)  268 mg/dL (05-26 @ 21:32)  121 mg/dL (05-27 @ 08:18)  214 mg/dL (05-27 @ 12:10)          ASSESSMENT / RECOMMENDATIONS    A1C: 7.3 %  BUN: 21  Creatinine: 0.88  GFR: 89  Weight: 60.6  BMI: 20.3  EF:     # Type 2 diabetes mellitus with hyperglycemia  - Keep lispro 5 units before each meal.  - Continue lispro moderate dose sliding scale before meals and at bedtime.  - Patient's fingerstick glucose goal is 100-180 mg/dL.    - Discharge recommendations to be discussed.   - Patient can follow up at discharge with [facility] by calling [phone] to make an appointment.      Case discussed with Dr. Chambers. Primary team updated.       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 CT scan showed evidence of colitis with stable findings for malignancy.  Colonoscopy confirmed colitis.  Pt's symptoms are improved on IV methylpred 125 mg IV (5/21-5/25)daily and currently on prednisone PO 60 mg BID.  Labs are acceptable with the exception of hyperglycemia.    Endocrinology has been consulted for Diabetes Management with hyperglycemia. Patient was started on  long term steroid course for immune mediated colitis managed by his oncologist Dr. Pratt. Patient is currently on metformin 500mg daily at home. He used to take it take 1000mg daily but cut it down due to potential interaction with keytruda.   DIABETES HISTORY  - Age at diagnosis: 69  - Diabetes managed outpatient by: PCP Dr. Manzo  - Current Therapy:metformin 500mg daily   - History of other regimens: none  - Home glucose readings: does not have glucometer  - History of DKA/HHS: none  - Diabetic Complications: none  - Diet:  breakfast (hard boil eggs, cream of wheat) lunch and dinner (soups/ bread/ vegetables/ limited chicken)      FAMILY HISTORY  - Diabetes: Grandmother, cousin    SOCIAL HISTORY  - Work: retired 15 years ago as  at [employer].  - Alcohol: Denies   - Smoking: Denies      CURRENT MEDICATIONS  acetaminophen     Tablet .. 650 milliGRAM(s) Oral every 6 hours PRN  albuterol    90 MICROgram(s) HFA Inhaler 2 Puff(s) Inhalation every 6 hours PRN  aluminum hydroxide/magnesium hydroxide/simethicone Suspension 30 milliLiter(s) Oral every 4 hours PRN  amLODIPine   Tablet 10 milliGRAM(s) Oral every 24 hours  aspirin enteric coated 81 milliGRAM(s) Oral daily  atorvastatin 80 milliGRAM(s) Oral at bedtime  dextrose 5%. 1000 milliLiter(s) IV Continuous <Continuous>  dextrose 5%. 1000 milliLiter(s) IV Continuous <Continuous>  dextrose 50% Injectable 12.5 Gram(s) IV Push once  dextrose 50% Injectable 25 Gram(s) IV Push once  dextrose 50% Injectable 25 Gram(s) IV Push once  dextrose Oral Gel 15 Gram(s) Oral once PRN  glucagon  Injectable 1 milliGRAM(s) IntraMuscular once  hydrochlorothiazide 25 milliGRAM(s) Oral every 24 hours  insulin lispro (ADMELOG) corrective regimen sliding scale   SubCutaneous Before meals and at bedtime  lisinopril 10 milliGRAM(s) Oral every 24 hours  melatonin 3 milliGRAM(s) Oral at bedtime PRN  metoprolol succinate  milliGRAM(s) Oral every 24 hours  ondansetron Injectable 4 milliGRAM(s) IV Push every 8 hours PRN  pantoprazole    Tablet 40 milliGRAM(s) Oral before breakfast  predniSONE   Tablet 60 milliGRAM(s) Oral every 12 hours  trimethoprim   80 mG/sulfamethoxazole 400 mG 1 Tablet(s) Oral daily      PHYSICAL EXAM  Vital Signs Last 24 Hrs  T(C): 36.9 (27 May 2025 09:30), Max: 36.9 (27 May 2025 09:30)  T(F): 98.4 (27 May 2025 09:30), Max: 98.4 (27 May 2025 09:30)  HR: 72 (27 May 2025 09:30) (60 - 72)  BP: 124/68 (27 May 2025 09:30) (124/68 - 153/71)  BP(mean): 98 (27 May 2025 06:18) (98 - 98)  RR: 18 (27 May 2025 09:30) (18 - 18)  SpO2: 92% (27 May 2025 09:30) (92% - 95%)    Parameters below as of 27 May 2025 09:30  Patient On (Oxygen Delivery Method): room air  PHYSICAL EXAM:  GENERAL: No acute distress, well-developed  ENT: EOMI, conjunctiva and sclera clear, Neck supple, No JVD, moist mucosa  CHEST/LUNG: Clear to auscultation bilaterally; No wheeze, equal breath sounds bilaterally  HEART: Regular rate and rhythm; No murmurs, rubs, or gallops, radial and DP 2+ b/l, euvolemic  ABDOMEN: Soft, Nontender, Nondistended  EXTREMITIES:  No clubbing, cyanosis, or edema  NEUROLOGY: AAOx3, non-focal  SKIN: Normal color, No rashes or lesions         LABS  CBC - WBC/HGB/HTC/PLT: 15.06/15.5/47.7/329 (05-27-25)  BMP: Na/K/Cl/Bicarb/BUN/Cr/Gluc: 140/3.8/98/30/21/0.88/129 (05-27-25)  Anion Gap: 12 (05-27-25)  eGFR: 89 (05-27-25)  Calcium: 8.8 (05-27-25)  Phosphorus: 3.1 (05-27-25)  Magnesium: 1.9 (05-27-25)  LFT - Alb/Tprot/Tbili/Dbili/AlkPhos/ALT/AST: 3.2/--/0.2/--/40/16/16 (05-22-25)  PT/aPTT/INR: 10.9/25.2/0.93 (05-23-25)  Thyroid Stimulating Hormone, Serum: 0.769 (04-28-25)  Total T4/Free T4: 7.90/-- (01-27-25)  CBC - WBC/HGB/HTC/PLT: 15.06/15.5/47.7/329 (05-27-25)BMP: Na/K/Cl/Bicarb/BUN/Cr/Gluc: 140/3.8/98/30/21/0.88/129 (05-27-25)  Anion Gap: 12 (05-27-25)  eGFR: 89 (05-27-25)  Calcium: 8.8 (05-27-25)  Phosphorus: 3.1 (05-27-25)  Magnesium: 1.9 (05-27-25)    CAPILLARY BLOOD GLUCOSE & INSULIN RECEIVED  129 mg/dL (05-26 @ 08:32)  256 mg/dL (05-26 @ 12:48)  124 mg/dL (05-26 @ 17:25)  268 mg/dL (05-26 @ 21:32)  121 mg/dL (05-27 @ 08:18)  214 mg/dL (05-27 @ 12:10)          ASSESSMENT / RECOMMENDATIONS    A1C: 7.3 %  BUN: 21  Creatinine: 0.88  GFR: 89  Weight: 60.6  BMI: 20.3  EF:     # Type 2 diabetes mellitus with hyperglycemia  - Keep lispro 5 units before each meal.  - Continue lispro moderate dose sliding scale before meals and at bedtime.  - Patient's fingerstick glucose goal is 100-180 mg/dL.    - Discharge recommendations to be discussed.   - Patient can follow up at discharge with [facility] by calling [phone] to make an appointment.      Case discussed with Dr. Chambers. Primary team updated.       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 CT scan showed evidence of colitis with stable findings for malignancy.  Colonoscopy confirmed colitis.  Pt's symptoms are improved on IV methylpred 125 mg IV (5/21-5/25)daily and currently on prednisone PO 60 mg BID.  Labs are acceptable with the exception of hyperglycemia.    Endocrinology has been consulted for Diabetes Management with hyperglycemia. Patient was started onlong term steroid course for immune mediated colitis managed by his oncologist Dr. Pratt. Patient is currently on metformin 500mg daily at home. He used to take it take 1000mg daily but cut it down due to potential interaction with keytruda. States diarrhea has resolved now.  DIABETES HISTORY  - Age at diagnosis: 69  - Diabetes managed outpatient by: PCP Dr. Manzo  - Current Therapy:metformin 500mg daily   - History of other regimens: none  - Home glucose readings: does not have glucometer  - History of DKA/HHS: none  - Diabetic Complications: none  - Diet:  breakfast (hard boil eggs, cream of wheat) lunch and dinner (soups/ bread/ vegetables/ limited chicken)      FAMILY HISTORY  - Diabetes: Grandmother, cousin    SOCIAL HISTORY  - Work: retired 15 years ago as  at [employer].  - Alcohol: Denies   - Smoking: Denies      CURRENT MEDICATIONS  acetaminophen     Tablet .. 650 milliGRAM(s) Oral every 6 hours PRN  albuterol    90 MICROgram(s) HFA Inhaler 2 Puff(s) Inhalation every 6 hours PRN  aluminum hydroxide/magnesium hydroxide/simethicone Suspension 30 milliLiter(s) Oral every 4 hours PRN  amLODIPine   Tablet 10 milliGRAM(s) Oral every 24 hours  aspirin enteric coated 81 milliGRAM(s) Oral daily  atorvastatin 80 milliGRAM(s) Oral at bedtime  dextrose 5%. 1000 milliLiter(s) IV Continuous <Continuous>  dextrose 5%. 1000 milliLiter(s) IV Continuous <Continuous>  dextrose 50% Injectable 12.5 Gram(s) IV Push once  dextrose 50% Injectable 25 Gram(s) IV Push once  dextrose 50% Injectable 25 Gram(s) IV Push once  dextrose Oral Gel 15 Gram(s) Oral once PRN  glucagon  Injectable 1 milliGRAM(s) IntraMuscular once  hydrochlorothiazide 25 milliGRAM(s) Oral every 24 hours  insulin lispro (ADMELOG) corrective regimen sliding scale   SubCutaneous Before meals and at bedtime  lisinopril 10 milliGRAM(s) Oral every 24 hours  melatonin 3 milliGRAM(s) Oral at bedtime PRN  metoprolol succinate  milliGRAM(s) Oral every 24 hours  ondansetron Injectable 4 milliGRAM(s) IV Push every 8 hours PRN  pantoprazole    Tablet 40 milliGRAM(s) Oral before breakfast  predniSONE   Tablet 60 milliGRAM(s) Oral every 12 hours  trimethoprim   80 mG/sulfamethoxazole 400 mG 1 Tablet(s) Oral daily      PHYSICAL EXAM  Vital Signs Last 24 Hrs  T(C): 36.9 (27 May 2025 09:30), Max: 36.9 (27 May 2025 09:30)  T(F): 98.4 (27 May 2025 09:30), Max: 98.4 (27 May 2025 09:30)  HR: 72 (27 May 2025 09:30) (60 - 72)  BP: 124/68 (27 May 2025 09:30) (124/68 - 153/71)  BP(mean): 98 (27 May 2025 06:18) (98 - 98)  RR: 18 (27 May 2025 09:30) (18 - 18)  SpO2: 92% (27 May 2025 09:30) (92% - 95%)    Parameters below as of 27 May 2025 09:30  Patient On (Oxygen Delivery Method): room air  PHYSICAL EXAM:  GENERAL: No acute distress, well-developed  ENT: EOMI, conjunctiva and sclera clear, Neck supple, No JVD, moist mucosa  CHEST/LUNG: Clear to auscultation bilaterally; No wheeze, equal breath sounds bilaterally  HEART: Regular rate and rhythm; No murmurs, rubs, or gallops, radial and DP 2+ b/l, euvolemic  ABDOMEN: Soft, Nontender, Nondistended  EXTREMITIES:  No clubbing, cyanosis, or edema  NEUROLOGY: AAOx3, non-focal  SKIN: Normal color, No rashes or lesions         LABS  CBC - WBC/HGB/HTC/PLT: 15.06/15.5/47.7/329 (05-27-25)  BMP: Na/K/Cl/Bicarb/BUN/Cr/Gluc: 140/3.8/98/30/21/0.88/129 (05-27-25)  Anion Gap: 12 (05-27-25)  eGFR: 89 (05-27-25)  Calcium: 8.8 (05-27-25)  Phosphorus: 3.1 (05-27-25)  Magnesium: 1.9 (05-27-25)  LFT - Alb/Tprot/Tbili/Dbili/AlkPhos/ALT/AST: 3.2/--/0.2/--/40/16/16 (05-22-25)  PT/aPTT/INR: 10.9/25.2/0.93 (05-23-25)  Thyroid Stimulating Hormone, Serum: 0.769 (04-28-25)  Total T4/Free T4: 7.90/-- (01-27-25)  CBC - WBC/HGB/HTC/PLT: 15.06/15.5/47.7/329 (05-27-25)BMP: Na/K/Cl/Bicarb/BUN/Cr/Gluc: 140/3.8/98/30/21/0.88/129 (05-27-25)  Anion Gap: 12 (05-27-25)  eGFR: 89 (05-27-25)  Calcium: 8.8 (05-27-25)  Phosphorus: 3.1 (05-27-25)  Magnesium: 1.9 (05-27-25)    CAPILLARY BLOOD GLUCOSE & INSULIN RECEIVED  129 mg/dL (05-26 @ 08:32)  256 mg/dL (05-26 @ 12:48)  124 mg/dL (05-26 @ 17:25)  268 mg/dL (05-26 @ 21:32)  121 mg/dL (05-27 @ 08:18)  214 mg/dL (05-27 @ 12:10)          ASSESSMENT / RECOMMENDATIONS    A1C: 7.3 %  BUN: 21  Creatinine: 0.88  GFR: 89  Weight: 60.6  BMI: 20.3  EF:

## 2025-05-29 ENCOUNTER — APPOINTMENT (OUTPATIENT)
Dept: HEMATOLOGY ONCOLOGY | Facility: CLINIC | Age: 75
End: 2025-05-29
Payer: MEDICARE

## 2025-05-29 VITALS
WEIGHT: 126 LBS | DIASTOLIC BLOOD PRESSURE: 66 MMHG | RESPIRATION RATE: 18 BRPM | SYSTOLIC BLOOD PRESSURE: 132 MMHG | HEIGHT: 66 IN | TEMPERATURE: 97.1 F | HEART RATE: 79 BPM | BODY MASS INDEX: 20.25 KG/M2 | OXYGEN SATURATION: 93 %

## 2025-05-29 DIAGNOSIS — C34.90 MALIGNANT NEOPLASM OF UNSPECIFIED PART OF UNSPECIFIED BRONCHUS OR LUNG: ICD-10-CM

## 2025-05-29 DIAGNOSIS — C79.31 MALIGNANT NEOPLASM OF UNSPECIFIED PART OF UNSPECIFIED BRONCHUS OR LUNG: ICD-10-CM

## 2025-05-29 PROBLEM — M79.89 SWELLING OF BOTH LOWER EXTREMITIES: Status: ACTIVE | Noted: 2025-05-29

## 2025-05-29 LAB
ALBUMIN SERPL ELPH-MCNC: 3.2 G/DL
ALP BLD-CCNC: 50 U/L
ALT SERPL-CCNC: 80 U/L
ANION GAP SERPL CALC-SCNC: 12 MMOL/L
AST SERPL-CCNC: 48 U/L
BILIRUB SERPL-MCNC: 0.7 MG/DL
BUN SERPL-MCNC: 26 MG/DL
CALCIUM SERPL-MCNC: 9.3 MG/DL
CHLORIDE SERPL-SCNC: 96 MMOL/L
CO2 SERPL-SCNC: 31 MMOL/L
CREAT SERPL-MCNC: 1 MG/DL
DEPRECATED D DIMER PPP IA-ACNC: 228 NG/ML DDU
EGFRCR SERPLBLD CKD-EPI 2021: 78 ML/MIN/1.73M2
GLUCOSE SERPL-MCNC: 258 MG/DL
POTASSIUM SERPL-SCNC: 4.2 MMOL/L
PROT SERPL-MCNC: 6.3 G/DL
SODIUM SERPL-SCNC: 139 MMOL/L

## 2025-05-29 PROCEDURE — 99215 OFFICE O/P EST HI 40 MIN: CPT | Mod: 25

## 2025-05-29 PROCEDURE — 36415 COLL VENOUS BLD VENIPUNCTURE: CPT

## 2025-05-29 PROCEDURE — G2212 PROLONG OUTPT/OFFICE VIS: CPT

## 2025-05-29 RX ORDER — SULFAMETHOXAZOLE AND TRIMETHOPRIM 400; 80 MG/1; MG/1
400-80 TABLET ORAL DAILY
Refills: 0 | Status: ACTIVE | COMMUNITY

## 2025-05-30 ENCOUNTER — APPOINTMENT (OUTPATIENT)
Dept: ENDOCRINOLOGY | Facility: CLINIC | Age: 75
End: 2025-05-30
Payer: MEDICARE

## 2025-05-30 DIAGNOSIS — E11.9 TYPE 2 DIABETES MELLITUS W/OUT COMPLICATIONS: ICD-10-CM

## 2025-05-30 PROCEDURE — 99212 OFFICE O/P EST SF 10 MIN: CPT | Mod: 93

## 2025-05-31 LAB
CULTURE RESULTS: SIGNIFICANT CHANGE UP
SPECIMEN SOURCE: SIGNIFICANT CHANGE UP

## 2025-06-02 NOTE — CHART NOTE - NSCHARTNOTESELECT_GEN_ALL_CORE
Event Note
PPM Interrogation/Event Note
Nutrition Services
oncology/Off Service Note
Post-Discharge Note

## 2025-06-02 NOTE — CHART NOTE - NSCHARTNOTEFT_GEN_A_CORE
Admitting Diagnosis:   Patient is a 74y old  Male who presents with a chief complaint of DIARRHEA (22 May 2025 11:57)    Current Nutrition Order:  NPO    PO Intake: Good (%) [   ]  Fair (50-75%) [   ] Poor (<25%) [   ] - N/A NPO    GI Issues:   Pt denies nausea/vomiting  Endorses loose BMs in setting of bowel prep  No abdominal distension/discomfort noted     Pain:  Pt denies pain this AM    Skin Integrity:  No pressure injuries or edema documented, Jae score 22    Labs:       138  |  96  |  12  ----------------------------<  108[H]  3.2[L]   |  32[H]  |  0.77    Ca    8.5      23 May 2025 06:20  Phos  2.0       Mg     2.1         TPro  5.4[L]  /  Alb  3.2[L]  /  TBili  0.2  /  DBili  x   /  AST  16  /  ALT  16  /  AlkPhos  40      CAPILLARY BLOOD GLUCOSE  POCT Blood Glucose.: 108 mg/dL (22 May 2025 22:01)  POCT Blood Glucose.: 170 mg/dL (22 May 2025 17:36)  POCT Blood Glucose.: 260 mg/dL (22 May 2025 12:40)    Medications:  MEDICATIONS  (STANDING):  amLODIPine   Tablet 10 milliGRAM(s) Oral every 24 hours  aspirin enteric coated 81 milliGRAM(s) Oral daily  atorvastatin 80 milliGRAM(s) Oral at bedtime  dextrose 5%. 1000 milliLiter(s) (50 mL/Hr) IV Continuous <Continuous>  dextrose 5%. 1000 milliLiter(s) (100 mL/Hr) IV Continuous <Continuous>  dextrose 50% Injectable 25 Gram(s) IV Push once  dextrose 50% Injectable 12.5 Gram(s) IV Push once  dextrose 50% Injectable 25 Gram(s) IV Push once  glucagon  Injectable 1 milliGRAM(s) IntraMuscular once  hydrochlorothiazide 25 milliGRAM(s) Oral every 24 hours  insulin lispro (ADMELOG) corrective regimen sliding scale   SubCutaneous Before meals and at bedtime  methylPREDNISolone sodium succinate Injectable 125 milliGRAM(s) IV Push daily  metoprolol succinate  milliGRAM(s) Oral every 24 hours  pantoprazole    Tablet 40 milliGRAM(s) Oral before breakfast  potassium chloride  20 mEq/100 mL IVPB 20 milliEquivalent(s) IV Intermittent every 2 hours    MEDICATIONS  (PRN):  acetaminophen     Tablet .. 650 milliGRAM(s) Oral every 6 hours PRN Temp greater or equal to 38C (100.4F), Mild Pain (1 - 3)  albuterol    90 MICROgram(s) HFA Inhaler 2 Puff(s) Inhalation every 6 hours PRN Bronchospasm  aluminum hydroxide/magnesium hydroxide/simethicone Suspension 30 milliLiter(s) Oral every 4 hours PRN Dyspepsia  dextrose Oral Gel 15 Gram(s) Oral once PRN Blood Glucose LESS THAN 70 milliGRAM(s)/deciliter  melatonin 3 milliGRAM(s) Oral at bedtime PRN Insomnia  ondansetron Injectable 4 milliGRAM(s) IV Push every 8 hours PRN Nausea and/or Vomiting    Anthropometrics:  Height: 5'8"  Weight: 134 pounds / 60.6 kilograms  BMI: 20.3  IBW: 154 pounds / 70 kilograms            87%IBW    Weight Change:   No new weights obtained since admission. Recommend nursing to trend weekly weights. RD to continue monitoring weights as able.    Severe Chronic Protein Calorie Malnutrition per RD : Risk Assessment Completed   - NFPE: moderate muscle and fat wasting     Estimated energy needs:   Calories: 30-35kcal/k-2121kcal/d  Protein: 1.5-2g/k-121g/d  Fluids: 30-35mL/k-2121mL/d  Actual body weight used as pt is within % ideal body weight (154 pounds); 86.6% ideal body weight. Needs adjusted for age, malnutrition, and cancer on treatment.    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 Pending colonoscopy .     Pt seen on 7WO for follow-up. Labs and medication orders reviewed. Ordered for hydrochlorothiazide, potassium chloride. Na/Mg WNL, Phos 2 <low>, K 3.2 ,L>, POC blood glucose () 108-260. On Clears last night, NPO this AM. Pt sitting up in bed on visit. Reports minimal GI discomfort, notes liquid stools in setting of bowel prep for colonoscopy today. Denies difficulty swallowing. Receptive to oral nutrition supplementation once diet advanced. See nutrition recommendations. RD to remain available.     Previous Nutrition Diagnosis:  Severe chronic malnutrition related to malabsorption related to ?immunotherapy induced colitis as evidenced by moderate muscle and fat wasting.     Active [ x ]  Resolved [   ]    Goal:  Pt to consistently meet >75% nutrient needs. Reduce signs and symptoms of protein-calorie malnutrition.     Recommendations:  1. As medically feasible status post procedure, recommend advance to Low Fiber diet with Ensure Max (150kcal, 30g protein) x2/day.   >>If electrolytes persistently low/difficult to correct, consider Consistent Carbohydrate diet restriction to mitigate refeeding syndrome.   2. Consider add thiamine in setting of severe malnutrition and electrolyte abnormalities.   3. Monitor GI tolerance, weight trends, labs, and skin integrity.  >>Monitor electrolytes closely and replete prn.   4. Defer bowel and pain regimens to team.   5. RD to remain available for diet education/intervention prn.     Education:  Reviewed strategies to maximize nutrition status and promote wt restoration. Discussed modifying fiber intake with GI symptoms. RD answered all pt questions. Pt aware RD remains available for additional questions/concerns.
Progress Notes by Howard Maravilla MD at 05/08/17 08:31 AM     Author:  Howard Maravilla MD Service:  (none) Author Type:  Physician     Filed:  05/08/17 10:37 AM Encounter Date:  5/8/2017 Status:  Signed     :  Howard Maravilla MD (Physician)              PATIENT NAME:  Lyndsey MELVIN. REC: [id number]   DATE: 5/8/2017                             YOB: 1950  PHYSICIAN: Howard Rosales M.D.    PREOPERATIVE DIAGNOSIS:[JC1.1T] Nuclear Sclerosis[JC1.1M] cataract in the[JC1.1T] right[JC1.1M] eye    POSTOPERATIVE DIAGNOSIS:[JC1.1T] Nuclear Sclerosis[JC1.1M] cataract in the[JC1.1T] right[JC1.1M] eye    PROCEDURE:[JC1.1T]  1.[JC1.1M]  Phacoemulsification with posterior chamber lens implantation in the[JC1.1T] right[JC1.1M] eye.[JC1.1T]         2.  toric[JC1.1M]    ANESTHESIA: Topical.    COMPLICATIONS: None.    ESTIMATED BLOOD LOSS: Zero.    INDICATIONS:  Patient with progressive loss of vision in the[JC1.1T] right[JC1.1M] eye affecting lifestyle.    PROCEDURE:  The[JC1.1T] right[JC1.1M] eye was dilated in the preop area.  Tetracaine 0.5% was put in the eye for anesthesia in the preop area.  The eye was marked at 6 o'clock. The patient was then brought into the operating room, placed in supine position, and EKG leads placed.  The eye was prepped and draped in the usual sterile fashion.  The lid speculum was used to open the eyelids.  Paracentesis was made at[JC1.1T] 11[JC1.1M] o'clock.  Lidocaine without epinephrine was injected into the anterior chamber.  This was about 0.2 cc.  After lidocaine was injected, viscoelastic was injected through the pareacentesis site.  Then a clear corneal temporal wound was made with a keratome blade.  A cystitome was then used to make a capsulorrhexis.  BSS was used to hydrodissect and hydrodelineate the nucleus and cortex.  The phaco tip was placed in the eye, and a[JC1.1T] standard stop-and-chop[JC1.1M] maneuver was used to remove the nucleus and most of the 
patient has follow up with Dr. Pratt at [facility] at 1:40PM on thursday 5/29  please include this in discharge instructions
5/23/25 colonoscopy    Impressions:  	Granularity, erythema, congestion and abnormal vascularity in the rectal pouch, sigmoid colon, descending colon, transverse colon and ascending colon. (Biopsy).  	Polyps in the ascending colon and sigmoid colon (possibly inflammatory pseudopolyps).  	Polyps in the ascending colon.    Plan  - Findings consistent with moderately active pan-colitis (Claire 2)     - Return to floor for further management    - Await pathology results.    - Advance diet as tolerated     - Continue current steroid therapy    Lloyd Hurd DO  Gastroenterology Fellow  GI Consult Pager Weekdays 7am-5pm: [phone]  Weeknights/Weekend/Holiday Coverage: Please Call the  for Contact Information  Follow Up Questions Welcome via [place] Microsoft Teams Messenger
Electrophysiology Device Interrogation       Indication: Device interrogation     Device model: [employer] [place]                       Functioning Mode: DDD  		    Pacemaker dependency:  99.8%, AP 2.3%    Battery status: 7.8 years    Interrogating parameters:   				RA			RV	                         Threshold:                                          0.750 mV @ 0.40                      1.000 mV @ 0.40                          Pacing Impedance:                               589                                    475                                                                                                                                         Events/Alert:  N/A    Parameter change: N/A    Assessment: No change in pacemaker threshold or pacing impedance. Sensitivity not performed given complete pacemaker dependence
cortex.  I/A tip was placed in the eye to remove the remaining cortex.  The polisher was used to polish the posterior capsule.  Viscoeleastic was injected into the capsular bag.  Then the posterior chamber lens implant, model[JC1.1T]  SN6[JC1.1M]AT3[JC1.2M],[JC1.1T] 19.5[JC1.2M] diopters was placed into the capsular bag[JC1.1T] 120 degrees[JC1.2M].  The viscoelastic was aspirated out.  The temporal wound was hydrated.  The anterior chamber was then inflated and wound leak was checked and there was none found.  0.05 cc of vigamox was injected into the anterior chamber through the paracentesis.  Wound leak was checked with weck remigio and none found.  The lid speculum was removed and Vigamox and prednisolone drops were placed in the eye. The eye[JC1.1T] was not[JC1.1M] patched. The patient left the operating room without any complications.  Patient is to follow up with me tomorrow morning.    Howard Maravilla MD 5/8/2017 8:31 AM  Department of Ophthalmology[JC1.1T]      Revision History        User Key Date/Time User Provider Type Action    > JC1.2 05/08/17 10:37 AM Howard Maravilla MD Physician Sign     JC1.1 05/08/17 08:31 AM Howard Maravilla MD Physician     M - Manual, T - Template            
Post-Discharge Medication Review: Completed	  Patient contacted to offer medication counseling post-discharge. Medication reconciliation completed. Per patient, medications include:	  	  1.	Albuterol (Eqv-Proventil HFA) 90 mcg/inh inhalation aerosol 2 puff(s) inhaled 4 times a day  2.	amLODIPine 10 mg oral tablet 1 tab(s) orally once a day  3.	Aspir 81 oral delayed release tablet 1 tab(s) orally once a day  4.	ezetimibe 10 mg oral tablet 1 tab(s) orally once a day  5.	hydroCHLOROthiazide 25 mg oral tablet 1 tab(s) orally once a day  6.	insulin lispro 100 units/mL injectable solution 4 unit(s) injectable 4 times a day (before meals and at bedtime)  7.	Lipitor 80 mg oral tablet 1 tab(s) orally once a day  8.	lisinopril 40 mg oral tablet 1 tab(s) orally once a day  9.	metoprolol succinate 100 mg oral tablet, extended release 1 tab(s) orally once a day  10.	pantoprazole 40 mg oral delayed release tablet 1 tab(s) orally once a day  11.	sulfamethoxazole-trimethoprim 400 mg-80 mg oral tablet 1 tab(s) orally once a day    	  Medications added to OMR (updated per discussion with patient):	  12.	 predniSONE 20 mg oral tablet 3 tab(s) orally once daily   		  Medications removed from OMR (updated per discussion with patient):	  1.	predniSONE 20 mg oral tablet 3 tab(s) orally every 12 hours   		  Medication name, indication, administration, side effect, and monitoring reviewed for new medications during post discharge counseling visit with patient. Patient demonstrated understanding. Counseling offered for all medications.	  	  Carlos Eduardo Beckwith, PharmD	  Clinical Pharmacy Specialist, Pharmacy Telehealth Team	  Can be reached via [place] Teams or [phone]

## 2025-06-03 DIAGNOSIS — E43 UNSPECIFIED SEVERE PROTEIN-CALORIE MALNUTRITION: ICD-10-CM

## 2025-06-03 DIAGNOSIS — Z79.82 LONG TERM (CURRENT) USE OF ASPIRIN: ICD-10-CM

## 2025-06-03 DIAGNOSIS — C79.31 SECONDARY MALIGNANT NEOPLASM OF BRAIN: ICD-10-CM

## 2025-06-03 DIAGNOSIS — Z79.84 LONG TERM (CURRENT) USE OF ORAL HYPOGLYCEMIC DRUGS: ICD-10-CM

## 2025-06-03 DIAGNOSIS — I10 ESSENTIAL (PRIMARY) HYPERTENSION: ICD-10-CM

## 2025-06-03 DIAGNOSIS — I44.2 ATRIOVENTRICULAR BLOCK, COMPLETE: ICD-10-CM

## 2025-06-03 DIAGNOSIS — T38.0X5A ADVERSE EFFECT OF GLUCOCORTICOIDS AND SYNTHETIC ANALOGUES, INITIAL ENCOUNTER: ICD-10-CM

## 2025-06-03 DIAGNOSIS — C34.31 MALIGNANT NEOPLASM OF LOWER LOBE, RIGHT BRONCHUS OR LUNG: ICD-10-CM

## 2025-06-03 DIAGNOSIS — E09.65 DRUG OR CHEMICAL INDUCED DIABETES MELLITUS WITH HYPERGLYCEMIA: ICD-10-CM

## 2025-06-03 DIAGNOSIS — A09 INFECTIOUS GASTROENTERITIS AND COLITIS, UNSPECIFIED: ICD-10-CM

## 2025-06-03 DIAGNOSIS — I74.5 EMBOLISM AND THROMBOSIS OF ILIAC ARTERY: ICD-10-CM

## 2025-06-03 DIAGNOSIS — Q21.12 PATENT FORAMEN OVALE: ICD-10-CM

## 2025-06-03 DIAGNOSIS — Z87.891 PERSONAL HISTORY OF NICOTINE DEPENDENCE: ICD-10-CM

## 2025-06-03 DIAGNOSIS — J44.9 CHRONIC OBSTRUCTIVE PULMONARY DISEASE, UNSPECIFIED: ICD-10-CM

## 2025-06-03 DIAGNOSIS — K52.1 TOXIC GASTROENTERITIS AND COLITIS: ICD-10-CM

## 2025-06-03 DIAGNOSIS — E78.5 HYPERLIPIDEMIA, UNSPECIFIED: ICD-10-CM

## 2025-06-03 DIAGNOSIS — K63.5 POLYP OF COLON: ICD-10-CM

## 2025-06-03 DIAGNOSIS — T45.1X5A ADVERSE EFFECT OF ANTINEOPLASTIC AND IMMUNOSUPPRESSIVE DRUGS, INITIAL ENCOUNTER: ICD-10-CM

## 2025-06-03 DIAGNOSIS — Z95.0 PRESENCE OF CARDIAC PACEMAKER: ICD-10-CM
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
## 2025-06-03 DIAGNOSIS — Z79.899 OTHER LONG TERM (CURRENT) DRUG THERAPY: ICD-10-CM

## 2025-06-03 DIAGNOSIS — N17.9 ACUTE KIDNEY FAILURE, UNSPECIFIED: ICD-10-CM

## 2025-06-04 ENCOUNTER — APPOINTMENT (OUTPATIENT)
Dept: ENDOCRINOLOGY | Facility: CLINIC | Age: 75
End: 2025-06-04
Payer: MEDICARE

## 2025-06-04 VITALS
HEART RATE: 67 BPM | SYSTOLIC BLOOD PRESSURE: 175 MMHG | WEIGHT: 120 LBS | DIASTOLIC BLOOD PRESSURE: 85 MMHG | BODY MASS INDEX: 19.37 KG/M2

## 2025-06-04 DIAGNOSIS — T38.0X5A HYPERGLYCEMIA, UNSPECIFIED: ICD-10-CM

## 2025-06-04 DIAGNOSIS — R73.9 HYPERGLYCEMIA, UNSPECIFIED: ICD-10-CM

## 2025-06-04 DIAGNOSIS — M79.89 OTHER SPECIFIED SOFT TISSUE DISORDERS: ICD-10-CM

## 2025-06-04 PROBLEM — E11.65 UNCONTROLLED TYPE 2 DIABETES MELLITUS WITH HYPERGLYCEMIA: Status: ACTIVE | Noted: 2025-06-04

## 2025-06-04 LAB
GLUCOSE BLDC GLUCOMTR-MCNC: 133
HBA1C MFR BLD HPLC: 7.3

## 2025-06-04 PROCEDURE — G2211 COMPLEX E/M VISIT ADD ON: CPT

## 2025-06-04 PROCEDURE — 83036 HEMOGLOBIN GLYCOSYLATED A1C: CPT | Mod: QW

## 2025-06-04 PROCEDURE — 36415 COLL VENOUS BLD VENIPUNCTURE: CPT

## 2025-06-04 PROCEDURE — 82962 GLUCOSE BLOOD TEST: CPT

## 2025-06-04 PROCEDURE — 99215 OFFICE O/P EST HI 40 MIN: CPT

## 2025-06-04 RX ORDER — BLOOD-GLUCOSE SENSOR
EACH MISCELLANEOUS
Qty: 3 | Refills: 3 | Status: ACTIVE | COMMUNITY
Start: 2025-06-04 | End: 1900-01-01

## 2025-06-05 ENCOUNTER — NON-APPOINTMENT (OUTPATIENT)
Age: 75
End: 2025-06-05

## 2025-06-05 LAB
ALBUMIN SERPL ELPH-MCNC: 4.2 G/DL
ALP BLD-CCNC: 61 U/L
ALT SERPL-CCNC: 78 U/L
ANION GAP SERPL CALC-SCNC: 13 MMOL/L
AST SERPL-CCNC: 27 U/L
BILIRUB SERPL-MCNC: 0.7 MG/DL
BUN SERPL-MCNC: 29 MG/DL
CALCIUM SERPL-MCNC: 9.3 MG/DL
CHLORIDE SERPL-SCNC: 98 MMOL/L
CHOLEST SERPL-MCNC: 150 MG/DL
CO2 SERPL-SCNC: 28 MMOL/L
CREAT SERPL-MCNC: 0.96 MG/DL
CREAT SPEC-SCNC: 39 MG/DL
EGFRCR SERPLBLD CKD-EPI 2021: 82 ML/MIN/1.73M2
GLUCOSE SERPL-MCNC: 153 MG/DL
HDLC SERPL-MCNC: 68 MG/DL
LDLC SERPL-MCNC: 63 MG/DL
MICROALBUMIN 24H UR DL<=1MG/L-MCNC: 6 MG/DL
MICROALBUMIN/CREAT 24H UR-RTO: 154 MG/G
NONHDLC SERPL-MCNC: 82 MG/DL
POTASSIUM SERPL-SCNC: 4.4 MMOL/L
PROT SERPL-MCNC: 6.7 G/DL
SODIUM SERPL-SCNC: 139 MMOL/L
T4 FREE SERPL-MCNC: 1.3 NG/DL
TRIGL SERPL-MCNC: 109 MG/DL
TSH SERPL-ACNC: 0.63 UIU/ML

## 2025-06-06 LAB — C PEPTIDE SERPL-MCNC: 2 NG/ML

## 2025-06-09 ENCOUNTER — APPOINTMENT (OUTPATIENT)
Dept: HEMATOLOGY ONCOLOGY | Facility: CLINIC | Age: 75
End: 2025-06-09
Payer: MEDICARE

## 2025-06-09 VITALS
HEIGHT: 66 IN | DIASTOLIC BLOOD PRESSURE: 73 MMHG | SYSTOLIC BLOOD PRESSURE: 118 MMHG | RESPIRATION RATE: 18 BRPM | BODY MASS INDEX: 20.73 KG/M2 | WEIGHT: 129 LBS | OXYGEN SATURATION: 95 % | HEART RATE: 73 BPM | TEMPERATURE: 97.1 F

## 2025-06-09 PROCEDURE — 99215 OFFICE O/P EST HI 40 MIN: CPT | Mod: 25

## 2025-06-09 PROCEDURE — 36415 COLL VENOUS BLD VENIPUNCTURE: CPT

## 2025-06-09 RX ORDER — FUROSEMIDE 20 MG/1
20 TABLET ORAL DAILY
Qty: 30 | Refills: 3 | Status: ACTIVE | COMMUNITY
Start: 2025-06-09 | End: 1900-01-01

## 2025-06-11 LAB
GAD65 AB SER-MCNC: 0 NMOL/L
ISLET CELL512 AB SER-SCNC: 0 NMOL/L

## 2025-06-12 ENCOUNTER — APPOINTMENT (OUTPATIENT)
Dept: HEMATOLOGY ONCOLOGY | Facility: CLINIC | Age: 75
End: 2025-06-12
Payer: MEDICARE

## 2025-06-12 PROCEDURE — 99214 OFFICE O/P EST MOD 30 MIN: CPT | Mod: 93

## 2025-06-12 RX ORDER — PREDNISONE 10 MG/1
10 TABLET ORAL
Qty: 100 | Refills: 0 | Status: ACTIVE | COMMUNITY
Start: 2025-06-12 | End: 1900-01-01

## 2025-06-13 ENCOUNTER — APPOINTMENT (OUTPATIENT)
Dept: ENDOCRINOLOGY | Facility: CLINIC | Age: 75
End: 2025-06-13

## 2025-06-13 PROCEDURE — 99212 OFFICE O/P EST SF 10 MIN: CPT | Mod: 2W

## 2025-06-17 ENCOUNTER — APPOINTMENT (OUTPATIENT)
Dept: HEMATOLOGY ONCOLOGY | Facility: CLINIC | Age: 75
End: 2025-06-17
Payer: MEDICARE

## 2025-06-17 PROCEDURE — 99214 OFFICE O/P EST MOD 30 MIN: CPT | Mod: 93

## 2025-06-20 LAB
M TB IFN-G BLD-IMP: NEGATIVE
QUANTIFERON TB PLUS MITOGEN MINUS NIL: 0.55 IU/ML
QUANTIFERON TB PLUS NIL: 0.14 IU/ML
QUANTIFERON TB PLUS TB1 MINUS NIL: 0 IU/ML
QUANTIFERON TB PLUS TB2 MINUS NIL: 0.01 IU/ML

## 2025-06-24 ENCOUNTER — APPOINTMENT (OUTPATIENT)
Dept: HEMATOLOGY ONCOLOGY | Facility: CLINIC | Age: 75
End: 2025-06-24
Payer: MEDICARE

## 2025-06-24 PROCEDURE — 99214 OFFICE O/P EST MOD 30 MIN: CPT | Mod: 93

## 2025-06-30 ENCOUNTER — APPOINTMENT (OUTPATIENT)
Dept: ENDOCRINOLOGY | Facility: CLINIC | Age: 75
End: 2025-06-30

## 2025-07-01 ENCOUNTER — APPOINTMENT (OUTPATIENT)
Dept: HEMATOLOGY ONCOLOGY | Facility: CLINIC | Age: 75
End: 2025-07-01
Payer: MEDICARE

## 2025-07-01 PROCEDURE — 99215 OFFICE O/P EST HI 40 MIN: CPT | Mod: 93

## 2025-07-07 ENCOUNTER — APPOINTMENT (OUTPATIENT)
Dept: UROLOGY | Facility: CLINIC | Age: 75
End: 2025-07-07

## 2025-07-08 ENCOUNTER — INPATIENT (INPATIENT)
Facility: HOSPITAL | Age: 75
LOS: 9 days | Discharge: HOME | End: 2025-07-18
Attending: INTERNAL MEDICINE | Admitting: STUDENT IN AN ORGANIZED HEALTH CARE EDUCATION/TRAINING PROGRAM
Payer: MEDICARE

## 2025-07-08 VITALS
SYSTOLIC BLOOD PRESSURE: 125 MMHG | RESPIRATION RATE: 18 BRPM | HEART RATE: 82 BPM | TEMPERATURE: 98 F | HEIGHT: 62 IN | WEIGHT: 119.93 LBS | OXYGEN SATURATION: 95 % | DIASTOLIC BLOOD PRESSURE: 79 MMHG

## 2025-07-08 DIAGNOSIS — I10 ESSENTIAL (PRIMARY) HYPERTENSION: ICD-10-CM

## 2025-07-08 DIAGNOSIS — J44.9 CHRONIC OBSTRUCTIVE PULMONARY DISEASE, UNSPECIFIED: ICD-10-CM

## 2025-07-08 DIAGNOSIS — E87.6 HYPOKALEMIA: ICD-10-CM

## 2025-07-08 DIAGNOSIS — N17.9 ACUTE KIDNEY FAILURE, UNSPECIFIED: ICD-10-CM

## 2025-07-08 DIAGNOSIS — Z29.9 ENCOUNTER FOR PROPHYLACTIC MEASURES, UNSPECIFIED: ICD-10-CM

## 2025-07-08 DIAGNOSIS — Z87.19 PERSONAL HISTORY OF OTHER DISEASES OF THE DIGESTIVE SYSTEM: ICD-10-CM

## 2025-07-08 DIAGNOSIS — E78.5 HYPERLIPIDEMIA, UNSPECIFIED: ICD-10-CM

## 2025-07-08 DIAGNOSIS — D72.829 ELEVATED WHITE BLOOD CELL COUNT, UNSPECIFIED: ICD-10-CM

## 2025-07-08 DIAGNOSIS — C34.90 MALIGNANT NEOPLASM OF UNSPECIFIED PART OF UNSPECIFIED BRONCHUS OR LUNG: ICD-10-CM

## 2025-07-08 DIAGNOSIS — E11.9 TYPE 2 DIABETES MELLITUS WITHOUT COMPLICATIONS: ICD-10-CM

## 2025-07-08 DIAGNOSIS — R60.0 LOCALIZED EDEMA: ICD-10-CM

## 2025-07-08 LAB
ADD ON TEST-SPECIMEN IN LAB: SIGNIFICANT CHANGE UP
ALBUMIN SERPL ELPH-MCNC: 3.4 G/DL — SIGNIFICANT CHANGE UP (ref 3.3–5)
ALP SERPL-CCNC: 95 U/L — SIGNIFICANT CHANGE UP (ref 40–120)
ALT FLD-CCNC: 177 U/L — HIGH (ref 10–45)
ANION GAP SERPL CALC-SCNC: 14 MMOL/L — SIGNIFICANT CHANGE UP (ref 5–17)
AST SERPL-CCNC: 38 U/L — SIGNIFICANT CHANGE UP (ref 10–40)
BASOPHILS # BLD AUTO: 0.04 K/UL — SIGNIFICANT CHANGE UP (ref 0–0.2)
BASOPHILS NFR BLD AUTO: 0.2 % — SIGNIFICANT CHANGE UP (ref 0–2)
BILIRUB SERPL-MCNC: 0.5 MG/DL — SIGNIFICANT CHANGE UP (ref 0.2–1.2)
BUN SERPL-MCNC: 67 MG/DL — HIGH (ref 7–23)
CALCIUM SERPL-MCNC: 8.6 MG/DL — SIGNIFICANT CHANGE UP (ref 8.4–10.5)
CHLORIDE SERPL-SCNC: 92 MMOL/L — LOW (ref 96–108)
CO2 SERPL-SCNC: 36 MMOL/L — HIGH (ref 22–31)
CREAT SERPL-MCNC: 1.65 MG/DL — HIGH (ref 0.5–1.3)
EGFR: 43 ML/MIN/1.73M2 — LOW
EGFR: 43 ML/MIN/1.73M2 — LOW
EOSINOPHIL # BLD AUTO: 0 K/UL — SIGNIFICANT CHANGE UP (ref 0–0.5)
EOSINOPHIL NFR BLD AUTO: 0 % — SIGNIFICANT CHANGE UP (ref 0–6)
FLUAV AG NPH QL: SIGNIFICANT CHANGE UP
FLUBV AG NPH QL: SIGNIFICANT CHANGE UP
GLUCOSE SERPL-MCNC: 221 MG/DL — HIGH (ref 70–99)
HCT VFR BLD CALC: 45.5 % — SIGNIFICANT CHANGE UP (ref 39–50)
HGB BLD-MCNC: 14.6 G/DL — SIGNIFICANT CHANGE UP (ref 13–17)
IMM GRANULOCYTES # BLD AUTO: 0.27 K/UL — HIGH (ref 0–0.07)
IMM GRANULOCYTES NFR BLD AUTO: 1.2 % — HIGH (ref 0–0.9)
LYMPHOCYTES # BLD AUTO: 4.72 K/UL — HIGH (ref 1–3.3)
LYMPHOCYTES NFR BLD AUTO: 21.2 % — SIGNIFICANT CHANGE UP (ref 13–44)
MAGNESIUM SERPL-MCNC: 2 MG/DL — SIGNIFICANT CHANGE UP (ref 1.6–2.6)
MCHC RBC-ENTMCNC: 28 PG — SIGNIFICANT CHANGE UP (ref 27–34)
MCHC RBC-ENTMCNC: 32.1 G/DL — SIGNIFICANT CHANGE UP (ref 32–36)
MCV RBC AUTO: 87.2 FL — SIGNIFICANT CHANGE UP (ref 80–100)
MONOCYTES # BLD AUTO: 0.75 K/UL — SIGNIFICANT CHANGE UP (ref 0–0.9)
MONOCYTES NFR BLD AUTO: 3.4 % — SIGNIFICANT CHANGE UP (ref 2–14)
NEUTROPHILS # BLD AUTO: 16.51 K/UL — HIGH (ref 1.8–7.4)
NEUTROPHILS NFR BLD AUTO: 74 % — SIGNIFICANT CHANGE UP (ref 43–77)
NRBC # BLD AUTO: 0 K/UL — SIGNIFICANT CHANGE UP (ref 0–0)
NRBC # FLD: 0 K/UL — SIGNIFICANT CHANGE UP (ref 0–0)
NRBC BLD AUTO-RTO: 0 /100 WBCS — SIGNIFICANT CHANGE UP (ref 0–0)
PLATELET # BLD AUTO: 310 K/UL — SIGNIFICANT CHANGE UP (ref 150–400)
PMV BLD: 10.1 FL — SIGNIFICANT CHANGE UP (ref 7–13)
POTASSIUM SERPL-MCNC: 2.8 MMOL/L — CRITICAL LOW (ref 3.5–5.3)
POTASSIUM SERPL-SCNC: 2.8 MMOL/L — CRITICAL LOW (ref 3.5–5.3)
PROT SERPL-MCNC: 6.4 G/DL — SIGNIFICANT CHANGE UP (ref 6–8.3)
RBC # BLD: 5.22 M/UL — SIGNIFICANT CHANGE UP (ref 4.2–5.8)
RBC # FLD: 17.4 % — HIGH (ref 10.3–14.5)
RSV RNA NPH QL NAA+NON-PROBE: SIGNIFICANT CHANGE UP
SARS-COV-2 RNA SPEC QL NAA+PROBE: SIGNIFICANT CHANGE UP
SODIUM SERPL-SCNC: 142 MMOL/L — SIGNIFICANT CHANGE UP (ref 135–145)
SOURCE RESPIRATORY: SIGNIFICANT CHANGE UP
WBC # BLD: 22.29 K/UL — HIGH (ref 3.8–10.5)
WBC # FLD AUTO: 22.29 K/UL — HIGH (ref 3.8–10.5)

## 2025-07-08 PROCEDURE — 93010 ELECTROCARDIOGRAM REPORT: CPT

## 2025-07-08 PROCEDURE — 99223 1ST HOSP IP/OBS HIGH 75: CPT

## 2025-07-08 PROCEDURE — 83735 ASSAY OF MAGNESIUM: CPT

## 2025-07-08 PROCEDURE — 85025 COMPLETE CBC W/AUTO DIFF WBC: CPT

## 2025-07-08 PROCEDURE — 99285 EMERGENCY DEPT VISIT HI MDM: CPT

## 2025-07-08 PROCEDURE — 36415 COLL VENOUS BLD VENIPUNCTURE: CPT

## 2025-07-08 PROCEDURE — 80053 COMPREHEN METABOLIC PANEL: CPT

## 2025-07-08 PROCEDURE — 71045 X-RAY EXAM CHEST 1 VIEW: CPT | Mod: 26

## 2025-07-08 PROCEDURE — 0241U: CPT

## 2025-07-08 PROCEDURE — 93970 EXTREMITY STUDY: CPT | Mod: 26

## 2025-07-08 PROCEDURE — 0225U NFCT DS DNA&RNA 21 SARSCOV2: CPT

## 2025-07-08 RX ORDER — METOPROLOL SUCCINATE 50 MG/1
100 TABLET, EXTENDED RELEASE ORAL DAILY
Refills: 0 | Status: DISCONTINUED | OUTPATIENT
Start: 2025-07-09 | End: 2025-07-18

## 2025-07-08 RX ORDER — DEXTROSE 50 % IN WATER 50 %
25 SYRINGE (ML) INTRAVENOUS ONCE
Refills: 0 | Status: DISCONTINUED | OUTPATIENT
Start: 2025-07-08 | End: 2025-07-18

## 2025-07-08 RX ORDER — SODIUM CHLORIDE 9 G/1000ML
1000 INJECTION, SOLUTION INTRAVENOUS
Refills: 0 | Status: DISCONTINUED | OUTPATIENT
Start: 2025-07-08 | End: 2025-07-18

## 2025-07-08 RX ORDER — ALBUTEROL SULFATE 2.5 MG/3ML
2 VIAL, NEBULIZER (ML) INHALATION EVERY 6 HOURS
Refills: 0 | Status: DISCONTINUED | OUTPATIENT
Start: 2025-07-08 | End: 2025-07-18

## 2025-07-08 RX ORDER — DEXTROSE 50 % IN WATER 50 %
15 SYRINGE (ML) INTRAVENOUS ONCE
Refills: 0 | Status: DISCONTINUED | OUTPATIENT
Start: 2025-07-08 | End: 2025-07-18

## 2025-07-08 RX ORDER — SULFAMETHOXAZOLE/TRIMETHOPRIM 800-160 MG
1 TABLET ORAL DAILY
Refills: 0 | Status: DISCONTINUED | OUTPATIENT
Start: 2025-07-09 | End: 2025-07-18

## 2025-07-08 RX ORDER — GLUCAGON 3 MG/1
1 POWDER NASAL ONCE
Refills: 0 | Status: DISCONTINUED | OUTPATIENT
Start: 2025-07-08 | End: 2025-07-18

## 2025-07-08 RX ORDER — AMLODIPINE BESYLATE 10 MG/1
10 TABLET ORAL DAILY
Refills: 0 | Status: DISCONTINUED | OUTPATIENT
Start: 2025-07-08 | End: 2025-07-09

## 2025-07-08 RX ORDER — INSULIN LISPRO 100 U/ML
INJECTION, SOLUTION INTRAVENOUS; SUBCUTANEOUS AT BEDTIME
Refills: 0 | Status: DISCONTINUED | OUTPATIENT
Start: 2025-07-08 | End: 2025-07-09

## 2025-07-08 RX ORDER — ATORVASTATIN CALCIUM 80 MG/1
80 TABLET, FILM COATED ORAL AT BEDTIME
Refills: 0 | Status: DISCONTINUED | OUTPATIENT
Start: 2025-07-08 | End: 2025-07-18

## 2025-07-08 RX ORDER — HEPARIN SODIUM 1000 [USP'U]/ML
5000 INJECTION INTRAVENOUS; SUBCUTANEOUS EVERY 8 HOURS
Refills: 0 | Status: DISCONTINUED | OUTPATIENT
Start: 2025-07-08 | End: 2025-07-18

## 2025-07-08 RX ORDER — EZETIMIBE 10 MG/1
10 TABLET ORAL DAILY
Refills: 0 | Status: DISCONTINUED | OUTPATIENT
Start: 2025-07-08 | End: 2025-07-18

## 2025-07-08 RX ORDER — INSULIN LISPRO 100 U/ML
4 INJECTION, SOLUTION INTRAVENOUS; SUBCUTANEOUS
Refills: 0 | Status: DISCONTINUED | OUTPATIENT
Start: 2025-07-08 | End: 2025-07-17

## 2025-07-08 RX ORDER — DEXTROSE 50 % IN WATER 50 %
12.5 SYRINGE (ML) INTRAVENOUS ONCE
Refills: 0 | Status: DISCONTINUED | OUTPATIENT
Start: 2025-07-08 | End: 2025-07-18

## 2025-07-08 RX ORDER — ASPIRIN 325 MG
81 TABLET ORAL DAILY
Refills: 0 | Status: DISCONTINUED | OUTPATIENT
Start: 2025-07-09 | End: 2025-07-18

## 2025-07-08 RX ADMIN — Medication 40 MILLIEQUIVALENT(S): at 20:02

## 2025-07-08 RX ADMIN — Medication 100 MILLIEQUIVALENT(S): at 20:10

## 2025-07-08 NOTE — H&P ADULT - CONVERSATION DETAILS
Introduced GOC with patient. Reports brother Gulshan Christopher is HCP ([phone]). States he has never thought about code status. Discussed options. Reports he will give it some thought and discuss with providers inpatient vs outpatient.

## 2025-07-08 NOTE — H&P ADULT - HISTORY OF PRESENT ILLNESS
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 Reports being on prednisone therapy for immunotherapy induced colitis. Was previously on 60 mg but was started on 30 mg on Monday. Reports no lightheadedness, dizziness nausea or vomiting, and diarrhea since decreasing the dose. Reports 2 weeks of b/l LE swelling, leg pain and heaviness that has made it hard to walk around and leave the house. Was started on Lasix 60 mg which states has not helped with his leg pain. States this has not happened before. Denies fevers, chills, sore throat, rhinorrhea chest pain, SOB, PND, orthopnea, abdominal pain, dysuria or diarrhea.    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 Reports being on prednisone therapy for immunotherapy induced colitis. Was previously on 60 mg but was started on 30 mg on Monday. Reports no lightheadedness, dizziness nausea or vomiting, and diarrhea since decreasing the dose. Reports a month of b/l LE swelling, leg pain and heaviness that has made it hard to walk around and leave the house. Was initially started on 20 Lasix 1 month ago but was started on Lasix 60 mg 2 weeks ago. States has not helped with his leg pain. States this has not happened before. Denies fevers, chills, sore throat, rhinorrhea chest pain, SOB, PND, orthopnea, abdominal pain, dysuria or diarrhea.    ED Course:   Vitals: T 97.9  HR 82 /79 RR 18 Sp02 95% RA  Labs: WBC 22.29 K 2.8 C02 36 BUN 67 Cr 1.65   Imaging:   ·	B/l LE doppler: no evidence of LE DVT   ·	CXR without consolidations or effusion, looks similar to prior; Pending formal read   Interventions: 40 meq KCl PO, 10 meq x1 KCl

## 2025-07-08 NOTE — ED ADULT NURSE REASSESSMENT NOTE - NS ED NURSE REASSESS COMMENT FT1
pt received from day shift - as per report pt presented to the ED for b/l LE swelling and sent from MD for diuresis. pt is resting comfortably on stretcher at this time, denies any pain or discomfort. Pt pending IV potassium admin and bed assignment. nursing care to continue at this time

## 2025-07-08 NOTE — H&P ADULT - NSHPLABSRESULTS_GEN_ALL_CORE
.  LABS:                         14.6   22.29 )-----------( 310      ( 08 Jul 2025 18:29 )             45.5     07-08    142  |  92[L]  |  67[H]  ----------------------------<  221[H]  2.8[LL]   |  36[H]  |  1.65[H]    Ca    8.6      08 Jul 2025 18:29  Mg     2.0     07-08    TPro  6.4  /  Alb  3.4  /  TBili  0.5  /  DBili  x   /  AST  38  /  ALT  177[H]  /  AlkPhos  95  07-08      Urinalysis Basic - ( 08 Jul 2025 18:29 )    Color: x / Appearance: x / SG: x / pH: x  Gluc: 221 mg/dL / Ketone: x  / Bili: x / Urobili: x   Blood: x / Protein: x / Nitrite: x   Leuk Esterase: x / RBC: x / WBC x   Sq Epi: x / Non Sq Epi: x / Bacteria: x                RADIOLOGY, EKG & ADDITIONAL TESTS: Reviewed. LABS:                         14.6   22.29 )-----------( 310      ( 08 Jul 2025 18:29 )             45.5     07-08    142  |  92[L]  |  67[H]  ----------------------------<  221[H]  2.8[LL]   |  36[H]  |  1.65[H]    Ca    8.6      08 Jul 2025 18:29  Mg     2.0     07-08    TPro  6.4  /  Alb  3.4  /  TBili  0.5  /  DBili  x   /  AST  38  /  ALT  177[H]  /  AlkPhos  95  07-08      Urinalysis Basic - ( 08 Jul 2025 18:29 )    Color: x / Appearance: x / SG: x / pH: x  Gluc: 221 mg/dL / Ketone: x  / Bili: x / Urobili: x   Blood: x / Protein: x / Nitrite: x   Leuk Esterase: x / RBC: x / WBC x   Sq Epi: x / Non Sq Epi: x / Bacteria: x        RADIOLOGY, EKG & ADDITIONAL TESTS: Reviewed.

## 2025-07-08 NOTE — H&P ADULT - ASSESSMENT
74M PMHx of tobacco use with stage IV metastatic non-small cell lung cancer (diagnosed in September 2023  on Keytruda/ pembrolizumab last infusion on 5/16), COPD, HTN, DM, S/p carotic endarterectomy 2020, presenting with 2 weeks of b/l LE edema and pain without improvement on lasix, found to have MARYAN and hypokalemia, admitted for PT eval and further evaluation.

## 2025-07-08 NOTE — PATIENT PROFILE ADULT - DO YOU LACK THE NECESSARY SUPPORT TO HELP YOU COPE WITH LIFE CHALLENGES?
Group Topic: BH Activity Group    Date: 7/19/2021  Start Time:  2:00 PM  End Time:  2:45 PM  Facilitators: FAUZIA Lacy    Focus: Values and Balance  Number in attendance: 7    Continued discussion on balance and values. Group members practiced incorporating 5 spheres of life into values based goals.     Method: Group   Attendance: Present  Participation: Active  Patient Response: Appropriate feedback, Attentive and Good eye contact    Pt was attentive and engaged throughout group discussion. He set goal of prioritizing time for pt to engage with things that make him happy, spend time being with himself.     Teresa Steward, LPC-IT   no

## 2025-07-08 NOTE — ED PROVIDER NOTE - PHYSICAL EXAMINATION
CONST: nontoxic NAD speaking in full sentences, intermittent coughing  HEAD: atraumatic  EYES: conjunctivae clear  NECK: supple  CARD: regular rate  CHEST: breathing comfortably, no stridor/retractions/tripoding  ABD: soft nontender nondistended  EXT: 3+ pitting edema b/l  SKIN: warm, dry  NEURO: awake alert answering questions following commands moving all extremities

## 2025-07-08 NOTE — H&P ADULT - PROBLEM SELECTOR PLAN 3
On admission, K 2.8 without ECG changes likely iso overdiuresis with Lasix.   S/p 40 meq KCl PO and 10 meq KCl x3 IV     - Hold on additional lasix for now   - F/u AM BMP On admission, K 2.8 without ECG changes likely iso overdiuresis with Lasix.   S/p 40 meq KCl PO and 10 meq KCl x1 IV     - Give additional 40 meq KCl PO  - Hold additional lasix for now   - F/u AM BMP

## 2025-07-08 NOTE — PATIENT PROFILE ADULT - FALL HARM RISK - HARM RISK INTERVENTIONS
Assistance with ambulation/Assistance OOB with selected safe patient handling equipment/Communicate Risk of Fall with Harm to all staff/Discuss with provider need for PT consult/Monitor gait and stability/Provide patient with walking aids - walker, cane, crutches/Reinforce activity limits and safety measures with patient and family/Sit up slowly, dangle for a short time, stand at bedside before walking/Tailored Fall Risk Interventions/Use of alarms - bed, chair and/or voice tab/Visual Cue: Yellow wristband and red socks/Bed in lowest position, wheels locked, appropriate side rails in place/Call bell, personal items and telephone in reach/Instruct patient to call for assistance before getting out of bed or chair/Non-slip footwear when patient is out of bed/[place] to call system/Physically safe environment - no spills, clutter or unnecessary equipment/Purposeful Proactive Rounding/Room/bathroom lighting operational, light cord in reach

## 2025-07-08 NOTE — ED PROVIDER NOTE - CLINICAL SUMMARY MEDICAL DECISION MAKING FREE TEXT BOX
Triage VS normal  bilateral 3+ pitting edema on exam  mouth dry  check labs, b/l LE duplex  heme/onc consulted- will see pt in AM, admit to medicine    -->labs w/ hypokalemia 2.8, pre-renal MARYAN (BUN/Cr 67/1.65) suspect possible over-diuresis given pt was on 60mg lasix x 1 week  WBC 22, pt has been on steroids. denies fevers/chills, afebrile here

## 2025-07-08 NOTE — H&P ADULT - PROBLEM SELECTOR PLAN 6
Hx of DM with A1c of 7.3, follows with endocrine within [facility]. Was previously seen after recent hospitalization due to steroid induced hyperglycemia. Has been taking 4U lispro TID with low insulin sliding scale 1:50 above 150.     - Continue with 4U lispro TID   - CC Diet   - low insulin sliding scale before bedtime Hx of DM with A1c of 7.3, follows with endocrine within [facility]. Was previously seen after recent hospitalization due to steroid induced hyperglycemia. Has been taking 4U lispro TID with low insulin sliding scale 1:50 above 150.     - Continue with 4U lispro TID   - CC Diet   - mISS

## 2025-07-08 NOTE — H&P ADULT - PROBLEM SELECTOR PLAN 1
Reports 2 weeks of LE swelling. Per chart review, patient has had complaints since previous discharge in early June. Was initially started on lasix 20 mg and uptitrated to lasix 60 mg daily per Dr. Pratt 2 weeks ago. Patient reports no change in LE swelling, but has had difficulty walking around. On exam there is 3+ pitting edema from ankle to shin b/l with surrounding erythema. Denies orthopnea, PND, chest pain, SOB.  B/l LE US negative for DVT, TTE 5/25 with normal EF    - Hold off on further diuresis  - Ace wrap legs b/l and elevate  - PT evaluation  - F/u BNP Reports1 month of LE swelling. Per chart review, patient has had complaints since previous discharge in early June. Was initially started on lasix 20 mg and uptitrated to lasix 60 mg daily per Dr. Pratt 2 weeks ago. Patient reports no change in LE swelling, but has had difficulty walking around. On exam there is 3+ pitting edema from ankle to shin b/l with surrounding erythema. Denies orthopnea, PND, chest pain, SOB.  B/l LE US negative for DVT, TTE 5/25 with normal EF    - Hold off on further diuresis  - Ace wrap legs b/l and elevate  - PT evaluation  - F/u BNP

## 2025-07-08 NOTE — H&P ADULT - ATTENDING COMMENTS
73 yo M with PMHx stage IV NSCLC with intracranial/bone metastases (currently off tx), recent admission x2 for IO-induced colitis (5/2025, currently on steroid taper), COPD, HTN, HLD, DM, CVA, KARLA (s/p CEA) px from home with several-week hx of worsening BLE edema with difficulty ambulating, admitted for further evaluation and management.      VSS. Labs reviewed. WBC 22.29. Likely i/s/o steroid use. K 2.8. Cl 92. Bicarb 26. BUN 67. Cr 1.65. AST/ALT 38/177. CXR clear. US Doppler BLE negative for DVT.     [ ] Hold home Lasix i/s/o hypokalemia/contraction alkalosis/MARYAN   - Concern for over-diuresis*   [ ] Hold home Lisinopril/HCTZ i/s/o MARYAN   - Can consider holding home Amlodipine as well if no improvement in BLE edema   [ ] Fall precautions   [ ] PT evaluation     [ ] Continue home Prednisone 20mg QD (IO-induced colitis)    [ ] Continue home Bactrim + PPI (PPx while on prolonged steroids)   [ ] Heme-Onc consulted in ED (Stage IV NSCLC)   - Follows outpt with Dr. Kolby Pratt (Last seen 7/1)   - Pending formal evaluation in AM

## 2025-07-08 NOTE — H&P ADULT - PROBLEM SELECTOR PLAN 2
On admission BUN/Cr 67/ 1.65, baseline Cr ~ 0.8 - 1.00 likely pre-renal iso overdiuresis.     - F/u urine studies  - Hold home lisinopril for now #NAGMA   On admission BUN/Cr 67/ 1.65, baseline Cr ~ 0.8 - 1.00 likely pre-renal iso overdiuresis. NAGMA also present with C02 36 likely due to renal losses.     - F/u urine studies  - Hold home lisinopril and HCTZ for now

## 2025-07-08 NOTE — ED ADULT NURSE NOTE - CHIEF COMPLAINT QUOTE
Pt presents to ED here for b/l feet welling, pt on water pills and pt states" I have been detoxing from prednisone". Denies CP or SOB. Pt A&Ox4, NAD and ambulates w/ cane.

## 2025-07-08 NOTE — ED ADULT NURSE NOTE - OBJECTIVE STATEMENT
pt. a&ox4 ambulatory with cane, hx of lung ca, currently on IgG therapy and daily prednisone (currently tapering), pt of MD Pratt, sent in for diuresis and probably admission for continuing diuresis and tx of the progressive BL LE edema he has been experiencing over the last 2 weeks. Denies any SOB, CP, or additional s/s. Takes lasix qd.

## 2025-07-08 NOTE — ED PROVIDER NOTE - OBJECTIVE STATEMENT
4M former smoker PMH HTN, HLD, COPD, DM, PPM, stage IV NSCLC (adenocarcinoma, brain mets, Dr. 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 States he has been on prednisone for the immunotherapy induced colitis was on 40mg and being tapered off, currently on 30mg. States the last 2 weeks has b/l LE edema and leg pain and heaviness making it hard to walk or leave the house. Endorses chronic cough x few weeks no fever/chills/chest pain/SOB. Has been on Lasix, was taking 60mg this past week but no change in leg swelling so presents to ED.  States no vomiting/diarrhea. Has been trying to eat/drink. Increased urination 2/2 Lasix. No dysuria. 4M former smoker PMH HTN, HLD, COPD, DM, PPM, stage IV NSCLC (adenocarcinoma, brain mets, Dr. 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 States he has been on prednisone for the immunotherapy induced colitis was on 60mg and being tapered off, currently on 30mg. States the last 2 weeks has b/l LE edema and leg pain and heaviness making it hard to walk or leave the house. Endorses chronic cough x few weeks no fever/chills/chest pain/SOB. Has been on Lasix, was taking 60mg this past week but no change in leg swelling so presents to ED.  States no vomiting/diarrhea. Has been trying to eat/drink. Increased urination 2/2 Lasix. No dysuria.

## 2025-07-08 NOTE — H&P ADULT - PROBLEM SELECTOR PLAN 4
On admission WBC 22.9, uptrending from previous admission 15.06. Currently on prednisone taper for colitis, decreased from 60 mg to 30 mg on Monday. Denies any fevers/chills, URI symptoms, abdominal pain, dysuria or diarrhea.   RVP negative, CXR appears similar to prior but pending final read. LE with b/l erythema but without warmth. Exam also notable for 3 small wounds on LE without pus or fluctuance.     - Monitor off abx for now  - F/u RVP  - F/u BCx

## 2025-07-08 NOTE — ED ADULT NURSE NOTE - NSFALLASSESSNEED_ED_ALL_ED
no Brief clinical course:  39 yo F pmh obesity c/o dizziness, weakness, polyuria and polydipsia x 1 week. Pt admits to not feeling well this week and it became worse today. Pt was found to have FS of 367. Labs revealed a1c of 11.4 and pt was sent to the CDU for glycemic control and endocrine consult.  Patient remained persistently hyperglycemic in CDU however well appearing and asymptomatic.  Had medications adjusted by endocrine service.  Patient also found to have UTI.  Discharged in stable condition with treatment for dm and uti.

## 2025-07-08 NOTE — H&P ADULT - PROBLEM SELECTOR PLAN 7
Home medications: lisinopril 40 mg daily, metoprolol 100 mg daily, HCTZ 25 mg daily, amlodipine 10 mg daily    - Hold lisinopril and HCTA iso MARYAN   - Continue with amlodipine and metoprolol Home medications: lisinopril 40 mg daily, metoprolol 100 mg daily, HCTZ 25 mg daily; amlodipine was recently discontinued due to LE swelling     - Hold lisinopril and HCTZ iso MARYAN   - Continue with metoprolol

## 2025-07-08 NOTE — H&P ADULT - NSICDXPASTMEDICALHX_GEN_ALL_CORE_FT
PAST MEDICAL HISTORY:  COPD without exacerbation     Diabetes mellitus     History of colitis     Hyperlipidemia     Hypertension     Stage 4 lung cancer

## 2025-07-08 NOTE — H&P ADULT - PROBLEM SELECTOR PLAN 5
Previously admitted in May for management of immunotherapy induced colitis, currently on steroid taper.     - Heme onc consulted, will follow up in the AM   - Continue with prednisone 30 mg daily, with plan to continue taper

## 2025-07-08 NOTE — H&P ADULT - NSHPPHYSICALEXAM_GEN_ALL_CORE
General: in no acute distress, resting comfortably in bed   Eyes: EOMI intact bilaterally. Anicteric sclerae, moist conjunctivae  HENT: Moist mucous membranes  Neck: Trachea midline, supple  Lungs: + rhonchi b/l  Cardiovascular: RRR. No murmurs, rubs, or gallops  Abdomen: Soft, non-tender non-distended; No rebound or guarding  Extremities: 3+ pitting edema ankle to shin; + erythema b/l without warmth  Neurological: Alert and oriented x3  Skin: + multiple skin wounds on LE, no pus noted Vital Signs Last 24 Hrs  T(C): 36.2 (08 Jul 2025 22:48), Max: 36.6 (08 Jul 2025 17:33)  T(F): 97.2 (08 Jul 2025 22:48), Max: 97.9 (08 Jul 2025 17:33)  HR: 77 (08 Jul 2025 22:48) (75 - 82)  BP: 163/77 (08 Jul 2025 22:48) (125/79 - 163/77)  BP(mean): 106 (08 Jul 2025 22:48) (106 - 106)    RR: 18 (08 Jul 2025 22:48) (18 - 18)  SpO2: 92% (08 Jul 2025 22:48) (92% - 95%)    O2 Parameters below as of 08 Jul 2025 22:48  Patient On (Oxygen Delivery Method): room air    General: in no acute distress, resting comfortably in bed   Eyes: EOMI intact bilaterally. Anicteric sclerae, moist conjunctivae  HENT: Moist mucous membranes  Neck: Trachea midline, supple  Lungs: + rhonchi b/l  Cardiovascular: RRR. No murmurs, rubs, or gallops  Abdomen: Soft, non-tender non-distended; No rebound or guarding  Extremities: 3+ pitting edema ankle to shin; + erythema b/l without warmth  Neurological: Alert and oriented x3  Skin: + multiple skin wounds on LE, no pus noted

## 2025-07-08 NOTE — ED ADULT NURSE NOTE - NSFALLHARMRISKINTERV_ED_ALL_ED

## 2025-07-08 NOTE — H&P ADULT - PROBLEM SELECTOR PLAN 8
Home medications: atorvastatin 80 mg daily and ezetimibe 10 mg daily    - Continue with home medications     # S/p carotic endarterectomy   Prior hx of CVA and carotid stenosis s/p endarterectomy in 2020    - Continue with home aspirin and atorvastatin

## 2025-07-08 NOTE — H&P ADULT - PROBLEM SELECTOR PLAN 9
Hx of stage IV NSCLC  adenocarcinoma with metastasis to the brain (T4N2M1 with BMs, PD-L1 90% positive, DDR2 mutant, TP53 mutant). Oncologist: Dr. Pratt. Previously on pembrolizumab, with treatment break after admission found to have immunotherapy induced colitis (last infusion reportedly 5/16).    CTAP 5/21 :Unchanged 21 x 21 mm superior right lower lobe mass surrounding the segmental airway and contiguous with the mediastinal border. No new suspicious lung lesions.    - F/u heme onc recs

## 2025-07-09 DIAGNOSIS — E78.5 HYPERLIPIDEMIA, UNSPECIFIED: ICD-10-CM

## 2025-07-09 DIAGNOSIS — R63.0 ANOREXIA: ICD-10-CM

## 2025-07-09 LAB
ADD ON TEST-SPECIMEN IN LAB: SIGNIFICANT CHANGE UP
ALBUMIN SERPL ELPH-MCNC: 2.9 G/DL — LOW (ref 3.3–5)
ALP SERPL-CCNC: 75 U/L — SIGNIFICANT CHANGE UP (ref 40–120)
ALT FLD-CCNC: 116 U/L — HIGH (ref 10–45)
ANION GAP SERPL CALC-SCNC: 12 MMOL/L — SIGNIFICANT CHANGE UP (ref 5–17)
ANION GAP SERPL CALC-SCNC: 12 MMOL/L — SIGNIFICANT CHANGE UP (ref 5–17)
APPEARANCE UR: CLEAR — SIGNIFICANT CHANGE UP
AST SERPL-CCNC: 29 U/L — SIGNIFICANT CHANGE UP (ref 10–40)
BASOPHILS # BLD AUTO: 0.03 K/UL — SIGNIFICANT CHANGE UP (ref 0–0.2)
BASOPHILS NFR BLD AUTO: 0.2 % — SIGNIFICANT CHANGE UP (ref 0–2)
BILIRUB SERPL-MCNC: 0.5 MG/DL — SIGNIFICANT CHANGE UP (ref 0.2–1.2)
BILIRUB UR-MCNC: NEGATIVE — SIGNIFICANT CHANGE UP
BUN SERPL-MCNC: 53 MG/DL — HIGH (ref 7–23)
BUN SERPL-MCNC: 56 MG/DL — HIGH (ref 7–23)
C DIFF GDH STL QL: NEGATIVE — SIGNIFICANT CHANGE UP
C DIFF GDH STL QL: SIGNIFICANT CHANGE UP
CALCIUM SERPL-MCNC: 8.1 MG/DL — LOW (ref 8.4–10.5)
CALCIUM SERPL-MCNC: 8.6 MG/DL — SIGNIFICANT CHANGE UP (ref 8.4–10.5)
CHLORIDE SERPL-SCNC: 105 MMOL/L — SIGNIFICANT CHANGE UP (ref 96–108)
CHLORIDE SERPL-SCNC: 97 MMOL/L — SIGNIFICANT CHANGE UP (ref 96–108)
CO2 SERPL-SCNC: 28 MMOL/L — SIGNIFICANT CHANGE UP (ref 22–31)
CO2 SERPL-SCNC: 34 MMOL/L — HIGH (ref 22–31)
COLOR SPEC: YELLOW — SIGNIFICANT CHANGE UP
CREAT ?TM UR-MCNC: 78 MG/DL — SIGNIFICANT CHANGE UP
CREAT SERPL-MCNC: 1.11 MG/DL — SIGNIFICANT CHANGE UP (ref 0.5–1.3)
CREAT SERPL-MCNC: 1.38 MG/DL — HIGH (ref 0.5–1.3)
DIFF PNL FLD: NEGATIVE — SIGNIFICANT CHANGE UP
EGFR: 53 ML/MIN/1.73M2 — LOW
EGFR: 53 ML/MIN/1.73M2 — LOW
EGFR: 69 ML/MIN/1.73M2 — SIGNIFICANT CHANGE UP
EGFR: 69 ML/MIN/1.73M2 — SIGNIFICANT CHANGE UP
EOSINOPHIL # BLD AUTO: 0.02 K/UL — SIGNIFICANT CHANGE UP (ref 0–0.5)
EOSINOPHIL NFR BLD AUTO: 0.1 % — SIGNIFICANT CHANGE UP (ref 0–6)
GLUCOSE SERPL-MCNC: 203 MG/DL — HIGH (ref 70–99)
GLUCOSE SERPL-MCNC: 236 MG/DL — HIGH (ref 70–99)
GLUCOSE UR QL: 250 MG/DL
HCT VFR BLD CALC: 37.6 % — LOW (ref 39–50)
HGB BLD-MCNC: 12.3 G/DL — LOW (ref 13–17)
IMM GRANULOCYTES # BLD AUTO: 0.26 K/UL — HIGH (ref 0–0.07)
IMM GRANULOCYTES NFR BLD AUTO: 1.4 % — HIGH (ref 0–0.9)
KETONES UR QL: NEGATIVE MG/DL — SIGNIFICANT CHANGE UP
LEUKOCYTE ESTERASE UR-ACNC: NEGATIVE — SIGNIFICANT CHANGE UP
LYMPHOCYTES # BLD AUTO: 4.24 K/UL — HIGH (ref 1–3.3)
LYMPHOCYTES NFR BLD AUTO: 22.5 % — SIGNIFICANT CHANGE UP (ref 13–44)
MAGNESIUM SERPL-MCNC: 1.8 MG/DL — SIGNIFICANT CHANGE UP (ref 1.6–2.6)
MAGNESIUM SERPL-MCNC: 2 MG/DL — SIGNIFICANT CHANGE UP (ref 1.6–2.6)
MCHC RBC-ENTMCNC: 28.3 PG — SIGNIFICANT CHANGE UP (ref 27–34)
MCHC RBC-ENTMCNC: 32.7 G/DL — SIGNIFICANT CHANGE UP (ref 32–36)
MCV RBC AUTO: 86.4 FL — SIGNIFICANT CHANGE UP (ref 80–100)
MONOCYTES # BLD AUTO: 0.68 K/UL — SIGNIFICANT CHANGE UP (ref 0–0.9)
MONOCYTES NFR BLD AUTO: 3.6 % — SIGNIFICANT CHANGE UP (ref 2–14)
NEUTROPHILS # BLD AUTO: 13.61 K/UL — HIGH (ref 1.8–7.4)
NEUTROPHILS NFR BLD AUTO: 72.2 % — SIGNIFICANT CHANGE UP (ref 43–77)
NITRITE UR-MCNC: NEGATIVE — SIGNIFICANT CHANGE UP
NRBC # BLD AUTO: 0 K/UL — SIGNIFICANT CHANGE UP (ref 0–0)
NRBC # FLD: 0 K/UL — SIGNIFICANT CHANGE UP (ref 0–0)
NRBC BLD AUTO-RTO: 0 /100 WBCS — SIGNIFICANT CHANGE UP (ref 0–0)
OSMOLALITY UR: 562 MOSM/KG — SIGNIFICANT CHANGE UP (ref 300–900)
PH UR: 6 — SIGNIFICANT CHANGE UP (ref 5–8)
PHOSPHATE SERPL-MCNC: 2.2 MG/DL — LOW (ref 2.5–4.5)
PHOSPHATE SERPL-MCNC: 3.9 MG/DL — SIGNIFICANT CHANGE UP (ref 2.5–4.5)
PLATELET # BLD AUTO: 251 K/UL — SIGNIFICANT CHANGE UP (ref 150–400)
PMV BLD: 9.8 FL — SIGNIFICANT CHANGE UP (ref 7–13)
POTASSIUM SERPL-MCNC: 2.9 MMOL/L — CRITICAL LOW (ref 3.5–5.3)
POTASSIUM SERPL-MCNC: 4.3 MMOL/L — SIGNIFICANT CHANGE UP (ref 3.5–5.3)
POTASSIUM SERPL-SCNC: 2.9 MMOL/L — CRITICAL LOW (ref 3.5–5.3)
POTASSIUM SERPL-SCNC: 4.3 MMOL/L — SIGNIFICANT CHANGE UP (ref 3.5–5.3)
POTASSIUM UR-SCNC: 27 MMOL/L — SIGNIFICANT CHANGE UP
PROT ?TM UR-MCNC: 15 MG/DL — HIGH (ref 0–12)
PROT SERPL-MCNC: 4.9 G/DL — LOW (ref 6–8.3)
PROT UR-MCNC: SIGNIFICANT CHANGE UP MG/DL
PROT/CREAT UR-RTO: 0.2 RATIO — SIGNIFICANT CHANGE UP (ref 0–0.2)
RBC # BLD: 4.35 M/UL — SIGNIFICANT CHANGE UP (ref 4.2–5.8)
RBC # FLD: 17.2 % — HIGH (ref 10.3–14.5)
SODIUM SERPL-SCNC: 143 MMOL/L — SIGNIFICANT CHANGE UP (ref 135–145)
SODIUM SERPL-SCNC: 145 MMOL/L — SIGNIFICANT CHANGE UP (ref 135–145)
SODIUM UR-SCNC: 36 MMOL/L — SIGNIFICANT CHANGE UP
SP GR SPEC: 1.02 — SIGNIFICANT CHANGE UP (ref 1–1.03)
UROBILINOGEN FLD QL: 1 MG/DL — SIGNIFICANT CHANGE UP (ref 0.2–1)
UUN UR-MCNC: 1092 MG/DL — SIGNIFICANT CHANGE UP
WBC # BLD: 18.84 K/UL — HIGH (ref 3.8–10.5)
WBC # FLD AUTO: 18.84 K/UL — HIGH (ref 3.8–10.5)

## 2025-07-09 PROCEDURE — 0225U NFCT DS DNA&RNA 21 SARSCOV2: CPT

## 2025-07-09 PROCEDURE — 87449 NOS EACH ORGANISM AG IA: CPT

## 2025-07-09 PROCEDURE — 80053 COMPREHEN METABOLIC PANEL: CPT

## 2025-07-09 PROCEDURE — 81003 URINALYSIS AUTO W/O SCOPE: CPT

## 2025-07-09 PROCEDURE — 0241U: CPT

## 2025-07-09 PROCEDURE — 85025 COMPLETE CBC W/AUTO DIFF WBC: CPT

## 2025-07-09 PROCEDURE — 84100 ASSAY OF PHOSPHORUS: CPT

## 2025-07-09 PROCEDURE — 87324 CLOSTRIDIUM AG IA: CPT

## 2025-07-09 PROCEDURE — 83735 ASSAY OF MAGNESIUM: CPT

## 2025-07-09 PROCEDURE — 80048 BASIC METABOLIC PNL TOTAL CA: CPT

## 2025-07-09 PROCEDURE — 36415 COLL VENOUS BLD VENIPUNCTURE: CPT

## 2025-07-09 PROCEDURE — 99233 SBSQ HOSP IP/OBS HIGH 50: CPT | Mod: GC

## 2025-07-09 PROCEDURE — 84540 ASSAY OF URINE/UREA-N: CPT

## 2025-07-09 PROCEDURE — 82962 GLUCOSE BLOOD TEST: CPT

## 2025-07-09 PROCEDURE — 84300 ASSAY OF URINE SODIUM: CPT

## 2025-07-09 PROCEDURE — 99222 1ST HOSP IP/OBS MODERATE 55: CPT

## 2025-07-09 PROCEDURE — 84133 ASSAY OF URINE POTASSIUM: CPT

## 2025-07-09 PROCEDURE — 83935 ASSAY OF URINE OSMOLALITY: CPT

## 2025-07-09 PROCEDURE — 82570 ASSAY OF URINE CREATININE: CPT

## 2025-07-09 PROCEDURE — 83880 ASSAY OF NATRIURETIC PEPTIDE: CPT

## 2025-07-09 PROCEDURE — 84156 ASSAY OF PROTEIN URINE: CPT

## 2025-07-09 PROCEDURE — 87040 BLOOD CULTURE FOR BACTERIA: CPT

## 2025-07-09 RX ORDER — POTASSIUM PHOSPHATE, MONOBASIC POTASSIUM PHOSPHATE, DIBASIC INJECTION, 236; 224 MG/ML; MG/ML
30 SOLUTION, CONCENTRATE INTRAVENOUS ONCE
Refills: 0 | Status: COMPLETED | OUTPATIENT
Start: 2025-07-09 | End: 2025-07-09

## 2025-07-09 RX ORDER — ACETAMINOPHEN 500 MG/5ML
650 LIQUID (ML) ORAL EVERY 6 HOURS
Refills: 0 | Status: DISCONTINUED | OUTPATIENT
Start: 2025-07-09 | End: 2025-07-18

## 2025-07-09 RX ORDER — INSULIN LISPRO 100 U/ML
INJECTION, SOLUTION INTRAVENOUS; SUBCUTANEOUS
Refills: 0 | Status: DISCONTINUED | OUTPATIENT
Start: 2025-07-09 | End: 2025-07-09

## 2025-07-09 RX ORDER — INSULIN LISPRO 100 U/ML
INJECTION, SOLUTION INTRAVENOUS; SUBCUTANEOUS
Refills: 0 | Status: DISCONTINUED | OUTPATIENT
Start: 2025-07-09 | End: 2025-07-18

## 2025-07-09 RX ORDER — PREDNISONE 20 MG/1
30 TABLET ORAL EVERY 24 HOURS
Refills: 0 | Status: DISCONTINUED | OUTPATIENT
Start: 2025-07-09 | End: 2025-07-13

## 2025-07-09 RX ORDER — INSULIN GLARGINE-YFGN 100 [IU]/ML
10 INJECTION, SOLUTION SUBCUTANEOUS AT BEDTIME
Refills: 0 | Status: DISCONTINUED | OUTPATIENT
Start: 2025-07-09 | End: 2025-07-10

## 2025-07-09 RX ADMIN — POTASSIUM PHOSPHATE, MONOBASIC POTASSIUM PHOSPHATE, DIBASIC INJECTION, 83.33 MILLIMOLE(S): 236; 224 SOLUTION, CONCENTRATE INTRAVENOUS at 13:16

## 2025-07-09 RX ADMIN — Medication 100 MILLIEQUIVALENT(S): at 10:39

## 2025-07-09 RX ADMIN — Medication 40 MILLIGRAM(S): at 05:58

## 2025-07-09 RX ADMIN — INSULIN LISPRO 4 UNIT(S): 100 INJECTION, SOLUTION INTRAVENOUS; SUBCUTANEOUS at 08:45

## 2025-07-09 RX ADMIN — Medication 1 TABLET(S): at 12:32

## 2025-07-09 RX ADMIN — Medication 81 MILLIGRAM(S): at 12:32

## 2025-07-09 RX ADMIN — Medication 100 MILLIEQUIVALENT(S): at 11:33

## 2025-07-09 RX ADMIN — HEPARIN SODIUM 5000 UNIT(S): 1000 INJECTION INTRAVENOUS; SUBCUTANEOUS at 13:52

## 2025-07-09 RX ADMIN — HEPARIN SODIUM 5000 UNIT(S): 1000 INJECTION INTRAVENOUS; SUBCUTANEOUS at 21:35

## 2025-07-09 RX ADMIN — ATORVASTATIN CALCIUM 80 MILLIGRAM(S): 80 TABLET, FILM COATED ORAL at 21:36

## 2025-07-09 RX ADMIN — INSULIN LISPRO 4: 100 INJECTION, SOLUTION INTRAVENOUS; SUBCUTANEOUS at 13:15

## 2025-07-09 RX ADMIN — PREDNISONE 30 MILLIGRAM(S): 20 TABLET ORAL at 08:46

## 2025-07-09 RX ADMIN — INSULIN LISPRO 4 UNIT(S): 100 INJECTION, SOLUTION INTRAVENOUS; SUBCUTANEOUS at 13:14

## 2025-07-09 RX ADMIN — EZETIMIBE 10 MILLIGRAM(S): 10 TABLET ORAL at 12:33

## 2025-07-09 RX ADMIN — INSULIN LISPRO 4 UNIT(S): 100 INJECTION, SOLUTION INTRAVENOUS; SUBCUTANEOUS at 17:52

## 2025-07-09 RX ADMIN — Medication 40 MILLIEQUIVALENT(S): at 10:39

## 2025-07-09 RX ADMIN — INSULIN GLARGINE-YFGN 10 UNIT(S): 100 INJECTION, SOLUTION SUBCUTANEOUS at 21:36

## 2025-07-09 RX ADMIN — Medication 40 MILLIEQUIVALENT(S): at 00:23

## 2025-07-09 RX ADMIN — INSULIN LISPRO 4: 100 INJECTION, SOLUTION INTRAVENOUS; SUBCUTANEOUS at 08:45

## 2025-07-09 RX ADMIN — INSULIN LISPRO 4: 100 INJECTION, SOLUTION INTRAVENOUS; SUBCUTANEOUS at 17:52

## 2025-07-09 RX ADMIN — HEPARIN SODIUM 5000 UNIT(S): 1000 INJECTION INTRAVENOUS; SUBCUTANEOUS at 05:57

## 2025-07-09 RX ADMIN — Medication 100 MILLIEQUIVALENT(S): at 12:36

## 2025-07-09 NOTE — CONSULT NOTE ADULT - ASSESSMENT
I M    74M PMHx of tobacco use with stage IV metastatic non-small cell lung cancer (diagnosed in September 2023  on Keytruda/ pembrolizumab last infusion on 5/16), COPD, HTN, DM, S/p carotic endarterectomy 2020, presenting with 2 weeks of b/l LE edema and pain without improvement on lasix, found to have MARYAN and hypokalemia, admitted for PT eval and further evaluation.     Problem/Plan - 1:  ·  Problem: Bilateral lower extremity edema.   ·  Plan: Reports1 month of LE swelling. Per chart review, patient has had complaints since previous discharge in early June. Was initially started on lasix 20 mg and uptitrated to lasix 60 mg daily per Dr. Pratt 2 weeks ago. Patient reports no change in LE swelling, but has had difficulty walking around. On exam there is 3+ pitting edema from ankle to shin b/l with surrounding erythema. Denies orthopnea, PND, chest pain, SOB.  B/l LE US negative for DVT, TTE 5/25 with normal EF    - Hold off on further diuresis  - Ace wrap legs b/l and elevate  - PT evaluation  - F/u BNP.    Problem/Plan - 2:  ·  Problem: MARYAN (acute kidney injury).   ·  Plan: #NAGMA   On admission BUN/Cr 67/ 1.65, baseline Cr ~ 0.8 - 1.00 likely pre-renal iso overdiuresis. NAGMA also present with C02 36 likely due to renal losses.     - F/u urine studies  - Hold home lisinopril and HCTZ for now.    Problem/Plan - 3:  ·  Problem: Hypokalemia.   ·  Plan: On admission, K 2.8 without ECG changes likely iso overdiuresis with Lasix.   S/p 40 meq KCl PO and 10 meq KCl x1 IV     - Give additional 40 meq KCl PO  - Hold additional lasix for now   - F/u AM BMP.    Problem/Plan - 4:  ·  Problem: Leukocytosis.   ·  Plan: On admission WBC 22.9, uptrending from previous admission 15.06. Currently on prednisone taper for colitis, decreased from 60 mg to 30 mg on Monday. Denies any fevers/chills, URI symptoms, abdominal pain, dysuria or diarrhea.   RVP negative, CXR appears similar to prior but pending final read. LE with b/l erythema but without warmth. Exam also notable for 3 small wounds on LE without pus or fluctuance.     - Monitor off abx for now  - F/u RVP  - F/u BCx.    Problem/Plan - 5:  ·  Problem: History of colitis.   ·  Plan: Previously admitted in May for management of immunotherapy induced colitis, currently on steroid taper.     - Heme onc consulted, will follow up in the AM   - Continue with prednisone 30 mg daily, with plan to continue taper.    Problem/Plan - 6:  ·  Problem: Diabetes mellitus.   ·  Plan: Hx of DM with A1c of 7.3, follows with endocrine within [facility]. Was previously seen after recent hospitalization due to steroid induced hyperglycemia. Has been taking 4U lispro TID with low insulin sliding scale 1:50 above 150.     - Continue with 4U lispro TID   - CC Diet   - mISS.    Problem/Plan - 7:  ·  Problem: HTN (hypertension).   ·  Plan: Home medications: lisinopril 40 mg daily, metoprolol 100 mg daily, HCTZ 25 mg daily; amlodipine was recently discontinued due to LE swelling     - Hold lisinopril and HCTZ iso MARYAN   - Continue with metoprolol.    Problem/Plan - 8:  ·  Problem: HLD (hyperlipidemia).   ·  Plan: Home medications: atorvastatin 80 mg daily and ezetimibe 10 mg daily    - Continue with home medications     # S/p carotic endarterectomy   Prior hx of CVA and carotid stenosis s/p endarterectomy in 2020    - Continue with home aspirin and atorvastatin.    Problem/Plan - 9:  ·  Problem: Non-small cell carcinoma of lung, stage 4.   ·  Plan: Hx of stage IV NSCLC  adenocarcinoma with metastasis to the brain (T4N2M1 with BMs, PD-L1 90% positive, DDR2 mutant, TP53 mutant). Oncologist: Dr. Pratt. Previously on pembrolizumab, with treatment break after admission found to have immunotherapy induced colitis (last infusion reportedly 5/16).    CTAP 5/21 :Unchanged 21 x 21 mm superior right lower lobe mass surrounding the segmental airway and contiguous with the mediastinal border. No new suspicious lung lesions.    - F/u heme onc recs.    Problem/Plan - 10:  ·  Problem: COPD without exacerbation.   ·  Plan; Former smoker, hx of COPD. Not in active exacerbation.     - Continue with home albuterol PRN.    Problem/Plan - 11:  ·  Problem: Prophylactic measure.   ·  Plan: F: as needed  E: replete as needed  N: CC diet   DVT ppx: lovenox   GI ppx: pantoprazole   Dispo --> RMF.

## 2025-07-09 NOTE — CONSULT NOTE ADULT - SUBJECTIVE AND OBJECTIVE BOX
Pt is a 75yo M w/ PMHx of Stage IV metastatic non-small cell lung cancer (diagnosed in Sept 2023, on Pembrolizumab, last infusion on 05/16), COPD, HTN, DM, KARLA (s/p carotid endarterectomy in 2020), left-sided pacemaker (placed in 2023 s/p complete AV block), and a recent history of immunotherapy-induced colitis (May 2025) who presented with bilateral 3+ pitting edema. LE venous duplex ruled out DVT b/l. Labs showed hypokalemia (2.8) and pre-renal MARYAN (BUN/Cr 67/1.65).     Mr. Christopher developed immunotherapy-induced colitis 3 weeks ago at which point the Keytruda was stopped and he was started on a Prednisone taper. Initial dosage of Prednisone at 60mg and currently tapered down to 30mg. After onset of BLE swelling, Lasix dosage was increased to 60mg.     Patient seen and examined at bedside 7/9, reports BLLE edema is improved.  Remains on IVF and receiving additional K repletions.  Otherwise stable with no new complaints. Pending PT eval.        MEDICATIONS  (STANDING):  aspirin enteric coated 81 milliGRAM(s) Oral daily  atorvastatin 80 milliGRAM(s) Oral at bedtime  dextrose 5%. 1000 milliLiter(s) (100 mL/Hr) IV Continuous <Continuous>  dextrose 5%. 1000 milliLiter(s) (50 mL/Hr) IV Continuous <Continuous>  dextrose 50% Injectable 25 Gram(s) IV Push once  dextrose 50% Injectable 12.5 Gram(s) IV Push once  dextrose 50% Injectable 25 Gram(s) IV Push once  ezetimibe 10 milliGRAM(s) Oral daily  glucagon  Injectable 1 milliGRAM(s) IntraMuscular once  heparin   Injectable 5000 Unit(s) SubCutaneous every 8 hours  insulin lispro (ADMELOG) corrective regimen sliding scale   SubCutaneous three times a day before meals  insulin lispro Injectable (ADMELOG) 4 Unit(s) SubCutaneous three times a day before meals  metoprolol succinate  milliGRAM(s) Oral daily  pantoprazole    Tablet 40 milliGRAM(s) Oral before breakfast  predniSONE   Tablet 30 milliGRAM(s) Oral every 24 hours  trimethoprim   80 mG/sulfamethoxazole 400 mG 1 Tablet(s) Oral daily    MEDICATIONS  (PRN):  acetaminophen     Tablet .. 650 milliGRAM(s) Oral every 6 hours PRN Mild Pain (1 - 3)  albuterol    90 MICROgram(s) HFA Inhaler 2 Puff(s) Inhalation every 6 hours PRN Shortness of Breath and/or Wheezing  dextrose Oral Gel 15 Gram(s) Oral once PRN Blood Glucose LESS THAN 70 milliGRAM(s)/deciliter      Vital Signs Last 24 Hrs  T(C): 36.6 (09 Jul 2025 12:45), Max: 36.6 (08 Jul 2025 17:33)  T(F): 97.9 (09 Jul 2025 12:45), Max: 97.9 (08 Jul 2025 17:33)  HR: 75 (09 Jul 2025 12:45) (75 - 82)  BP: 153/78 (09 Jul 2025 12:45) (125/79 - 164/81)  BP(mean): 106 (08 Jul 2025 22:48) (106 - 106)  RR: 20 (09 Jul 2025 12:45) (18 - 20)  SpO2: 93% (09 Jul 2025 12:45) (92% - 95%)    Parameters below as of 09 Jul 2025 12:45  Patient On (Oxygen Delivery Method): room air    PHYSICAL EXAM:  General: in no acute distress  Lungs: CTA B/L. No wheezes, rales, or rhonchi  Cardiovascular: RRR. No murmurs, rubs, or gallops  Abdomen: Soft, non-tender non-distended; No rebound or guarding  Extremities: WWP, BLLE 3+ pitting   Neurological: Alert and oriented  Skin: BLLE erythema     LABS:                        12.3   18.84 )-----------( 251      ( 09 Jul 2025 05:30 )             37.6     07-09    143  |  97  |  56[H]  ----------------------------<  236[H]  2.9[LL]   |  34[H]  |  1.38[H]    Ca    8.6      09 Jul 2025 05:30  Phos  2.2     07-09  Mg     2.0     07-09    TPro  4.9[L]  /  Alb  2.9[L]  /  TBili  0.5  /  DBili  x   /  AST  29  /  ALT  116[H]  /  AlkPhos  75  07-09

## 2025-07-09 NOTE — DIETITIAN INITIAL EVALUATION ADULT - OTHER INFO
74M with PMHx of stage IV metastatic non-small cell lung cancer (diagnosed in Sept 2023, on Keytruda/Pembrolizumab, last infusion on 05/16), COPD, HTN, DM, KARLA (status post carotid endarterectomy in 2020), left-sided pacemaker (placed in 2023 status post complete AV block), and a recent history of immunotherapy-induced colitis (May 2025) presenting with worsening BLE edema concerning for third-spacing secondary to to NSCLC vs heart failure vs prednisone use, admitted for further stabilization and treatment of hypokalemia and pre-renal MARYAN secondary to over-diuresis (25mg hydrochlorothiazide + 60mg furosemide).     Pt seen on 7WO for assessment. Labs and medication orders reviewed. Ordered for 4U premeal admelog, IV potassium phosphate, prednisone. Na/Mg WNL, K 2.9 <low>, Phos 2.2 <low>, BUN/Cr 56/1.38 <high>, POC blood glucose (7/8-7/8) 202-239, HgbA1c 7.3% (5/22). On Consistent Carbohydrate diet. Pt out of bed to chair on visit this AM. Reports poor appetite, makes an effort to maintain good intake though consuming <3 meals daily and losing wt PTA. Denies issues with meal/food procurement PTA, endorses assistance from brother as needed, denies need/interest in meal delivery program on d/c (ex. GL[employer], Mo[employer]). States UBW ~155 pounds, per prior admission RD note (5/22) pt wt 134 pounds; current admission wt 114 pounds / 51.7 kilograms indicates 17 pound / 13% wt loss over <2 months - clinically significant. Overt signs of severe muscle and fat wasting identified. Per ASPEN guidelines, pt meets criteria for severe malnutrition - see nutrition risk notification. Pt denies difficulty chewing/swallowing. Reports no nausea/vomiting/diarrhea/constipation. No abdominal distension/discomfort noted, no pain reported. Pt confirms no known food allergies. No Latter day/ethnic/cultural food preferences noted. Notes preference for low sugar foods due to concern for blood glucose control. RD reviewed formulary and fortified food options, pt receptive to Ensure Max, fortified smoothie, oatmeal, and Gelatein. No pressure injuries documented, 3+ right leg and 4+ left leg edema noted. See nutrition recommendations - RD communicated to team. RD to remain available.

## 2025-07-09 NOTE — PROGRESS NOTE ADULT - PROBLEM SELECTOR PLAN 4
Work with Dr. Pratt to return to using Keytruda Eventual plan to return to using Keytruda  Follows with Dr. Pratt - Eventual plan to return to using Keytruda  - Follows with Dr. Pratt

## 2025-07-09 NOTE — PHYSICAL THERAPY INITIAL EVALUATION ADULT - AMBULATION SKILLS, REHAB EVAL
Parotidectomy    The parotid is a gland near the ear. It helps produce and control the release of saliva. The facial nerve passes through the gland. This nerve controls the muscles of the face. If a tumor forms in the parotid gland, it can press on the facial nerve, causing discomfort and pain. Part or all of the face may be weak or paralyzed. To treat a parotid tumor, part or all of the gland is removed. This surgery is called parotidectomy. This sheet explains the surgery and what to expect.  Types of tumors  Most parotid tumors are benign (not cancer). A benign tumor may grow larger, but it will not spread to other parts of the body. In some cases, though, a tumor is cancer. It can become metastatic, meaning it spreads cancer cells to other parts of the body. Whether the tumor is benign or cancer, part or all of the parotid gland will be removed. If a tumor is cancer, nearby tissues or lymph nodes may be removed as well. And further cancer treatments, such as radiation, may be needed.  Protecting the facial nerve  During this surgery, steps are taken to protect the facial nerve from damage. This may include using a device called a facial nerve monitor to sense activity in and around the nerve. This helps to map the exact location of the nerve, so the healthcare provider can avoid touching it during surgery. But in some cases the nerve cant be completely protected. You and your healthcare provider will discuss whether your facial nerve is likely to be affected by the surgery and what your choices are.  Preparing for surgery  Prepare for the procedure as you have been instructed. Be sure to tell your healthcare provider about all medicines you take. This includes over-the-counter medicines. It also includes herbs and other supplements. You may need to stop taking some or all of them before surgery. Also, follow any directions youre given for not eating or drinking before surgery.  The day of surgery  The  surgery takes 3 to 5 hours.  Before the surgery begins:  · An IV line is put into a vein in your arm or hand. This line delivers fluids and medicines.  · To keep you free of pain during the surgery, youre given general anesthesia. This medicine puts you into a state like deep sleep through the surgery so you don't feel pain.  During the surgery:   · The healthcare provider makes an incision (cut) from the front of your ear to partway down the neck to expose the parotid gland.  · The facial nerve is located. Great care is taken to avoid harming this nerve. A facial nerve monitor (a machine with a small sensor that is put onto your cheek) may be used to map the nerves exact location. This helps avoid damage.  · The gland is removed.  · If cancer is present, a margin of tissue around the gland is also removed. Nearby lymph nodes may also be removed.  · The incision is closed with sutures (stitches), surgical glue, or both.  · A tube (drain) may be placed into the surgical area. This drains fluid that may build up after surgery. The drain will likely be removed before you go home.  After the surgery  You will be taken to a recovery room to wake up from the anesthesia. You may feel sleepy and nauseated at first. You will be given medicine to control pain. You may then be taken to a hospital room to stay overnight. Once you are ready to go home, you will be released to an adult family member or friend. Have someone stay with you for a few days to help care for you as your healing begins. If youre sent home with a drain, you will be shown how to care for it.  Recovering at home  Once at home, follow the instructions you have been given. Keep in mind that nerves take time to heal. It could be weeks or months before the facial nerve returns to normal. Discuss what to expect with your healthcare provider. During your recovery:  · Take all prescribed medicine as directed.  · Sleep with your head raised above the level of  your heart for 3 to 5 days after the surgery. This helps reduce swelling.  · Limit exercise as directed. Your healthcare provider will tell you when you can return to your normal activities and routine.  · Avoid driving until you are no longer taking pain medicines that make you drowsy.  · Care for your bandage and incision as directed. Do not get your incision or bandage wet until your healthcare provider says its OK.  · Check your incision daily for symptoms of infection listed below.  When to call the healthcare provider  Be sure you have a contact number for your healthcare provider. After you get home, call if you have any of the following:  · Chest pain or trouble breathing (call 911 or other emergency service)  · Fever of 100.4°F (38°C) or higher, or as directed by your healthcare provider  · Pain that gets worse or is not relieved by pain medicine  · Symptoms of infection at the incision site, such as increased redness or swelling, warmth, worsening pain, or foul-smelling drainage  · Severe facial swelling or weakness  · Trouble eating or drinking  · Clear fluid draining from the incision site  · Trouble breathing  · Severe nausea or vomiting  · Pain or swelling in the legs   Follow-up  During follow-up visits, your healthcare provider will check on your healing. If you have drains that need to be removed, this may be done 1 to 2 days after surgery. Stitches or staples will likely be removed 5 to 10 days after the surgery. If your surgery was done to treat cancer, you may need further evaluation and treatment. Your healthcare provider can tell you more.  Risks and possible complications  Risks of parotidectomy include:  · Infection  · Bleeding  · Ear numbness  · Injury to the facial nerve or some of its branches, which may result in permanent weakness or paralysis  · Seroma (buildup of fluid around the wound that causes swelling)  · Fernanda syndrome, a condition that causes sweating while eating due to changes  to the nerves  · Inability to remove the entire tumor, needing further cancer treatment  · Return of cancer  · Risks of anesthesia (you will discuss these with the anesthesiologist)   Talk with your healthcare providers about what signs to look for and when to call them. Make sure you know what number to call with questions during office hours, nights, and weekends.  Date Last Reviewed: 10/22/2015  © 8698-5116 [employer]. [address] [place] [address], [place] [address]. All rights reserved. This information is not intended as a substitute for professional medical care. Always follow your healthcare professional's instructions.         straight cane/independent/needs device

## 2025-07-09 NOTE — PROGRESS NOTE ADULT - PROBLEM SELECTOR PLAN 8
PO Potassium Chloride - 40 milliEquivalent(s) - stop after 1 time  IV Potassium Chloride - 10 milliEquivalents every 60 minutes - stop after 3 times   IV Potassium Phosphate - 30 milliEquivalents - stop after 1 time PO Potassium Chloride - 40 milliEquivalent(s) - stop after 1 time  IV Potassium Chloride - 10 milliEquivalents every 60 minutes - stop after 3 times   IV Potassium Phosphate - 30 milliEquivalents - stop after 1 time  Reach goal of 4.0 K+ - PO Potassium Chloride - 40 milliEquivalent(s) - stop after 1 time  - IV Potassium Chloride - 10 milliEquivalents every 60 minutes - stop after 3 times   - IV Potassium Phosphate - 30 milliEquivalents - stop after 1 time  - Target goal of 4.0 K+

## 2025-07-09 NOTE — OCCUPATIONAL THERAPY INITIAL EVALUATION ADULT - DIAGNOSIS, OT EVAL
Pt presents to [facility] with BLE swelling found to be hypokalemic +pre-renal MARYAN. OT consulted and pt discharged indep without AD or DME needs.

## 2025-07-09 NOTE — PROGRESS NOTE ADULT - ASSESSMENT
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 Admitted for further stabilization and treatment of hypokalemia and pre-renal MARYAN 2/2 to over-diuresis (25mg hydrochlorothiazide + 60mg furosemide).  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 Admitted for further evaluation and management in addition to treatment of hypokalemia and pre-renal MARYAN 2/2 to over-diuresis (25mg hydrochlorothiazide + 60mg furosemide).

## 2025-07-09 NOTE — DIETITIAN INITIAL EVALUATION ADULT - PERSON TAUGHT/METHOD
Educated on energy/protein dense foods and strategies to maximize intake. Discussed oral nutrition supplements available and methods to fortify preferred foods. Reviewed strategies to balance blood glucose control and wt repletion. RD answered all questions. Pt aware RD remains available for additional questions/concerns./verbal instruction/patient instructed

## 2025-07-09 NOTE — DIETITIAN INITIAL EVALUATION ADULT - PROBLEM SELECTOR PLAN 2
#NAGMA   On admission BUN/Cr 67/ 1.65, baseline Cr ~ 0.8 - 1.00 likely pre-renal iso overdiuresis. NAGMA also present with C02 36 likely due to renal losses.     - F/u urine studies  - Hold home lisinopril and HCTZ for now

## 2025-07-09 NOTE — PROGRESS NOTE ADULT - PROBLEM SELECTOR PLAN 6
Insulin lispro on sliding scale Continue Insulin lispro on sliding scale - Continue Insulin lispro  - Lantus 10mg nocte added as basal insulin  - Will monitor response and titrate as needed

## 2025-07-09 NOTE — PHYSICAL THERAPY INITIAL EVALUATION ADULT - GAIT DEVIATIONS NOTED, PT EVAL
+BM throughout, further therapy not appropriate, steady, fecal incontinence/decreased tien/decreased step length

## 2025-07-09 NOTE — DIETITIAN INITIAL EVALUATION ADULT - OTHER CALCULATIONS
Estimated needs based on IBW as dosing wt 117 pounds / 51.7 kilograms is <80% IBW (77%). Needs adjusted for age, BMI, COPD, cancer, and malnutrition. Defer fluids to team.

## 2025-07-09 NOTE — CONSULT NOTE ADULT - NS ATTEND AMEND GEN_ALL_CORE FT
I evaluated the patient with the Hematology Oncology PA, Autumn Nugent.  The patient is a 74 year old man with NSCLC stage IV, previously treated w/ pembrolizumab, complicated by immune mediated colitis for which he is presently on a steroid taper (prednisone 30 mg daily).  He came to the hospital due to progressive weakness, LE edema, poor PO intake.  He has been struggling at home.  Colitis appears to be well controlled on steroids.  As noted above, he had multiple laboratory abnormalities including MARYAN, hypokalemia, and a metabolic alkalosis, possibly from volume contraction from home diuretics.  These metabolic abnormalities are being addressed by the internal medicine service.    Plan from an oncology standpoint will be to continue the current dose of prednisone for immune mediated colitis.  Suggest addressing LE edema w/ compression hose/TEDS or wraps.  DVT has been ruled out.  The patient's anticancer immunotherapy treatment has been discontinued due to colitis.  He should f/u w/ Dr Pratt in the clinic for routine care/restaging scans.    DVT prophylaxis w/ UFH.

## 2025-07-09 NOTE — DIETITIAN INITIAL EVALUATION ADULT - PROBLEM SELECTOR PROBLEM 4
----- Message from Ora sent at 10/3/2024 10:34 AM CDT -----  Pt asking for a left knee inj on 10/23 any time after 10am. She has another appt in town that day. Please let her know. You can leave a message.   Who Called: Heidy Luis Moyer    Caller is requesting a sooner appointment.    When is the first available appointment?10/30  Options offered (Virtual Visit, Urgent Care):   Symptoms:knee pain        Patient's Preferred Phone Number on File: [phone]   Best Call Back Number, if different:  Additional Information:   Called and left message with patient that I scheduled her an appt with Dr. Bravo on 10/23/24 @ 10:30a.m.   08-Apr-2022 14:15 Leukocytosis

## 2025-07-09 NOTE — PROGRESS NOTE ADULT - PROBLEM SELECTOR PLAN 7
Heparin - 5000 units subcutaneous every 8 hrs DVT ppx: Heparin - 5000 units subcutaneous every 8 hrs - DVT ppx: Heparin - 5000 units subcutaneous every 8 hrs

## 2025-07-09 NOTE — DIETITIAN INITIAL EVALUATION ADULT - PROBLEM SELECTOR PLAN 1
Reports1 month of LE swelling. Per chart review, patient has had complaints since previous discharge in early June. Was initially started on lasix 20 mg and uptitrated to lasix 60 mg daily per Dr. Pratt 2 weeks ago. Patient reports no change in LE swelling, but has had difficulty walking around. On exam there is 3+ pitting edema from ankle to shin b/l with surrounding erythema. Denies orthopnea, PND, chest pain, SOB.  B/l LE US negative for DVT, TTE 5/25 with normal EF    - Hold off on further diuresis  - Ace wrap legs b/l and elevate  - PT evaluation  - F/u BNP

## 2025-07-09 NOTE — PROGRESS NOTE ADULT - ATTENDING COMMENTS
Pt seen and examined w housestaff  74M w Stage 4 NSCLC (dx 9/2023) on pembrolizumab - last on 5/16/25, COPD on RA, HTN, DM2, carotid artery stenosis s/p CEA 2020, L sided PPM, recent immunotherapy induced colitis (5/2025 --> started on prednisone - decreased to 30mg daily, c/b BLE Swelling    #Diarrhea - multiple episodes of watery diarrhea   - no abd pain    #Hypokalemia - K 2.9. Mg at target. Likely d/t being on diuretics.  #MARYAN - Improving. 1.38 from 1.65 on admission. Baseline is 0.88. Likely d/t diuretics and decreased PO intake. BUN/Cr ratio >20 and low Baldo at 36 also pointing towards pre-renal (hypovolemic) etiology    #BLE edema - apppears to be improving. Some mild-moderate pitting edema that is symmetrical present in mid shin present. No pain.   - BLE Dopplers negative 7/8 for DVT   - TTE   #Leukocytosis - likely d/t chronic prednisone 30mg daily. 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 PRN Albuterol. Does not appear in acute exacerbation  #HLD - on atorvastatin 80, zetia    PPx: SQH  Plan  GI PCR, C Diff r/o for watery diarrhea  Stop Lasix and HCTZ i/s/o MARYAN and hypokalemia   -- ACE wrap and elevate BLE  Supplement KCl today  Nutrition c/s as pt reports unintentional wt loss  PT Eval --   Needed 16u w lispro+SSI today. Remains above target   - start lantus 10u HS  CBC w diff, BMP w Mg in AM    DISPO: outpatient PT -- pending stabilization of potassium

## 2025-07-09 NOTE — OCCUPATIONAL THERAPY INITIAL EVALUATION ADULT - ADDITIONAL COMMENTS
Pt r handed and wears glasses. Pt lives with brother in apartment with 20 [address]. Pt indep in all ADL and IADL including grovery shopping, meal prep, diabetes self management.

## 2025-07-09 NOTE — DIETITIAN INITIAL EVALUATION ADULT - PERTINENT MEDS FT
MEDICATIONS  (STANDING):  aspirin enteric coated 81 milliGRAM(s) Oral daily  atorvastatin 80 milliGRAM(s) Oral at bedtime  dextrose 5%. 1000 milliLiter(s) (100 mL/Hr) IV Continuous <Continuous>  dextrose 5%. 1000 milliLiter(s) (50 mL/Hr) IV Continuous <Continuous>  dextrose 50% Injectable 25 Gram(s) IV Push once  dextrose 50% Injectable 12.5 Gram(s) IV Push once  dextrose 50% Injectable 25 Gram(s) IV Push once  ezetimibe 10 milliGRAM(s) Oral daily  glucagon  Injectable 1 milliGRAM(s) IntraMuscular once  heparin   Injectable 5000 Unit(s) SubCutaneous every 8 hours  insulin lispro (ADMELOG) corrective regimen sliding scale   SubCutaneous three times a day before meals  insulin lispro Injectable (ADMELOG) 4 Unit(s) SubCutaneous three times a day before meals  metoprolol succinate  milliGRAM(s) Oral daily  pantoprazole    Tablet 40 milliGRAM(s) Oral before breakfast  potassium phosphate IVPB 30 milliMole(s) IV Intermittent once  predniSONE   Tablet 30 milliGRAM(s) Oral every 24 hours  trimethoprim   80 mG/sulfamethoxazole 400 mG 1 Tablet(s) Oral daily    MEDICATIONS  (PRN):  acetaminophen     Tablet .. 650 milliGRAM(s) Oral every 6 hours PRN Mild Pain (1 - 3)  albuterol    90 MICROgram(s) HFA Inhaler 2 Puff(s) Inhalation every 6 hours PRN Shortness of Breath and/or Wheezing  dextrose Oral Gel 15 Gram(s) Oral once PRN Blood Glucose LESS THAN 70 milliGRAM(s)/deciliter

## 2025-07-09 NOTE — DIETITIAN INITIAL EVALUATION ADULT - ADD RECOMMEND
1. Continue Consistent Carbohydrate diet, recommend add Ensure Max (150kcal, 30g protein) x1/day.  >>Dietary to provide fortified oatmeal (245kcal, 19g protein), strawberry vanilla fortified smoothie (230kcal, 17g protein), and Gelatein (80kcal, 20g protein) x1/day.    >>Encourage and monitor PO intake. [place] dietary preferences as able.   2. Consider appetite stimulant in setting of poor appetite and severe malnutrition.   3. Monitor GI tolerance, weight trends, labs, and skin integrity.  4. Defer bowel and pain regimens to team.   5. RD to remain available for diet education/intervention prn.  1. Continue Consistent Carbohydrate diet, recommend add Ensure Max (150kcal, 30g protein) x1/day.  >>Dietary to provide fortified oatmeal (245kcal, 19g protein), strawberry vanilla fortified smoothie (230kcal, 17g protein), and Gelatein (80kcal, 20g protein) x1/day.    >>Encourage and monitor PO intake. [place] dietary preferences as able.   2. Consider appetite stimulant in setting of poor appetite and severe malnutrition.   3. Monitor GI tolerance, weight trends, labs, and skin integrity.  >>Monitor electrolytes closely and replete prn.   4. Defer bowel and pain regimens to team.   5. RD to remain available for diet education/intervention prn.

## 2025-07-09 NOTE — OCCUPATIONAL THERAPY INITIAL EVALUATION ADULT - PERTINENT HX OF CURRENT PROBLEM, REHAB EVAL
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 The pt developed immunotherapy-induced colitis 3 weeks ago at which point the Keytruda was stopped and he was started on a Prednisone taper. Initial dosage of Prednisone at 60mg and currently tapered down to 30mg. After onset of BLE swelling, Lasix dosage was increased to 60mg. The pt also notes that he used Lidocaine cream for his leg pain with significant relief. After noting no change in BLE swelling, pt presented to the ED for further treatment.   ED course: Pt presented with bilateral 3+ pitting edema on exam. LE venous duplex ruled out DVT b/l. Labs showed hypokalemia (2.8) and pre-renal MARYAN (BUN/Cr 67/1.65). Heme/onc was consulted and pt was admitted to medicine for management of BLE swelling and pre-renal MARYAN.

## 2025-07-09 NOTE — DIETITIAN INITIAL EVALUATION ADULT - PERTINENT LABORATORY DATA
07-09    143  |  97  |  56[H]  ----------------------------<  236[H]  2.9[LL]   |  34[H]  |  1.38[H]    Ca    8.6      09 Jul 2025 05:30  Phos  2.2     07-09  Mg     2.0     07-09    TPro  4.9[L]  /  Alb  2.9[L]  /  TBili  0.5  /  DBili  x   /  AST  29  /  ALT  116[H]  /  AlkPhos  75  07-09  POCT Blood Glucose.: 202 mg/dL (07-09-25 @ 08:23)  A1C with Estimated Average Glucose Result: 7.3 % (05-22-25 @ 06:55)

## 2025-07-09 NOTE — PROGRESS NOTE ADULT - PROBLEM SELECTOR PLAN 1
Third-spacing 2/2 to NSCLC vs heart failure vs prednisone use  - Less likely HF (normal echo in May although BNP 1600. No resp sx and chest clear)  - Monitor edema as pred continues to taper  - Compression stockings applied

## 2025-07-09 NOTE — OCCUPATIONAL THERAPY INITIAL EVALUATION ADULT - GENERAL OBSERVATIONS, REHAB EVAL
RN Lilia aware of intent to eval. Pt received semisupine +NAD +IV; pt left as found with all lines intact and needs met, RN aware.

## 2025-07-09 NOTE — CONSULT NOTE ADULT - SUBJECTIVE AND OBJECTIVE BOX
Patient is a 74y old  Male who presents with a chief complaint of BLE edema (2025 21:05)      HPI:  74M PMHx of tobacco use with stage IV metastatic non-small cell lung cancer (diagnosed in 2023  on Keytruda/ pembrolizumab, which has been held since May), COPD, HTN, DM, diagnosed w/ immunotherapy induced colitis May 2025, 2/p carotid endarterectomy , presenting with1 month of b/l LE swelling and inability to get around his house. Reports being on prednisone therapy for immunotherapy induced colitis. Was previously on 60 mg but was started on 30 mg on Monday. Reports no lightheadedness, dizziness nausea or vomiting, and diarrhea since decreasing the dose. Reports a month of b/l LE swelling, leg pain and heaviness that has made it hard to walk around and leave the house. Was initially started on 20 Lasix 1 month ago but was started on Lasix 60 mg 2 weeks ago. States has not helped with his leg pain. States this has not happened before. Denies fevers, chills, sore throat, rhinorrhea chest pain, SOB, PND, orthopnea, abdominal pain, dysuria or diarrhea.    ED Course:   Vitals: T 97.9  HR 82 /79 RR 18 Sp02 95% RA  Labs: WBC 22.29 K 2.8 C02 36 BUN 67 Cr 1.65   Imaging:   ·	B/l LE doppler: no evidence of LE DVT   ·	CXR without consolidations or effusion, looks similar to prior; Pending formal read   Interventions: 40 meq KCl PO, 10 meq x1 KCl  (2025 21:05)    PAST MEDICAL & SURGICAL HISTORY:  Hypertension      Hyperlipidemia      COPD without exacerbation      Diabetes mellitus      Stage 4 lung cancer      History of colitis      No significant past surgical history        MEDICATIONS  (STANDING):  aspirin enteric coated 81 milliGRAM(s) Oral daily  atorvastatin 80 milliGRAM(s) Oral at bedtime  dextrose 5%. 1000 milliLiter(s) (100 mL/Hr) IV Continuous <Continuous>  dextrose 5%. 1000 milliLiter(s) (50 mL/Hr) IV Continuous <Continuous>  dextrose 50% Injectable 25 Gram(s) IV Push once  dextrose 50% Injectable 12.5 Gram(s) IV Push once  dextrose 50% Injectable 25 Gram(s) IV Push once  ezetimibe 10 milliGRAM(s) Oral daily  glucagon  Injectable 1 milliGRAM(s) IntraMuscular once  heparin   Injectable 5000 Unit(s) SubCutaneous every 8 hours  insulin lispro (ADMELOG) corrective regimen sliding scale   SubCutaneous three times a day before meals  insulin lispro Injectable (ADMELOG) 4 Unit(s) SubCutaneous three times a day before meals  metoprolol succinate  milliGRAM(s) Oral daily  pantoprazole    Tablet 40 milliGRAM(s) Oral before breakfast  predniSONE   Tablet 30 milliGRAM(s) Oral every 24 hours  trimethoprim   80 mG/sulfamethoxazole 400 mG 1 Tablet(s) Oral daily    MEDICATIONS  (PRN):  albuterol    90 MICROgram(s) HFA Inhaler 2 Puff(s) Inhalation every 6 hours PRN Shortness of Breath and/or Wheezing  dextrose Oral Gel 15 Gram(s) Oral once PRN Blood Glucose LESS THAN 70 milliGRAM(s)/deciliter      FAMILY HISTORY:  No pertinent family history in first degree relatives        CBC Full  -  ( 2025 05:30 )  WBC Count : 18.84 K/uL  RBC Count : 4.35 M/uL  Hemoglobin : 12.3 g/dL  Hematocrit : 37.6 %  Platelet Count - Automated : 251 K/uL  Mean Cell Volume : 86.4 fl  Mean Cell Hemoglobin : 28.3 pg  Mean Cell Hemoglobin Concentration : 32.7 g/dL  Auto Neutrophil # : 13.61 K/uL  Auto Lymphocyte # : 4.24 K/uL  Auto Monocyte # : 0.68 K/uL  Auto Eosinophil # : 0.02 K/uL  Auto Basophil # : 0.03 K/uL  Auto Neutrophil % : 72.2 %  Auto Lymphocyte % : 22.5 %  Auto Monocyte % : 3.6 %  Auto Eosinophil % : 0.1 %  Auto Basophil % : 0.2 %          142  |  92[L]  |  67[H]  ----------------------------<  221[H]  2.8[LL]   |  36[H]  |  1.65[H]    Ca    8.6      2025 18:29  Mg     2.0         TPro  6.4  /  Alb  3.4  /  TBili  0.5  /  DBili  x   /  AST  38  /  ALT  177[H]  /  AlkPhos  95        Urinalysis Basic - ( 2025 23:23 )    Color: Yellow / Appearance: Clear / S.019 / pH: x  Gluc: x / Ketone: x  / Bili: Negative / Urobili: 1.0 mg/dL   Blood: x / Protein: Trace mg/dL / Nitrite: Negative   Leuk Esterase: Negative / RBC: x / WBC x   Sq Epi: x / Non Sq Epi: x / Bacteria: x        Radiology :     ACC: [id number] EXAM:  US DPLX LWR EXT VEINS COMPL BI   ORDERED BY:   RJ DALLAS     PROCEDURE DATE:  2025          INTERPRETATION:  CLINICAL INFORMATION: Clinical suspicion for DVT    COMPARISON: None available.    TECHNIQUE: Duplex sonography of the BILATERAL LOWER extremity veins with   color and spectral Doppler, with and without compression.    FINDINGS:    RIGHT:  Normal compressibility of the RIGHT common femoral, femoral and popliteal   veins.  Doppler examination shows normal spontaneous and phasic flow.  No RIGHT calf vein thrombosis is detected.    LEFT:  Normal compressibility of the LEFT common femoral, femoral and popliteal   veins.  Doppler examination shows normal spontaneous and phasic flow.  No LEFT calf vein thrombosis is detected.    IMPRESSION:  No evidence of deep venous thrombosis in either lower extremity.    ACC: [id number] EXAM:  XR CHEST PORTABLE URGENT 1V   ORDERED BY: RJ DALLAS     PROCEDURE DATE:  2025          INTERPRETATION:  CLINICAL HISTORY: Shortness of breath    Comparison : 2024.    Technique/Positioning: Frontal views.    Findings:    Support devices: None.    Cardiac/mediastinum/hilum: No cardiomegaly. Left-sided pacemaker.    Lung parenchyma/Pleura: No consolidation, pleural effusion or   pneumothorax.    Skeleton/soft tissues: No significant osseous abnormality is seen.      Impression:      No acute pulmonary process          Review of Systems : per HPI         Vital Signs Last 24 Hrs  T(C): 36.4 (2025 05:50), Max: 36.6 (2025 17:33)  T(F): 97.5 (2025 05:50), Max: 97.9 (2025 17:33)  HR: 77 (2025 05:50) (75 - 82)  BP: 164/81 (2025 05:50) (125/79 - 164/81)  BP(mean): 106 (2025 22:48) (106 - 106)  RR: 18 (2025 05:50) (18 - 18)  SpO2: 92% (2025 05:50) (92% - 95%)    Parameters below as of 2025 22:48  Patient On (Oxygen Delivery Method): room air            Physical Exam:  74 y o man lying comfortably in semi Camp's position , awake , alert , no acute complaints     Head: normocephalic , atraumatic    Eyes: PERRLA , EOMI , no nystagmus , sclera anicteric    ENT / FACE: neg nasal discharge , uvula midline , no oropharyngeal erythema / exudate    Neck: supple , negative JVD , negative carotid bruits , no thyromegaly    Chest: bilateral rhonchi      Cardiovascular: regular rate and rhythm , neg murmurs / rubs / gallops    Abdomen: soft , non distended , no tenderness to palpation in all 4 quadrants ,  normal bowel sounds     Extremities: 3+ pitting edema ankle to shin; + erythema with  multiple skin wounds    Neurologic Exam:     Alert and oriented to person , place , date/year      Cranial Nerves:           II:                         pupils equal , round and reactive to light , visual fields intact         III/ IV/VI:             extraocular movements intact , neg nystagmus , neg ptosis        V:                        facial sensation intact , V1-3 normal        VII:                      face symmetric , no droop , normal eye closure and smile        VIII:                     hearing intact to finger rub bilaterally        IX and X:             no hoarseness , gag intact , palate/ uvula rise symmetrically        XI:                       SCM / trapezius strength intact bilateral        XII:                      no tongue deviation    Motor Exam:        > 3+/5 x 4 extremities , without drift     Sensation:         intact to light touch x 4 extremities                            no neglect or extinction on double simultaneous testing    DTR:           biceps/brachioradialis: equal                            patella/ankle: equal          neg Babinski      Coordination:            Finger to Nose:  neg dysmetria bilaterally        Gait:  not tested         PM&R Impression: admitted for LE edema ,  MARYAN and hypokalemia    - deconditioned    - no focal weakness         Recommendations / Plan:       1) Physical / Occupational therapy focusing on therapeutic exercises , equipment evaluation , bed mobility/transfer out of bed evaluation , progressive ambulation with assistive devices prn .    2) Current disposition plan recommendation:    pending functional progress

## 2025-07-09 NOTE — PROGRESS NOTE ADULT - SUBJECTIVE AND OBJECTIVE BOX
Patient is a 74y old  Male who presents with a chief complaint of BLE edema (09 Jul 2025 07:40)      SUBJECTIVE / OVERNIGHT EVENTS:    MEDICATIONS  (STANDING):  aspirin enteric coated 81 milliGRAM(s) Oral daily  atorvastatin 80 milliGRAM(s) Oral at bedtime  dextrose 5%. 1000 milliLiter(s) (100 mL/Hr) IV Continuous <Continuous>  dextrose 5%. 1000 milliLiter(s) (50 mL/Hr) IV Continuous <Continuous>  dextrose 50% Injectable 25 Gram(s) IV Push once  dextrose 50% Injectable 12.5 Gram(s) IV Push once  dextrose 50% Injectable 25 Gram(s) IV Push once  ezetimibe 10 milliGRAM(s) Oral daily  glucagon  Injectable 1 milliGRAM(s) IntraMuscular once  heparin   Injectable 5000 Unit(s) SubCutaneous every 8 hours  insulin lispro (ADMELOG) corrective regimen sliding scale   SubCutaneous three times a day before meals  insulin lispro Injectable (ADMELOG) 4 Unit(s) SubCutaneous three times a day before meals  metoprolol succinate  milliGRAM(s) Oral daily  pantoprazole    Tablet 40 milliGRAM(s) Oral before breakfast  potassium chloride  10 mEq/100 mL IVPB 10 milliEquivalent(s) IV Intermittent every 1 hour  potassium phosphate IVPB 30 milliMole(s) IV Intermittent once  predniSONE   Tablet 30 milliGRAM(s) Oral every 24 hours  trimethoprim   80 mG/sulfamethoxazole 400 mG 1 Tablet(s) Oral daily    MEDICATIONS  (PRN):  acetaminophen     Tablet .. 650 milliGRAM(s) Oral every 6 hours PRN Mild Pain (1 - 3)  albuterol    90 MICROgram(s) HFA Inhaler 2 Puff(s) Inhalation every 6 hours PRN Shortness of Breath and/or Wheezing  dextrose Oral Gel 15 Gram(s) Oral once PRN Blood Glucose LESS THAN 70 milliGRAM(s)/deciliter      CAPILLARY BLOOD GLUCOSE      POCT Blood Glucose.: 202 mg/dL (09 Jul 2025 08:23)  POCT Blood Glucose.: 239 mg/dL (08 Jul 2025 23:48)    I&O's Summary      PHYSICAL EXAM:      LABS:                        12.3   18.84 )-----------( 251      ( 09 Jul 2025 05:30 )             37.6      07-09    143  |  97  |  56[H]  ----------------------------<  236[H]  2.9[LL]   |  34[H]  |  1.38[H]    Ca    8.6      09 Jul 2025 05:30  Phos  2.2     07-09  Mg     2.0     07-09    TPro  4.9[L]  /  Alb  2.9[L]  /  TBili  0.5  /  DBili  x   /  AST  29  /  ALT  116[H]  /  AlkPhos  75  07-09          Urinalysis Basic - ( 09 Jul 2025 05:30 )    Color: x / Appearance: x / SG: x / pH: x  Gluc: 236 mg/dL / Ketone: x  / Bili: x / Urobili: x   Blood: x / Protein: x / Nitrite: x   Leuk Esterase: x / RBC: x / WBC x   Sq Epi: x / Non Sq Epi: x / Bacteria: x        RADIOLOGY & ADDITIONAL TESTS:    Imaging Personally Reviewed:    Consultant(s) Notes Reviewed:      Care Discussed with Consultants/Other Providers:   SUBJECTIVE / OVERNIGHT EVENTS:  HPI: 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 The pt developed immunotherapy-induced colitis 3 weeks ago at which point the Keytruda was stopped and he was started on a Prednisone taper. Initial dosage of Prednisone at 60mg and currently tapered down to 30mg. After onset of BLE swelling, Lasix dosage was increased to 60mg. The pt also notes that he used Aspercreme for his leg pain with significant relief. After noting no change in BLE swelling, pt presented to the ED for further treatment.   ED course: Pt presented with bilateral 3+ pitting edema on PE. LE venous duplex ruled out DVT b/l. Labs showed hypokalemia (2.8) and pre-renal MARYAN (BUN/Cr 67/1.65). Heme/onc was consulted and pt was admitted to medicine for management of BLE swelling and pre-renal MARYAN.   Overnight events: Pt was treated with ACE wraps and given PO+IV potassium.     Social History:  Lives with brother  Ambulates with cane for the past 2 weeks  Does not drink alcohol   Endorses 40 pack years smoking hx (smoked 2 ppd x 20 yrs, stopped 10 yrs ago)  Endorses smoking marijuana x 10 yrs (stopped 20 yrs ago)    INTERVAL EVENTS:   No acute events overnight.    SUBJECTIVE:   Patient seen and examined at bedside. Condition improved from yesterday.     ROS:  Positive for chronic cough. Negative for SOB, fever, chills, chest pain, abdominal pain, diarrhea, dysuria.    VITAL SIGNS:  Vital Signs Last 24 Hrs  T(C): 36.4 (09 Jul 2025 05:50), Max: 36.6 (08 Jul 2025 17:33)  T(F): 97.5 (09 Jul 2025 05:50), Max: 97.9 (08 Jul 2025 17:33)  HR: 77 (09 Jul 2025 05:50) (75 - 82)  BP: 164/81 (09 Jul 2025 05:50) (125/79 - 164/81)  BP(mean): 106 (08 Jul 2025 22:48) (106 - 106)  RR: 18 (09 Jul 2025 05:50) (18 - 18)  SpO2: 92% (09 Jul 2025 05:50) (92% - 95%)    Parameters below as of 08 Jul 2025 22:48  Patient On (Oxygen Delivery Method): room air    PHYSICAL EXAM:  Constitutional: AOx3. No acute distress. Resting comfortably in bed.  HEENT: Atraumatic. PERRL. EOMI. Clear conjunctiva. Moist mucous membranes.  Neck: Trachea midline. Supple.  Lungs: Mild rales noted at apex of lungs b/l. Lungs otherwise clear to auscultation b/l. No accessory muscle use. No wheezing. No stridor/retracting/tripoding.   Cardiovascular: Regular rate and rhythm. No murmurs, rubs, or gallops.  Abdomen: Soft, non-tender, non-distended. No rebound or guarding.    Extremities: 3+ b/l pitting edema ankle to shin + erythema w/o warmth.  Skin: Multiple skin wounds noted to b/l ankles. No pus noted.  Neurologic: No apparent focal neurological deficits. Answering questions, following commands, moving all extremities.   Psychiatric: Cooperative. Appropriate mood and affect.     MEDICATIONS  (STANDING):  aspirin enteric coated 81 milliGRAM(s) Oral daily  atorvastatin 80 milliGRAM(s) Oral at bedtime  dextrose 5%. 1000 milliLiter(s) (100 mL/Hr) IV Continuous <Continuous>  dextrose 5%. 1000 milliLiter(s) (50 mL/Hr) IV Continuous <Continuous>  dextrose 50% Injectable 25 Gram(s) IV Push once  dextrose 50% Injectable 12.5 Gram(s) IV Push once  dextrose 50% Injectable 25 Gram(s) IV Push once  ezetimibe 10 milliGRAM(s) Oral daily  glucagon  Injectable 1 milliGRAM(s) IntraMuscular once  heparin   Injectable 5000 Unit(s) SubCutaneous every 8 hours  insulin lispro (ADMELOG) corrective regimen sliding scale   SubCutaneous three times a day before meals  insulin lispro Injectable (ADMELOG) 4 Unit(s) SubCutaneous three times a day before meals  metoprolol succinate  milliGRAM(s) Oral daily  pantoprazole    Tablet 40 milliGRAM(s) Oral before breakfast  potassium chloride  10 mEq/100 mL IVPB 10 milliEquivalent(s) IV Intermittent every 1 hour  potassium phosphate IVPB 30 milliMole(s) IV Intermittent once  predniSONE   Tablet 30 milliGRAM(s) Oral every 24 hours  trimethoprim   80 mG/sulfamethoxazole 400 mG 1 Tablet(s) Oral daily    MEDICATIONS  (PRN):  acetaminophen     Tablet .. 650 milliGRAM(s) Oral every 6 hours PRN Mild Pain (1 - 3)  albuterol    90 MICROgram(s) HFA Inhaler 2 Puff(s) Inhalation every 6 hours PRN Shortness of Breath and/or Wheezing  dextrose Oral Gel 15 Gram(s) Oral once PRN Blood Glucose LESS THAN 70 milliGRAM(s)/deciliter    ALLERGIES:  NKDA, NKFA, NKEA    POCT Blood Glucose.: 202 mg/dL (09 Jul 2025 08:23)  POCT Blood Glucose.: 239 mg/dL (08 Jul 2025 23:48)    LABS:                        12.3   18.84 )-----------( 251      ( 09 Jul 2025 05:30 )             37.6      07-09    143  |  97  |  56[H]  ----------------------------<  236[H]  2.9[LL]   |  34[H]  |  1.38[H]    Ca    8.6      09 Jul 2025 05:30  Phos  2.2     07-09  Mg     2.0     07-09    TPro  4.9[L]  /  Alb  2.9[L]  /  TBili  0.5  /  DBili  x   /  AST  29  /  ALT  116[H]  /  AlkPhos  75  07-09      Urinalysis Basic - ( 09 Jul 2025 05:30 )    Color: x / Appearance: x / SG: x / pH: x  Gluc: 236 mg/dL / Ketone: x  / Bili: x / Urobili: x   Blood: x / Protein: x / Nitrite: x   Leuk Esterase: x / RBC: x / WBC x   Sq Epi: x / Non Sq Epi: x / Bacteria: x      RADIOLOGY & ADDITIONAL TESTS: Reviewed.     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 The pt developed immunotherapy-induced colitis 3 weeks ago at which point the Keytruda was stopped and he was started on a Prednisone taper. Initial dosage of Prednisone at 60mg and currently tapered down to 30mg. After onset of BLE swelling, Lasix dosage was increased to 60mg. The pt also notes that he used Lidocaine cream for his leg pain with significant relief. After noting no change in BLE swelling, pt presented to the ED for further treatment.   ED course: Pt presented with bilateral 3+ pitting edema on exam. LE venous duplex ruled out DVT b/l. Labs showed hypokalemia (2.8) and pre-renal MARYAN (BUN/Cr 67/1.65). Heme/onc was consulted and pt was admitted to medicine for management of BLE swelling and pre-renal MARYAN.   Overnight events: Pt was treated with ACE wraps and given PO+IV potassium replacement.    Social History:  Lives with brother  Ambulates with cane for the past 2 weeks  Does not drink alcohol   Endorses 40 pack years smoking hx (smoked 2 ppd x 20 yrs, stopped 10 yrs ago)  Endorses smoking marijuana x 10 yrs (stopped 20 yrs ago)    INTERVAL EVENTS:   No acute events overnight.    SUBJECTIVE:   Patient seen and examined at bedside. Condition improved from yesterday.     ROS:  Positive for chronic cough. Negative for SOB, fever, chills, chest pain, abdominal pain, diarrhea, dysuria.    VITAL SIGNS:  Vital Signs Last 24 Hrs  T(C): 36.4 (09 Jul 2025 05:50), Max: 36.6 (08 Jul 2025 17:33)  T(F): 97.5 (09 Jul 2025 05:50), Max: 97.9 (08 Jul 2025 17:33)  HR: 77 (09 Jul 2025 05:50) (75 - 82)  BP: 164/81 (09 Jul 2025 05:50) (125/79 - 164/81)  BP(mean): 106 (08 Jul 2025 22:48) (106 - 106)  RR: 18 (09 Jul 2025 05:50) (18 - 18)  SpO2: 92% (09 Jul 2025 05:50) (92% - 95%)    Parameters below as of 08 Jul 2025 22:48  Patient On (Oxygen Delivery Method): room air    PHYSICAL EXAM:  Constitutional: AOx3. No acute distress. Resting comfortably in bed.  HEENT: Atraumatic. PERRL. EOMI. Clear conjunctiva. Moist mucous membranes.  Neck: Trachea midline. Supple.  Lungs: Mild rales noted at apex of lungs b/l. Lungs otherwise clear to auscultation b/l. No accessory muscle use. No wheezing. No stridor/retracting/tripoding.   Cardiovascular: Regular rate and rhythm. No murmurs, rubs, or gallops.  Abdomen: Soft, non-tender, non-distended. No rebound or guarding.    Extremities: 3+ b/l pitting edema ankle to shin + erythema w/o warmth.  Skin: Multiple skin wounds noted to b/l ankles. No pus noted.  Neurologic: No apparent focal neurological deficits. Answering questions, following commands, moving all extremities.   Psychiatric: Cooperative. Appropriate mood and affect.     MEDICATIONS  (STANDING):  aspirin enteric coated 81 milliGRAM(s) Oral daily  atorvastatin 80 milliGRAM(s) Oral at bedtime  dextrose 5%. 1000 milliLiter(s) (100 mL/Hr) IV Continuous <Continuous>  dextrose 5%. 1000 milliLiter(s) (50 mL/Hr) IV Continuous <Continuous>  dextrose 50% Injectable 25 Gram(s) IV Push once  dextrose 50% Injectable 12.5 Gram(s) IV Push once  dextrose 50% Injectable 25 Gram(s) IV Push once  ezetimibe 10 milliGRAM(s) Oral daily  glucagon  Injectable 1 milliGRAM(s) IntraMuscular once  heparin   Injectable 5000 Unit(s) SubCutaneous every 8 hours  insulin lispro (ADMELOG) corrective regimen sliding scale   SubCutaneous three times a day before meals  insulin lispro Injectable (ADMELOG) 4 Unit(s) SubCutaneous three times a day before meals  metoprolol succinate  milliGRAM(s) Oral daily  pantoprazole    Tablet 40 milliGRAM(s) Oral before breakfast  potassium chloride  10 mEq/100 mL IVPB 10 milliEquivalent(s) IV Intermittent every 1 hour  potassium phosphate IVPB 30 milliMole(s) IV Intermittent once  predniSONE   Tablet 30 milliGRAM(s) Oral every 24 hours  trimethoprim   80 mG/sulfamethoxazole 400 mG 1 Tablet(s) Oral daily    MEDICATIONS  (PRN):  acetaminophen     Tablet .. 650 milliGRAM(s) Oral every 6 hours PRN Mild Pain (1 - 3)  albuterol    90 MICROgram(s) HFA Inhaler 2 Puff(s) Inhalation every 6 hours PRN Shortness of Breath and/or Wheezing  dextrose Oral Gel 15 Gram(s) Oral once PRN Blood Glucose LESS THAN 70 milliGRAM(s)/deciliter    ALLERGIES:  NKDA, NKFA, NKEA    POCT Blood Glucose.: 202 mg/dL (09 Jul 2025 08:23)  POCT Blood Glucose.: 239 mg/dL (08 Jul 2025 23:48)    LABS:                        12.3   18.84 )-----------( 251      ( 09 Jul 2025 05:30 )             37.6      07-09    143  |  97  |  56[H]  ----------------------------<  236[H]  2.9[LL]   |  34[H]  |  1.38[H]    Ca    8.6      09 Jul 2025 05:30  Phos  2.2     07-09  Mg     2.0     07-09    TPro  4.9[L]  /  Alb  2.9[L]  /  TBili  0.5  /  DBili  x   /  AST  29  /  ALT  116[H]  /  AlkPhos  75  07-09      Urinalysis Basic - ( 09 Jul 2025 05:30 )    Color: x / Appearance: x / SG: x / pH: x  Gluc: 236 mg/dL / Ketone: x  / Bili: x / Urobili: x   Blood: x / Protein: x / Nitrite: x   Leuk Esterase: x / RBC: x / WBC x   Sq Epi: x / Non Sq Epi: x / Bacteria: x      RADIOLOGY & ADDITIONAL TESTS: Reviewed.

## 2025-07-09 NOTE — DIETITIAN NUTRITION RISK NOTIFICATION - ADDITIONAL COMMENTS/DIETITIAN RECOMMENDATIONS
Pt reports poor appetite, makes an effort to maintain good intake though consuming <3 meals daily and losing wt PTA. States UBW ~155 pounds, per prior admission RD note (5/22) pt wt 134 pounds; current admission wt 114 pounds / 51.7 kilograms indicates 17 pound / 13% wt loss over <2 months - clinically significant. Overt signs of severe muscle and fat wasting identified. Per ASPEN guidelines, pt meets criteria for severe malnutrition.

## 2025-07-09 NOTE — CONSULT NOTE ADULT - ASSESSMENT
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 LE venous duplex ruled out DVT b/l. Labs showed hypokalemia (2.8) and pre-renal MARYAN (BUN/Cr 67/1.65).     Mr. Christopher developed immunotherapy-induced colitis 3 weeks ago at which point the Keytruda was stopped and he was started on a Prednisone taper. Initial dosage of Prednisone at 60mg and currently tapered down to 30mg. After onset of BLE swelling, Lasix dosage was increased to 60mg.     Patient seen and examined at bedside 7/9, reports BLLE edema is improved.  Remains on IVF and receiving additional K repletions.  Otherwise stable with no new complaints. Pending PT eval.      Agree with continuing steroids, suspect patients hypokalemia and MARYAN are 2/2 3x daily lasix use with HCTZ.  BLLE edema likely 2/2 stasis and prolonged steroid use.      Will continue to follow along, Appreciate primary medicine team care.   Will arrange for outpatient follow up upon patients discharge.

## 2025-07-09 NOTE — DIETITIAN NUTRITION RISK NOTIFICATION - TREATMENT: THE FOLLOWING DIET HAS BEEN RECOMMENDED
Diet, Consistent Carbohydrate/No Snacks (07-08-25 @ 23:22) [Active]  Ensure Max (150kcal, 30g protein) x1/day

## 2025-07-09 NOTE — DIETITIAN INITIAL EVALUATION ADULT - PROBLEM SELECTOR PLAN 7
Home medications: lisinopril 40 mg daily, metoprolol 100 mg daily, HCTZ 25 mg daily; amlodipine was recently discontinued due to LE swelling     - Hold lisinopril and HCTZ iso MARYAN   - Continue with metoprolol

## 2025-07-09 NOTE — PHYSICAL THERAPY INITIAL EVALUATION ADULT - PERTINENT HX OF CURRENT PROBLEM, REHAB EVAL
74M PMHx of tobacco use with stage IV metastatic non-small cell lung cancer (diagnosed in September 2023  on Keytruda/ pembrolizumab, which has been held since May), COPD, HTN, DM, diagnosed w/ immunotherapy induced colitis May 2025, 2/p carotid endarterectomy 2020, presenting with1 month of b/l LE swelling and inability to get around his house. Reports being on prednisone therapy for immunotherapy induced colitis. Was previously on 60 mg but was started on 30 mg on Monday. Reports no lightheadedness, dizziness nausea or vomiting, and diarrhea since decreasing the dose. Reports a month of b/l LE swelling, leg pain and heaviness that has made it hard to walk around and leave the house. Was initially started on 20 Lasix 1 month ago but was started on Lasix 60 mg 2 weeks ago. States has not helped with his leg pain. States this has not happened before. Denies fevers, chills, sore throat, rhinorrhea chest pain, SOB, PND, orthopnea, abdominal pain, dysuria or diarrhea.

## 2025-07-09 NOTE — DIETITIAN INITIAL EVALUATION ADULT - PROBLEM SELECTOR PLAN 6
Hx of DM with A1c of 7.3, follows with endocrine within [facility]. Was previously seen after recent hospitalization due to steroid induced hyperglycemia. Has been taking 4U lispro TID with low insulin sliding scale 1:50 above 150.     - Continue with 4U lispro TID   - CC Diet   - mISS

## 2025-07-09 NOTE — PROGRESS NOTE ADULT - PROBLEM SELECTOR PLAN 11
On Atorvastatin 80mg PO at home bedtime  On Ezetimibe 10mg PO at home daily - On Atorvastatin 80mg PO at home bedtime  - On Ezetimibe 10mg PO at home daily - Prescribed Ensure max x1/day as per RD  - Appetite stimulant as per RD

## 2025-07-09 NOTE — DIETITIAN INITIAL EVALUATION ADULT - OBTAIN WEEKLY WEIGHT
----- Message from Osmin Casas MD sent at 8/12/2020  2:18 PM CDT -----  A1C and blood sugar up slightly.  Increase metformin to BID.  Chol very elevated.  Begin atorvastatin 10 mg daily.  CMP; FLPr; A1C plus OV 6 months  
Called and left message advising parent, Janet of below. Parent to call back with any questions. Prescriptions sent for patient.     
yes

## 2025-07-09 NOTE — DIETITIAN INITIAL EVALUATION ADULT - PROBLEM SELECTOR PLAN 3
On admission, K 2.8 without ECG changes likely iso overdiuresis with Lasix.   S/p 40 meq KCl PO and 10 meq KCl x1 IV     - Give additional 40 meq KCl PO  - Hold additional lasix for now   - F/u AM BMP

## 2025-07-10 LAB
ANION GAP SERPL CALC-SCNC: 8 MMOL/L — SIGNIFICANT CHANGE UP (ref 5–17)
BASOPHILS # BLD AUTO: 0.06 K/UL — SIGNIFICANT CHANGE UP (ref 0–0.2)
BASOPHILS NFR BLD AUTO: 0.3 % — SIGNIFICANT CHANGE UP (ref 0–2)
BUN SERPL-MCNC: 42 MG/DL — HIGH (ref 7–23)
CALCIUM SERPL-MCNC: 8.7 MG/DL — SIGNIFICANT CHANGE UP (ref 8.4–10.5)
CHLORIDE SERPL-SCNC: 105 MMOL/L — SIGNIFICANT CHANGE UP (ref 96–108)
CO2 SERPL-SCNC: 34 MMOL/L — HIGH (ref 22–31)
CREAT SERPL-MCNC: 1 MG/DL — SIGNIFICANT CHANGE UP (ref 0.5–1.3)
EGFR: 78 ML/MIN/1.73M2 — SIGNIFICANT CHANGE UP
EGFR: 78 ML/MIN/1.73M2 — SIGNIFICANT CHANGE UP
EOSINOPHIL # BLD AUTO: 0.01 K/UL — SIGNIFICANT CHANGE UP (ref 0–0.5)
EOSINOPHIL NFR BLD AUTO: 0 % — SIGNIFICANT CHANGE UP (ref 0–6)
GI PCR PANEL: SIGNIFICANT CHANGE UP
GLUCOSE SERPL-MCNC: 95 MG/DL — SIGNIFICANT CHANGE UP (ref 70–99)
HCT VFR BLD CALC: 45.5 % — SIGNIFICANT CHANGE UP (ref 39–50)
HGB BLD-MCNC: 14.2 G/DL — SIGNIFICANT CHANGE UP (ref 13–17)
IMM GRANULOCYTES # BLD AUTO: 0.31 K/UL — HIGH (ref 0–0.07)
IMM GRANULOCYTES NFR BLD AUTO: 1.5 % — HIGH (ref 0–0.9)
LYMPHOCYTES # BLD AUTO: 4.16 K/UL — HIGH (ref 1–3.3)
LYMPHOCYTES NFR BLD AUTO: 19.5 % — SIGNIFICANT CHANGE UP (ref 13–44)
MAGNESIUM SERPL-MCNC: 1.9 MG/DL — SIGNIFICANT CHANGE UP (ref 1.6–2.6)
MCHC RBC-ENTMCNC: 28 PG — SIGNIFICANT CHANGE UP (ref 27–34)
MCHC RBC-ENTMCNC: 31.2 G/DL — LOW (ref 32–36)
MCV RBC AUTO: 89.6 FL — SIGNIFICANT CHANGE UP (ref 80–100)
MONOCYTES # BLD AUTO: 0.86 K/UL — SIGNIFICANT CHANGE UP (ref 0–0.9)
MONOCYTES NFR BLD AUTO: 4 % — SIGNIFICANT CHANGE UP (ref 2–14)
NEUTROPHILS # BLD AUTO: 15.89 K/UL — HIGH (ref 1.8–7.4)
NEUTROPHILS NFR BLD AUTO: 74.7 % — SIGNIFICANT CHANGE UP (ref 43–77)
NOROVIRUS GI+II RNA STL QL NAA+NON-PROBE: ABNORMAL
NRBC # BLD AUTO: 0 K/UL — SIGNIFICANT CHANGE UP (ref 0–0)
NRBC # FLD: 0 K/UL — SIGNIFICANT CHANGE UP (ref 0–0)
NRBC BLD AUTO-RTO: 0 /100 WBCS — SIGNIFICANT CHANGE UP (ref 0–0)
PHOSPHATE SERPL-MCNC: 2.7 MG/DL — SIGNIFICANT CHANGE UP (ref 2.5–4.5)
PLATELET # BLD AUTO: 295 K/UL — SIGNIFICANT CHANGE UP (ref 150–400)
PMV BLD: 10.3 FL — SIGNIFICANT CHANGE UP (ref 7–13)
POTASSIUM SERPL-MCNC: 5.1 MMOL/L — SIGNIFICANT CHANGE UP (ref 3.5–5.3)
POTASSIUM SERPL-SCNC: 5.1 MMOL/L — SIGNIFICANT CHANGE UP (ref 3.5–5.3)
RBC # BLD: 5.08 M/UL — SIGNIFICANT CHANGE UP (ref 4.2–5.8)
RBC # FLD: 17.8 % — HIGH (ref 10.3–14.5)
SODIUM SERPL-SCNC: 147 MMOL/L — HIGH (ref 135–145)
WBC # BLD: 21.29 K/UL — HIGH (ref 3.8–10.5)
WBC # FLD AUTO: 21.29 K/UL — HIGH (ref 3.8–10.5)

## 2025-07-10 PROCEDURE — 82570 ASSAY OF URINE CREATININE: CPT

## 2025-07-10 PROCEDURE — 97165 OT EVAL LOW COMPLEX 30 MIN: CPT

## 2025-07-10 PROCEDURE — 87637 SARSCOV2&INF A&B&RSV AMP PRB: CPT

## 2025-07-10 PROCEDURE — 97161 PT EVAL LOW COMPLEX 20 MIN: CPT

## 2025-07-10 PROCEDURE — 80053 COMPREHEN METABOLIC PANEL: CPT

## 2025-07-10 PROCEDURE — 81003 URINALYSIS AUTO W/O SCOPE: CPT

## 2025-07-10 PROCEDURE — 80048 BASIC METABOLIC PNL TOTAL CA: CPT

## 2025-07-10 PROCEDURE — 87507 IADNA-DNA/RNA PROBE TQ 12-25: CPT

## 2025-07-10 PROCEDURE — 85025 COMPLETE CBC W/AUTO DIFF WBC: CPT

## 2025-07-10 PROCEDURE — 84133 ASSAY OF URINE POTASSIUM: CPT

## 2025-07-10 PROCEDURE — 84156 ASSAY OF PROTEIN URINE: CPT

## 2025-07-10 PROCEDURE — 0225U NFCT DS DNA&RNA 21 SARSCOV2: CPT

## 2025-07-10 PROCEDURE — 83880 ASSAY OF NATRIURETIC PEPTIDE: CPT

## 2025-07-10 PROCEDURE — 93970 EXTREMITY STUDY: CPT

## 2025-07-10 PROCEDURE — 93005 ELECTROCARDIOGRAM TRACING: CPT

## 2025-07-10 PROCEDURE — 87040 BLOOD CULTURE FOR BACTERIA: CPT

## 2025-07-10 PROCEDURE — 82962 GLUCOSE BLOOD TEST: CPT

## 2025-07-10 PROCEDURE — 36415 COLL VENOUS BLD VENIPUNCTURE: CPT

## 2025-07-10 PROCEDURE — 87324 CLOSTRIDIUM AG IA: CPT

## 2025-07-10 PROCEDURE — 84100 ASSAY OF PHOSPHORUS: CPT

## 2025-07-10 PROCEDURE — 84540 ASSAY OF URINE/UREA-N: CPT

## 2025-07-10 PROCEDURE — 83735 ASSAY OF MAGNESIUM: CPT

## 2025-07-10 PROCEDURE — 99233 SBSQ HOSP IP/OBS HIGH 50: CPT | Mod: GC

## 2025-07-10 PROCEDURE — 83935 ASSAY OF URINE OSMOLALITY: CPT

## 2025-07-10 PROCEDURE — 87449 NOS EACH ORGANISM AG IA: CPT

## 2025-07-10 PROCEDURE — 84300 ASSAY OF URINE SODIUM: CPT

## 2025-07-10 RX ORDER — SODIUM CHLORIDE 9 G/1000ML
500 INJECTION, SOLUTION INTRAVENOUS
Refills: 0 | Status: DISCONTINUED | OUTPATIENT
Start: 2025-07-10 | End: 2025-07-17

## 2025-07-10 RX ORDER — INSULIN GLARGINE-YFGN 100 [IU]/ML
6 INJECTION, SOLUTION SUBCUTANEOUS AT BEDTIME
Refills: 0 | Status: DISCONTINUED | OUTPATIENT
Start: 2025-07-10 | End: 2025-07-11

## 2025-07-10 RX ADMIN — INSULIN LISPRO 6: 100 INJECTION, SOLUTION INTRAVENOUS; SUBCUTANEOUS at 12:47

## 2025-07-10 RX ADMIN — HEPARIN SODIUM 5000 UNIT(S): 1000 INJECTION INTRAVENOUS; SUBCUTANEOUS at 12:48

## 2025-07-10 RX ADMIN — Medication 81 MILLIGRAM(S): at 09:53

## 2025-07-10 RX ADMIN — INSULIN GLARGINE-YFGN 6 UNIT(S): 100 INJECTION, SOLUTION SUBCUTANEOUS at 22:14

## 2025-07-10 RX ADMIN — EZETIMIBE 10 MILLIGRAM(S): 10 TABLET ORAL at 09:53

## 2025-07-10 RX ADMIN — SODIUM CHLORIDE 250 MILLILITER(S): 9 INJECTION, SOLUTION INTRAVENOUS at 13:11

## 2025-07-10 RX ADMIN — INSULIN LISPRO 4 UNIT(S): 100 INJECTION, SOLUTION INTRAVENOUS; SUBCUTANEOUS at 17:29

## 2025-07-10 RX ADMIN — PREDNISONE 30 MILLIGRAM(S): 20 TABLET ORAL at 09:53

## 2025-07-10 RX ADMIN — INSULIN LISPRO 4 UNIT(S): 100 INJECTION, SOLUTION INTRAVENOUS; SUBCUTANEOUS at 12:44

## 2025-07-10 RX ADMIN — ATORVASTATIN CALCIUM 80 MILLIGRAM(S): 80 TABLET, FILM COATED ORAL at 22:13

## 2025-07-10 RX ADMIN — INSULIN LISPRO 6: 100 INJECTION, SOLUTION INTRAVENOUS; SUBCUTANEOUS at 22:16

## 2025-07-10 RX ADMIN — HEPARIN SODIUM 5000 UNIT(S): 1000 INJECTION INTRAVENOUS; SUBCUTANEOUS at 22:14

## 2025-07-10 RX ADMIN — HEPARIN SODIUM 5000 UNIT(S): 1000 INJECTION INTRAVENOUS; SUBCUTANEOUS at 05:36

## 2025-07-10 RX ADMIN — Medication 40 MILLIGRAM(S): at 06:04

## 2025-07-10 RX ADMIN — METOPROLOL SUCCINATE 100 MILLIGRAM(S): 50 TABLET, EXTENDED RELEASE ORAL at 05:36

## 2025-07-10 RX ADMIN — Medication 1 TABLET(S): at 09:53

## 2025-07-10 RX ADMIN — INSULIN LISPRO 8: 100 INJECTION, SOLUTION INTRAVENOUS; SUBCUTANEOUS at 17:30

## 2025-07-10 NOTE — PROGRESS NOTE ADULT - NUTRITIONAL ASSESSMENT
This patient has been assessed with a concern for Malnutrition and has been determined to have a diagnosis/diagnoses of Severe protein-calorie malnutrition and Underweight (BMI < 19).    This patient is being managed with:   Diet Consistent Carbohydrate Renal/No Snacks-  Supplement Feeding Modality:  Oral  Ensure Max Cans or Servings Per Day:  1       Frequency:  Daily  Entered: Jul 9 2025  4:10PM

## 2025-07-10 NOTE — PROGRESS NOTE ADULT - PROBLEM SELECTOR PLAN 10
- Potentially restart Amlodipine s/p d/c - Potentially restart Amlodipine s/p d/c  - Potentially switch to Losartan

## 2025-07-10 NOTE — PROGRESS NOTE ADULT - PROBLEM SELECTOR PLAN 8
- PO Potassium Chloride - 40 milliEquivalent(s) - stop after 1 time  - IV Potassium Chloride - 10 milliEquivalents every 60 minutes - stop after 3 times   - IV Potassium Phosphate - 30 milliEquivalents - stop after 1 time  - Target goal of 4.0 K+ - Resolved; reached target goal of K+ 4.0  - Repeat labs confirmed K+ 5.1

## 2025-07-10 NOTE — PROGRESS NOTE ADULT - ASSESSMENT
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 Admitted for further evaluation and management in addition to treatment of hypokalemia and pre-renal MARYAN 2/2 to over-diuresis (25mg hydrochlorothiazide + 60mg furosemide).      COVID-19 Negative Lab Result:  · COVID-19 Negative Lab Result  COVID-19 ruled out    Problem/Plan - 1:  ·  Problem: Bilateral lower extremity edema.   ·  Plan: Third-spacing 2/2 to NSCLC vs heart failure vs prednisone use  - Less likely HF (normal echo in May although BNP 1600. No resp sx and chest clear)  - Monitor edema as pred continues to taper  - Compression stockings applied.    Problem/Plan - 2:  ·  Problem: Leukocytosis.   ·  Plan: Has been on regular Prednisolone  - Neutrophilia  - Steroid related?  - Downtrending.    Problem/Plan - 3:  ·  Problem: History of colitis.   ·  Plan: - Continue Prednisone taper.    Problem/Plan - 4:  ·  Problem: Non-small cell carcinoma of lung, stage 4.   ·  Plan: - Eventual plan to return to using Keytruda  - Follows with Dr. Pratt.    Problem/Plan - 5:  ·  Problem: COPD without exacerbation.   ·  Plan: - Continue Albuterol prn.    Problem/Plan - 6:  ·  Problem: Diabetes mellitus.   ·  Plan: - Continue Insulin lispro  - Lantus 10mg nocte added as basal insulin  - Will monitor response and titrate as needed.    Problem/Plan - 7:  ·  Problem: Prophylactic measure.   ·  Plan: - DVT ppx: Heparin - 5000 units subcutaneous every 8 hrs.    Problem/Plan - 8:  ·  Problem: Hypokalemia.   ·  Plan: - PO Potassium Chloride - 40 milliEquivalent(s) - stop after 1 time  - IV Potassium Chloride - 10 milliEquivalents every 60 minutes - stop after 3 times   - IV Potassium Phosphate - 30 milliEquivalents - stop after 1 time  - Target goal of 4.0 K+.    Problem/Plan - 9:  ·  Problem: MARYAN (acute kidney injury).   ·  Plan: - Resolving.    Problem/Plan - 10:  ·  Problem: HTN (hypertension).   ·  Plan; - Potentially restart Amlodipine s/p d/c.    Problem/Plan - 11:  ·  Problem: Loss of appetite.   ·  Plan: - Prescribed Ensure max x1/day as per RD  - Appetite stimulant as per RD.    Problem/Plan - 12:  ·  Problem: Hyperlipidemia.   ·  Plan: - On Atorvastatin 80mg PO at home bedtime  - On Ezetimibe 10mg PO at home daily.

## 2025-07-10 NOTE — PROGRESS NOTE ADULT - ASSESSMENT
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 Admitted for further evaluation and management in addition to treatment of hypokalemia and pre-renal MARYAN 2/2 to over-diuresis (25mg hydrochlorothiazide + 60mg furosemide).

## 2025-07-10 NOTE — PROGRESS NOTE ADULT - PROBLEM SELECTOR PLAN 2
Has been on regular Prednisolone  - Neutrophilia  - Steroid related?  - Downtrending Has been on regular Prednisolone  - Neutrophilia  - Steroid related?  - Will continue to monitor with daily labs

## 2025-07-10 NOTE — PROGRESS NOTE ADULT - SUBJECTIVE AND OBJECTIVE BOX
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 Reports being on prednisone therapy for immunotherapy induced colitis. Was previously on 60 mg but was started on 30 mg on Monday. Reports no lightheadedness, dizziness nausea or vomiting, and diarrhea since decreasing the dose. Reports a month of b/l LE swelling, leg pain and heaviness that has made it hard to walk around and leave the house. Was initially started on 20 Lasix 1 month ago but was started on Lasix 60 mg 2 weeks ago. States has not helped with his leg pain. States this has not happened before. Denies fevers, chills, sore throat, rhinorrhea chest pain, SOB, PND, orthopnea, abdominal pain, dysuria or diarrhea.    ED Course:   Vitals: T 97.9  HR 82 /79 RR 18 Sp02 95% RA  Labs: WBC 22.29 K 2.8 C02 36 BUN 67 Cr 1.65   Imaging:   ·	B/l LE doppler: no evidence of LE DVT   ·	CXR without consolidations or effusion, looks similar to prior; Pending formal read   Interventions: 40 meq KCl PO, 10 meq x1 KCl  (08 Jul 2025 21:05)                            12.3   18.84 )-----------( 251      ( 09 Jul 2025 05:30 )             37.6       07-10    x   |  105  |  x   ----------------------------<  95  5.1   |  x   |  1.00    Ca    8.7      10 Jul 2025 10:00  Phos  2.7     07-10  Mg     1.9     07-10    TPro  4.9[L]  /  Alb  2.9[L]  /  TBili  0.5  /  DBili  x   /  AST  29  /  ALT  116[H]  /  AlkPhos  75  07-09    Vital Signs Last 24 Hrs  T(C): 36.6 (10 Jul 2025 05:20), Max: 36.8 (09 Jul 2025 20:49)  T(F): 97.9 (10 Jul 2025 05:20), Max: 98.3 (09 Jul 2025 20:49)  HR: 79 (10 Jul 2025 05:20) (75 - 81)  BP: 163/90 (10 Jul 2025 05:20) (148/80 - 163/90)  BP(mean): --  RR: 18 (10 Jul 2025 05:20) (18 - 20)  SpO2: 95% (10 Jul 2025 05:20) (93% - 95%)    Parameters below as of 09 Jul 2025 20:49  Patient On (Oxygen Delivery Method): room air        MEDICATIONS  (STANDING):  aspirin enteric coated 81 milliGRAM(s) Oral daily  atorvastatin 80 milliGRAM(s) Oral at bedtime  dextrose 5%. 1000 milliLiter(s) (100 mL/Hr) IV Continuous <Continuous>  dextrose 5%. 1000 milliLiter(s) (50 mL/Hr) IV Continuous <Continuous>  dextrose 50% Injectable 25 Gram(s) IV Push once  dextrose 50% Injectable 12.5 Gram(s) IV Push once  dextrose 50% Injectable 25 Gram(s) IV Push once  ezetimibe 10 milliGRAM(s) Oral daily  glucagon  Injectable 1 milliGRAM(s) IntraMuscular once  heparin   Injectable 5000 Unit(s) SubCutaneous every 8 hours  insulin glargine Injectable (LANTUS) 10 Unit(s) SubCutaneous at bedtime  insulin lispro (ADMELOG) corrective regimen sliding scale   SubCutaneous three times a day before meals  insulin lispro Injectable (ADMELOG) 4 Unit(s) SubCutaneous three times a day before meals  metoprolol succinate  milliGRAM(s) Oral daily  pantoprazole    Tablet 40 milliGRAM(s) Oral before breakfast  predniSONE   Tablet 30 milliGRAM(s) Oral every 24 hours  trimethoprim   80 mG/sulfamethoxazole 400 mG 1 Tablet(s) Oral daily    MEDICATIONS  (PRN):  acetaminophen     Tablet .. 650 milliGRAM(s) Oral every 6 hours PRN Mild Pain (1 - 3)  albuterol    90 MICROgram(s) HFA Inhaler 2 Puff(s) Inhalation every 6 hours PRN Shortness of Breath and/or Wheezing  dextrose Oral Gel 15 Gram(s) Oral once PRN Blood Glucose LESS THAN 70 milliGRAM(s)/deciliter      T(C): 36.6 (07-10-25 @ 05:20), Max: 36.8 (07-09-25 @ 20:49)  HR: 79 (07-10-25 @ 05:20) (75 - 81)  BP: 163/90 (07-10-25 @ 05:20) (148/80 - 163/90)  RR: 18 (07-10-25 @ 05:20) (18 - 20)  SpO2: 95% (07-10-25 @ 05:20) (93% - 95%)    Physical Exam:   74 y o man lying comfortably in semi Camp's position , awake , alert , no acute complaints     Head: normocephalic , atraumatic    Eyes: PERRLA , EOMI , no nystagmus , sclera anicteric    ENT / FACE: neg nasal discharge , uvula midline , no oropharyngeal erythema / exudate    Neck: supple , negative JVD , negative carotid bruits , no thyromegaly    Chest: bilateral rhonchi      Cardiovascular: regular rate and rhythm , neg murmurs / rubs / gallops    Abdomen: soft , non distended , no tenderness to palpation in all 4 quadrants ,  normal bowel sounds     Extremities: 3+ pitting edema ankle to shin; + erythema with  multiple skin wounds    Neurologic Exam:     Alert and oriented to person , place , date/year      Cranial Nerves:           II:                         pupils equal , round and reactive to light , visual fields intact         III/ IV/VI:             extraocular movements intact , neg nystagmus , neg ptosis        V:                        facial sensation intact , V1-3 normal        VII:                      face symmetric , no droop , normal eye closure and smile        VIII:                     hearing intact to finger rub bilaterally        IX and X:             no hoarseness , gag intact , palate/ uvula rise symmetrically        XI:                       SCM / trapezius strength intact bilateral        XII:                      no tongue deviation    Motor Exam:        > 3+/5 x 4 extremities , without drift     Sensation:         intact to light touch x 4 extremities                            no neglect or extinction on double simultaneous testing    DTR:           biceps/brachioradialis: equal                            patella/ankle: equal          neg Babinski      Coordination:            Finger to Nose:  neg dysmetria bilaterally      Initial Functional Status Assessment :         Previous Level of Function:     · Ambulation Skills  independent; needs device; straight cane  · Transfer Skills  independent; needs device; straight cane  · ADL Skills  independent; needs device; straight cane  · Work/Leisure Activity  independent; needs device; straight cane  · Additional Comments  independent prior to arrival , 1 mechanical fall outdoors in past year, 20 steps to enter apartment    Cognitive Status Examination:   · Orientation  oriented to person, place, time and situation  · Level of Consciousness  alert  · Follows Commands and Answers Questions  100% of the time  · Personal Safety and Judgment  intact  · Short Term Memory  intact  · Long Term Memory  intact    Range of Motion Exam:   · Range of Motion Examination  no ROM deficits were identified  · Active Range of Motion Examination  no Active ROM deficits were identified    Manual Muscle Testing:   · Manual Muscle Testing Results  grossly assessed due to  functional observation against gravity > 3/5 MMT    Bed Mobility: Rolling/Turning:     · Level of Sabine  supervision    Bed Mobility: Scooting/Bridging:     · Level of [place]  supervision    Bed Mobility: Supine to Sit:     · Level of [place]  supervision    Transfer: Sit to Stand:     · Level of [place]  supervision  · Assistive Device  straight cane    Transfer: Stand to Sit:     · Level of [place]ine  supervision  · Assistive Device  straight cane    Sit/Stand Transfer Safety Analysis:     · Impairments Contributing to Impaired Transfers  decreased strength    Gait Skills:     · Level of [place]ine  supervision  · Assistive Device  straight cane  · Gait Distance  bed to chair; 10 feet    Gait Analysis:     · Gait Deviations Noted  decreased tien; decreased step length; +BM throughout, further therapy not appropriate, steady, fecal incontinence  · Impairments Contributing to Gait Deviations  decreased strength    Stair Negotiation:     · Level of Sabine  to be assessed    Balance Skills Assessment:     · Sitting Balance: Static  good balance  · Sitting Balance: Dynamic  good balance  · Sit-to-Stand Balance  fair balance  · Standing Balance: Static  fair balance  · Standing Balance: Dynamic  fair balance  · Systems Impairment Contributing to Balance Disturbance  musculoskeletal  · Identified Impairments Contributing to Balance Disturbance  decreased strength    Clinical Impressions:   · Criteria for Skilled Therapeutic Interventions  impairments found; functional limitations in following categories; risk reduction/prevention; rehab potential; therapy frequency; anticipated discharge recommendation  · Impairments Found (describe specific impairments)  aerobic capacity/endurance; gait, locomotion, and balance; muscle strength  · Functional Limitations in Following Categories (describe specific limitations)  self-care; home management; community/leisure  · Risk Reduction/Prevention (Describe Specific Areas of risk reduction/prevention)  risk factors  · Risk Areas  fall    Upper Body Dressing Training:     · Level of [place]  independent; don gown    Lower Body Dressing Training:     · Level of [place]ine  independent; don doff socks      PM&R Impression : as above    Current disposition plan recommendation :    d/c home with outpatient physical therapy

## 2025-07-10 NOTE — PROGRESS NOTE ADULT - ATTENDING COMMENTS
74M w Stage 4 NSCLC (dx 9/2023) on pembrolizumab - last on 5/16/25, COPD on RA, HTN, DM2, carotid artery stenosis s/p CEA 2020, L sided PPM, recent immunotherapy induced colitis (5/2025 --> started on prednisone - decreased to 30mg daily, c/b BLE Swelling, admitted for MARYAN, hypokalemia - likely d/t diuretics and diarrhea -- now found to have norovirus    Diarrhea slowing down  - becoming more formed.  LE exam - pitting edema in distal calves through both feet - slight serosanguinous weeping in b/l ankles. No erythema, warmth, or pain present.     #Norovirisu Diarrhea  - symptomatic management    #Hypokalemia - K 4.1 this AM   #MARYAN - Resolved. Cr 0.98 today. Likely d/t diuretics and decreased PO intake. BUN/Cr ratio >20 and low Baldo at 36 also pointing towards pre-renal (hypovolemic) etiology    #BLE edema - apppears to be improving. Some mild-moderate pitting edema that is symmetrical present in mid shin present. No pain.   - BLE Dopplers negative 7/8 for DVT   - TTE    - Home on HCTZ and Lasix 60mg daily  #HTN - was on Diuretics above at home - currently held. -150s today   - Started on losartan 25mg daily    #Leukocytosis - likely d/t chronic prednisone 30mg daily. 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 PRN Albuterol. Does not appear in acute exacerbation  #HLD - on atorvastatin 80, zetia    PPx: SQH  Plan  Symptomatic management of norovirus diarrhea - appears to be improving  Lasix 30mg IV today for BLE Edema -- elevated BLE at rest  CBC w diff, BMP w Mg in AM   Continue lispro + SSI for diabetes.     DISPO: outpatient PT -- pending stabilization of potassium . Date of service 7/10/25  74M w Stage 4 NSCLC (dx 9/2023) on pembrolizumab - last on 5/16/25, COPD on RA, HTN, DM2, carotid artery stenosis s/p CEA 2020, L sided PPM, recent immunotherapy induced colitis (5/2025 --> started on prednisone - decreased to 30mg daily, c/b BLE Swelling, admitted for MARYAN, hypokalemia - likely d/t diuretics and diarrhea -- now found to have norovirus indeterminate    Diarrhea improving. Labs this AM w hyperNatremia and Hyperkalemia -- giving 500 D5W this AM  F/U CBC w diff, BMP w Mg in AM

## 2025-07-10 NOTE — PROGRESS NOTE ADULT - PROBLEM SELECTOR PLAN 7
- DVT ppx: Heparin - 5000 units subcutaneous every 8 hrs F: None  E: None  N: Nutrition consulted. Prescribed Ensure max x1/day.   DVT ppx: Heparin - 5000 units subcutaneous every 8 hrs  Bowel: None  Dispo:   Code status:

## 2025-07-10 NOTE — PROVIDER CONTACT NOTE (HYPOGLYCEMIA EVENT) - NS PROVIDER CONTACT RECOMMEND-HYPO
Pt driniking juice and just got breakfast, re-check after 137. Per MD Brooke, OK to hold pre-meal insulin. Team will make adjustments to night time Lantus

## 2025-07-10 NOTE — PROGRESS NOTE ADULT - PROBLEM SELECTOR PLAN 6
- Continue Insulin lispro  - Lantus 10mg nocte added as basal insulin  - Will monitor response and titrate as needed - Continue Insulin lispro  - Lantus 10mg at bedtime was added as basal insulin  - Based on response (blood glucose 64) will decrease dosage to 6mg Lantus - Continue Insulin lispro  - Lantus 10mg at bedtime  - Based on response (blood glucose 64) will decrease dosage to 6mg Lantus

## 2025-07-11 DIAGNOSIS — M79.601 PAIN IN RIGHT ARM: ICD-10-CM

## 2025-07-11 PROBLEM — J44.9 CHRONIC OBSTRUCTIVE PULMONARY DISEASE, UNSPECIFIED: Chronic | Status: ACTIVE | Noted: 2025-07-08

## 2025-07-11 PROBLEM — C34.90 MALIGNANT NEOPLASM OF UNSPECIFIED PART OF UNSPECIFIED BRONCHUS OR LUNG: Chronic | Status: ACTIVE | Noted: 2025-07-08

## 2025-07-11 PROBLEM — I10 ESSENTIAL (PRIMARY) HYPERTENSION: Chronic | Status: ACTIVE | Noted: 2025-07-08

## 2025-07-11 PROBLEM — E11.9 TYPE 2 DIABETES MELLITUS WITHOUT COMPLICATIONS: Chronic | Status: ACTIVE | Noted: 2025-07-08

## 2025-07-11 PROBLEM — Z87.19 PERSONAL HISTORY OF OTHER DISEASES OF THE DIGESTIVE SYSTEM: Chronic | Status: ACTIVE | Noted: 2025-07-08

## 2025-07-11 LAB
ANION GAP SERPL CALC-SCNC: 11 MMOL/L — SIGNIFICANT CHANGE UP (ref 5–17)
BASOPHILS # BLD AUTO: 0.07 K/UL — SIGNIFICANT CHANGE UP (ref 0–0.2)
BASOPHILS NFR BLD AUTO: 0.3 % — SIGNIFICANT CHANGE UP (ref 0–2)
BUN SERPL-MCNC: 34 MG/DL — HIGH (ref 7–23)
CALCIUM SERPL-MCNC: 8.6 MG/DL — SIGNIFICANT CHANGE UP (ref 8.4–10.5)
CHLORIDE SERPL-SCNC: 100 MMOL/L — SIGNIFICANT CHANGE UP (ref 96–108)
CO2 SERPL-SCNC: 29 MMOL/L — SIGNIFICANT CHANGE UP (ref 22–31)
CREAT SERPL-MCNC: 0.98 MG/DL — SIGNIFICANT CHANGE UP (ref 0.5–1.3)
EGFR: 80 ML/MIN/1.73M2 — SIGNIFICANT CHANGE UP
EGFR: 80 ML/MIN/1.73M2 — SIGNIFICANT CHANGE UP
EOSINOPHIL # BLD AUTO: 0.02 K/UL — SIGNIFICANT CHANGE UP (ref 0–0.5)
EOSINOPHIL NFR BLD AUTO: 0.1 % — SIGNIFICANT CHANGE UP (ref 0–6)
GLUCOSE SERPL-MCNC: 79 MG/DL — SIGNIFICANT CHANGE UP (ref 70–99)
HCT VFR BLD CALC: 42.6 % — SIGNIFICANT CHANGE UP (ref 39–50)
HGB BLD-MCNC: 13.4 G/DL — SIGNIFICANT CHANGE UP (ref 13–17)
IMM GRANULOCYTES # BLD AUTO: 0.55 K/UL — HIGH (ref 0–0.07)
IMM GRANULOCYTES NFR BLD AUTO: 2.6 % — HIGH (ref 0–0.9)
LYMPHOCYTES # BLD AUTO: 4.22 K/UL — HIGH (ref 1–3.3)
LYMPHOCYTES NFR BLD AUTO: 19.8 % — SIGNIFICANT CHANGE UP (ref 13–44)
MAGNESIUM SERPL-MCNC: 1.4 MG/DL — LOW (ref 1.6–2.6)
MCHC RBC-ENTMCNC: 28 PG — SIGNIFICANT CHANGE UP (ref 27–34)
MCHC RBC-ENTMCNC: 31.5 G/DL — LOW (ref 32–36)
MCV RBC AUTO: 88.9 FL — SIGNIFICANT CHANGE UP (ref 80–100)
MONOCYTES # BLD AUTO: 0.85 K/UL — SIGNIFICANT CHANGE UP (ref 0–0.9)
MONOCYTES NFR BLD AUTO: 4 % — SIGNIFICANT CHANGE UP (ref 2–14)
NEUTROPHILS # BLD AUTO: 15.55 K/UL — HIGH (ref 1.8–7.4)
NEUTROPHILS NFR BLD AUTO: 73.2 % — SIGNIFICANT CHANGE UP (ref 43–77)
NRBC # BLD AUTO: 0 K/UL — SIGNIFICANT CHANGE UP (ref 0–0)
NRBC # FLD: 0 K/UL — SIGNIFICANT CHANGE UP (ref 0–0)
NRBC BLD AUTO-RTO: 0 /100 WBCS — SIGNIFICANT CHANGE UP (ref 0–0)
PHOSPHATE SERPL-MCNC: 1.6 MG/DL — LOW (ref 2.5–4.5)
PLATELET # BLD AUTO: 272 K/UL — SIGNIFICANT CHANGE UP (ref 150–400)
PMV BLD: 10.5 FL — SIGNIFICANT CHANGE UP (ref 7–13)
POTASSIUM SERPL-MCNC: 4.1 MMOL/L — SIGNIFICANT CHANGE UP (ref 3.5–5.3)
POTASSIUM SERPL-SCNC: 4.1 MMOL/L — SIGNIFICANT CHANGE UP (ref 3.5–5.3)
RBC # BLD: 4.79 M/UL — SIGNIFICANT CHANGE UP (ref 4.2–5.8)
RBC # FLD: 17.1 % — HIGH (ref 10.3–14.5)
SODIUM SERPL-SCNC: 140 MMOL/L — SIGNIFICANT CHANGE UP (ref 135–145)
WBC # BLD: 21.26 K/UL — HIGH (ref 3.8–10.5)
WBC # FLD AUTO: 21.26 K/UL — HIGH (ref 3.8–10.5)

## 2025-07-11 PROCEDURE — 99233 SBSQ HOSP IP/OBS HIGH 50: CPT | Mod: GC

## 2025-07-11 RX ORDER — FUROSEMIDE 10 MG/ML
30 INJECTION INTRAMUSCULAR; INTRAVENOUS ONCE
Refills: 0 | Status: COMPLETED | OUTPATIENT
Start: 2025-07-11 | End: 2025-07-11

## 2025-07-11 RX ORDER — MAGNESIUM SULFATE 500 MG/ML
4 SYRINGE (ML) INJECTION ONCE
Refills: 0 | Status: COMPLETED | OUTPATIENT
Start: 2025-07-11 | End: 2025-07-11

## 2025-07-11 RX ORDER — LOSARTAN POTASSIUM 100 MG/1
25 TABLET, FILM COATED ORAL DAILY
Refills: 0 | Status: DISCONTINUED | OUTPATIENT
Start: 2025-07-11 | End: 2025-07-18

## 2025-07-11 RX ORDER — LIDOCAINE HYDROCHLORIDE 20 MG/ML
1 JELLY TOPICAL ONCE
Refills: 0 | Status: COMPLETED | OUTPATIENT
Start: 2025-07-11 | End: 2025-07-11

## 2025-07-11 RX ADMIN — EZETIMIBE 10 MILLIGRAM(S): 10 TABLET ORAL at 12:26

## 2025-07-11 RX ADMIN — INSULIN LISPRO 2: 100 INJECTION, SOLUTION INTRAVENOUS; SUBCUTANEOUS at 17:59

## 2025-07-11 RX ADMIN — PREDNISONE 30 MILLIGRAM(S): 20 TABLET ORAL at 09:11

## 2025-07-11 RX ADMIN — HEPARIN SODIUM 5000 UNIT(S): 1000 INJECTION INTRAVENOUS; SUBCUTANEOUS at 06:14

## 2025-07-11 RX ADMIN — HEPARIN SODIUM 5000 UNIT(S): 1000 INJECTION INTRAVENOUS; SUBCUTANEOUS at 22:04

## 2025-07-11 RX ADMIN — ATORVASTATIN CALCIUM 80 MILLIGRAM(S): 80 TABLET, FILM COATED ORAL at 22:04

## 2025-07-11 RX ADMIN — LIDOCAINE HYDROCHLORIDE 1 PATCH: 20 JELLY TOPICAL at 13:49

## 2025-07-11 RX ADMIN — Medication 25 GRAM(S): at 12:29

## 2025-07-11 RX ADMIN — Medication 650 MILLIGRAM(S): at 07:44

## 2025-07-11 RX ADMIN — Medication 40 MILLIGRAM(S): at 06:14

## 2025-07-11 RX ADMIN — FUROSEMIDE 30 MILLIGRAM(S): 10 INJECTION INTRAMUSCULAR; INTRAVENOUS at 13:49

## 2025-07-11 RX ADMIN — INSULIN LISPRO 8: 100 INJECTION, SOLUTION INTRAVENOUS; SUBCUTANEOUS at 12:58

## 2025-07-11 RX ADMIN — Medication 650 MILLIGRAM(S): at 07:16

## 2025-07-11 RX ADMIN — HEPARIN SODIUM 5000 UNIT(S): 1000 INJECTION INTRAVENOUS; SUBCUTANEOUS at 13:13

## 2025-07-11 RX ADMIN — Medication 81 MILLIGRAM(S): at 12:26

## 2025-07-11 RX ADMIN — METOPROLOL SUCCINATE 100 MILLIGRAM(S): 50 TABLET, EXTENDED RELEASE ORAL at 06:15

## 2025-07-11 RX ADMIN — LOSARTAN POTASSIUM 25 MILLIGRAM(S): 100 TABLET, FILM COATED ORAL at 12:25

## 2025-07-11 RX ADMIN — INSULIN LISPRO 4 UNIT(S): 100 INJECTION, SOLUTION INTRAVENOUS; SUBCUTANEOUS at 17:58

## 2025-07-11 RX ADMIN — INSULIN LISPRO 4 UNIT(S): 100 INJECTION, SOLUTION INTRAVENOUS; SUBCUTANEOUS at 12:58

## 2025-07-11 RX ADMIN — Medication 1 TABLET(S): at 12:26

## 2025-07-11 NOTE — PROVIDER CONTACT NOTE (HYPOGLYCEMIA EVENT) - NS PROVIDER CONTACT BACKGROUND-HYPO
Age: 74y    Gender: Male    POCT Blood Glucose:  290 mg/dL (07-10-25 @ 12:18)  137 mg/dL (07-10-25 @ 10:24)  64 mg/dL (07-10-25 @ 09:34)  214 mg/dL (07-09-25 @ 21:52)  231 mg/dL (07-09-25 @ 17:33)      eMAR:atorvastatin   80 milliGRAM(s) Oral (07-09-25 @ 21:36)    ezetimibe   10 milliGRAM(s) Oral (07-10-25 @ 09:53)    insulin glargine Injectable (LANTUS)   10 Unit(s) SubCutaneous (07-09-25 @ 21:36)    insulin lispro (ADMELOG) corrective regimen sliding scale   6  SubCutaneous (07-10-25 @ 12:47)   4 Unit(s) SubCutaneous (07-09-25 @ 17:52)    insulin lispro Injectable (ADMELOG)   4 Unit(s) SubCutaneous (07-10-25 @ 12:44)   4 Unit(s) SubCutaneous (07-09-25 @ 17:52)    predniSONE   Tablet   30 milliGRAM(s) Oral (07-10-25 @ 09:53)    
Age: 74y    Gender: Male    POCT Blood Glucose:  110 mg/dL (07-11-25 @ 09:32)  65 mg/dL (07-11-25 @ 08:45)  269 mg/dL (07-10-25 @ 21:56)  343 mg/dL (07-10-25 @ 17:21)  290 mg/dL (07-10-25 @ 12:18)  137 mg/dL (07-10-25 @ 10:24)      eMAR:atorvastatin   80 milliGRAM(s) Oral (07-10-25 @ 22:13)    ezetimibe   10 milliGRAM(s) Oral (07-10-25 @ 09:53)    insulin glargine Injectable (LANTUS)   6 Unit(s) SubCutaneous (07-10-25 @ 22:14)    insulin lispro (ADMELOG) corrective regimen sliding scale   6 Unit(s) SubCutaneous (07-10-25 @ 22:16)   8 Unit(s) SubCutaneous (07-10-25 @ 17:30)   6  SubCutaneous (07-10-25 @ 12:47)    insulin lispro Injectable (ADMELOG)   4 Unit(s) SubCutaneous (07-10-25 @ 17:29)   4 Unit(s) SubCutaneous (07-10-25 @ 12:44)    predniSONE   Tablet   30 milliGRAM(s) Oral (07-11-25 @ 09:11)   30 milliGRAM(s) Oral (07-10-25 @ 09:53)

## 2025-07-11 NOTE — PROGRESS NOTE ADULT - PROBLEM SELECTOR PLAN 11
- Prescribed Ensure max x1/day as per RD  - Appetite stimulant as per RD - On Atorvastatin 80mg PO at home bedtime  - On Ezetimibe 10mg PO at home daily

## 2025-07-11 NOTE — PROGRESS NOTE ADULT - ATTENDING COMMENTS
74M w Stage 4 NSCLC (dx 9/2023) on pembrolizumab - last on 5/16/25, COPD on RA, HTN, DM2, carotid artery stenosis s/p CEA 2020, L sided PPM, recent immunotherapy induced colitis (5/2025 --> started on prednisone - decreased to 30mg daily, c/b BLE Swelling    #Diarrhea - multiple episodes of watery diarrhea   - no abd pain    #Hypokalemia - K 2.9. Mg at target. Likely d/t being on diuretics.  #MARYAN - Improving. 1.38 from 1.65 on admission. Baseline is 0.88. Likely d/t diuretics and decreased PO intake. BUN/Cr ratio >20 and low Baldo at 36 also pointing towards pre-renal (hypovolemic) etiology    #BLE edema - apppears to be improving. Some mild-moderate pitting edema that is symmetrical present in mid shin present. No pain.   - BLE Dopplers negative 7/8 for DVT   - TTE   #Leukocytosis - likely d/t chronic prednisone 30mg daily. 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 PRN Albuterol. Does not appear in acute exacerbation  #HLD - on atorvastatin 80, zetia    PPx: SQH  Plan  GI PCR, C Diff r/o for watery diarrhea  Stop Lasix and HCTZ i/s/o MARYAN and hypokalemia   -- ACE wrap and elevate BLE  Supplement KCl today  Nutrition c/s as pt reports unintentional wt loss  PT Eval --   Needed 16u w lispro+SSI today. Remains above target   - start lantus 10u HS  CBC w diff, BMP w Mg in AM    DISPO: outpatient PT -- pending stabilization of potassium . 74M w Stage 4 NSCLC (dx 9/2023) on pembrolizumab - last on 5/16/25, COPD on RA, HTN, DM2, carotid artery stenosis s/p CEA 2020, L sided PPM, recent immunotherapy induced colitis (5/2025 --> started on prednisone - decreased to 30mg daily, c/b BLE Swelling    #Diarrhea due to Norovirus - multiple episodes of watery diarrhea   - no abd pain    #Hypokalemia - K 2.9. Mg at target. Likely d/t being on diuretics.  #MARYAN - Improving. 1.38 from 1.65 on admission. Baseline is 0.88. Likely d/t diuretics and decreased PO intake. BUN/Cr ratio >20 and low Baldo at 36 also pointing towards pre-renal (hypovolemic) etiology    #BLE edema - apppears to be improving. Some mild-moderate pitting edema that is symmetrical present in mid shin present. No pain.   - BLE Dopplers negative 7/8 for DVT   - TTE   #Leukocytosis - likely d/t chronic prednisone 30mg daily. 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 PRN Albuterol. Does not appear in acute exacerbation  #HLD - on atorvastatin 80, zetia    PPx: SQH  Plan  hypernatremia improved today. MARYAN also improved  BLE swelling increased today - give 30mg IV lasix  ACE wrap and elevated BLE.     CBC w diff, BMP w Mg in AM    DISPO: outpatient PT -- pending improvement of BLE Edema.

## 2025-07-11 NOTE — PROGRESS NOTE ADULT - PROBLEM SELECTOR PLAN 10
- Potentially restart Amlodipine s/p d/c  - Potentially switch to Losartan - Plan to start pt on Losartan - Losartan

## 2025-07-11 NOTE — PROGRESS NOTE ADULT - PROBLEM SELECTOR PLAN 8
- Resolved; reached target goal of K+ 4.0  - Repeat labs confirmed K+ 5.1 - Resolved; reached target goal of K+ 4.0  - Repeat labs confirmed K+ 4.1 Pain in R forearm  - Lidocaine patch  - Heatpack  - Monitor response

## 2025-07-11 NOTE — PROGRESS NOTE ADULT - PROBLEM SELECTOR PLAN 7
F: None  E: None  N: Nutrition consulted. Prescribed Ensure max x1/day.   DVT ppx: Heparin - 5000 units subcutaneous every 8 hrs  Bowel: None  Dispo:   Code status: F: None  E: None  N: Nutrition consulted. Prescribed Ensure max x1/day.   DVT ppx: Heparin - 5000 units subcutaneous every 8 hrs  Bowel: None  Dispo: RMF  Code status: - Resolved

## 2025-07-11 NOTE — PROGRESS NOTE ADULT - ASSESSMENT
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 Admitted for further evaluation and management in addition to treatment of hypokalemia and pre-renal MARYAN 2/2 to over-diuresis (25mg hydrochlorothiazide + 60mg furosemide). 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 Admitted for further evaluation and management in addition to treatment of hypokalemia and pre-renal MARYAN 2/2 to over-diuresis (25mg hydrochlorothiazide + 60mg furosemide). MARYAN has improved. Swelling present bilaterally lower extremities

## 2025-07-11 NOTE — CHART NOTE - NSCHARTNOTEFT_GEN_A_CORE
Outpatient appointment made with medical oncology, as below. Please include in patient's discharge paperwork. Notified patient at bedside, as well.    Appointment time/date:  7/17/25 at 11:00am    Dr. Kolby Pratt  [address] [place] [id number] [phone]. Hematology/oncology chart note    Patient was seen and examined on 7/10/25; noted ongoing generalized weakness. Significant pedal edema noted; wearing compression stockings to knee. No SOB or diarrhea.    # stage IV NSCLC    - please instruct patient to taper down to prednisone 20mg daily starting MONDAY 7/14/25  - close outpatient follow up scheduled, as below    ======================================    Outpatient appointment made with medical oncology, as below. Please include in patient's discharge paperwork. Please notify patient at bedside, as well.    Appointment time/date:  7/17/25 at 11:00am    Dr. Kolby Pratt  [address] [place], [place] [id number] [phone].

## 2025-07-11 NOTE — PROGRESS NOTE ADULT - PROBLEM SELECTOR PLAN 6
- Continue Insulin lispro  - Lantus 10mg at bedtime  - Based on response (blood glucose 64) will decrease dosage to 6mg Lantus - Continue Insulin lispro  - Based on response (blood glucose 65) will stop Lantus - Continue Insulin lispro  - Based on response (blood glucose 65) will stop Lantus  - Monitor BSLs tomorrow

## 2025-07-11 NOTE — PROGRESS NOTE ADULT - PROBLEM SELECTOR PLAN 2
Has been on regular Prednisolone  - Neutrophilia  - Steroid related?  - Will continue to monitor with daily labs

## 2025-07-11 NOTE — PROGRESS NOTE ADULT - PROBLEM SELECTOR PLAN 1
Third-spacing 2/2 to NSCLC vs heart failure vs prednisone use  - Less likely HF (normal echo in May although BNP 1600. No resp sx and chest clear)  - Monitor edema as pred continues to taper  - Compression stockings applied Third-spacing 2/2 to NSCLC vs heart failure vs prednisone use  - Less likely HF (normal echo in May although BNP 1600. No resp sx and chest clear)  - Monitor edema as pred continues to taper  - Compression stockings applied  - 30 IV stat Furosemide (assess need for continuation tmrw: IV vs oral) Third-spacing 2/2 to NSCLC vs heart failure vs prednisone use  - Less likely HF (normal echo in May although BNP 1600. No resp sx and chest clear)  - Monitor edema as pred continues to taper  - Compression stockings applied until early AM but removed during day  - 30 IV stat Furosemide (assess need for continuation tmrw: IV vs oral)

## 2025-07-11 NOTE — CHART NOTE - NSCHARTNOTEFT_GEN_A_CORE
Admitting Diagnosis:   Patient is a 74y old  Male who presents with a chief complaint of BLE edema (2025 05:40)    PAST MEDICAL & SURGICAL HISTORY:  Hypertension  COPD without exacerbation  Diabetes mellitus  Stage 4 lung cancer  History of colitis  No significant past surgical history    Current Nutrition Order:  Consistent Carbohydrate Renal/No Snacks  Ensure Max x1/day  strawberry vanilla fortified smoothie (230kcal, 17g protein) x1/day  fortified oatmeal (245kcal, 19g protein) x1/day  Gelatein (80kcal, 20g protein, <1g carbohydrate) x1/day    PO Intake: Good (%) [ x ]  Fair (50-75%) [   ] Poor (<25%) [   ]    GI Issues:   Pt denies nausea/vomiting  Reports diarrhea improving, last BM this AM normal  Abdominal distension noted    Pain:  No pain reported     Skin Integrity:  No pressure injuries or edema documented     Labs:       140  |  100  |  34[H]  ----------------------------<  79  4.1   |  29  |  0.98    Ca    8.6      2025 05:30  Phos  1.6     07-11  Mg     1.4     07-11    CAPILLARY BLOOD GLUCOSE  POCT Blood Glucose.: 65 mg/dL (2025 08:45)  POCT Blood Glucose.: 269 mg/dL (10 Jul 2025 21:56)  POCT Blood Glucose.: 343 mg/dL (10 Jul 2025 17:21)  POCT Blood Glucose.: 290 mg/dL (10 Jul 2025 12:18)  POCT Blood Glucose.: 137 mg/dL (10 Jul 2025 10:24)  POCT Blood Glucose.: 64 mg/dL (10 Jul 2025 09:34)    Medications:  MEDICATIONS  (STANDING):  aspirin enteric coated 81 milliGRAM(s) Oral daily  atorvastatin 80 milliGRAM(s) Oral at bedtime  dextrose 5%. 500 milliLiter(s) (250 mL/Hr) IV Continuous <Continuous>  dextrose 5%. 1000 milliLiter(s) (100 mL/Hr) IV Continuous <Continuous>  dextrose 5%. 1000 milliLiter(s) (50 mL/Hr) IV Continuous <Continuous>  dextrose 50% Injectable 25 Gram(s) IV Push once  dextrose 50% Injectable 12.5 Gram(s) IV Push once  dextrose 50% Injectable 25 Gram(s) IV Push once  ezetimibe 10 milliGRAM(s) Oral daily  glucagon  Injectable 1 milliGRAM(s) IntraMuscular once  heparin   Injectable 5000 Unit(s) SubCutaneous every 8 hours  insulin glargine Injectable (LANTUS) 6 Unit(s) SubCutaneous at bedtime  insulin lispro (ADMELOG) corrective regimen sliding scale   SubCutaneous Before meals and at bedtime  insulin lispro Injectable (ADMELOG) 4 Unit(s) SubCutaneous three times a day before meals  metoprolol succinate  milliGRAM(s) Oral daily  pantoprazole    Tablet 40 milliGRAM(s) Oral before breakfast  predniSONE   Tablet 30 milliGRAM(s) Oral every 24 hours  trimethoprim   80 mG/sulfamethoxazole 400 mG 1 Tablet(s) Oral daily    MEDICATIONS  (PRN):  acetaminophen     Tablet .. 650 milliGRAM(s) Oral every 6 hours PRN Mild Pain (1 - 3)  albuterol    90 MICROgram(s) HFA Inhaler 2 Puff(s) Inhalation every 6 hours PRN Shortness of Breath and/or Wheezing  dextrose Oral Gel 15 Gram(s) Oral once PRN Blood Glucose LESS THAN 70 milliGRAM(s)/deciliter    Anthropometrics:  Height: 5'7"  Weight: 114 pounds / 51.7 kilograms  BMI: 17.8  IBW: 148 pounds / 67.1 kilograms            77%IBW    Weight Change:   No new weights obtained since admission. Recommend nursing to trend weekly weights. RD to continue monitoring weights as able.    [Severe Chronic Protein Calorie Malnutrition: Risk Assessment Completed ]  - Wt Loss: 13% wt loss in <2 months  - NFPE: severe muscle and fat wasting    Estimated energy needs:   Calories: 30-35kcal/k-2348kcal/d  Protein: 1.2-1.5g/k-101g/d  Defer fluids to team.  Estimated needs based on IBW as dosing wt 117 pounds / 51.7 kilograms is <80% IBW (77%). Needs adjusted for age, BMI, COPD, cancer, and malnutrition.     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 : diarrhea, c diff negative.     Pt seen on 7WO for follow-up. Labs and medication orders reviewed. Na/K WNL, Mg 1.4 <low>, Phos 1.6 <low>, BUN/Cr 34 <high>/0.98 WNL, POC blood glucose (7/10-) . Ordered for prednisone. On Consistent Carbohydrate Renal diet with Ensure Max x1/day. Pt sitting up in bed on visit this AM. Reports good appetite, consuming 100% of meal trays. Requests continued Ensure Max x1/day. Denies issues with chewing/swallowing. Reports diarrhea improving, x2 BMs yesterday and x1 thus far today. See nutrition recommendations. RD to remain available.     Previous Nutrition Diagnosis:  Severe chronic malnutrition related to inadequate intake in setting of increased physiological demand for cancer, COPD as evidenced by severe muscle and fat wasting, 13% wt loss in <2 months.    Active [ x ]  Resolved [   ]    Goal:  Pt to consistently meet >75% nutrient needs. Reduce signs and symptoms of protein-calorie malnutrition.    Recommendations:  1. Recommend Consistent Carbohydrate diet with Ensure Max (150kcal, 30g protein) x1/day.  >>Recommend d/c Renal restriction in setting of low/WNL K/Phos and no HD.   >>Dietary to provide fortified oatmeal (245kcal, 19g protein), strawberry vanilla fortified smoothie (230kcal, 17g protein), and Gelatein (80kcal, 20g protein) x1/day.    >>Encourage and monitor PO intake. Mayer dietary preferences as able.   2. Consider thiamine supplementation in setting of low electrolytes and malnutrition.   3. Monitor GI tolerance, weight trends, labs, and skin integrity.  >>Monitor electrolytes closely and replete prn.   4. Defer bowel and pain regimens to team.   5. RD to remain available for diet education/intervention prn.    Education:  Encouraged continued adequate intake of meals with use of oral nutrition supplement between meals to maximize intake. Discussed prioritizing protein foods, reviewed nutrition therapy for euglycemia. Pt aware RD remains available for additional questions/concerns. Admitting Diagnosis:   Patient is a 74y old  Male who presents with a chief complaint of BLE edema (2025 05:40)    PAST MEDICAL & SURGICAL HISTORY:  Hypertension  COPD without exacerbation  Diabetes mellitus  Stage 4 lung cancer  History of colitis  No significant past surgical history    Current Nutrition Order:  Consistent Carbohydrate Renal/No Snacks  Ensure Max x1/day  strawberry vanilla fortified smoothie (230kcal, 17g protein) x1/day  fortified oatmeal (245kcal, 19g protein) x1/day  Gelatein (80kcal, 20g protein, <1g carbohydrate) x1/day    PO Intake: Good (%) [ x ]  Fair (50-75%) [   ] Poor (<25%) [   ]    GI Issues:   Pt denies nausea/vomiting  Reports diarrhea improving, last BM this AM normal  Abdominal distension noted    Pain:  No pain reported     Skin Integrity:  No pressure injuries or edema documented     Labs:       140  |  100  |  34[H]  ----------------------------<  79  4.1   |  29  |  0.98    Ca    8.6      2025 05:30  Phos  1.6     07-11  Mg     1.4     07-11    CAPILLARY BLOOD GLUCOSE  POCT Blood Glucose.: 65 mg/dL (2025 08:45)  POCT Blood Glucose.: 269 mg/dL (10 Jul 2025 21:56)  POCT Blood Glucose.: 343 mg/dL (10 Jul 2025 17:21)  POCT Blood Glucose.: 290 mg/dL (10 Jul 2025 12:18)  POCT Blood Glucose.: 137 mg/dL (10 Jul 2025 10:24)  POCT Blood Glucose.: 64 mg/dL (10 Jul 2025 09:34)    Medications:  MEDICATIONS  (STANDING):  aspirin enteric coated 81 milliGRAM(s) Oral daily  atorvastatin 80 milliGRAM(s) Oral at bedtime  dextrose 5%. 500 milliLiter(s) (250 mL/Hr) IV Continuous <Continuous>  dextrose 5%. 1000 milliLiter(s) (100 mL/Hr) IV Continuous <Continuous>  dextrose 5%. 1000 milliLiter(s) (50 mL/Hr) IV Continuous <Continuous>  dextrose 50% Injectable 25 Gram(s) IV Push once  dextrose 50% Injectable 12.5 Gram(s) IV Push once  dextrose 50% Injectable 25 Gram(s) IV Push once  ezetimibe 10 milliGRAM(s) Oral daily  glucagon  Injectable 1 milliGRAM(s) IntraMuscular once  heparin   Injectable 5000 Unit(s) SubCutaneous every 8 hours  insulin glargine Injectable (LANTUS) 6 Unit(s) SubCutaneous at bedtime  insulin lispro (ADMELOG) corrective regimen sliding scale   SubCutaneous Before meals and at bedtime  insulin lispro Injectable (ADMELOG) 4 Unit(s) SubCutaneous three times a day before meals  metoprolol succinate  milliGRAM(s) Oral daily  pantoprazole    Tablet 40 milliGRAM(s) Oral before breakfast  predniSONE   Tablet 30 milliGRAM(s) Oral every 24 hours  trimethoprim   80 mG/sulfamethoxazole 400 mG 1 Tablet(s) Oral daily    MEDICATIONS  (PRN):  acetaminophen     Tablet .. 650 milliGRAM(s) Oral every 6 hours PRN Mild Pain (1 - 3)  albuterol    90 MICROgram(s) HFA Inhaler 2 Puff(s) Inhalation every 6 hours PRN Shortness of Breath and/or Wheezing  dextrose Oral Gel 15 Gram(s) Oral once PRN Blood Glucose LESS THAN 70 milliGRAM(s)/deciliter    Anthropometrics:  Height: 5'7"  Weight: 114 pounds / 51.7 kilograms  BMI: 17.8  IBW: 148 pounds / 67.1 kilograms            77%IBW    Weight Change:   No new weights obtained since admission. Recommend nursing to trend weekly weights. RD to continue monitoring weights as able.    [Severe Chronic Protein Calorie Malnutrition: Risk Assessment Completed ]  - Wt Loss: 13% wt loss in <2 months  - NFPE: severe muscle and fat wasting    Estimated energy needs:   Calories: 30-35kcal/k-2348kcal/d  Protein: 1.2-1.5g/k-101g/d  Defer fluids to team.  Estimated needs based on IBW as dosing wt 117 pounds / 51.7 kilograms is <80% IBW (77%). Needs adjusted for age, BMI, COPD, cancer, and malnutrition.     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 : diarrhea, c diff negative.     Pt seen on 7WO for follow-up. Labs and medication orders reviewed. Na/K WNL, Mg 1.4 <low>, Phos 1.6 <low>, BUN/Cr 34 <high>/0.98 WNL, POC blood glucose (7/10-) . Ordered for prednisone. On Consistent Carbohydrate Renal diet with Ensure Max x1/day. Pt sitting up in bed on visit this AM. Reports good appetite, consuming 100% of meal trays. Requests continued Ensure Max x1/day. Denies issues with chewing/swallowing. Reports diarrhea improving, x2 BMs yesterday and x1 thus far today. See nutrition recommendations - RD communicated to team. RD to remain available.     Previous Nutrition Diagnosis:  Severe chronic malnutrition related to inadequate intake in setting of increased physiological demand for cancer, COPD as evidenced by severe muscle and fat wasting, 13% wt loss in <2 months.    Active [ x ]  Resolved [   ]    Goal:  Pt to consistently meet >75% nutrient needs. Reduce signs and symptoms of protein-calorie malnutrition.    Recommendations:  1. Recommend Consistent Carbohydrate diet with Ensure Max (150kcal, 30g protein) x1/day.  >>Recommend d/c Renal restriction in setting of low/WNL K/Phos and no HD.   >>Dietary to provide fortified oatmeal (245kcal, 19g protein), strawberry vanilla fortified smoothie (230kcal, 17g protein), and Gelatein (80kcal, 20g protein) x1/day.    >>Encourage and monitor PO intake. Jeremiah dietary preferences as able.   2. Consider thiamine supplementation in setting of low electrolytes and malnutrition.   3. Monitor GI tolerance, weight trends, labs, and skin integrity.  >>Monitor electrolytes closely and replete prn.   4. Defer bowel and pain regimens to team.   5. RD to remain available for diet education/intervention prn.    Education:  Encouraged continued adequate intake of meals with use of oral nutrition supplement between meals to maximize intake. Discussed prioritizing protein foods, reviewed nutrition therapy for euglycemia. Pt aware RD remains available for additional questions/concerns.

## 2025-07-11 NOTE — PROGRESS NOTE ADULT - PROBLEM SELECTOR PLAN 12
- On Atorvastatin 80mg PO at home bedtime  - On Ezetimibe 10mg PO at home daily F: None  E: None  N: Nutrition consulted. Prescribed Ensure max x1/day.   DVT ppx: Heparin - 5000 units subcutaneous every 8 hrs  Bowel: None  Dispo: RMF  Code status:

## 2025-07-11 NOTE — PROGRESS NOTE ADULT - SUBJECTIVE AND OBJECTIVE BOX
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 The pt developed immunotherapy-induced colitis 3 weeks ago at which point the Keytruda was stopped and he was started on a Prednisone taper. Initial dosage of Prednisone at 60mg and currently tapered down to 30mg. After onset of BLE swelling, Lasix dosage was increased to 60mg. The pt also notes that he used Lidocaine cream for his leg pain with significant relief. After noting no change in BLE swelling, pt presented to the ED for further treatment.    Social History:  Lives with brother  Ambulates with cane for the past 2 weeks  Does not drink alcohol   Endorses 40 pack years smoking hx (smoked 2 ppd x 20 yrs, stopped 10 yrs ago)  Endorses smoking marijuana x 10 yrs (stopped 20 yrs ago)    INTERVAL EVENTS:   No acute events overnight. Potassium normalized at 5.1. Pt tested negative for C. diff.    SUBJECTIVE:   Patient seen and examined at bedside. Condition improved from yesterday. States that his last BM was yesterday. Does not have any diarrhea.    ROS:  Positive for chronic cough. Negative for SOB, fever, chills, chest pain, abdominal pain, diarrhea, dysuria.    VITAL SIGNS:  Vital Signs Last 24 Hrs  T(C): 37.1 (10 Jul 2025 20:37), Max: 37.1 (10 Jul 2025 20:37)  T(F): 98.7 (10 Jul 2025 20:37), Max: 98.7 (10 Jul 2025 20:37)  HR: 69 (10 Jul 2025 20:37) (63 - 69)  BP: 157/67 (10 Jul 2025 20:37) (157/67 - 157/73)  BP(mean): --  RR: 18 (10 Jul 2025 20:37) (18 - 18)  SpO2: 95% (10 Jul 2025 20:37) (95% - 96%)    Parameters below as of 10 Jul 2025 20:37  Patient On (Oxygen Delivery Method): room air      PHYSICAL EXAM:  Constitutional: resting comfortably; NAD  HEENT: NC/AT, PERRL, EOMI, anicteric sclera, MMM  Neck: supple; no JVD or thyromegaly; no LAD  Respiratory: CTA B/L; no W/R/R, no retractions or increased work of breathing  Cardiac: +S1/S2; RRR; no M/R/G  Gastrointestinal: soft, NT/ND; no rebound or guarding; +BS  Extremities: WWP, no clubbing or cyanosis; brisk capillary refill; no peripheral edema  Musculoskeletal: NROM x4; no joint swelling, tenderness or erythema  Vascular: 2+ radial, DP/PT pulses B/L  Dermatologic: skin warm, dry and intact; no rashes, wounds, or scars  Neurologic: AAOx3, CN II-XII intact, no focal deficits, strength 5/5 and equal in all extremities, sensation intact  Psychiatric: calm, cooperative, behaviors are appropriate        MEDICATIONS  (STANDING):  aspirin enteric coated 81 milliGRAM(s) Oral daily  atorvastatin 80 milliGRAM(s) Oral at bedtime  dextrose 5%. 1000 milliLiter(s) (100 mL/Hr) IV Continuous <Continuous>  dextrose 5%. 1000 milliLiter(s) (50 mL/Hr) IV Continuous <Continuous>  dextrose 50% Injectable 25 Gram(s) IV Push once  dextrose 50% Injectable 12.5 Gram(s) IV Push once  dextrose 50% Injectable 25 Gram(s) IV Push once  ezetimibe 10 milliGRAM(s) Oral daily  glucagon  Injectable 1 milliGRAM(s) IntraMuscular once  heparin   Injectable 5000 Unit(s) SubCutaneous every 8 hours  insulin lispro (ADMELOG) corrective regimen sliding scale   SubCutaneous three times a day before meals  insulin lispro Injectable (ADMELOG) 4 Unit(s) SubCutaneous three times a day before meals  metoprolol succinate  milliGRAM(s) Oral daily  pantoprazole    Tablet 40 milliGRAM(s) Oral before breakfast  potassium chloride  10 mEq/100 mL IVPB 10 milliEquivalent(s) IV Intermittent every 1 hour  potassium phosphate IVPB 30 milliMole(s) IV Intermittent once  predniSONE   Tablet 30 milliGRAM(s) Oral every 24 hours  trimethoprim   80 mG/sulfamethoxazole 400 mG 1 Tablet(s) Oral daily    MEDICATIONS  (PRN):  acetaminophen     Tablet .. 650 milliGRAM(s) Oral every 6 hours PRN Mild Pain (1 - 3)  albuterol    90 MICROgram(s) HFA Inhaler 2 Puff(s) Inhalation every 6 hours PRN Shortness of Breath and/or Wheezing  dextrose Oral Gel 15 Gram(s) Oral once PRN Blood Glucose LESS THAN 70 milliGRAM(s)/deciliter    ALLERGIES:  NKDA, NKFA, NKEA    CAPILLARY BLOOD GLUCOSE  POCT Blood Glucose.: 269 mg/dL (10 Jul 2025 21:56)  POCT Blood Glucose.: 290 mg/dL (10 Jul 2025 12:18)  POCT Blood Glucose.: 202 mg/dL (09 Jul 2025 08:23)  POCT Blood Glucose.: 239 mg/dL (08 Jul 2025 23:48)    LABS:                        14.2   21.29 )-----------( 295      ( 10 Jul 2025 10:00 )             45.5     07-10    147[H]  |  105  |  42[H]  ----------------------------<  95  5.1   |  34[H]  |  1.00    Ca    8.7      10 Jul 2025 10:00  Phos  2.7     07-10  Mg     1.9     07-10        Urinalysis Basic - ( 10 Jul 2025 10:00 )    Color: x / Appearance: x / SG: x / pH: x  Gluc: 95 mg/dL / Ketone: x  / Bili: x / Urobili: x   Blood: x / Protein: x / Nitrite: x   Leuk Esterase: x / RBC: x / WBC x   Sq Epi: x / Non Sq Epi: x / Bacteria: x                RADIOLOGY, EKG & ADDITIONAL TESTS: Reviewed.  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 The pt developed immunotherapy-induced colitis 3 weeks ago at which point the Keytruda was stopped and he was started on a Prednisone taper. Initial dosage of Prednisone at 60mg and currently tapered down to 30mg. After onset of BLE swelling, Lasix dosage was increased to 60mg. The pt also notes that he used Lidocaine cream for his leg pain with significant relief. After noting no change in BLE swelling, pt presented to the ED for further treatment.    Social History:  Lives with brother  Ambulates with cane for the past 2 weeks  Does not drink alcohol   Endorses 40 pack years smoking hx (smoked 2 ppd x 20 yrs, stopped 10 yrs ago)  Endorses smoking marijuana x 10 yrs (stopped 20 yrs ago)    INTERVAL EVENTS:   No acute events overnight. Pt tested positive for Norovirus and was placed on contact precautions. He wore compression stockings overnight. Pt noted right wrist pain although no redness, warmth, or swelling noted by his nurse. Nurse believes pain is likely 2/2 to cane use considering pt is right-handed.     SUBJECTIVE:   Patient seen and examined at bedside. Condition improved from yesterday. In the AM, pt noted that swelling had improved s/p compression stockings. He also stated that he had 3 loose bowel movements since yesterday, but did not endorse diarrhea. In the PM, pt noted worsening swelling of the bilateral lower extremities s/p no compression use since the morning.     ROS:  Positive for chronic cough. Negative for SOB, fever, chills, chest pain, abdominal pain, diarrhea, dysuria.      VITAL SIGNS:  Vital Signs Last 24 Hrs  T(C): 36.3 (11 Jul 2025 05:58), Max: 37.1 (10 Jul 2025 20:37)  T(F): 97.3 (11 Jul 2025 05:58), Max: 98.7 (10 Jul 2025 20:37)  HR: 69 (11 Jul 2025 05:58) (69 - 69)  BP: 151/76 (11 Jul 2025 05:58) (151/76 - 157/67)  BP(mean): 101 (11 Jul 2025 05:58) (101 - 101)  RR: 18 (11 Jul 2025 05:58) (18 - 18)  SpO2: 95% (11 Jul 2025 05:58) (95% - 95%)    Parameters below as of 11 Jul 2025 05:58  Patient On (Oxygen Delivery Method): room air      PHYSICAL EXAM:  Constitutional: resting comfortably; NAD  HEENT: NC/AT, PERRL, EOMI, anicteric sclera, MMM  Neck: supple; no JVD or thyromegaly; no LAD  Respiratory: CTA B/L; no W/R/R, no retractions or increased work of breathing  Cardiac: +S1/S2; RRR; no M/R/G  Gastrointestinal: soft, NT/ND; no rebound or guarding; +BS  Extremities: WWP, no clubbing or cyanosis; brisk capillary refill; no peripheral edema  Musculoskeletal: NROM x4; no joint swelling, tenderness or erythema  Vascular: 2+ radial, DP/PT pulses B/L  Dermatologic: skin warm, dry and intact; no rashes, wounds, or scars  Neurologic: AAOx3, CN II-XII intact, no focal deficits, strength 5/5 and equal in all extremities, sensation intact  Psychiatric: calm, cooperative, behaviors are appropriate      MEDICATIONS  (STANDING):  aspirin enteric coated 81 milliGRAM(s) Oral daily  atorvastatin 80 milliGRAM(s) Oral at bedtime  dextrose 5%. 1000 milliLiter(s) (100 mL/Hr) IV Continuous <Continuous>  dextrose 5%. 1000 milliLiter(s) (50 mL/Hr) IV Continuous <Continuous>  dextrose 50% Injectable 25 Gram(s) IV Push once  dextrose 50% Injectable 12.5 Gram(s) IV Push once  dextrose 50% Injectable 25 Gram(s) IV Push once  ezetimibe 10 milliGRAM(s) Oral daily  glucagon  Injectable 1 milliGRAM(s) IntraMuscular once  heparin   Injectable 5000 Unit(s) SubCutaneous every 8 hours  insulin lispro (ADMELOG) corrective regimen sliding scale   SubCutaneous three times a day before meals  insulin lispro Injectable (ADMELOG) 4 Unit(s) SubCutaneous three times a day before meals  metoprolol succinate  milliGRAM(s) Oral daily  pantoprazole    Tablet 40 milliGRAM(s) Oral before breakfast  potassium chloride  10 mEq/100 mL IVPB 10 milliEquivalent(s) IV Intermittent every 1 hour  potassium phosphate IVPB 30 milliMole(s) IV Intermittent once  predniSONE   Tablet 30 milliGRAM(s) Oral every 24 hours  trimethoprim   80 mG/sulfamethoxazole 400 mG 1 Tablet(s) Oral daily    MEDICATIONS  (PRN):  acetaminophen     Tablet .. 650 milliGRAM(s) Oral every 6 hours PRN Mild Pain (1 - 3)  albuterol    90 MICROgram(s) HFA Inhaler 2 Puff(s) Inhalation every 6 hours PRN Shortness of Breath and/or Wheezing  dextrose Oral Gel 15 Gram(s) Oral once PRN Blood Glucose LESS THAN 70 milliGRAM(s)/deciliter    ALLERGIES:  NKDA, NKFA, NKEA    CAPILLARY BLOOD GLUCOSE  CAPILLARY BLOOD GLUCOSE  POCT Blood Glucose.: 338 mg/dL (11 Jul 2025 12:12)  POCT Blood Glucose.: 110 mg/dL (11 Jul 2025 09:32)  POCT Blood Glucose.: 65 mg/dL (11 Jul 2025 08:45)  POCT Blood Glucose.: 269 mg/dL (10 Jul 2025 21:56)  POCT Blood Glucose.: 343 mg/dL (10 Jul 2025 17:21)  POCT Blood Glucose.: 202 mg/dL (09 Jul 2025 08:23)  POCT Blood Glucose.: 239 mg/dL (08 Jul 2025 23:48)      LABS:                         13.4   21.26 )-----------( 272      ( 11 Jul 2025 05:30 )             42.6     07-11    140  |  100  |  34[H]  ----------------------------<  79  4.1   |  29  |  0.98    Ca    8.6      11 Jul 2025 05:30  Phos  1.6     07-11  Mg     1.4     07-11        Urinalysis Basic - ( 11 Jul 2025 05:30 )    Color: x / Appearance: x / SG: x / pH: x  Gluc: 79 mg/dL / Ketone: x  / Bili: x / Urobili: x   Blood: x / Protein: x / Nitrite: x   Leuk Esterase: x / RBC: x / WBC x   Sq Epi: x / Non Sq Epi: x / Bacteria: x    RADIOLOGY, EKG & ADDITIONAL TESTS: Reviewed.  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 The pt developed immunotherapy-induced colitis 3 weeks ago at which point the Keytruda was stopped and he was started on a Prednisone taper. Initial dosage of Prednisone at 60mg and currently tapered down to 30mg. After onset of BLE swelling, Lasix dosage was increased to 60mg. The pt also notes that he used Lidocaine cream for his leg pain with significant relief. After noting no change in BLE swelling, pt presented to the ED for further treatment.    Social History:  Lives with brother  Ambulates with cane for the past 2 weeks  Does not drink alcohol   Endorses 40 pack years smoking hx (smoked 2 ppd x 20 yrs, stopped 10 yrs ago)  Endorses smoking marijuana x 10 yrs (stopped 20 yrs ago)    INTERVAL EVENTS:   No acute events overnight. Pt tested positive for Norovirus and was placed on contact precautions. He wore compression stockings overnight. Pt noted right wrist pain although no redness, warmth, or swelling noted by his nurse. Nurse believes pain is likely 2/2 to cane use considering pt is right-handed.     SUBJECTIVE:   Patient seen and examined at bedside. Condition improved from yesterday. In the AM, pt noted that swelling had improved s/p compression stockings. He also stated that he had 3 loose bowel movements since yesterday, but did not endorse diarrhea. In the PM, pt noted worsening swelling of the bilateral lower extremities s/p no compression use since the morning.     ROS:  Positive for chronic cough. Negative for SOB, fever, chills, chest pain, abdominal pain, diarrhea, dysuria.      VITAL SIGNS:  Vital Signs Last 24 Hrs  T(C): 36.3 (11 Jul 2025 05:58), Max: 37.1 (10 Jul 2025 20:37)  T(F): 97.3 (11 Jul 2025 05:58), Max: 98.7 (10 Jul 2025 20:37)  HR: 69 (11 Jul 2025 05:58) (69 - 69)  BP: 151/76 (11 Jul 2025 05:58) (151/76 - 157/67)  BP(mean): 101 (11 Jul 2025 05:58) (101 - 101)  RR: 18 (11 Jul 2025 05:58) (18 - 18)  SpO2: 95% (11 Jul 2025 05:58) (95% - 95%)    Parameters below as of 11 Jul 2025 05:58  Patient On (Oxygen Delivery Method): room air      PHYSICAL EXAM:  Constitutional: AOx3. No acute distress. Resting comfortably in bed.  HEENT: Atraumatic. PERRL. EOMI. Clear conjunctiva. Moist mucous membranes.  Neck: Trachea midline. Supple.  Lungs: Mild rales noted at apex of lungs b/l. Lungs otherwise clear to auscultation b/l. No accessory muscle use. No wheezing. No stridor/retracting/tripoding.   Cardiovascular: Regular rate and rhythm. No murmurs, rubs, or gallops.  Abdomen: Soft, non-tender, non-distended. No rebound or guarding.    Extremities: 3+ b/l LE pitting edema + erythema w/o warmth. Right wrist edema.  Skin: Multiple, weeping skin wounds noted to b/l ankles. No pus noted.  Neurologic: No apparent focal neurological deficits. Answering questions, following commands, moving all extremities.   Psychiatric: Cooperative. Appropriate mood and affect.       MEDICATIONS  (STANDING):  aspirin enteric coated 81 milliGRAM(s) Oral daily  atorvastatin 80 milliGRAM(s) Oral at bedtime  dextrose 5%. 1000 milliLiter(s) (100 mL/Hr) IV Continuous <Continuous>  dextrose 5%. 1000 milliLiter(s) (50 mL/Hr) IV Continuous <Continuous>  dextrose 50% Injectable 25 Gram(s) IV Push once  dextrose 50% Injectable 12.5 Gram(s) IV Push once  dextrose 50% Injectable 25 Gram(s) IV Push once  ezetimibe 10 milliGRAM(s) Oral daily  glucagon  Injectable 1 milliGRAM(s) IntraMuscular once  heparin   Injectable 5000 Unit(s) SubCutaneous every 8 hours  insulin lispro (ADMELOG) corrective regimen sliding scale   SubCutaneous three times a day before meals  insulin lispro Injectable (ADMELOG) 4 Unit(s) SubCutaneous three times a day before meals  metoprolol succinate  milliGRAM(s) Oral daily  pantoprazole    Tablet 40 milliGRAM(s) Oral before breakfast  potassium chloride  10 mEq/100 mL IVPB 10 milliEquivalent(s) IV Intermittent every 1 hour  potassium phosphate IVPB 30 milliMole(s) IV Intermittent once  predniSONE   Tablet 30 milliGRAM(s) Oral every 24 hours  trimethoprim   80 mG/sulfamethoxazole 400 mG 1 Tablet(s) Oral daily    MEDICATIONS  (PRN):  acetaminophen     Tablet .. 650 milliGRAM(s) Oral every 6 hours PRN Mild Pain (1 - 3)  albuterol    90 MICROgram(s) HFA Inhaler 2 Puff(s) Inhalation every 6 hours PRN Shortness of Breath and/or Wheezing  dextrose Oral Gel 15 Gram(s) Oral once PRN Blood Glucose LESS THAN 70 milliGRAM(s)/deciliter    ALLERGIES:  NKDA, NKFA, NKEA    CAPILLARY BLOOD GLUCOSE  CAPILLARY BLOOD GLUCOSE  POCT Blood Glucose.: 338 mg/dL (11 Jul 2025 12:12)  POCT Blood Glucose.: 110 mg/dL (11 Jul 2025 09:32)  POCT Blood Glucose.: 65 mg/dL (11 Jul 2025 08:45)  POCT Blood Glucose.: 269 mg/dL (10 Jul 2025 21:56)  POCT Blood Glucose.: 343 mg/dL (10 Jul 2025 17:21)  POCT Blood Glucose.: 202 mg/dL (09 Jul 2025 08:23)  POCT Blood Glucose.: 239 mg/dL (08 Jul 2025 23:48)      LABS:                         13.4   21.26 )-----------( 272      ( 11 Jul 2025 05:30 )             42.6     07-11    140  |  100  |  34[H]  ----------------------------<  79  4.1   |  29  |  0.98    Ca    8.6      11 Jul 2025 05:30  Phos  1.6     07-11  Mg     1.4     07-11        Urinalysis Basic - ( 11 Jul 2025 05:30 )    Color: x / Appearance: x / SG: x / pH: x  Gluc: 79 mg/dL / Ketone: x  / Bili: x / Urobili: x   Blood: x / Protein: x / Nitrite: x   Leuk Esterase: x / RBC: x / WBC x   Sq Epi: x / Non Sq Epi: x / Bacteria: x    RADIOLOGY, EKG & ADDITIONAL TESTS: Reviewed.  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 The pt developed immunotherapy-induced colitis 3 weeks ago at which point the Keytruda was stopped and he was started on a Prednisone taper. Initial dosage of Prednisone at 60mg and currently tapered down to 30mg. After onset of BLE swelling, Lasix dosage was increased to 60mg. The pt also notes that he used Lidocaine cream for his leg pain with significant relief. After noting no change in BLE swelling, pt presented to the ED for further treatment.    INTERVAL EVENTS:   No acute events overnight. Pt tested positive for Norovirus and was placed on contact precautions. He wore compression stockings overnight. Pt noted right wrist pain although no redness or warmth noted. Nurse believes pain is likely 2/2 to cane use considering pt is right-handed.    SUBJECTIVE:   Patient seen and examined at bedside. Condition improved from yesterday. In the AM, pt noted that swelling had improved s/p compression stockings. He also stated that he had 3 loose bowel movements since yesterday, but did not endorse diarrhea. In the PM, pt noted worsening swelling of the bilateral lower extremities s/p no compression use since the morning.     ROS:  Positive for chronic cough. Negative for SOB, fever, chills, chest pain, abdominal pain, diarrhea, dysuria.      VITAL SIGNS:  Vital Signs Last 24 Hrs  T(C): 36.3 (11 Jul 2025 05:58), Max: 37.1 (10 Jul 2025 20:37)  T(F): 97.3 (11 Jul 2025 05:58), Max: 98.7 (10 Jul 2025 20:37)  HR: 69 (11 Jul 2025 05:58) (69 - 69)  BP: 151/76 (11 Jul 2025 05:58) (151/76 - 157/67)  BP(mean): 101 (11 Jul 2025 05:58) (101 - 101)  RR: 18 (11 Jul 2025 05:58) (18 - 18)  SpO2: 95% (11 Jul 2025 05:58) (95% - 95%)    Parameters below as of 11 Jul 2025 05:58  Patient On (Oxygen Delivery Method): room air      PHYSICAL EXAM:  Constitutional: AOx3. No acute distress. Resting comfortably in bed.  HEENT: Atraumatic. PERRL. EOMI. Clear conjunctiva. Moist mucous membranes.  Neck: Trachea midline. Supple.  Lungs: Mild rales noted at apex of lungs b/l. Lungs otherwise clear to auscultation b/l. No accessory muscle use. No wheezing. No stridor/retracting/tripoding.   Cardiovascular: Regular rate and rhythm. No murmurs, rubs, or gallops.  Abdomen: Soft, non-tender, non-distended. No rebound or guarding.    Extremities: 3+ b/l LE pitting edema + erythema w/o warmth. Right wrist edema.  Skin: Multiple, weeping skin wounds noted to b/l ankles. No pus noted.  Neurologic: No apparent focal neurological deficits. Answering questions, following commands, moving all extremities.   Psychiatric: Cooperative. Appropriate mood and affect.       MEDICATIONS  (STANDING):  aspirin enteric coated 81 milliGRAM(s) Oral daily  atorvastatin 80 milliGRAM(s) Oral at bedtime  dextrose 5%. 1000 milliLiter(s) (100 mL/Hr) IV Continuous <Continuous>  dextrose 5%. 1000 milliLiter(s) (50 mL/Hr) IV Continuous <Continuous>  dextrose 50% Injectable 25 Gram(s) IV Push once  dextrose 50% Injectable 12.5 Gram(s) IV Push once  dextrose 50% Injectable 25 Gram(s) IV Push once  ezetimibe 10 milliGRAM(s) Oral daily  glucagon  Injectable 1 milliGRAM(s) IntraMuscular once  heparin   Injectable 5000 Unit(s) SubCutaneous every 8 hours  insulin lispro (ADMELOG) corrective regimen sliding scale   SubCutaneous three times a day before meals  insulin lispro Injectable (ADMELOG) 4 Unit(s) SubCutaneous three times a day before meals  metoprolol succinate  milliGRAM(s) Oral daily  pantoprazole    Tablet 40 milliGRAM(s) Oral before breakfast  potassium chloride  10 mEq/100 mL IVPB 10 milliEquivalent(s) IV Intermittent every 1 hour  potassium phosphate IVPB 30 milliMole(s) IV Intermittent once  predniSONE   Tablet 30 milliGRAM(s) Oral every 24 hours  trimethoprim   80 mG/sulfamethoxazole 400 mG 1 Tablet(s) Oral daily    MEDICATIONS  (PRN):  acetaminophen     Tablet .. 650 milliGRAM(s) Oral every 6 hours PRN Mild Pain (1 - 3)  albuterol    90 MICROgram(s) HFA Inhaler 2 Puff(s) Inhalation every 6 hours PRN Shortness of Breath and/or Wheezing  dextrose Oral Gel 15 Gram(s) Oral once PRN Blood Glucose LESS THAN 70 milliGRAM(s)/deciliter    ALLERGIES:  NKDA, NKFA, NKEA    CAPILLARY BLOOD GLUCOSE  CAPILLARY BLOOD GLUCOSE  POCT Blood Glucose.: 338 mg/dL (11 Jul 2025 12:12)  POCT Blood Glucose.: 110 mg/dL (11 Jul 2025 09:32)  POCT Blood Glucose.: 65 mg/dL (11 Jul 2025 08:45)  POCT Blood Glucose.: 269 mg/dL (10 Jul 2025 21:56)  POCT Blood Glucose.: 343 mg/dL (10 Jul 2025 17:21)  POCT Blood Glucose.: 202 mg/dL (09 Jul 2025 08:23)  POCT Blood Glucose.: 239 mg/dL (08 Jul 2025 23:48)      LABS:                         13.4   21.26 )-----------( 272      ( 11 Jul 2025 05:30 )             42.6     07-11    140  |  100  |  34[H]  ----------------------------<  79  4.1   |  29  |  0.98    Ca    8.6      11 Jul 2025 05:30  Phos  1.6     07-11  Mg     1.4     07-11        Urinalysis Basic - ( 11 Jul 2025 05:30 )    Color: x / Appearance: x / SG: x / pH: x  Gluc: 79 mg/dL / Ketone: x  / Bili: x / Urobili: x   Blood: x / Protein: x / Nitrite: x   Leuk Esterase: x / RBC: x / WBC x   Sq Epi: x / Non Sq Epi: x / Bacteria: x    RADIOLOGY, EKG & ADDITIONAL TESTS: Reviewed.

## 2025-07-12 LAB
ANION GAP SERPL CALC-SCNC: 10 MMOL/L — SIGNIFICANT CHANGE UP (ref 5–17)
BUN SERPL-MCNC: 30 MG/DL — HIGH (ref 7–23)
CALCIUM SERPL-MCNC: 9 MG/DL — SIGNIFICANT CHANGE UP (ref 8.4–10.5)
CHLORIDE SERPL-SCNC: 99 MMOL/L — SIGNIFICANT CHANGE UP (ref 96–108)
CO2 SERPL-SCNC: 29 MMOL/L — SIGNIFICANT CHANGE UP (ref 22–31)
CREAT SERPL-MCNC: 0.85 MG/DL — SIGNIFICANT CHANGE UP (ref 0.5–1.3)
EGFR: 90 ML/MIN/1.73M2 — SIGNIFICANT CHANGE UP
EGFR: 90 ML/MIN/1.73M2 — SIGNIFICANT CHANGE UP
GLUCOSE SERPL-MCNC: 83 MG/DL — SIGNIFICANT CHANGE UP (ref 70–99)
HCT VFR BLD CALC: 47.1 % — SIGNIFICANT CHANGE UP (ref 39–50)
HGB BLD-MCNC: 15 G/DL — SIGNIFICANT CHANGE UP (ref 13–17)
MAGNESIUM SERPL-MCNC: 2 MG/DL — SIGNIFICANT CHANGE UP (ref 1.6–2.6)
MCHC RBC-ENTMCNC: 27.9 PG — SIGNIFICANT CHANGE UP (ref 27–34)
MCHC RBC-ENTMCNC: 31.8 G/DL — LOW (ref 32–36)
MCV RBC AUTO: 87.5 FL — SIGNIFICANT CHANGE UP (ref 80–100)
NRBC # BLD AUTO: 0 K/UL — SIGNIFICANT CHANGE UP (ref 0–0)
NRBC # FLD: 0 K/UL — SIGNIFICANT CHANGE UP (ref 0–0)
NRBC BLD AUTO-RTO: 0 /100 WBCS — SIGNIFICANT CHANGE UP (ref 0–0)
PHOSPHATE SERPL-MCNC: 1.8 MG/DL — LOW (ref 2.5–4.5)
PLATELET # BLD AUTO: 273 K/UL — SIGNIFICANT CHANGE UP (ref 150–400)
PMV BLD: 10.6 FL — SIGNIFICANT CHANGE UP (ref 7–13)
POTASSIUM SERPL-MCNC: 4.6 MMOL/L — SIGNIFICANT CHANGE UP (ref 3.5–5.3)
POTASSIUM SERPL-SCNC: 4.6 MMOL/L — SIGNIFICANT CHANGE UP (ref 3.5–5.3)
RBC # BLD: 5.38 M/UL — SIGNIFICANT CHANGE UP (ref 4.2–5.8)
RBC # FLD: 17.3 % — HIGH (ref 10.3–14.5)
SODIUM SERPL-SCNC: 138 MMOL/L — SIGNIFICANT CHANGE UP (ref 135–145)
WBC # BLD: 23.72 K/UL — HIGH (ref 3.8–10.5)
WBC # FLD AUTO: 23.72 K/UL — HIGH (ref 3.8–10.5)

## 2025-07-12 PROCEDURE — 99233 SBSQ HOSP IP/OBS HIGH 50: CPT | Mod: GC

## 2025-07-12 RX ORDER — POTASSIUM PHOSPHATE, MONOBASIC POTASSIUM PHOSPHATE, DIBASIC INJECTION, 236; 224 MG/ML; MG/ML
30 SOLUTION, CONCENTRATE INTRAVENOUS ONCE
Refills: 0 | Status: DISCONTINUED | OUTPATIENT
Start: 2025-07-12 | End: 2025-07-12

## 2025-07-12 RX ORDER — POTASSIUM PHOSPHATE, MONOBASIC POTASSIUM PHOSPHATE, DIBASIC INJECTION, 236; 224 MG/ML; MG/ML
30 SOLUTION, CONCENTRATE INTRAVENOUS ONCE
Refills: 0 | Status: COMPLETED | OUTPATIENT
Start: 2025-07-12 | End: 2025-07-12

## 2025-07-12 RX ORDER — LIDOCAINE HYDROCHLORIDE 20 MG/ML
1 JELLY TOPICAL ONCE
Refills: 0 | Status: COMPLETED | OUTPATIENT
Start: 2025-07-12 | End: 2025-07-12

## 2025-07-12 RX ORDER — FUROSEMIDE 10 MG/ML
40 INJECTION INTRAMUSCULAR; INTRAVENOUS EVERY 12 HOURS
Refills: 0 | Status: COMPLETED | OUTPATIENT
Start: 2025-07-12 | End: 2025-07-12

## 2025-07-12 RX ORDER — FUROSEMIDE 10 MG/ML
40 INJECTION INTRAMUSCULAR; INTRAVENOUS ONCE
Refills: 0 | Status: DISCONTINUED | OUTPATIENT
Start: 2025-07-12 | End: 2025-07-12

## 2025-07-12 RX ADMIN — POTASSIUM PHOSPHATE, MONOBASIC POTASSIUM PHOSPHATE, DIBASIC INJECTION, 83.33 MILLIMOLE(S): 236; 224 SOLUTION, CONCENTRATE INTRAVENOUS at 21:54

## 2025-07-12 RX ADMIN — EZETIMIBE 10 MILLIGRAM(S): 10 TABLET ORAL at 12:11

## 2025-07-12 RX ADMIN — Medication 1 TABLET(S): at 12:18

## 2025-07-12 RX ADMIN — Medication 40 MILLIGRAM(S): at 06:41

## 2025-07-12 RX ADMIN — INSULIN LISPRO 4 UNIT(S): 100 INJECTION, SOLUTION INTRAVENOUS; SUBCUTANEOUS at 09:22

## 2025-07-12 RX ADMIN — HEPARIN SODIUM 5000 UNIT(S): 1000 INJECTION INTRAVENOUS; SUBCUTANEOUS at 13:18

## 2025-07-12 RX ADMIN — METOPROLOL SUCCINATE 100 MILLIGRAM(S): 50 TABLET, EXTENDED RELEASE ORAL at 06:49

## 2025-07-12 RX ADMIN — Medication 81 MILLIGRAM(S): at 12:12

## 2025-07-12 RX ADMIN — LIDOCAINE HYDROCHLORIDE 1 PATCH: 20 JELLY TOPICAL at 16:39

## 2025-07-12 RX ADMIN — INSULIN LISPRO 4 UNIT(S): 100 INJECTION, SOLUTION INTRAVENOUS; SUBCUTANEOUS at 13:15

## 2025-07-12 RX ADMIN — PREDNISONE 30 MILLIGRAM(S): 20 TABLET ORAL at 09:22

## 2025-07-12 RX ADMIN — HEPARIN SODIUM 5000 UNIT(S): 1000 INJECTION INTRAVENOUS; SUBCUTANEOUS at 21:54

## 2025-07-12 RX ADMIN — FUROSEMIDE 40 MILLIGRAM(S): 10 INJECTION INTRAMUSCULAR; INTRAVENOUS at 12:12

## 2025-07-12 RX ADMIN — ATORVASTATIN CALCIUM 80 MILLIGRAM(S): 80 TABLET, FILM COATED ORAL at 21:55

## 2025-07-12 RX ADMIN — FUROSEMIDE 40 MILLIGRAM(S): 10 INJECTION INTRAMUSCULAR; INTRAVENOUS at 18:11

## 2025-07-12 RX ADMIN — LOSARTAN POTASSIUM 25 MILLIGRAM(S): 100 TABLET, FILM COATED ORAL at 06:42

## 2025-07-12 RX ADMIN — INSULIN LISPRO 2: 100 INJECTION, SOLUTION INTRAVENOUS; SUBCUTANEOUS at 13:16

## 2025-07-12 RX ADMIN — INSULIN LISPRO 6: 100 INJECTION, SOLUTION INTRAVENOUS; SUBCUTANEOUS at 18:04

## 2025-07-12 RX ADMIN — INSULIN LISPRO 4 UNIT(S): 100 INJECTION, SOLUTION INTRAVENOUS; SUBCUTANEOUS at 18:04

## 2025-07-12 RX ADMIN — Medication 100 MILLIGRAM(S): at 21:54

## 2025-07-12 RX ADMIN — INSULIN LISPRO 6: 100 INJECTION, SOLUTION INTRAVENOUS; SUBCUTANEOUS at 21:52

## 2025-07-12 RX ADMIN — HEPARIN SODIUM 5000 UNIT(S): 1000 INJECTION INTRAVENOUS; SUBCUTANEOUS at 06:40

## 2025-07-12 NOTE — PROGRESS NOTE ADULT - ASSESSMENT
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 Admitted for further evaluation and management in addition to treatment of hypokalemia and pre-renal MARYAN 2/2 to over-diuresis (25mg hydrochlorothiazide + 60mg furosemide). MARYAN has improved. Swelling present bilaterally lower extremities      Nutritional Assessment:  · Nutritional Assessment  This patient has been assessed with a concern for Malnutrition and has been determined to have a diagnosis/diagnoses of Severe protein-calorie malnutrition and Underweight (BMI < 19).    This patient is being managed with:   Diet Consistent Carbohydrate Renal/No Snacks-  Supplement Feeding Modality:  Oral  Ensure Max Cans or Servings Per Day:  1       Frequency:  Daily  Entered: Jul 9 2025  4:10PM    COVID-19 Negative Lab Result:  · COVID-19 Negative Lab Result  COVID-19 ruled out    Problem/Plan - 1:  ·  Problem: Bilateral lower extremity edema.   ·  Plan: Third-spacing 2/2 to NSCLC vs heart failure vs prednisone use  - Less likely HF (normal echo in May although BNP 1600. No resp sx and chest clear)  - Monitor edema as pred continues to taper  - Compression stockings applied until early AM but removed during day  - 30 IV stat Furosemide (assess need for continuation tmrw: IV vs oral).    Problem/Plan - 2:  ·  Problem: Leukocytosis.   ·  Plan: Has been on regular Prednisolone  - Neutrophilia  - Steroid related?  - Will continue to monitor with daily labs.    Problem/Plan - 3:  ·  Problem: History of colitis.   ·  Plan: - Continue Prednisone taper.    Problem/Plan - 4:  ·  Problem: Non-small cell carcinoma of lung, stage 4.   ·  Plan: - Eventual plan to return to using Keytruda  - Follows with Dr. Pratt.    Problem/Plan - 5:  ·  Problem: COPD without exacerbation.   ·  Plan: - Continue Albuterol prn.    Problem/Plan - 6:  ·  Problem: Diabetes mellitus.   ·  Plan: - Continue Insulin lispro  - Based on response (blood glucose 65) will stop Lantus  - Monitor BSLs tomorrow.    Problem/Plan - 7:  ·  Problem: Hypokalemia.   ·  Plan: - Resolved.    Problem/Plan - 8:  ·  Problem: Right arm pain.   ·  Plan: Pain in R forearm  - Lidocaine patch  - Heatpack  - Monitor response.    Problem/Plan - 9:  ·  Problem: MARYAN (acute kidney injury).   ·  Plan: - Resolving  - Downtrending BUN:Cr.    Problem/Plan - 10:  ·  Problem: HTN (hypertension).   ·  Plan; - Losartan.    Problem/Plan - 11:  ·  Problem: Hyperlipidemia.   ·  Plan: - On Atorvastatin 80mg PO at home bedtime  - On Ezetimibe 10mg PO at home daily.    Problem/Plan - 12:  ·  Problem: Prophylactic measure.   ·  Plan: F: None  E: None  N: Nutrition consulted. Prescribed Ensure max x1/day.   DVT ppx: Heparin - 5000 units subcutaneous every 8 hrs  Bowel: None  Dispo: RMF  Code status:

## 2025-07-12 NOTE — PROGRESS NOTE ADULT - NUTRITIONAL ASSESSMENT
This patient has been assessed with a concern for Malnutrition and has been determined to have a diagnosis/diagnoses of Severe protein-calorie malnutrition and Underweight (BMI < 19).    This patient is being managed with:   Diet Consistent Carbohydrate Renal/No Snacks-  Supplement Feeding Modality:  Oral  Ensure Max Cans or Servings Per Day:  1       Frequency:  Daily  Entered: Jul 11 2025 11:19AM

## 2025-07-12 NOTE — PROGRESS NOTE ADULT - PROBLEM SELECTOR PLAN 8
Pain in R forearm, good relief from Lidocaine patch previously  - Lidocaine patch reapplied today  - Monitor response

## 2025-07-12 NOTE — PROGRESS NOTE ADULT - SUBJECTIVE AND OBJECTIVE BOX
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 The pt developed immunotherapy-induced colitis 3 weeks ago at which point the Keytruda was stopped and he was started on a Prednisone taper. Initial dosage of Prednisone at 60mg and currently tapered down to 30mg. After onset of BLE swelling, Lasix dosage was increased to 60mg. The pt also notes that he used Lidocaine cream for his leg pain with significant relief. After noting no change in BLE swelling, pt presented to the ED for further treatment.    INTERVAL EVENTS:   No acute events overnight. Pt tested positive for Norovirus and was placed on contact precautions. He wore compression stockings overnight but still edematous. Pt noted right wrist pain although no redness or warmth noted. Nurse believes pain is likely 2/2 to cane use considering pt is right-handed.    SUBJECTIVE:   Patient seen and examined at bedside. Still edematous despite compression stockings. He also stated that he had 2 loose bowel movements overnight, but did not endorse diarrhea. Still reporting some pain in R forearm/wrist. Otherwise feeling relatively well    ROS:  Positive for chronic cough. Negative for SOB, fever, chills, chest pain, abdominal pain, diarrhea, dysuria.      VITAL SIGNS:  Vital Signs Last 24 Hrs  T(C): 37.1 (12 Jul 2025 12:00), Max: 37.1 (12 Jul 2025 12:00)  T(F): 98.8 (12 Jul 2025 12:00), Max: 98.8 (12 Jul 2025 12:00)  HR: 64 (12 Jul 2025 12:00) (64 - 68)  BP: 168/85 (12 Jul 2025 12:00) (143/64 - 168/85)  BP(mean): --  RR: 18 (12 Jul 2025 12:00) (18 - 18)  SpO2: 98% (12 Jul 2025 12:00) (98% - 98%)    Parameters below as of 12 Jul 2025 12:00  Patient On (Oxygen Delivery Method): room air      PHYSICAL EXAM:  Constitutional: AOx3. No acute distress. Resting comfortably in bed.  HEENT: Atraumatic. PERRL. EOMI. Clear conjunctiva. Moist mucous membranes.  Neck: Trachea midline. Supple.  Lungs: Mild rales noted at apex of lungs b/l. Lungs otherwise clear to auscultation b/l. No accessory muscle use. No wheezing. No stridor/retracting/tripoding.   Cardiovascular: Regular rate and rhythm. No murmurs, rubs, or gallops.  Abdomen: Soft, non-tender, non-distended. No rebound or guarding.    Extremities: 3+ b/l LE pitting edema still present + erythema w/o warmth. Right wrist edema.  Skin: Multiple, weeping skin wounds noted to b/l ankles. No pus noted.  Neurologic: No apparent focal neurological deficits. Answering questions, following commands, moving all extremities.   Psychiatric: Cooperative. Appropriate mood and affect.       MEDICATIONS  (STANDING):  aspirin enteric coated 81 milliGRAM(s) Oral daily  atorvastatin 80 milliGRAM(s) Oral at bedtime  dextrose 5%. 1000 milliLiter(s) (100 mL/Hr) IV Continuous <Continuous>  dextrose 5%. 1000 milliLiter(s) (50 mL/Hr) IV Continuous <Continuous>  dextrose 50% Injectable 25 Gram(s) IV Push once  dextrose 50% Injectable 12.5 Gram(s) IV Push once  dextrose 50% Injectable 25 Gram(s) IV Push once  ezetimibe 10 milliGRAM(s) Oral daily  glucagon  Injectable 1 milliGRAM(s) IntraMuscular once  heparin   Injectable 5000 Unit(s) SubCutaneous every 8 hours  insulin lispro (ADMELOG) corrective regimen sliding scale   SubCutaneous three times a day before meals  insulin lispro Injectable (ADMELOG) 4 Unit(s) SubCutaneous three times a day before meals  metoprolol succinate  milliGRAM(s) Oral daily  pantoprazole    Tablet 40 milliGRAM(s) Oral before breakfast  potassium chloride  10 mEq/100 mL IVPB 10 milliEquivalent(s) IV Intermittent every 1 hour  potassium phosphate IVPB 30 milliMole(s) IV Intermittent once  predniSONE   Tablet 30 milliGRAM(s) Oral every 24 hours  trimethoprim   80 mG/sulfamethoxazole 400 mG 1 Tablet(s) Oral daily    MEDICATIONS  (PRN):  acetaminophen     Tablet .. 650 milliGRAM(s) Oral every 6 hours PRN Mild Pain (1 - 3)  albuterol    90 MICROgram(s) HFA Inhaler 2 Puff(s) Inhalation every 6 hours PRN Shortness of Breath and/or Wheezing  dextrose Oral Gel 15 Gram(s) Oral once PRN Blood Glucose LESS THAN 70 milliGRAM(s)/deciliter    ALLERGIES:  NKDA, NKFA, [employer]KE[employer]        LABS:                         15.0   23.72 )-----------( 273      ( 12 Jul 2025 05:30 )             47.1     07-12    138  |  99  |  30[H]  ----------------------------<  83  4.6   |  29  |  0.85    Ca    9.0      12 Jul 2025 05:30  Phos  1.8     07-12  Mg     2.0     07-12        Urinalysis Basic - ( 12 Jul 2025 05:30 )    Color: x / Appearance: x / SG: x / pH: x  Gluc: 83 mg/dL / Ketone: x  / Bili: x / Urobili: x   Blood: x / Protein: x / Nitrite: x   Leuk Esterase: x / RBC: x / WBC x   Sq Epi: x / Non Sq Epi: x / Bacteria: x    CAPILLARY BLOOD GLUCOSE  POCT Blood Glucose.: 175 mg/dL (12 Jul 2025 12:37)  POCT Blood Glucose.: 338 mg/dL (11 Jul 2025 12:12)  POCT Blood Glucose.: 110 mg/dL (11 Jul 2025 09:32)  POCT Blood Glucose.: 65 mg/dL (11 Jul 2025 08:45)  POCT Blood Glucose.: 269 mg/dL (10 Jul 2025 21:56)  POCT Blood Glucose.: 343 mg/dL (10 Jul 2025 17:21)  POCT Blood Glucose.: 202 mg/dL (09 Jul 2025 08:23)  POCT Blood Glucose.: 239 mg/dL (08 Jul 2025 23:48)      RADIOLOGY, EKG & ADDITIONAL TESTS: Reviewed.

## 2025-07-12 NOTE — PROGRESS NOTE ADULT - PROBLEM SELECTOR PLAN 12
F: None  E: None  N: Nutrition consulted. Prescribed Ensure max x1/day.   DVT ppx: Heparin - 5000 units subcutaneous every 8 hrs  Bowel: None  Dispo: RMF  Code status:

## 2025-07-12 NOTE — PROGRESS NOTE ADULT - ATTENDING COMMENTS
Seen and examined w housestaff - Dr. Brooke this AM  74M w Stage 4 NSCLC (dx 9/2023) on pembrolizumab - last on 5/16/25, COPD on RA, HTN, DM2, carotid artery stenosis s/p CEA 2020, L sided PPM, recent immunotherapy induced colitis (5/2025 --> started on prednisone - decreased to 30mg daily, c/b BLE Swelling, found to have norovirus - imroving    Pt reports diarrhea improving - becoming more formed. Exam shows swelling present in mid calf into b/l feet. Denies any dyspnea.     #Diarrhea due to Norovirus - multiple episodes of watery diarrhea   - no abd pain  #BLE edema - apppears to be improving. Some mild-moderate pitting edema that is symmetrical present in mid shin present. No pain.   - BLE Dopplers negative 7/8 for DVT   - TTE    - Home was on hctz 25mg daily and furosemide 60mg daily    #Hypokalemia - K 2.9. Mg at target. Likely d/t being on diuretics.  #MARYAN - Improving. 0.85 today returned to baseline. Was 1.65 on admission likely d/t diuresis + norovirus diarrhea.      #Leukocytosis - likely d/t chronic prednisone 30mg daily. 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 PRN Albuterol. Does not appear in acute exacerbation  #HLD - on atorvastatin 80, zetia    PPx: SQH  Plan  BLE Edema little changed despite lasix 30mg IV yesterday.   Lasix 40mg IV BID today. HCTZ 25mg daily starting today  BMP w Mg in AM     ACE wrap and elevated BLE.   CBC w diff, BMP w Mg in AM    DISPO: outpatient PT -- pending improvement of BLE Edema.

## 2025-07-12 NOTE — PROGRESS NOTE ADULT - PROBLEM SELECTOR PLAN 1
Third-spacing 2/2 to NSCLC vs heart failure vs prednisone use. Still present despite 30mg IV Lasix stat  - Less likely HF (normal echo in May although BNP 1600. No resp sx and chest clear)  - Monitor edema as pred continues to taper  - Compression stockings applied overnight  - 40mg IV LASIX BID + 25mg ORAL HCTZ

## 2025-07-12 NOTE — PROGRESS NOTE ADULT - ASSESSMENT
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 Admitted for further evaluation and management in addition to treatment of hypokalemia and pre-renal MARYAN 2/2 to combination of diarrhea along with over-diuresis (25mg hydrochlorothiazide + 60mg furosemide). MARYAN has improved. Swelling present bilaterally lower extremities

## 2025-07-12 NOTE — PROGRESS NOTE ADULT - SUBJECTIVE AND OBJECTIVE BOX
Physical Medicine and Rehabilitation Progress Note :       Patient is a 74y old  Male who presents with a chief complaint of BLE edema (12 Jul 2025 09:23)      HPI:  74M PMHx of tobacco use with stage IV metastatic non-small cell lung cancer (diagnosed in September 2023  on Keytruda/ pembrolizumab, which has been held since May), COPD, HTN, DM, diagnosed w/ immunotherapy induced colitis May 2025, 2/p carotid endarterectomy 2020, presenting with1 month of b/l LE swelling and inability to get around his house. Reports being on prednisone therapy for immunotherapy induced colitis. Was previously on 60 mg but was started on 30 mg on Monday. Reports no lightheadedness, dizziness nausea or vomiting, and diarrhea since decreasing the dose. Reports a month of b/l LE swelling, leg pain and heaviness that has made it hard to walk around and leave the house. Was initially started on 20 Lasix 1 month ago but was started on Lasix 60 mg 2 weeks ago. States has not helped with his leg pain. States this has not happened before. Denies fevers, chills, sore throat, rhinorrhea chest pain, SOB, PND, orthopnea, abdominal pain, dysuria or diarrhea.    ED Course:   Vitals: T 97.9  HR 82 /79 RR 18 Sp02 95% RA  Labs: WBC 22.29 K 2.8 C02 36 BUN 67 Cr 1.65   Imaging:   ·	B/l LE doppler: no evidence of LE DVT   ·	CXR without consolidations or effusion, looks similar to prior; Pending formal read   Interventions: 40 meq KCl PO, 10 meq x1 KCl  (08 Jul 2025 21:05)                            15.0   23.72 )-----------( 273      ( 12 Jul 2025 05:30 )             47.1       07-12    138  |  99  |  30[H]  ----------------------------<  83  4.6   |  29  |  0.85    Ca    9.0      12 Jul 2025 05:30  Phos  1.8     07-12  Mg     2.0     07-12      Vital Signs Last 24 Hrs  T(C): 36.9 (11 Jul 2025 23:00), Max: 37 (11 Jul 2025 12:20)  T(F): 98.4 (11 Jul 2025 23:00), Max: 98.6 (11 Jul 2025 12:20)  HR: 68 (11 Jul 2025 23:00) (67 - 69)  BP: 143/64 (11 Jul 2025 23:00) (140/62 - 143/64)  BP(mean): --  RR: 18 (11 Jul 2025 23:00) (17 - 18)  SpO2: 98% (11 Jul 2025 23:00) (96% - 98%)    Parameters below as of 11 Jul 2025 23:00  Patient On (Oxygen Delivery Method): room air        MEDICATIONS  (STANDING):  aspirin enteric coated 81 milliGRAM(s) Oral daily  atorvastatin 80 milliGRAM(s) Oral at bedtime  dextrose 5%. 500 milliLiter(s) (250 mL/Hr) IV Continuous <Continuous>  dextrose 5%. 1000 milliLiter(s) (100 mL/Hr) IV Continuous <Continuous>  dextrose 5%. 1000 milliLiter(s) (50 mL/Hr) IV Continuous <Continuous>  dextrose 50% Injectable 25 Gram(s) IV Push once  dextrose 50% Injectable 12.5 Gram(s) IV Push once  dextrose 50% Injectable 25 Gram(s) IV Push once  ezetimibe 10 milliGRAM(s) Oral daily  glucagon  Injectable 1 milliGRAM(s) IntraMuscular once  heparin   Injectable 5000 Unit(s) SubCutaneous every 8 hours  insulin lispro (ADMELOG) corrective regimen sliding scale   SubCutaneous Before meals and at bedtime  insulin lispro Injectable (ADMELOG) 4 Unit(s) SubCutaneous three times a day before meals  losartan 25 milliGRAM(s) Oral daily  metoprolol succinate  milliGRAM(s) Oral daily  pantoprazole    Tablet 40 milliGRAM(s) Oral before breakfast  potassium phosphate IVPB 30 milliMole(s) IV Intermittent once  predniSONE   Tablet 30 milliGRAM(s) Oral every 24 hours  thiamine 100 milliGRAM(s) Oral every 24 hours  trimethoprim   80 mG/sulfamethoxazole 400 mG 1 Tablet(s) Oral daily    MEDICATIONS  (PRN):  acetaminophen     Tablet .. 650 milliGRAM(s) Oral every 6 hours PRN Mild Pain (1 - 3)  albuterol    90 MICROgram(s) HFA Inhaler 2 Puff(s) Inhalation every 6 hours PRN Shortness of Breath and/or Wheezing  dextrose Oral Gel 15 Gram(s) Oral once PRN Blood Glucose LESS THAN 70 milliGRAM(s)/deciliter      T(C): 36.9 (07-11-25 @ 23:00), Max: 37 (07-11-25 @ 12:20)  HR: 68 (07-11-25 @ 23:00) (67 - 69)  BP: 143/64 (07-11-25 @ 23:00) (140/62 - 143/64)  RR: 18 (07-11-25 @ 23:00) (17 - 18)  SpO2: 98% (07-11-25 @ 23:00) (96% - 98%)    Physical Exam:   74 y o man lying comfortably in semi Camp's position , awake , alert , no acute complaints     Head: normocephalic , atraumatic    Eyes: PERRLA , EOMI , no nystagmus , sclera anicteric    ENT / FACE: neg nasal discharge , uvula midline , no oropharyngeal erythema / exudate    Neck: supple , negative JVD , negative carotid bruits , no thyromegaly    Chest: bilateral rhonchi      Cardiovascular: regular rate and rhythm , neg murmurs / rubs / gallops    Abdomen: soft , non distended , no tenderness to palpation in all 4 quadrants ,  normal bowel sounds     Extremities: 3+ pitting edema ankle to shin; + erythema with  multiple skin wounds    Neurologic Exam:     Alert and oriented to person , place , date/year      Cranial Nerves:           II:                         pupils equal , round and reactive to light , visual fields intact         III/ IV/VI:             extraocular movements intact , neg nystagmus , neg ptosis        V:                        facial sensation intact , V1-3 normal        VII:                      face symmetric , no droop , normal eye closure and smile        VIII:                     hearing intact to finger rub bilaterally        IX and X:             no hoarseness , gag intact , palate/ uvula rise symmetrically        XI:                       SCM / trapezius strength intact bilateral        XII:                      no tongue deviation    Motor Exam:        > 3+/5 x 4 extremities , without drift     Sensation:         intact to light touch x 4 extremities                            no neglect or extinction on double simultaneous testing    DTR:           biceps/brachioradialis: equal                            patella/ankle: equal          neg Babinski      Coordination:            Finger to Nose:  neg dysmetria bilaterally      Initial Functional Status Assessment :         Pain Assessment/Number Scale (0-10) Adult  Presence of Pain: denies pain/discomfort (Rating = 0)  Pain Rating (0-10): Rest: 0 (no pain/absence of nonverbal indicators of pain)  Pain Rating (0-10): Activity: 0 (no pain/absence of nonverbal indicators of pain)    Safety      [facility] Functional Assessment: Basic Mobility  Type of Assessment: Admission  Turning from your back to your side while in a flat bed without using bedrails?: 4 = No assist / stand by assistance  Moving from lying on your back to sitting on the flat side of a flat bed without using bedrails?: 4 = No assist / stand by assistance  Moving to and from a bed to a chair (including a wheelchair)?: 4 = No assist / stand by assistance  Standing up from a chair using your arms (e.g. wheelchair or bedside chair)?: 4 = No assist / stand by assistance  Walking in hospital room?: 4 = No assist / stand by assistance  Climbing 3-5 steps with a railing?:    4-calculated by average   Score: 24   Row Comment: Ask the patient "How much help from another person do you currently need? (If the patient hasn't done an activity recently, how much help from another person do you think he/she needs if he/she tried?)    Coping/Psychosocial      Chaplaincy Care  Additional Notes: Pt very touched and grateful for staff and medical care at [facility]. Pt concerned for his wife. Marissa for reocvery and health     Cognitive/Neuro      Cognitive/Neuro/Behavioral  Cognitive/Neuro/Behavioral [WDL Definition: Alert; opens eyes spontaneously; arouses to voice or touch; oriented x 4; follows commands; speech spontaneous, logical; purposeful motor response; behavior appropriate to situation]: WDL    Language Assistance  Preferred Language to Address Healthcare Preferred Language to Address Healthcare: English    Therapeutic Interventions      Sit-Stand Transfer Training  Transfer Training Sit-to-Stand Transfer: supervsion;  nonverbal cues (demo/gestures);  verbal cues;  supervision;  full weight-bearing   straight cane  Transfer Training Stand-to-Sit Transfer: supervsion;  supervision;  verbal cues;  nonverbal cues (demo/gestures);  full weight-bearing   straight cane  Sit-to-Stand Transfer Training Transfer Safety Analysis: decreased weight-shifting ability;  decreased flexibility;  decreased sensation;  impaired balance    Gait Training  Gait Training: supervsion;  stand-by assist;  supervision;  verbal cues;  nonverbal cues (demo/gestures);  full weight-bearing   straight cane;  50 feet;  x2  Gait Analysis: decreased arm swing;  decreased tien;  increased time in double stance;  decreased weight-shifting ability;  decreased flexibility;  impaired balance;  decreased strength    Therapeutic Exercise  Therapeutic Exercise Detail: sitting EOB ankle pumps, LAQ 1x10 B LE           PM&R Impression : as above    Current disposition plan recommendation :   d/c home with outpatient physical therapy

## 2025-07-13 LAB
ALBUMIN SERPL ELPH-MCNC: 3.2 G/DL — LOW (ref 3.3–5)
ALP SERPL-CCNC: 87 U/L — SIGNIFICANT CHANGE UP (ref 40–120)
ALT FLD-CCNC: 125 U/L — HIGH (ref 10–45)
ANION GAP SERPL CALC-SCNC: 15 MMOL/L — SIGNIFICANT CHANGE UP (ref 5–17)
ANION GAP SERPL CALC-SCNC: 9 MMOL/L — SIGNIFICANT CHANGE UP (ref 5–17)
AST SERPL-CCNC: 49 U/L — HIGH (ref 10–40)
BASOPHILS # BLD AUTO: 0.18 K/UL — SIGNIFICANT CHANGE UP (ref 0–0.2)
BASOPHILS NFR BLD AUTO: 0.9 % — SIGNIFICANT CHANGE UP (ref 0–2)
BILIRUB SERPL-MCNC: 0.4 MG/DL — SIGNIFICANT CHANGE UP (ref 0.2–1.2)
BLD GP AB SCN SERPL QL: NEGATIVE — SIGNIFICANT CHANGE UP
BUN SERPL-MCNC: 41 MG/DL — HIGH (ref 7–23)
BUN SERPL-MCNC: 48 MG/DL — HIGH (ref 7–23)
CALCIUM SERPL-MCNC: 8.5 MG/DL — SIGNIFICANT CHANGE UP (ref 8.4–10.5)
CALCIUM SERPL-MCNC: 8.5 MG/DL — SIGNIFICANT CHANGE UP (ref 8.4–10.5)
CHLORIDE SERPL-SCNC: 95 MMOL/L — LOW (ref 96–108)
CHLORIDE SERPL-SCNC: 98 MMOL/L — SIGNIFICANT CHANGE UP (ref 96–108)
CO2 SERPL-SCNC: 23 MMOL/L — SIGNIFICANT CHANGE UP (ref 22–31)
CO2 SERPL-SCNC: 31 MMOL/L — SIGNIFICANT CHANGE UP (ref 22–31)
CREAT SERPL-MCNC: 0.83 MG/DL — SIGNIFICANT CHANGE UP (ref 0.5–1.3)
CREAT SERPL-MCNC: 1.11 MG/DL — SIGNIFICANT CHANGE UP (ref 0.5–1.3)
EGFR: 69 ML/MIN/1.73M2 — SIGNIFICANT CHANGE UP
EGFR: 69 ML/MIN/1.73M2 — SIGNIFICANT CHANGE UP
EGFR: 91 ML/MIN/1.73M2 — SIGNIFICANT CHANGE UP
EGFR: 91 ML/MIN/1.73M2 — SIGNIFICANT CHANGE UP
EOSINOPHIL # BLD AUTO: 0.02 K/UL — SIGNIFICANT CHANGE UP (ref 0–0.5)
EOSINOPHIL NFR BLD AUTO: 0.1 % — SIGNIFICANT CHANGE UP (ref 0–6)
GLUCOSE SERPL-MCNC: 153 MG/DL — HIGH (ref 70–99)
GLUCOSE SERPL-MCNC: 178 MG/DL — HIGH (ref 70–99)
HCT VFR BLD CALC: 45.9 % — SIGNIFICANT CHANGE UP (ref 39–50)
HGB BLD-MCNC: 14.5 G/DL — SIGNIFICANT CHANGE UP (ref 13–17)
IMM GRANULOCYTES # BLD AUTO: 0.83 K/UL — HIGH (ref 0–0.07)
IMM GRANULOCYTES NFR BLD AUTO: 4.2 % — HIGH (ref 0–0.9)
LYMPHOCYTES # BLD AUTO: 5.15 K/UL — HIGH (ref 1–3.3)
LYMPHOCYTES NFR BLD AUTO: 25.9 % — SIGNIFICANT CHANGE UP (ref 13–44)
MAGNESIUM SERPL-MCNC: 1.8 MG/DL — SIGNIFICANT CHANGE UP (ref 1.6–2.6)
MAGNESIUM SERPL-MCNC: 2 MG/DL — SIGNIFICANT CHANGE UP (ref 1.6–2.6)
MCHC RBC-ENTMCNC: 28.1 PG — SIGNIFICANT CHANGE UP (ref 27–34)
MCHC RBC-ENTMCNC: 31.6 G/DL — LOW (ref 32–36)
MCV RBC AUTO: 89 FL — SIGNIFICANT CHANGE UP (ref 80–100)
MONOCYTES # BLD AUTO: 0.83 K/UL — SIGNIFICANT CHANGE UP (ref 0–0.9)
MONOCYTES NFR BLD AUTO: 4.2 % — SIGNIFICANT CHANGE UP (ref 2–14)
NEUTROPHILS # BLD AUTO: 12.84 K/UL — HIGH (ref 1.8–7.4)
NEUTROPHILS NFR BLD AUTO: 64.7 % — SIGNIFICANT CHANGE UP (ref 43–77)
NRBC # BLD AUTO: 0 K/UL — SIGNIFICANT CHANGE UP (ref 0–0)
NRBC # FLD: 0 K/UL — SIGNIFICANT CHANGE UP (ref 0–0)
NRBC BLD AUTO-RTO: 0 /100 WBCS — SIGNIFICANT CHANGE UP (ref 0–0)
PHOSPHATE SERPL-MCNC: 2.8 MG/DL — SIGNIFICANT CHANGE UP (ref 2.5–4.5)
PHOSPHATE SERPL-MCNC: 3.6 MG/DL — SIGNIFICANT CHANGE UP (ref 2.5–4.5)
PLATELET # BLD AUTO: 210 K/UL — SIGNIFICANT CHANGE UP (ref 150–400)
PMV BLD: 10.9 FL — SIGNIFICANT CHANGE UP (ref 7–13)
POTASSIUM SERPL-MCNC: 4.6 MMOL/L — SIGNIFICANT CHANGE UP (ref 3.5–5.3)
POTASSIUM SERPL-MCNC: 5 MMOL/L — SIGNIFICANT CHANGE UP (ref 3.5–5.3)
POTASSIUM SERPL-SCNC: 4.6 MMOL/L — SIGNIFICANT CHANGE UP (ref 3.5–5.3)
POTASSIUM SERPL-SCNC: 5 MMOL/L — SIGNIFICANT CHANGE UP (ref 3.5–5.3)
PROT SERPL-MCNC: 6.3 G/DL — SIGNIFICANT CHANGE UP (ref 6–8.3)
RBC # BLD: 5.16 M/UL — SIGNIFICANT CHANGE UP (ref 4.2–5.8)
RBC # FLD: 17.2 % — HIGH (ref 10.3–14.5)
RH IG SCN BLD-IMP: POSITIVE — SIGNIFICANT CHANGE UP
SODIUM SERPL-SCNC: 133 MMOL/L — LOW (ref 135–145)
SODIUM SERPL-SCNC: 138 MMOL/L — SIGNIFICANT CHANGE UP (ref 135–145)
WBC # BLD: 19.85 K/UL — HIGH (ref 3.8–10.5)
WBC # FLD AUTO: 19.85 K/UL — HIGH (ref 3.8–10.5)

## 2025-07-13 PROCEDURE — 99232 SBSQ HOSP IP/OBS MODERATE 35: CPT | Mod: GC

## 2025-07-13 RX ORDER — FUROSEMIDE 10 MG/ML
40 INJECTION INTRAMUSCULAR; INTRAVENOUS ONCE
Refills: 0 | Status: COMPLETED | OUTPATIENT
Start: 2025-07-13 | End: 2025-07-13

## 2025-07-13 RX ORDER — FUROSEMIDE 10 MG/ML
40 INJECTION INTRAMUSCULAR; INTRAVENOUS EVERY 24 HOURS
Refills: 0 | Status: DISCONTINUED | OUTPATIENT
Start: 2025-07-14 | End: 2025-07-14

## 2025-07-13 RX ORDER — FUROSEMIDE 10 MG/ML
40 INJECTION INTRAMUSCULAR; INTRAVENOUS DAILY
Refills: 0 | Status: DISCONTINUED | OUTPATIENT
Start: 2025-07-13 | End: 2025-07-13

## 2025-07-13 RX ORDER — PREDNISONE 20 MG/1
20 TABLET ORAL EVERY 24 HOURS
Refills: 0 | Status: DISCONTINUED | OUTPATIENT
Start: 2025-07-14 | End: 2025-07-17

## 2025-07-13 RX ORDER — MAGNESIUM SULFATE 500 MG/ML
2 SYRINGE (ML) INJECTION ONCE
Refills: 0 | Status: COMPLETED | OUTPATIENT
Start: 2025-07-13 | End: 2025-07-13

## 2025-07-13 RX ADMIN — Medication 100 MILLIGRAM(S): at 21:20

## 2025-07-13 RX ADMIN — EZETIMIBE 10 MILLIGRAM(S): 10 TABLET ORAL at 11:09

## 2025-07-13 RX ADMIN — PREDNISONE 30 MILLIGRAM(S): 20 TABLET ORAL at 08:58

## 2025-07-13 RX ADMIN — Medication 81 MILLIGRAM(S): at 11:09

## 2025-07-13 RX ADMIN — INSULIN LISPRO 4 UNIT(S): 100 INJECTION, SOLUTION INTRAVENOUS; SUBCUTANEOUS at 08:55

## 2025-07-13 RX ADMIN — INSULIN LISPRO 4 UNIT(S): 100 INJECTION, SOLUTION INTRAVENOUS; SUBCUTANEOUS at 12:30

## 2025-07-13 RX ADMIN — Medication 40 MILLIGRAM(S): at 06:20

## 2025-07-13 RX ADMIN — FUROSEMIDE 40 MILLIGRAM(S): 10 INJECTION INTRAMUSCULAR; INTRAVENOUS at 17:57

## 2025-07-13 RX ADMIN — INSULIN LISPRO 2: 100 INJECTION, SOLUTION INTRAVENOUS; SUBCUTANEOUS at 12:34

## 2025-07-13 RX ADMIN — HEPARIN SODIUM 5000 UNIT(S): 1000 INJECTION INTRAVENOUS; SUBCUTANEOUS at 06:20

## 2025-07-13 RX ADMIN — INSULIN LISPRO 8: 100 INJECTION, SOLUTION INTRAVENOUS; SUBCUTANEOUS at 17:59

## 2025-07-13 RX ADMIN — Medication 1 TABLET(S): at 11:09

## 2025-07-13 RX ADMIN — HEPARIN SODIUM 5000 UNIT(S): 1000 INJECTION INTRAVENOUS; SUBCUTANEOUS at 14:50

## 2025-07-13 RX ADMIN — ATORVASTATIN CALCIUM 80 MILLIGRAM(S): 80 TABLET, FILM COATED ORAL at 21:19

## 2025-07-13 RX ADMIN — LOSARTAN POTASSIUM 25 MILLIGRAM(S): 100 TABLET, FILM COATED ORAL at 06:20

## 2025-07-13 RX ADMIN — FUROSEMIDE 40 MILLIGRAM(S): 10 INJECTION INTRAMUSCULAR; INTRAVENOUS at 11:11

## 2025-07-13 RX ADMIN — HEPARIN SODIUM 5000 UNIT(S): 1000 INJECTION INTRAVENOUS; SUBCUTANEOUS at 21:19

## 2025-07-13 RX ADMIN — INSULIN LISPRO 4 UNIT(S): 100 INJECTION, SOLUTION INTRAVENOUS; SUBCUTANEOUS at 17:59

## 2025-07-13 RX ADMIN — LIDOCAINE HYDROCHLORIDE 1 PATCH: 20 JELLY TOPICAL at 04:00

## 2025-07-13 RX ADMIN — METOPROLOL SUCCINATE 100 MILLIGRAM(S): 50 TABLET, EXTENDED RELEASE ORAL at 06:20

## 2025-07-13 RX ADMIN — LIDOCAINE HYDROCHLORIDE 1 PATCH: 20 JELLY TOPICAL at 02:07

## 2025-07-13 NOTE — PROGRESS NOTE ADULT - SUBJECTIVE AND OBJECTIVE BOX
INTERVAL HPI/OVERNIGHT EVENTS: hilda o/n    SUBJECTIVE: Patient seen and examined at bedside.   Seen and examined w [facility] - Dr. Ayoub  Pt reports BMs are formed. Sitting w legs  dangling. Significant pitting edema present in distal calves and both feet. Denies chest pain, dyspnea.     Counseled pt to elevate BLE and wrapped BLE in ACE wrap up  to b/l knees w housestaff on rounds today    OBJECTIVE:    VITAL SIGNS:  ICU Vital Signs Last 24 Hrs  T(C): 36.4 (13 Jul 2025 11:10), Max: 36.9 (12 Jul 2025 22:57)  T(F): 97.5 (13 Jul 2025 11:10), Max: 98.4 (12 Jul 2025 22:57)  HR: 63 (13 Jul 2025 11:10) (63 - 68)  BP: 160/80 (13 Jul 2025 11:10) (135/78 - 160/80)  BP(mean): --  ABP: --  ABP(mean): --  RR: 17 (13 Jul 2025 11:10) (17 - 18)  SpO2: 93% (13 Jul 2025 11:10) (93% - 98%)    O2 Parameters below as of 13 Jul 2025 11:10  Patient On (Oxygen Delivery Method): room air              07-13 @ 07:01  -  07-13 @ 16:08  --------------------------------------------------------  IN: 0 mL / OUT: 800 mL / NET: -800 mL      CAPILLARY BLOOD GLUCOSE      POCT Blood Glucose.: 154 mg/dL (13 Jul 2025 11:31)      PHYSICAL EXAM:  GEN: Male in NAD on RA  HEENT: NC/AT, MMM  CV: RRR, nml S1S2, no murmurs  PULM: nml effort, CTAB  ABD: Soft, non-distended, NABS, non-tender  NEURO  A/O x3, moving all extremities, Sensation intact  Pitting edema in distal calves and b/l feet. scant serosanguinous weeping wo pus, erythema,tendernesss, or increased warmth  PSYCH: Appropriate      MEDICATIONS:  MEDICATIONS  (STANDING):  aspirin enteric coated 81 milliGRAM(s) Oral daily  atorvastatin 80 milliGRAM(s) Oral at bedtime  dextrose 5%. 500 milliLiter(s) (250 mL/Hr) IV Continuous <Continuous>  dextrose 5%. 1000 milliLiter(s) (100 mL/Hr) IV Continuous <Continuous>  dextrose 5%. 1000 milliLiter(s) (50 mL/Hr) IV Continuous <Continuous>  dextrose 50% Injectable 25 Gram(s) IV Push once  dextrose 50% Injectable 12.5 Gram(s) IV Push once  dextrose 50% Injectable 25 Gram(s) IV Push once  ezetimibe 10 milliGRAM(s) Oral daily  furosemide   Injectable 40 milliGRAM(s) IV Push once  glucagon  Injectable 1 milliGRAM(s) IntraMuscular once  heparin   Injectable 5000 Unit(s) SubCutaneous every 8 hours  hydrochlorothiazide 25 milliGRAM(s) Oral every 24 hours  insulin lispro (ADMELOG) corrective regimen sliding scale   SubCutaneous Before meals and at bedtime  insulin lispro Injectable (ADMELOG) 4 Unit(s) SubCutaneous three times a day before meals  losartan 25 milliGRAM(s) Oral daily  metoprolol succinate  milliGRAM(s) Oral daily  pantoprazole    Tablet 40 milliGRAM(s) Oral before breakfast  predniSONE   Tablet 30 milliGRAM(s) Oral every 24 hours  thiamine 100 milliGRAM(s) Oral every 24 hours  trimethoprim   80 mG/sulfamethoxazole 400 mG 1 Tablet(s) Oral daily    MEDICATIONS  (PRN):  acetaminophen     Tablet .. 650 milliGRAM(s) Oral every 6 hours PRN Mild Pain (1 - 3)  albuterol    90 MICROgram(s) HFA Inhaler 2 Puff(s) Inhalation every 6 hours PRN Shortness of Breath and/or Wheezing  dextrose Oral Gel 15 Gram(s) Oral once PRN Blood Glucose LESS THAN 70 milliGRAM(s)/deciliter      ALLERGIES:  Allergies    No Known Allergies    Intolerances        LABS:                        14.5   19.85 )-----------( 210      ( 13 Jul 2025 06:30 )             45.9     07-13    133[L]  |  95[L]  |  41[H]  ----------------------------<  153[H]  5.0   |  23  |  0.83    Ca    8.5      13 Jul 2025 06:30  Phos  3.6     07-13  Mg     2.0     07-13    TPro  6.3  /  Alb  3.2[L]  /  TBili  0.4  /  DBili  x   /  AST  49[H]  /  ALT  125[H]  /  AlkPhos  87  07-13      Urinalysis Basic - ( 13 Jul 2025 06:30 )    Color: x / Appearance: x / SG: x / pH: x  Gluc: 153 mg/dL / Ketone: x  / Bili: x / Urobili: x   Blood: x / Protein: x / Nitrite: x   Leuk Esterase: x / RBC: x / WBC x   Sq Epi: x / Non Sq Epi: x / Bacteria: x        RADIOLOGY & ADDITIONAL TESTS: Reviewed.

## 2025-07-13 NOTE — PROGRESS NOTE ADULT - ASSESSMENT
Seen and examined w housestaff - Dr. Brooke this AM  74M w Stage 4 NSCLC (dx 9/2023) on pembrolizumab - last on 5/16/25, COPD on RA, HTN, DM2, carotid artery stenosis s/p CEA 2020, L sided PPM, recent immunotherapy induced colitis (5/2025 --> started on prednisone - decreased to 30mg daily, c/b BLE Swelling, found to have norovirus - imroving    #Diarrhea due to Norovirus - multiple episodes of watery diarrhea   - no abd pain  #BLE edema - minimal improvement. Some mild-moderate pitting edema that is symmetrical present in mid shin present. No pain.   - BLE Dopplers negative 7/8 for DVT   - TTE    - Home was on hctz 25mg daily and furosemide 60mg daily    #Hypokalemia - K 5 today. Mg at target. Likely d/t being on diuretics.  #MARYAN - Improving. 0.85 today returned to baseline. Was 1.65 on admission likely d/t diuresis + norovirus diarrhea.      #Leukocytosis - likely d/t chronic prednisone 30mg daily. 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 PRN Albuterol. Does not appear in acute exacerbation  #HLD - on atorvastatin 80, zetia    PPx: SQH  Plan  BLE Edema little changed despite lasix 30mg IV yesterday.   Lasix 40mg IV BID today. HCTZ 25mg daily  Elevate legs + ACE Wrap    CBC w diff, BMP w Mg in AM    DISPO: outpatient PT -- pending improvement of BLE Edema.

## 2025-07-14 LAB
ANION GAP SERPL CALC-SCNC: 10 MMOL/L — SIGNIFICANT CHANGE UP (ref 5–17)
ANION GAP SERPL CALC-SCNC: 10 MMOL/L — SIGNIFICANT CHANGE UP (ref 5–17)
BUN SERPL-MCNC: 45 MG/DL — HIGH (ref 7–23)
BUN SERPL-MCNC: 51 MG/DL — HIGH (ref 7–23)
CALCIUM SERPL-MCNC: 8.4 MG/DL — SIGNIFICANT CHANGE UP (ref 8.4–10.5)
CALCIUM SERPL-MCNC: 9.3 MG/DL — SIGNIFICANT CHANGE UP (ref 8.4–10.5)
CHLORIDE SERPL-SCNC: 96 MMOL/L — SIGNIFICANT CHANGE UP (ref 96–108)
CHLORIDE SERPL-SCNC: 97 MMOL/L — SIGNIFICANT CHANGE UP (ref 96–108)
CO2 SERPL-SCNC: 30 MMOL/L — SIGNIFICANT CHANGE UP (ref 22–31)
CO2 SERPL-SCNC: 32 MMOL/L — HIGH (ref 22–31)
CREAT SERPL-MCNC: 0.97 MG/DL — SIGNIFICANT CHANGE UP (ref 0.5–1.3)
CREAT SERPL-MCNC: 1.22 MG/DL — SIGNIFICANT CHANGE UP (ref 0.5–1.3)
CULTURE RESULTS: SIGNIFICANT CHANGE UP
CULTURE RESULTS: SIGNIFICANT CHANGE UP
EGFR: 61 ML/MIN/1.73M2 — SIGNIFICANT CHANGE UP
EGFR: 61 ML/MIN/1.73M2 — SIGNIFICANT CHANGE UP
EGFR: 81 ML/MIN/1.73M2 — SIGNIFICANT CHANGE UP
EGFR: 81 ML/MIN/1.73M2 — SIGNIFICANT CHANGE UP
GLUCOSE SERPL-MCNC: 153 MG/DL — HIGH (ref 70–99)
GLUCOSE SERPL-MCNC: 236 MG/DL — HIGH (ref 70–99)
HCT VFR BLD CALC: 44.6 % — SIGNIFICANT CHANGE UP (ref 39–50)
HGB BLD-MCNC: 14.4 G/DL — SIGNIFICANT CHANGE UP (ref 13–17)
MAGNESIUM SERPL-MCNC: 2.5 MG/DL — SIGNIFICANT CHANGE UP (ref 1.6–2.6)
MCHC RBC-ENTMCNC: 28.1 PG — SIGNIFICANT CHANGE UP (ref 27–34)
MCHC RBC-ENTMCNC: 32.3 G/DL — SIGNIFICANT CHANGE UP (ref 32–36)
MCV RBC AUTO: 86.9 FL — SIGNIFICANT CHANGE UP (ref 80–100)
NRBC # BLD AUTO: 0 K/UL — SIGNIFICANT CHANGE UP (ref 0–0)
NRBC # FLD: 0 K/UL — SIGNIFICANT CHANGE UP (ref 0–0)
NRBC BLD AUTO-RTO: 0 /100 WBCS — SIGNIFICANT CHANGE UP (ref 0–0)
PHOSPHATE SERPL-MCNC: 2.7 MG/DL — SIGNIFICANT CHANGE UP (ref 2.5–4.5)
PLATELET # BLD AUTO: 251 K/UL — SIGNIFICANT CHANGE UP (ref 150–400)
PMV BLD: 10.5 FL — SIGNIFICANT CHANGE UP (ref 7–13)
POTASSIUM SERPL-MCNC: 4.2 MMOL/L — SIGNIFICANT CHANGE UP (ref 3.5–5.3)
POTASSIUM SERPL-MCNC: 5.3 MMOL/L — SIGNIFICANT CHANGE UP (ref 3.5–5.3)
POTASSIUM SERPL-SCNC: 4.2 MMOL/L — SIGNIFICANT CHANGE UP (ref 3.5–5.3)
POTASSIUM SERPL-SCNC: 5.3 MMOL/L — SIGNIFICANT CHANGE UP (ref 3.5–5.3)
RBC # BLD: 5.13 M/UL — SIGNIFICANT CHANGE UP (ref 4.2–5.8)
RBC # FLD: 17.3 % — HIGH (ref 10.3–14.5)
SODIUM SERPL-SCNC: 136 MMOL/L — SIGNIFICANT CHANGE UP (ref 135–145)
SODIUM SERPL-SCNC: 139 MMOL/L — SIGNIFICANT CHANGE UP (ref 135–145)
SPECIMEN SOURCE: SIGNIFICANT CHANGE UP
SPECIMEN SOURCE: SIGNIFICANT CHANGE UP
WBC # BLD: 17.57 K/UL — HIGH (ref 3.8–10.5)
WBC # FLD AUTO: 17.57 K/UL — HIGH (ref 3.8–10.5)

## 2025-07-14 PROCEDURE — 99232 SBSQ HOSP IP/OBS MODERATE 35: CPT | Mod: GC

## 2025-07-14 RX ORDER — FUROSEMIDE 10 MG/ML
40 INJECTION INTRAMUSCULAR; INTRAVENOUS
Refills: 0 | Status: DISCONTINUED | OUTPATIENT
Start: 2025-07-14 | End: 2025-07-15

## 2025-07-14 RX ADMIN — FUROSEMIDE 40 MILLIGRAM(S): 10 INJECTION INTRAMUSCULAR; INTRAVENOUS at 09:26

## 2025-07-14 RX ADMIN — PREDNISONE 20 MILLIGRAM(S): 20 TABLET ORAL at 09:26

## 2025-07-14 RX ADMIN — INSULIN LISPRO 8: 100 INJECTION, SOLUTION INTRAVENOUS; SUBCUTANEOUS at 12:45

## 2025-07-14 RX ADMIN — Medication 81 MILLIGRAM(S): at 09:25

## 2025-07-14 RX ADMIN — INSULIN LISPRO 4 UNIT(S): 100 INJECTION, SOLUTION INTRAVENOUS; SUBCUTANEOUS at 09:27

## 2025-07-14 RX ADMIN — LOSARTAN POTASSIUM 25 MILLIGRAM(S): 100 TABLET, FILM COATED ORAL at 06:25

## 2025-07-14 RX ADMIN — EZETIMIBE 10 MILLIGRAM(S): 10 TABLET ORAL at 09:26

## 2025-07-14 RX ADMIN — Medication 1 TABLET(S): at 09:26

## 2025-07-14 RX ADMIN — INSULIN LISPRO 4: 100 INJECTION, SOLUTION INTRAVENOUS; SUBCUTANEOUS at 18:26

## 2025-07-14 RX ADMIN — INSULIN LISPRO 4 UNIT(S): 100 INJECTION, SOLUTION INTRAVENOUS; SUBCUTANEOUS at 12:44

## 2025-07-14 RX ADMIN — ATORVASTATIN CALCIUM 80 MILLIGRAM(S): 80 TABLET, FILM COATED ORAL at 21:26

## 2025-07-14 RX ADMIN — INSULIN LISPRO 4 UNIT(S): 100 INJECTION, SOLUTION INTRAVENOUS; SUBCUTANEOUS at 18:26

## 2025-07-14 RX ADMIN — Medication 100 MILLIGRAM(S): at 21:26

## 2025-07-14 RX ADMIN — HEPARIN SODIUM 5000 UNIT(S): 1000 INJECTION INTRAVENOUS; SUBCUTANEOUS at 21:26

## 2025-07-14 RX ADMIN — Medication 25 GRAM(S): at 00:03

## 2025-07-14 RX ADMIN — METOPROLOL SUCCINATE 100 MILLIGRAM(S): 50 TABLET, EXTENDED RELEASE ORAL at 06:25

## 2025-07-14 RX ADMIN — INSULIN LISPRO 2: 100 INJECTION, SOLUTION INTRAVENOUS; SUBCUTANEOUS at 22:28

## 2025-07-14 RX ADMIN — HEPARIN SODIUM 5000 UNIT(S): 1000 INJECTION INTRAVENOUS; SUBCUTANEOUS at 06:24

## 2025-07-14 RX ADMIN — Medication 40 MILLIGRAM(S): at 06:25

## 2025-07-14 RX ADMIN — HEPARIN SODIUM 5000 UNIT(S): 1000 INJECTION INTRAVENOUS; SUBCUTANEOUS at 13:10

## 2025-07-14 RX ADMIN — FUROSEMIDE 40 MILLIGRAM(S): 10 INJECTION INTRAMUSCULAR; INTRAVENOUS at 15:29

## 2025-07-14 NOTE — PROGRESS NOTE ADULT - PROBLEM SELECTOR PLAN 2
Has been on regular Prednisolone  - Neutrophilia  - Steroid related?  - Will continue to monitor with daily labs Has been on regular Prednisolone  - Downtrending   - Neutrophilia  - Steroid related?  - Will continue to monitor with daily labs - Eventual plan to return to using Keytruda  - Follows with Dr. Pratt

## 2025-07-14 NOTE — PROGRESS NOTE ADULT - PROBLEM SELECTOR PLAN 12
F: None  E: None  N: Nutrition consulted. Prescribed Ensure max x1/day.   DVT ppx: Heparin - 5000 units subcutaneous every 8 hrs  Bowel: None  Dispo: [facility]  Code status: F: None  E: None  N: Nutrition consulted. Prescribed Ensure max x1/day. Placed on low carb diet.   DVT ppx: Heparin - 5000 units subcutaneous every 8 hrs  Bowel: None  Dispo: [facility]  Code status:

## 2025-07-14 NOTE — PROGRESS NOTE ADULT - PROBLEM SELECTOR PLAN 1
Third-spacing 2/2 to NSCLC vs heart failure vs prednisone use. Still present despite 30mg IV Lasix stat  - Less likely HF (normal echo in May although BNP 1600. No resp sx and chest clear)  - Monitor edema as pred continues to taper  - Compression stockings applied overnight  - 40mg IV LASIX BID + 25mg ORAL HCTZ Third-spacing 2/2 to NSCLC vs heart failure vs prednisone use. Still present despite 30mg IV Lasix stat  - Less likely HF (normal echo in May although BNP 1600. No resp sx and chest clear)  - Monitor edema as pred continues to taper  - Compression stockings applied overnight  - 40mg IV LASIX BID + 25mg ORAL HCTZ (potentially add Metolazone?)  - Measure pt weight to assess fluid status   - Consult Dr. Pérez Third-spacing 2/2 to NSCLC vs heart failure vs prednisone use.  - Less likely HF (normal echo in May although BNP 1600. No resp sx and chest clear)  - Monitor edema as pred continues to taper  - Compression stockings/ACE wraps  - advised to keep legs raised as much as possible  - 40mg IV LASIX BID + 25mg ORAL Metolazone  - daily weights  - strict i/o  - Consult Dr. Pérez

## 2025-07-14 NOTE — PROGRESS NOTE ADULT - PROBLEM SELECTOR PLAN 7
- Resolved Has been on regular Prednisolone  - Downtrending   - Neutrophilia  - Steroid related? Tapering steroids  - Will continue to monitor with daily labs

## 2025-07-14 NOTE — CHART NOTE - NSCHARTNOTEFT_GEN_A_CORE
Vascular cardiology attending chart note    73 yo man PMHx of severe carotid stenosis s/p CEA, CHB s/p PPM, HTN, DM2, COPD, stage IV metastatic non-small cell lung cancer s/p immunotherapy s/p colitis    Assessment  1. B/l LE edema  2. MARYAN - resolved  3. Severe carotid stenosis s/p CEA  4. CHB s/p PPM  5. HTN, DM2, COPD, stage IV metastatic lung cancer    Plan  1. TTE from May/2025 reviewed, EF 65%, E/e’ and diastolic filling pressure normal with normal IVC; mild TR; no concern for cardiac etiology of LE edema  2. MARYAN is resolved w/o significant proteinuria in UA and LFTs only mildly elevated so renal and hepatic etiology also not likely  3. LE venous duplex US w/o DVT. Venous reflux not properly assessed  4. CT A/P w/ contrast May/2025 with R iliac artery occlusion with reconstitution; no significant abdominal/pelvic lymphadenopathy to induced lymphedema  5. Since LE edema acutely worsened and there is no new TTE, would recommend TTE to reassess for diastolic filling pressure and IVC  6. Full note to follow after physical exam tomorrow am, will assess for LE venous disease or lymphedema, for now recommend ACE wrap compression & LE elevation  7. Since not responding adequately to Lasix, switch to IV Bumex 2mg BID w/ metolazone 5mg PO QD

## 2025-07-14 NOTE — PROGRESS NOTE ADULT - PROBLEM SELECTOR PLAN 8
Pain in R forearm, good relief from Lidocaine patch previously  - Lidocaine patch reapplied today  - Monitor response - Continue Albuterol prn

## 2025-07-14 NOTE — PROGRESS NOTE ADULT - SUBJECTIVE AND OBJECTIVE BOX
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 The pt developed immunotherapy-induced colitis 3 weeks ago at which point the Keytruda was stopped and he was started on a Prednisone taper. Initial dosage of Prednisone at 60mg and currently tapered down to 30mg. After onset of BLE swelling, Lasix dosage was increased to 60mg. The pt also notes that he used Lidocaine cream for his leg pain with significant relief. After noting no change in BLE swelling, pt presented to the ED for further treatment.    INTERVAL EVENTS:   No acute events overnight. Pt tested positive for Norovirus and was placed on contact precautions. He wore compression stockings overnight but still edematous. Pt noted right wrist pain although no redness or warmth noted. Nurse believes pain is likely 2/2 to cane use considering pt is right-handed.    SUBJECTIVE:   Patient seen and examined at bedside. Still edematous despite compression stockings. He also stated that he had 2 loose bowel movements overnight, but did not endorse diarrhea. Still reporting some pain in R forearm/wrist. Otherwise feeling relatively well    ROS:  Positive for chronic cough. Negative for SOB, fever, chills, chest pain, abdominal pain, diarrhea, dysuria.      VITAL SIGNS:  Vital Signs Last 24 Hrs  T(C): 37.1 (12 Jul 2025 12:00), Max: 37.1 (12 Jul 2025 12:00)  T(F): 98.8 (12 Jul 2025 12:00), Max: 98.8 (12 Jul 2025 12:00)  HR: 64 (12 Jul 2025 12:00) (64 - 68)  BP: 168/85 (12 Jul 2025 12:00) (143/64 - 168/85)  BP(mean): --  RR: 18 (12 Jul 2025 12:00) (18 - 18)  SpO2: 98% (12 Jul 2025 12:00) (98% - 98%)    Parameters below as of 12 Jul 2025 12:00  Patient On (Oxygen Delivery Method): room air      PHYSICAL EXAM:  Constitutional: AOx3. No acute distress. Resting comfortably in bed.  HEENT: Atraumatic. PERRL. EOMI. Clear conjunctiva. Moist mucous membranes.  Neck: Trachea midline. Supple.  Lungs: Mild rales noted at apex of lungs b/l. Lungs otherwise clear to auscultation b/l. No accessory muscle use. No wheezing. No stridor/retracting/tripoding.   Cardiovascular: Regular rate and rhythm. No murmurs, rubs, or gallops.  Abdomen: Soft, non-tender, non-distended. No rebound or guarding.    Extremities: 3+ b/l LE pitting edema still present + erythema w/o warmth. Right wrist edema.  Skin: Multiple, weeping skin wounds noted to b/l ankles. No pus noted.  Neurologic: No apparent focal neurological deficits. Answering questions, following commands, moving all extremities.   Psychiatric: Cooperative. Appropriate mood and affect.       MEDICATIONS  (STANDING):  aspirin enteric coated 81 milliGRAM(s) Oral daily  atorvastatin 80 milliGRAM(s) Oral at bedtime  dextrose 5%. 1000 milliLiter(s) (100 mL/Hr) IV Continuous <Continuous>  dextrose 5%. 1000 milliLiter(s) (50 mL/Hr) IV Continuous <Continuous>  dextrose 50% Injectable 25 Gram(s) IV Push once  dextrose 50% Injectable 12.5 Gram(s) IV Push once  dextrose 50% Injectable 25 Gram(s) IV Push once  ezetimibe 10 milliGRAM(s) Oral daily  glucagon  Injectable 1 milliGRAM(s) IntraMuscular once  heparin   Injectable 5000 Unit(s) SubCutaneous every 8 hours  insulin lispro (ADMELOG) corrective regimen sliding scale   SubCutaneous three times a day before meals  insulin lispro Injectable (ADMELOG) 4 Unit(s) SubCutaneous three times a day before meals  metoprolol succinate  milliGRAM(s) Oral daily  pantoprazole    Tablet 40 milliGRAM(s) Oral before breakfast  potassium chloride  10 mEq/100 mL IVPB 10 milliEquivalent(s) IV Intermittent every 1 hour  potassium phosphate IVPB 30 milliMole(s) IV Intermittent once  predniSONE   Tablet 30 milliGRAM(s) Oral every 24 hours  trimethoprim   80 mG/sulfamethoxazole 400 mG 1 Tablet(s) Oral daily    MEDICATIONS  (PRN):  acetaminophen     Tablet .. 650 milliGRAM(s) Oral every 6 hours PRN Mild Pain (1 - 3)  albuterol    90 MICROgram(s) HFA Inhaler 2 Puff(s) Inhalation every 6 hours PRN Shortness of Breath and/or Wheezing  dextrose Oral Gel 15 Gram(s) Oral once PRN Blood Glucose LESS THAN 70 milliGRAM(s)/deciliter    ALLERGIES:  NKDA, NKFA, [employer]KE[employer]        LABS:                         15.0   23.72 )-----------( 273      ( 12 Jul 2025 05:30 )             47.1     07-12    138  |  99  |  30[H]  ----------------------------<  83  4.6   |  29  |  0.85    Ca    9.0      12 Jul 2025 05:30  Phos  1.8     07-12  Mg     2.0     07-12        Urinalysis Basic - ( 12 Jul 2025 05:30 )    Color: x / Appearance: x / SG: x / pH: x  Gluc: 83 mg/dL / Ketone: x  / Bili: x / Urobili: x   Blood: x / Protein: x / Nitrite: x   Leuk Esterase: x / RBC: x / WBC x   Sq Epi: x / Non Sq Epi: x / Bacteria: x    CAPILLARY BLOOD GLUCOSE  POCT Blood Glucose.: 175 mg/dL (12 Jul 2025 12:37)  POCT Blood Glucose.: 338 mg/dL (11 Jul 2025 12:12)  POCT Blood Glucose.: 110 mg/dL (11 Jul 2025 09:32)  POCT Blood Glucose.: 65 mg/dL (11 Jul 2025 08:45)  POCT Blood Glucose.: 269 mg/dL (10 Jul 2025 21:56)  POCT Blood Glucose.: 343 mg/dL (10 Jul 2025 17:21)  POCT Blood Glucose.: 202 mg/dL (09 Jul 2025 08:23)  POCT Blood Glucose.: 239 mg/dL (08 Jul 2025 23:48)      RADIOLOGY, EKG & ADDITIONAL TESTS: Reviewed.  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 The pt developed immunotherapy-induced colitis 3 weeks ago at which point the Keytruda was stopped and he was started on a Prednisone taper. Initial dosage of Prednisone at 60mg and currently tapered down to 30mg. After onset of BLE swelling, Lasix dosage was increased to 60mg. The pt also notes that he used Lidocaine cream for his leg pain with significant relief. After noting no change in BLE swelling, pt presented to the ED for further treatment.    INTERVAL EVENTS:   No acute events overnight. Pt is still on contact precautions for Norovirus. He had ACE wraps placed on his bilateral LEs that he kept on throughout the night. Mg was repleted.     SUBJECTIVE:   Patient seen and examined at bedside. Still edematous despite ACE wraps. He also stated that he had 3-4 "mushy" bowel movements since yesterday, but did not endorse diarrhea. Still reporting some pain in R forearm/wrist along with mild swelling. Otherwise feeling relatively well    ROS:  Positive for chronic cough. Negative for SOB, fever, chills, chest pain, abdominal pain, nausea, vomiting, diarrhea, dysuria, dizziness, headache.    VITAL SIGNS:  Vital Signs Last 24 Hrs  T(C): 36.6 (14 Jul 2025 06:05), Max: 36.6 (14 Jul 2025 06:05)  T(F): 97.9 (14 Jul 2025 06:05), Max: 97.9 (14 Jul 2025 06:05)  HR: 61 (14 Jul 2025 08:03) (61 - 75)  BP: 179/88 (14 Jul 2025 08:03) (146/72 - 179/88)  BP(mean): --  RR: 18 (14 Jul 2025 06:33) (17 - 18)  SpO2: 96% (14 Jul 2025 06:33) (86% - 96%)    Parameters below as of 13 Jul 2025 20:50  Patient On (Oxygen Delivery Method): room air    PHYSICAL EXAM:  Constitutional: AOx3. No acute distress. Resting comfortably in bed.  HEENT: Atraumatic. PERRL. EOMI. Clear conjunctiva. Moist mucous membranes.  Neck: Trachea midline. Supple.  Lungs: Mild rales noted at apex of lungs b/l. Lungs otherwise clear to auscultation b/l. No accessory muscle use. No wheezing. No stridor/retracting/tripoding.   Cardiovascular: Regular rate and rhythm. No murmurs, rubs, or gallops.  Abdomen: Soft, non-tender, non-distended. No rebound or guarding.    Extremities: 3+ b/l LE pitting edema still present + erythema w/o warmth. Right wrist edema.  Skin: Multiple, weeping skin wounds noted to b/l ankles. No pus noted.  Neurologic: No apparent focal neurological deficits. Answering questions, following commands, moving all extremities.   Psychiatric: Cooperative. Appropriate mood and affect.     MEDICATIONS  (STANDING):  aspirin enteric coated 81 milliGRAM(s) Oral daily  atorvastatin 80 milliGRAM(s) Oral at bedtime  dextrose 5%. 500 milliLiter(s) (250 mL/Hr) IV Continuous <Continuous>  dextrose 5%. 1000 milliLiter(s) (100 mL/Hr) IV Continuous <Continuous>  dextrose 5%. 1000 milliLiter(s) (50 mL/Hr) IV Continuous <Continuous>  dextrose 50% Injectable 25 Gram(s) IV Push once  dextrose 50% Injectable 12.5 Gram(s) IV Push once  dextrose 50% Injectable 25 Gram(s) IV Push once  ezetimibe 10 milliGRAM(s) Oral daily  furosemide   Injectable 40 milliGRAM(s) IV Push every 24 hours  glucagon  Injectable 1 milliGRAM(s) IntraMuscular once  heparin   Injectable 5000 Unit(s) SubCutaneous every 8 hours  hydrochlorothiazide 25 milliGRAM(s) Oral every 24 hours  insulin lispro (ADMELOG) corrective regimen sliding scale   SubCutaneous Before meals and at bedtime  insulin lispro Injectable (ADMELOG) 4 Unit(s) SubCutaneous three times a day before meals  losartan 25 milliGRAM(s) Oral daily  metoprolol succinate  milliGRAM(s) Oral daily  pantoprazole    Tablet 40 milliGRAM(s) Oral before breakfast  predniSONE   Tablet 20 milliGRAM(s) Oral every 24 hours  thiamine 100 milliGRAM(s) Oral every 24 hours  trimethoprim   80 mG/sulfamethoxazole 400 mG 1 Tablet(s) Oral daily    MEDICATIONS  (PRN):  acetaminophen     Tablet .. 650 milliGRAM(s) Oral every 6 hours PRN Mild Pain (1 - 3)  albuterol    90 MICROgram(s) HFA Inhaler 2 Puff(s) Inhalation every 6 hours PRN Shortness of Breath and/or Wheezing  dextrose Oral Gel 15 Gram(s) Oral once PRN Blood Glucose LESS THAN 70 milliGRAM(s)/deciliter    ALLERGIES:  NKDA, NKFA, [employer]      LABS:                         14.4   17.57 )-----------( 251      ( 14 Jul 2025 06:10 )             44.6     07-14    139  |  97  |  45[H]  ----------------------------<  153[H]  5.3   |  32[H]  |  0.97    Ca    9.3      14 Jul 2025 06:10  Phos  2.7     07-14  Mg     2.5     07-14    TPro  6.3  /  Alb  3.2[L]  /  TBili  0.4  /  DBili  x   /  AST  49[H]  /  ALT  125[H]  /  AlkPhos  87  07-13      Urinalysis Basic - ( 14 Jul 2025 06:10 )    Color: x / Appearance: x / SG: x / pH: x  Gluc: 153 mg/dL / Ketone: x  / Bili: x / Urobili: x   Blood: x / Protein: x / Nitrite: x   Leuk Esterase: x / RBC: x / WBC x   Sq Epi: x / Non Sq Epi: x / Bacteria: x    CAPILLARY BLOOD GLUCOSE  POCT Blood Glucose.: 140 mg/dL (14 Jul 2025 08:37)  POCT Blood Glucose.: 127 mg/dL (13 Jul 2025 21:27)  POCT Blood Glucose.: 336 mg/dL (13 Jul 2025 17:40)  POCT Blood Glucose.: 154 mg/dL (13 Jul 2025 11:31)  POCT Blood Glucose.: 175 mg/dL (12 Jul 2025 12:37)  POCT Blood Glucose.: 338 mg/dL (11 Jul 2025 12:12)  POCT Blood Glucose.: 110 mg/dL (11 Jul 2025 09:32)  POCT Blood Glucose.: 65 mg/dL (11 Jul 2025 08:45)  POCT Blood Glucose.: 269 mg/dL (10 Jul 2025 21:56)  POCT Blood Glucose.: 343 mg/dL (10 Jul 2025 17:21)  POCT Blood Glucose.: 202 mg/dL (09 Jul 2025 08:23)  POCT Blood Glucose.: 239 mg/dL (08 Jul 2025 23:48)      RADIOLOGY, EKG & ADDITIONAL TESTS: Reviewed.  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 The pt developed immunotherapy-induced colitis 3 weeks ago at which point the Keytruda was stopped and he was started on a Prednisone taper. Initial dosage of Prednisone at 60mg and currently tapered down to 30mg. After onset of BLE swelling, Lasix dosage was increased to 60mg. The pt also notes that he used Lidocaine cream for his leg pain with significant relief. After noting no change in BLE swelling, pt presented to the ED for further treatment.    INTERVAL EVENTS:   No acute events overnight. Pt is still on contact precautions for Norovirus. He had ACE wraps placed on his bilateral LEs that he kept on throughout the night. Mg was repleted.    SUBJECTIVE:   Patient seen and examined at bedside. Still edematous despite ACE wraps. He also stated that he had 3-4 "mushy" bowel movements since yesterday, but did not endorse diarrhea. Still reporting some pain in R forearm/wrist along with mild swelling. Otherwise feeling relatively well    ROS:  Positive for chronic cough. Negative for SOB, fever, chills, chest pain, abdominal pain, nausea, vomiting, diarrhea, dysuria, dizziness, headache.      VITAL SIGNS:  Vital Signs Last 24 Hrs  T(C): 36.6 (14 Jul 2025 09:18), Max: 36.6 (14 Jul 2025 06:05)  T(F): 97.8 (14 Jul 2025 09:18), Max: 97.9 (14 Jul 2025 06:05)  HR: 81 (14 Jul 2025 12:17) (61 - 81)  BP: 134/72 (14 Jul 2025 12:17) (134/72 - 179/88)  BP(mean): --  RR: 18 (14 Jul 2025 12:17) (17 - 18)  SpO2: 98% (14 Jul 2025 12:17) (86% - 98%)    Parameters below as of 14 Jul 2025 12:17  Patient On (Oxygen Delivery Method): room air        PHYSICAL EXAM:  Constitutional: AOx3. No acute distress. Resting comfortably in bed.  HEENT: Atraumatic. PERRL. EOMI. Clear conjunctiva. Moist mucous membranes.  Neck: Trachea midline. Supple.  Lungs: Mild rales noted at apex of lungs b/l. Lungs otherwise clear to auscultation b/l. No accessory muscle use. No wheezing. No stridor/retracting/tripoding.   Cardiovascular: Regular rate and rhythm. No murmurs, rubs, or gallops.  Abdomen: Soft, non-tender, non-distended. No rebound or guarding.    Extremities: 3+ b/l LE pitting edema still present + erythema w/o warmth. Right wrist edema.  Skin: Multiple, weeping skin wounds noted to b/l ankles. No pus noted.  Neurologic: No apparent focal neurological deficits. Answering questions, following commands, moving all extremities.   Psychiatric: Cooperative. Appropriate mood and affect.     MEDICATIONS  (STANDING):  aspirin enteric coated 81 milliGRAM(s) Oral daily  atorvastatin 80 milliGRAM(s) Oral at bedtime  dextrose 5%. 500 milliLiter(s) (250 mL/Hr) IV Continuous <Continuous>  dextrose 5%. 1000 milliLiter(s) (100 mL/Hr) IV Continuous <Continuous>  dextrose 5%. 1000 milliLiter(s) (50 mL/Hr) IV Continuous <Continuous>  dextrose 50% Injectable 25 Gram(s) IV Push once  dextrose 50% Injectable 12.5 Gram(s) IV Push once  dextrose 50% Injectable 25 Gram(s) IV Push once  ezetimibe 10 milliGRAM(s) Oral daily  furosemide   Injectable 40 milliGRAM(s) IV Push every 24 hours  glucagon  Injectable 1 milliGRAM(s) IntraMuscular once  heparin   Injectable 5000 Unit(s) SubCutaneous every 8 hours  hydrochlorothiazide 25 milliGRAM(s) Oral every 24 hours  insulin lispro (ADMELOG) corrective regimen sliding scale   SubCutaneous Before meals and at bedtime  insulin lispro Injectable (ADMELOG) 4 Unit(s) SubCutaneous three times a day before meals  losartan 25 milliGRAM(s) Oral daily  metoprolol succinate  milliGRAM(s) Oral daily  pantoprazole    Tablet 40 milliGRAM(s) Oral before breakfast  predniSONE   Tablet 20 milliGRAM(s) Oral every 24 hours  thiamine 100 milliGRAM(s) Oral every 24 hours  trimethoprim   80 mG/sulfamethoxazole 400 mG 1 Tablet(s) Oral daily    MEDICATIONS  (PRN):  acetaminophen     Tablet .. 650 milliGRAM(s) Oral every 6 hours PRN Mild Pain (1 - 3)  albuterol    90 MICROgram(s) HFA Inhaler 2 Puff(s) Inhalation every 6 hours PRN Shortness of Breath and/or Wheezing  dextrose Oral Gel 15 Gram(s) Oral once PRN Blood Glucose LESS THAN 70 milliGRAM(s)/deciliter    ALLERGIES:  NKDA, NKFA, [employer]      LABS:                         14.4   17.57 )-----------( 251      ( 14 Jul 2025 06:10 )             44.6     07-14    139  |  97  |  45[H]  ----------------------------<  153[H]  5.3   |  32[H]  |  0.97    Ca    9.3      14 Jul 2025 06:10  Phos  2.7     07-14  Mg     2.5     07-14    TPro  6.3  /  Alb  3.2[L]  /  TBili  0.4  /  DBili  x   /  AST  49[H]  /  ALT  125[H]  /  AlkPhos  87  07-13      Urinalysis Basic - ( 14 Jul 2025 06:10 )    Color: x / Appearance: x / SG: x / pH: x  Gluc: 153 mg/dL / Ketone: x  / Bili: x / Urobili: x   Blood: x / Protein: x / Nitrite: x   Leuk Esterase: x / RBC: x / WBC x   Sq Epi: x / Non Sq Epi: x / Bacteria: x    CAPILLARY BLOOD GLUCOSE  POCT Blood Glucose.: 140 mg/dL (14 Jul 2025 08:37)  POCT Blood Glucose.: 127 mg/dL (13 Jul 2025 21:27)  POCT Blood Glucose.: 336 mg/dL (13 Jul 2025 17:40)  POCT Blood Glucose.: 154 mg/dL (13 Jul 2025 11:31)  POCT Blood Glucose.: 175 mg/dL (12 Jul 2025 12:37)  POCT Blood Glucose.: 338 mg/dL (11 Jul 2025 12:12)  POCT Blood Glucose.: 110 mg/dL (11 Jul 2025 09:32)  POCT Blood Glucose.: 65 mg/dL (11 Jul 2025 08:45)  POCT Blood Glucose.: 269 mg/dL (10 Jul 2025 21:56)  POCT Blood Glucose.: 343 mg/dL (10 Jul 2025 17:21)  POCT Blood Glucose.: 202 mg/dL (09 Jul 2025 08:23)  POCT Blood Glucose.: 239 mg/dL (08 Jul 2025 23:48)      RADIOLOGY, EKG & ADDITIONAL TESTS: Reviewed.  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 The pt developed immunotherapy-induced colitis 3 weeks ago at which point the Keytruda was stopped and he was started on a Prednisone taper. Initial dosage of Prednisone at 60mg and currently tapered down to 30mg. After onset of BLE swelling, Lasix dosage was increased to 60mg. The pt also notes that he used Lidocaine cream for his leg pain with significant relief. After noting no change in BLE swelling, pt presented to the ED for further treatment.    INTERVAL EVENTS:   No acute events overnight. Pt is still on contact precautions for Norovirus. He had ACE wraps placed on his bilateral LEs that he kept on throughout the night. Mg was repleted.    SUBJECTIVE:   Patient seen and examined at bedside. Still edematous despite ACE wraps. He also stated that he had 3-4 "mushy" bowel movements since yesterday, but did not endorse diarrhea. Still reporting some pain in R forearm/wrist along with mild swelling. Otherwise feeling relatively well    ROS:  Positive for chronic cough. Negative for SOB, fever, chills, chest pain, abdominal pain, nausea, vomiting, diarrhea, dysuria, dizziness, headache.      VITAL SIGNS:  Vital Signs Last 24 Hrs  T(C): 36.6 (14 Jul 2025 09:18), Max: 36.6 (14 Jul 2025 06:05)  T(F): 97.8 (14 Jul 2025 09:18), Max: 97.9 (14 Jul 2025 06:05)  HR: 81 (14 Jul 2025 12:17) (61 - 81)  BP: 134/72 (14 Jul 2025 12:17) (134/72 - 179/88)  BP(mean): --  RR: 18 (14 Jul 2025 12:17) (17 - 18)  SpO2: 98% (14 Jul 2025 12:17) (86% - 98%)    Parameters below as of 14 Jul 2025 12:17  Patient On (Oxygen Delivery Method): room air        PHYSICAL EXAM:  Constitutional: AOx3. No acute distress. Resting comfortably in bed.  HEENT: Atraumatic. PERRL. EOMI. Clear conjunctiva. Moist mucous membranes.  Neck: Trachea midline. Supple.  Lungs: Mild rales noted at apex of lungs b/l. Lungs otherwise clear to auscultation b/l. No accessory muscle use. No wheezing. No stridor/retracting/tripoding.   Cardiovascular: Regular rate and rhythm. No murmurs, rubs, or gallops.  Abdomen: Soft, non-tender, non-distended. No rebound or guarding.    Extremities: 3+ b/l LE pitting edema still present + erythema w/o warmth. Right wrist edema.  Skin: Multiple, weeping skin wounds noted to b/l ankles. No pus noted.  Neurologic: No apparent focal neurological deficits. Answering questions, following commands, moving all extremities.   Psychiatric: Cooperative. Appropriate mood and affect.     MEDICATIONS  (STANDING):  aspirin enteric coated 81 milliGRAM(s) Oral daily  atorvastatin 80 milliGRAM(s) Oral at bedtime  dextrose 5%. 500 milliLiter(s) (250 mL/Hr) IV Continuous <Continuous>  dextrose 5%. 1000 milliLiter(s) (100 mL/Hr) IV Continuous <Continuous>  dextrose 5%. 1000 milliLiter(s) (50 mL/Hr) IV Continuous <Continuous>  dextrose 50% Injectable 25 Gram(s) IV Push once  dextrose 50% Injectable 12.5 Gram(s) IV Push once  dextrose 50% Injectable 25 Gram(s) IV Push once  ezetimibe 10 milliGRAM(s) Oral daily  furosemide   Injectable 40 milliGRAM(s) IV Push every 24 hours  glucagon  Injectable 1 milliGRAM(s) IntraMuscular once  heparin   Injectable 5000 Unit(s) SubCutaneous every 8 hours  hydrochlorothiazide 25 milliGRAM(s) Oral every 24 hours  insulin lispro (ADMELOG) corrective regimen sliding scale   SubCutaneous Before meals and at bedtime  insulin lispro Injectable (ADMELOG) 4 Unit(s) SubCutaneous three times a day before meals  losartan 25 milliGRAM(s) Oral daily  metoprolol succinate  milliGRAM(s) Oral daily  pantoprazole    Tablet 40 milliGRAM(s) Oral before breakfast  predniSONE   Tablet 20 milliGRAM(s) Oral every 24 hours  thiamine 100 milliGRAM(s) Oral every 24 hours  trimethoprim   80 mG/sulfamethoxazole 400 mG 1 Tablet(s) Oral daily    MEDICATIONS  (PRN):  acetaminophen     Tablet .. 650 milliGRAM(s) Oral every 6 hours PRN Mild Pain (1 - 3)  albuterol    90 MICROgram(s) HFA Inhaler 2 Puff(s) Inhalation every 6 hours PRN Shortness of Breath and/or Wheezing  dextrose Oral Gel 15 Gram(s) Oral once PRN Blood Glucose LESS THAN 70 milliGRAM(s)/deciliter    ALLERGIES:  NKDA, NKFA, [employer]      LABS:                         14.4   17.57 )-----------( 251      ( 14 Jul 2025 06:10 )             44.6     07-14    139  |  97  |  45[H]  ----------------------------<  153[H]  5.3   |  32[H]  |  0.97    Ca    9.3      14 Jul 2025 06:10  Phos  2.7     07-14  Mg     2.5     07-14    TPro  6.3  /  Alb  3.2[L]  /  TBili  0.4  /  DBili  x   /  AST  49[H]  /  ALT  125[H]  /  AlkPhos  87  07-13      Urinalysis Basic - ( 14 Jul 2025 06:10 )    Color: x / Appearance: x / SG: x / pH: x  Gluc: 153 mg/dL / Ketone: x  / Bili: x / Urobili: x   Blood: x / Protein: x / Nitrite: x   Leuk Esterase: x / RBC: x / WBC x   Sq Epi: x / Non Sq Epi: x / Bacteria: x    CAPILLARY BLOOD GLUCOSE  POCT Blood Glucose.: 140 mg/dL (14 Jul 2025 08:37)  POCT Blood Glucose.: 127 mg/dL (13 Jul 2025 21:27)  POCT Blood Glucose.: 336 mg/dL (13 Jul 2025 17:40)  POCT Blood Glucose.: 154 mg/dL (13 Jul 2025 11:31)  POCT Blood Glucose.: 175 mg/dL (12 Jul 2025 12:37)  POCT Blood Glucose.: 338 mg/dL (11 Jul 2025 12:12)  POCT Blood Glucose.: 110 mg/dL (11 Jul 2025 09:32)  POCT Blood Glucose.: 65 mg/dL (11 Jul 2025 08:45)  POCT Blood Glucose.: 269 mg/dL (10 Jul 2025 21:56)  POCT Blood Glucose.: 343 mg/dL (10 Jul 2025 17:21)  POCT Blood Glucose.: 202 mg/dL (09 Jul 2025 08:23)  POCT Blood Glucose.: 239 mg/dL (08 Jul 2025 23:48)      RADIOLOGY, EKG & ADDITIONAL TESTS: Reviewed.

## 2025-07-14 NOTE — PROGRESS NOTE ADULT - PROBLEM SELECTOR PLAN 3
- Continue Prednisone taper Pain in R forearm, mild swelling, no warmth  - Lidocaine patch previously applied with some relief  - Runs warm water on it in bathroom with good symptomatic relief

## 2025-07-14 NOTE — PROGRESS NOTE ADULT - ATTENDING COMMENTS
Unwrapped ACE wrap this morning. Edema is minimally improved. Had 800cc net output yesterday.     Plan  Lasix 40mg IV BID today. Give 2.5mg metolazone prior to 2nd lasix dose  Counseled pt to elevate legs if not walking  Consult vascular cardiology - Dr. Pérez  Suspect water retention d/t chronic steroids. May repeat BLE Doppler today  f/u BMP w Mg - obtain standing weight today

## 2025-07-15 ENCOUNTER — RESULT REVIEW (OUTPATIENT)
Age: 75
End: 2025-07-15

## 2025-07-15 LAB
ANION GAP SERPL CALC-SCNC: 14 MMOL/L — SIGNIFICANT CHANGE UP (ref 5–17)
ANION GAP SERPL CALC-SCNC: 7 MMOL/L — SIGNIFICANT CHANGE UP (ref 5–17)
BUN SERPL-MCNC: 48 MG/DL — HIGH (ref 7–23)
BUN SERPL-MCNC: 62 MG/DL — HIGH (ref 7–23)
CALCIUM SERPL-MCNC: 8.9 MG/DL — SIGNIFICANT CHANGE UP (ref 8.4–10.5)
CALCIUM SERPL-MCNC: 9.1 MG/DL — SIGNIFICANT CHANGE UP (ref 8.4–10.5)
CHLORIDE SERPL-SCNC: 90 MMOL/L — LOW (ref 96–108)
CHLORIDE SERPL-SCNC: 92 MMOL/L — LOW (ref 96–108)
CO2 SERPL-SCNC: 27 MMOL/L — SIGNIFICANT CHANGE UP (ref 22–31)
CO2 SERPL-SCNC: 33 MMOL/L — HIGH (ref 22–31)
CREAT SERPL-MCNC: 1.03 MG/DL — SIGNIFICANT CHANGE UP (ref 0.5–1.3)
CREAT SERPL-MCNC: 1.13 MG/DL — SIGNIFICANT CHANGE UP (ref 0.5–1.3)
EGFR: 67 ML/MIN/1.73M2 — SIGNIFICANT CHANGE UP
EGFR: 67 ML/MIN/1.73M2 — SIGNIFICANT CHANGE UP
EGFR: 75 ML/MIN/1.73M2 — SIGNIFICANT CHANGE UP
EGFR: 75 ML/MIN/1.73M2 — SIGNIFICANT CHANGE UP
GLUCOSE SERPL-MCNC: 190 MG/DL — HIGH (ref 70–99)
GLUCOSE SERPL-MCNC: 215 MG/DL — HIGH (ref 70–99)
HCT VFR BLD CALC: 41.9 % — SIGNIFICANT CHANGE UP (ref 39–50)
HGB BLD-MCNC: 13.8 G/DL — SIGNIFICANT CHANGE UP (ref 13–17)
MAGNESIUM SERPL-MCNC: 2.1 MG/DL — SIGNIFICANT CHANGE UP (ref 1.6–2.6)
MCHC RBC-ENTMCNC: 28.1 PG — SIGNIFICANT CHANGE UP (ref 27–34)
MCHC RBC-ENTMCNC: 32.9 G/DL — SIGNIFICANT CHANGE UP (ref 32–36)
MCV RBC AUTO: 85.3 FL — SIGNIFICANT CHANGE UP (ref 80–100)
NRBC # BLD AUTO: 0 K/UL — SIGNIFICANT CHANGE UP (ref 0–0)
NRBC # FLD: 0 K/UL — SIGNIFICANT CHANGE UP (ref 0–0)
NRBC BLD AUTO-RTO: 0 /100 WBCS — SIGNIFICANT CHANGE UP (ref 0–0)
PHOSPHATE SERPL-MCNC: 2.6 MG/DL — SIGNIFICANT CHANGE UP (ref 2.5–4.5)
PLATELET # BLD AUTO: 208 K/UL — SIGNIFICANT CHANGE UP (ref 150–400)
PMV BLD: 10.2 FL — SIGNIFICANT CHANGE UP (ref 7–13)
POTASSIUM SERPL-MCNC: 4.2 MMOL/L — SIGNIFICANT CHANGE UP (ref 3.5–5.3)
POTASSIUM SERPL-MCNC: 4.5 MMOL/L — SIGNIFICANT CHANGE UP (ref 3.5–5.3)
POTASSIUM SERPL-SCNC: 4.2 MMOL/L — SIGNIFICANT CHANGE UP (ref 3.5–5.3)
POTASSIUM SERPL-SCNC: 4.5 MMOL/L — SIGNIFICANT CHANGE UP (ref 3.5–5.3)
RBC # BLD: 4.91 M/UL — SIGNIFICANT CHANGE UP (ref 4.2–5.8)
RBC # FLD: 17.2 % — HIGH (ref 10.3–14.5)
SODIUM SERPL-SCNC: 131 MMOL/L — LOW (ref 135–145)
SODIUM SERPL-SCNC: 132 MMOL/L — LOW (ref 135–145)
WBC # BLD: 25.15 K/UL — HIGH (ref 3.8–10.5)
WBC # FLD AUTO: 25.15 K/UL — HIGH (ref 3.8–10.5)

## 2025-07-15 PROCEDURE — 84300 ASSAY OF URINE SODIUM: CPT

## 2025-07-15 PROCEDURE — 84540 ASSAY OF URINE/UREA-N: CPT

## 2025-07-15 PROCEDURE — 80053 COMPREHEN METABOLIC PANEL: CPT

## 2025-07-15 PROCEDURE — 86850 RBC ANTIBODY SCREEN: CPT

## 2025-07-15 PROCEDURE — 80048 BASIC METABOLIC PNL TOTAL CA: CPT

## 2025-07-15 PROCEDURE — 85027 COMPLETE CBC AUTOMATED: CPT

## 2025-07-15 PROCEDURE — 85025 COMPLETE CBC W/AUTO DIFF WBC: CPT

## 2025-07-15 PROCEDURE — 83735 ASSAY OF MAGNESIUM: CPT

## 2025-07-15 PROCEDURE — 82570 ASSAY OF URINE CREATININE: CPT

## 2025-07-15 PROCEDURE — 93005 ELECTROCARDIOGRAM TRACING: CPT

## 2025-07-15 PROCEDURE — 86900 BLOOD TYPING SEROLOGIC ABO: CPT

## 2025-07-15 PROCEDURE — 97161 PT EVAL LOW COMPLEX 20 MIN: CPT

## 2025-07-15 PROCEDURE — 97165 OT EVAL LOW COMPLEX 30 MIN: CPT

## 2025-07-15 PROCEDURE — 93970 EXTREMITY STUDY: CPT

## 2025-07-15 PROCEDURE — 83880 ASSAY OF NATRIURETIC PEPTIDE: CPT

## 2025-07-15 PROCEDURE — 84133 ASSAY OF URINE POTASSIUM: CPT

## 2025-07-15 PROCEDURE — 36415 COLL VENOUS BLD VENIPUNCTURE: CPT

## 2025-07-15 PROCEDURE — 82962 GLUCOSE BLOOD TEST: CPT

## 2025-07-15 PROCEDURE — 87449 NOS EACH ORGANISM AG IA: CPT

## 2025-07-15 PROCEDURE — 83935 ASSAY OF URINE OSMOLALITY: CPT

## 2025-07-15 PROCEDURE — 99233 SBSQ HOSP IP/OBS HIGH 50: CPT | Mod: GC

## 2025-07-15 PROCEDURE — 71045 X-RAY EXAM CHEST 1 VIEW: CPT

## 2025-07-15 PROCEDURE — 87324 CLOSTRIDIUM AG IA: CPT

## 2025-07-15 PROCEDURE — 81003 URINALYSIS AUTO W/O SCOPE: CPT

## 2025-07-15 PROCEDURE — 93306 TTE W/DOPPLER COMPLETE: CPT | Mod: 26

## 2025-07-15 PROCEDURE — 84156 ASSAY OF PROTEIN URINE: CPT

## 2025-07-15 PROCEDURE — 99223 1ST HOSP IP/OBS HIGH 75: CPT

## 2025-07-15 PROCEDURE — 87637 SARSCOV2&INF A&B&RSV AMP PRB: CPT

## 2025-07-15 PROCEDURE — 73090 X-RAY EXAM OF FOREARM: CPT | Mod: 26,RT

## 2025-07-15 PROCEDURE — 87040 BLOOD CULTURE FOR BACTERIA: CPT

## 2025-07-15 PROCEDURE — 0225U NFCT DS DNA&RNA 21 SARSCOV2: CPT

## 2025-07-15 PROCEDURE — 84100 ASSAY OF PHOSPHORUS: CPT

## 2025-07-15 PROCEDURE — 86901 BLOOD TYPING SEROLOGIC RH(D): CPT

## 2025-07-15 PROCEDURE — 97116 GAIT TRAINING THERAPY: CPT

## 2025-07-15 PROCEDURE — 87507 IADNA-DNA/RNA PROBE TQ 12-25: CPT

## 2025-07-15 RX ORDER — BUMETANIDE 1 MG/1
2 TABLET ORAL
Refills: 0 | Status: DISCONTINUED | OUTPATIENT
Start: 2025-07-15 | End: 2025-07-16

## 2025-07-15 RX ADMIN — INSULIN LISPRO 4: 100 INJECTION, SOLUTION INTRAVENOUS; SUBCUTANEOUS at 18:05

## 2025-07-15 RX ADMIN — BUMETANIDE 2 MILLIGRAM(S): 1 TABLET ORAL at 15:12

## 2025-07-15 RX ADMIN — INSULIN LISPRO 4 UNIT(S): 100 INJECTION, SOLUTION INTRAVENOUS; SUBCUTANEOUS at 13:03

## 2025-07-15 RX ADMIN — Medication 1 TABLET(S): at 13:06

## 2025-07-15 RX ADMIN — INSULIN LISPRO 4 UNIT(S): 100 INJECTION, SOLUTION INTRAVENOUS; SUBCUTANEOUS at 18:05

## 2025-07-15 RX ADMIN — Medication 100 MILLIGRAM(S): at 21:22

## 2025-07-15 RX ADMIN — HEPARIN SODIUM 5000 UNIT(S): 1000 INJECTION INTRAVENOUS; SUBCUTANEOUS at 15:13

## 2025-07-15 RX ADMIN — LOSARTAN POTASSIUM 25 MILLIGRAM(S): 100 TABLET, FILM COATED ORAL at 07:15

## 2025-07-15 RX ADMIN — INSULIN LISPRO 4: 100 INJECTION, SOLUTION INTRAVENOUS; SUBCUTANEOUS at 22:27

## 2025-07-15 RX ADMIN — Medication 650 MILLIGRAM(S): at 03:29

## 2025-07-15 RX ADMIN — EZETIMIBE 10 MILLIGRAM(S): 10 TABLET ORAL at 13:06

## 2025-07-15 RX ADMIN — INSULIN LISPRO 4 UNIT(S): 100 INJECTION, SOLUTION INTRAVENOUS; SUBCUTANEOUS at 09:07

## 2025-07-15 RX ADMIN — HEPARIN SODIUM 5000 UNIT(S): 1000 INJECTION INTRAVENOUS; SUBCUTANEOUS at 21:22

## 2025-07-15 RX ADMIN — HEPARIN SODIUM 5000 UNIT(S): 1000 INJECTION INTRAVENOUS; SUBCUTANEOUS at 07:15

## 2025-07-15 RX ADMIN — FUROSEMIDE 40 MILLIGRAM(S): 10 INJECTION INTRAMUSCULAR; INTRAVENOUS at 07:16

## 2025-07-15 RX ADMIN — ATORVASTATIN CALCIUM 80 MILLIGRAM(S): 80 TABLET, FILM COATED ORAL at 21:22

## 2025-07-15 RX ADMIN — PREDNISONE 20 MILLIGRAM(S): 20 TABLET ORAL at 09:06

## 2025-07-15 RX ADMIN — Medication 81 MILLIGRAM(S): at 13:06

## 2025-07-15 RX ADMIN — INSULIN LISPRO 2: 100 INJECTION, SOLUTION INTRAVENOUS; SUBCUTANEOUS at 09:07

## 2025-07-15 RX ADMIN — INSULIN LISPRO 6: 100 INJECTION, SOLUTION INTRAVENOUS; SUBCUTANEOUS at 13:04

## 2025-07-15 RX ADMIN — Medication 40 MILLIGRAM(S): at 07:15

## 2025-07-15 RX ADMIN — METOPROLOL SUCCINATE 100 MILLIGRAM(S): 50 TABLET, EXTENDED RELEASE ORAL at 07:15

## 2025-07-15 NOTE — PROGRESS NOTE ADULT - SUBJECTIVE AND OBJECTIVE BOX
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 The pt developed immunotherapy-induced colitis 3 weeks ago at which point the Keytruda was stopped and he was started on a Prednisone taper. Initial dosage of Prednisone at 60mg and currently tapered down to 30mg. After onset of BLE swelling, Lasix dosage was increased to 60mg. The pt also notes that he used Lidocaine cream for his leg pain with significant relief. After noting no change in BLE swelling, pt presented to the ED for further treatment.    INTERVAL EVENTS:   No acute events overnight. Pt is still on contact precautions for Norovirus. He had ACE wraps placed on his bilateral LEs that he kept on throughout the night. Mg was repleted.    SUBJECTIVE:   Patient seen and examined at bedside. Still edematous despite ACE wraps. He also stated that he had 3-4 "mushy" bowel movements since yesterday, but did not endorse diarrhea. Still reporting some pain in R forearm/wrist along with mild swelling. Otherwise feeling relatively well    ROS:  Positive for chronic cough. Negative for SOB, fever, chills, chest pain, abdominal pain, nausea, vomiting, diarrhea, dysuria, dizziness, headache.      VITAL SIGNS:  Vital Signs Last 24 Hrs  T(C): 36.6 (14 Jul 2025 09:18), Max: 36.6 (14 Jul 2025 06:05)  T(F): 97.8 (14 Jul 2025 09:18), Max: 97.9 (14 Jul 2025 06:05)  HR: 81 (14 Jul 2025 12:17) (61 - 81)  BP: 134/72 (14 Jul 2025 12:17) (134/72 - 179/88)  BP(mean): --  RR: 18 (14 Jul 2025 12:17) (17 - 18)  SpO2: 98% (14 Jul 2025 12:17) (86% - 98%)    Parameters below as of 14 Jul 2025 12:17  Patient On (Oxygen Delivery Method): room air        PHYSICAL EXAM:  Constitutional: AOx3. No acute distress. Resting comfortably in bed.  HEENT: Atraumatic. PERRL. EOMI. Clear conjunctiva. Moist mucous membranes.  Neck: Trachea midline. Supple.  Lungs: Mild rales noted at apex of lungs b/l. Lungs otherwise clear to auscultation b/l. No accessory muscle use. No wheezing. No stridor/retracting/tripoding.   Cardiovascular: Regular rate and rhythm. No murmurs, rubs, or gallops.  Abdomen: Soft, non-tender, non-distended. No rebound or guarding.    Extremities: 3+ b/l LE pitting edema still present + erythema w/o warmth. Right wrist edema.  Skin: Multiple, weeping skin wounds noted to b/l ankles. No pus noted.  Neurologic: No apparent focal neurological deficits. Answering questions, following commands, moving all extremities.   Psychiatric: Cooperative. Appropriate mood and affect.     MEDICATIONS  (STANDING):  aspirin enteric coated 81 milliGRAM(s) Oral daily  atorvastatin 80 milliGRAM(s) Oral at bedtime  dextrose 5%. 500 milliLiter(s) (250 mL/Hr) IV Continuous <Continuous>  dextrose 5%. 1000 milliLiter(s) (100 mL/Hr) IV Continuous <Continuous>  dextrose 5%. 1000 milliLiter(s) (50 mL/Hr) IV Continuous <Continuous>  dextrose 50% Injectable 25 Gram(s) IV Push once  dextrose 50% Injectable 12.5 Gram(s) IV Push once  dextrose 50% Injectable 25 Gram(s) IV Push once  ezetimibe 10 milliGRAM(s) Oral daily  furosemide   Injectable 40 milliGRAM(s) IV Push every 24 hours  glucagon  Injectable 1 milliGRAM(s) IntraMuscular once  heparin   Injectable 5000 Unit(s) SubCutaneous every 8 hours  hydrochlorothiazide 25 milliGRAM(s) Oral every 24 hours  insulin lispro (ADMELOG) corrective regimen sliding scale   SubCutaneous Before meals and at bedtime  insulin lispro Injectable (ADMELOG) 4 Unit(s) SubCutaneous three times a day before meals  losartan 25 milliGRAM(s) Oral daily  metoprolol succinate  milliGRAM(s) Oral daily  pantoprazole    Tablet 40 milliGRAM(s) Oral before breakfast  predniSONE   Tablet 20 milliGRAM(s) Oral every 24 hours  thiamine 100 milliGRAM(s) Oral every 24 hours  trimethoprim   80 mG/sulfamethoxazole 400 mG 1 Tablet(s) Oral daily    MEDICATIONS  (PRN):  acetaminophen     Tablet .. 650 milliGRAM(s) Oral every 6 hours PRN Mild Pain (1 - 3)  albuterol    90 MICROgram(s) HFA Inhaler 2 Puff(s) Inhalation every 6 hours PRN Shortness of Breath and/or Wheezing  dextrose Oral Gel 15 Gram(s) Oral once PRN Blood Glucose LESS THAN 70 milliGRAM(s)/deciliter    ALLERGIES:  NKDA, NKFA, [employer]KE[employer]      LABS:                         14.4   17.57 )-----------( 251      ( 14 Jul 2025 06:10 )             44.6     07-14    139  |  97  |  45[H]  ----------------------------<  153[H]  5.3   |  32[H]  |  0.97    Ca    9.3      14 Jul 2025 06:10  Phos  2.7     07-14  Mg     2.5     07-14    TPro  6.3  /  Alb  3.2[L]  /  TBili  0.4  /  DBili  x   /  AST  49[H]  /  ALT  125[H]  /  AlkPhos  87  07-13      Urinalysis Basic - ( 14 Jul 2025 06:10 )    Color: x / Appearance: x / SG: x / pH: x  Gluc: 153 mg/dL / Ketone: x  / Bili: x / Urobili: x   Blood: x / Protein: x / Nitrite: x   Leuk Esterase: x / RBC: x / WBC x   Sq Epi: x / Non Sq Epi: x / Bacteria: x    CAPILLARY BLOOD GLUCOSE  POCT Blood Glucose.: 140 mg/dL (14 Jul 2025 08:37)  POCT Blood Glucose.: 127 mg/dL (13 Jul 2025 21:27)  POCT Blood Glucose.: 336 mg/dL (13 Jul 2025 17:40)  POCT Blood Glucose.: 154 mg/dL (13 Jul 2025 11:31)  POCT Blood Glucose.: 175 mg/dL (12 Jul 2025 12:37)  POCT Blood Glucose.: 338 mg/dL (11 Jul 2025 12:12)  POCT Blood Glucose.: 110 mg/dL (11 Jul 2025 09:32)  POCT Blood Glucose.: 65 mg/dL (11 Jul 2025 08:45)  POCT Blood Glucose.: 269 mg/dL (10 Jul 2025 21:56)  POCT Blood Glucose.: 343 mg/dL (10 Jul 2025 17:21)  POCT Blood Glucose.: 202 mg/dL (09 Jul 2025 08:23)  POCT Blood Glucose.: 239 mg/dL (08 Jul 2025 23:48)      RADIOLOGY, EKG & ADDITIONAL TESTS: Reviewed.  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 The pt developed immunotherapy-induced colitis 3 weeks ago at which point the Keytruda was stopped and he was started on a Prednisone taper. Initial dosage of Prednisone at 60mg and currently tapered down to 30mg. After onset of BLE swelling, Lasix dosage was increased to 60mg. The pt also notes that he used Lidocaine cream for his leg pain with significant relief. After noting no change in BLE swelling, pt presented to the ED for further treatment.    INTERVAL EVENTS:   No acute events overnight. Pt is still on contact precautions for Norovirus. He had ACE wraps placed on his bilateral LEs that he kept on until approx 5am    SUBJECTIVE:   Patient seen and examined at bedside. Still edematous, possibly marginal improvement. He also stated that he had 2 well formed bowel movements overnight. Still reporting some pain in R forearm/wrist along with mild swelling but xray does not have significant findings. Otherwise feeling relatively well    ROS:  Positive for chronic cough. Negative for SOB, fever, chills, chest pain, abdominal pain, nausea, vomiting, diarrhea, dysuria, dizziness, headache.        VITAL SIGNS:  Vital Signs Last 24 Hrs  T(C): 36.7 (15 Jul 2025 13:10), Max: 36.7 (15 Jul 2025 06:38)  T(F): 98.1 (15 Jul 2025 13:10), Max: 98.1 (15 Jul 2025 13:10)  HR: 84 (15 Jul 2025 13:10) (63 - 84)  BP: 135/78 (15 Jul 2025 13:10) (135/78 - 149/-)  BP(mean): 77 (15 Jul 2025 07:19) (77 - 101)  RR: 18 (15 Jul 2025 13:10) (17 - 18)  SpO2: 98% (15 Jul 2025 13:10) (98% - 98%)    Parameters below as of 15 Jul 2025 13:10  Patient On (Oxygen Delivery Method): room air    PHYSICAL EXAM:  Constitutional: AOx3. No acute distress. Resting comfortably in bed.  HEENT: Atraumatic. PERRL. EOMI. Clear conjunctiva. Moist mucous membranes.  Neck: Trachea midline. Supple.  Lungs: Mild rales noted at apex of lungs b/l. Lungs otherwise clear to auscultation b/l. No accessory muscle use. No wheezing. No stridor/retracting/tripoding.   Cardiovascular: Regular rate and rhythm. No murmurs, rubs, or gallops.  Abdomen: Soft, non-tender, non-distended. No rebound or guarding.    Extremities: 3+ b/l LE pitting edema still present + erythema w/o warmth. Right wrist edema.  Skin: Multiple, weeping skin wounds noted to b/l ankles. No pus noted.  Neurologic: No apparent focal neurological deficits. Answering questions, following commands, moving all extremities.   Psychiatric: Cooperative. Appropriate mood and affect.     MEDICATIONS  (STANDING):  aspirin enteric coated 81 milliGRAM(s) Oral daily  atorvastatin 80 milliGRAM(s) Oral at bedtime  dextrose 5%. 500 milliLiter(s) (250 mL/Hr) IV Continuous <Continuous>  dextrose 5%. 1000 milliLiter(s) (100 mL/Hr) IV Continuous <Continuous>  dextrose 5%. 1000 milliLiter(s) (50 mL/Hr) IV Continuous <Continuous>  dextrose 50% Injectable 25 Gram(s) IV Push once  dextrose 50% Injectable 12.5 Gram(s) IV Push once  dextrose 50% Injectable 25 Gram(s) IV Push once  ezetimibe 10 milliGRAM(s) Oral daily  furosemide   Injectable 40 milliGRAM(s) IV Push every 24 hours  glucagon  Injectable 1 milliGRAM(s) IntraMuscular once  heparin   Injectable 5000 Unit(s) SubCutaneous every 8 hours  hydrochlorothiazide 25 milliGRAM(s) Oral every 24 hours  insulin lispro (ADMELOG) corrective regimen sliding scale   SubCutaneous Before meals and at bedtime  insulin lispro Injectable (ADMELOG) 4 Unit(s) SubCutaneous three times a day before meals  losartan 25 milliGRAM(s) Oral daily  metoprolol succinate  milliGRAM(s) Oral daily  pantoprazole    Tablet 40 milliGRAM(s) Oral before breakfast  predniSONE   Tablet 20 milliGRAM(s) Oral every 24 hours  thiamine 100 milliGRAM(s) Oral every 24 hours  trimethoprim   80 mG/sulfamethoxazole 400 mG 1 Tablet(s) Oral daily    MEDICATIONS  (PRN):  acetaminophen     Tablet .. 650 milliGRAM(s) Oral every 6 hours PRN Mild Pain (1 - 3)  albuterol    90 MICROgram(s) HFA Inhaler 2 Puff(s) Inhalation every 6 hours PRN Shortness of Breath and/or Wheezing  dextrose Oral Gel 15 Gram(s) Oral once PRN Blood Glucose LESS THAN 70 milliGRAM(s)/deciliter    ALLERGIES:  NKDA, NKFA, [employer]      LABS:                         13.8   25.15 )-----------( 208      ( 15 Jul 2025 07:00 )             41.9     07-15    132[L]  |  92[L]  |  48[H]  ----------------------------<  190[H]  4.5   |  33[H]  |  1.03    Ca    9.1      15 Jul 2025 07:00  Phos  2.6     07-15  Mg     2.1     07-15        Urinalysis Basic - ( 15 Jul 2025 07:00 )    Color: x / Appearance: x / SG: x / pH: x  Gluc: 190 mg/dL / Ketone: x  / Bili: x / Urobili: x   Blood: x / Protein: x / Nitrite: x   Leuk Esterase: x / RBC: x / WBC x   Sq Epi: x / Non Sq Epi: x / Bacteria: x          RADIOLOGY, EKG & ADDITIONAL TESTS: Reviewed.

## 2025-07-15 NOTE — PROGRESS NOTE ADULT - PROBLEM SELECTOR PLAN 1
Third-spacing 2/2 to NSCLC vs heart failure vs prednisone use.  - Less likely HF (normal echo in May although BNP 1600. No resp sx and chest clear)  - Monitor edema as pred continues to taper  - Compression stockings/ACE wraps  - advised to keep legs raised as much as possible  - 40mg IV LASIX BID + 25mg ORAL Metolazone  - daily weights  - strict i/o  - Consult Dr. Pérez Third-spacing 2/2 to NSCLC vs heart failure vs prednisone use.  - Less likely HF (echo 7/15 not significant)  - Monitor edema as pred continues to taper  - Compression stockings/ACE wraps  - advised to keep legs raised as much as possible  - New diuretic regimen IV Bumex 2mg BID + PO Metolazone 5mg QD as per Dr Pérez  - daily weights  - strict i/o

## 2025-07-15 NOTE — PROGRESS NOTE ADULT - PROBLEM SELECTOR PLAN 12
F: None  E: None  N: Nutrition consulted. Prescribed Ensure max x1/day. Placed on low carb diet.   DVT ppx: Heparin - 5000 units subcutaneous every 8 hrs  Bowel: None  Dispo: RMF  Code status:

## 2025-07-15 NOTE — PROGRESS NOTE ADULT - ATTENDING COMMENTS
Pt reports stool more well formed. No pain in BLE.  Steroids decreased to prednisone 20mg daily  BLE Edema still present in calf through feet. ACE Wrap w elevation  Appreciate vascular cardiology recs - TTE repeated. Shows preserved EF, nml PASP, no signifcant diastolic dysfunction   - increase diuresis to bumex IV BID w  metolazone x1 dosefor sequential blockade   - BMP w Mg. Supplement K/Mg. Monitor Cr and CO2 for contraction alkalosis

## 2025-07-15 NOTE — PROGRESS NOTE ADULT - ASSESSMENT
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 LE venous duplex ruled out DVT b/l. Labs showed hypokalemia (2.8) and pre-renal MARYAN (BUN/Cr 67/1.65). Mr. Christopher developed immunotherapy-induced colitis 3 weeks ago at which point the Keytruda was stopped and he was started on a Prednisone taper. Initial dosage of Prednisone at 60mg and currently tapered down to 30mg. After onset of BLE swelling, Lasix dosage was increased to 60mg.     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 Discussed with Dr. Julianne Woodruff.    ======================================    Outpatient appointment made with medical oncology, as below. Please include in patient's discharge paperwork. Please notify patient at bedside, as well.    Appointment time/date:  7/17/25 at 11:00am    Dr. 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 LE venous duplex ruled out DVT b/l. Labs showed hypokalemia (2.8) and pre-renal MARYAN (BUN/Cr 67/1.65). Mr. Christopher developed immunotherapy-induced colitis 3 weeks ago at which point the Keytruda was stopped and he was started on a Prednisone taper. Initial dosage of Prednisone at 60mg and currently tapered down to 30mg. After onset of BLE swelling, Lasix dosage was increased to 60mg.     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 please note new date, appointment has been rescheduled    Heme/onc will continue to follow. Discussed with Dr. Julianne Woodruff.    ======================================    Outpatient appointment made with medical oncology, as below. Please include in patient's discharge paperwork. Please notify patient at bedside, as well.    Appointment time/date:  7/24/25 at 11:00am    Dr. Kolby Pratt  [address] [address]

## 2025-07-15 NOTE — PROGRESS NOTE ADULT - PROBLEM SELECTOR PLAN 7
Has been on regular Prednisolone  - Downtrending   - Neutrophilia  - Steroid related? Tapering steroids  - Will continue to monitor with daily labs

## 2025-07-15 NOTE — CONSULT NOTE ADULT - SUBJECTIVE AND OBJECTIVE BOX
VASCULAR CARDIOLOGY ATTENDING CONSULT NOTE    SERVICE LINE: [phone]    HPI  75 yo man PMHx of severe carotid stenosis s/p CEA, CHB s/p PPM, HTN, DM2, COPD, stage IV metastatic non-small cell lung cancer s/p immunotherapy s/p colitis    Current Medications:   acetaminophen     Tablet .. 650 milliGRAM(s) Oral every 6 hours PRN  albuterol    90 MICROgram(s) HFA Inhaler 2 Puff(s) Inhalation every 6 hours PRN  aspirin enteric coated 81 milliGRAM(s) Oral daily  atorvastatin 80 milliGRAM(s) Oral at bedtime  bumetanide Injectable 2 milliGRAM(s) IV Push <User Schedule>  dextrose 5%. 500 milliLiter(s) IV Continuous <Continuous>  dextrose 5%. 1000 milliLiter(s) IV Continuous <Continuous>  dextrose 5%. 1000 milliLiter(s) IV Continuous <Continuous>  dextrose 50% Injectable 25 Gram(s) IV Push once  dextrose 50% Injectable 12.5 Gram(s) IV Push once  dextrose 50% Injectable 25 Gram(s) IV Push once  dextrose Oral Gel 15 Gram(s) Oral once PRN  ezetimibe 10 milliGRAM(s) Oral daily  glucagon  Injectable 1 milliGRAM(s) IntraMuscular once  heparin   Injectable 5000 Unit(s) SubCutaneous every 8 hours  insulin lispro (ADMELOG) corrective regimen sliding scale   SubCutaneous Before meals and at bedtime  insulin lispro Injectable (ADMELOG) 4 Unit(s) SubCutaneous three times a day before meals  losartan 25 milliGRAM(s) Oral daily  metOLazone 5 milliGRAM(s) Oral every 24 hours  metoprolol succinate  milliGRAM(s) Oral daily  pantoprazole    Tablet 40 milliGRAM(s) Oral before breakfast  predniSONE   Tablet 20 milliGRAM(s) Oral every 24 hours  thiamine 100 milliGRAM(s) Oral every 24 hours  trimethoprim   80 mG/sulfamethoxazole 400 mG 1 Tablet(s) Oral daily      REVIEW OF SYSTEMS:  CONSTITUTIONAL: No weakness, fevers or chills  EYES/ENT: No visual changes;  No dysphagia  NECK: No pain or stiffness  RESPIRATORY: No cough, wheezing, hemoptysis; No shortness of breath  CARDIOVASCULAR: No chest pain or palpitations; No lower extremity edema  GASTROINTESTINAL: No abdominal or epigastric pain. No nausea, vomiting, or hematemesis; No diarrhea or constipation. No melena or hematochezia.  BACK: No back pain  GENITOURINARY: No dysuria, frequency or hematuria  NEUROLOGICAL: No numbness or weakness  SKIN: No itching, burning, rashes, or lesions   All other review of systems is negative unless indicated above.    Physical Exam:  T(F): 98 (07-15), Max: 98 (07-15)  HR: 63 (07-15) (63 - 80)  BP: 149/- (07-15) (136/82 - 149/-)  BP(mean): 77 (07-15) (77 - 101)  ABP: --  ABP(mean): --  RR: 17 (07-15)  SpO2: 98% (07-15)  GENERAL: No acute distress, well-developed  HEAD:  Atraumatic, Normocephalic  ENT: EOMI, PERRLA, conjunctiva and sclera clear, Neck supple, No JVD, moist mucosa  CHEST/LUNG: Clear to auscultation bilaterally; No wheeze, equal breath sounds bilaterally   BACK: No spinal tenderness  HEART: Regular rate and rhythm; No murmurs, rubs, or gallops  ABDOMEN: Soft, Nontender, Nondistended; Bowel sounds present  EXTREMITIES:  No clubbing, cyanosis, or edema  PSYCH: Nl behavior, nl affect  NEUROLOGY: AAOx3, non-focal, cranial nerves intact  SKIN: Normal color, No rashes or lesions  LINES:    Cardiovascular Diagnostic Testing: personally reviewed    CXR: Personally reviewed    Labs: Personally reviewed                        13.8   25.15 )-----------( 208      ( 15 Jul 2025 07:00 )             41.9     07-15    132[L]  |  92[L]  |  48[H]  ----------------------------<  190[H]  4.5   |  33[H]  |  1.03    Ca    9.1      15 Jul 2025 07:00  Phos  2.6     07-15  Mg     2.1     07-15

## 2025-07-15 NOTE — PROGRESS NOTE ADULT - PROBLEM SELECTOR PLAN 3
Pain in R forearm, mild swelling, no warmth  - Lidocaine patch previously applied with some relief  - Runs warm water on it in bathroom with good symptomatic relief Pain in R forearm, mild swelling, no warmth  - Lidocaine patch previously applied with some relief  - Runs warm water on it in bathroom with good symptomatic relief  - no significant findings on forearm xray

## 2025-07-15 NOTE — CONSULT NOTE ADULT - ASSESSMENT
75 yo man PMHx of severe carotid stenosis s/p CEA, CHB s/p PPM, HTN, DM2, COPD, stage IV metastatic non-small cell lung cancer s/p immunotherapy s/p colitis    Assessment  1. B/l LE edema  He reports acute new b/l below the knee pitting edema since starting PO prednisone  2. MARYAN - resolved  3. Severe carotid stenosis s/p CEA  4. CHB s/p PPM  5. HTN, DM2, COPD, stage IV metastatic lung cancer    Plan  1. No concern for hepatic/renal/cardiac etiology. LE venous duplex US w/o DVT. No evidence of lymphedema as above the knee has no pitting edema.  2. However, given immunotherapy, cannot r/o new HF, and no TTE was done since May/2025 (at that time was normal), would recommend new TTE  3. If TTE unremarkable, likely prednisone side effect, to continue LE compression with ACE wrap, diuretics with IV Bumex 2mg BID + metolazone 5mg QD (and will switch to PO torsemide when read for dc)

## 2025-07-15 NOTE — PROGRESS NOTE ADULT - SUBJECTIVE AND OBJECTIVE BOX
Patient is a 74y old  Male who presents with a chief complaint of BLE edema (15 Jul 2025 12:28)    INTERVAL HISTORY: Interim events reviewed. Now tapered to prednisone 20mg daily since . Being diuresed with furosemide initially, with modest improvement in leg swelling. Vascular cardiology consulted, now on bumetanide/metolazone. Subjectively, feels his leg swelling is slightly improved since admission. Generalized weakness is modestly improved.    REVIEW OF SYSTEMS:  All other review of systems is negative unless indicated above.    OBJECTIVE:  T(C): 36.7 (07-15-25 @ 06:38), Max: 36.7 (07-15-25 @ 06:38)  HR: 63 (07-15-25 @ 07:19) (63 - 80)  BP: 149/- (07-15-25 @ 07:19) (136/82 - 149/-)  RR: 17 (07-15-25 @ 06:38) (17 - 17)  SpO2: 98% (07-15-25 @ 06:38) (98% - 98%)  Daily     Daily Weight in k.5 (15 Jul 2025 07:19)    Physical Exam:  General: in no acute distress, non-toxic appearing  Eyes: EOMI intact bilaterally. Anicteric sclerae, moist conjunctivae  HENT: Moist mucous membranes  Neck: Trachea midline, supple  Lungs: Normal respiratory effort. No accessory muscle use  Neurological: Alert and appropriately conversant  Extremities: No clubbing, cyanosis. 2+ pitting edema to knees. LEs wrapped in ACE bandages.  Skin: Warm and dry. No obvious rash     Medications:  MEDICATIONS  (STANDING):  aspirin enteric coated 81 milliGRAM(s) Oral daily  atorvastatin 80 milliGRAM(s) Oral at bedtime  bumetanide Injectable 2 milliGRAM(s) IV Push <User Schedule>  dextrose 5%. 500 milliLiter(s) (250 mL/Hr) IV Continuous <Continuous>  dextrose 5%. 1000 milliLiter(s) (100 mL/Hr) IV Continuous <Continuous>  dextrose 5%. 1000 milliLiter(s) (50 mL/Hr) IV Continuous <Continuous>  dextrose 50% Injectable 25 Gram(s) IV Push once  dextrose 50% Injectable 12.5 Gram(s) IV Push once  dextrose 50% Injectable 25 Gram(s) IV Push once  ezetimibe 10 milliGRAM(s) Oral daily  glucagon  Injectable 1 milliGRAM(s) IntraMuscular once  heparin   Injectable 5000 Unit(s) SubCutaneous every 8 hours  insulin lispro (ADMELOG) corrective regimen sliding scale   SubCutaneous Before meals and at bedtime  insulin lispro Injectable (ADMELOG) 4 Unit(s) SubCutaneous three times a day before meals  losartan 25 milliGRAM(s) Oral daily  metOLazone 5 milliGRAM(s) Oral every 24 hours  metoprolol succinate  milliGRAM(s) Oral daily  pantoprazole    Tablet 40 milliGRAM(s) Oral before breakfast  predniSONE   Tablet 20 milliGRAM(s) Oral every 24 hours  thiamine 100 milliGRAM(s) Oral every 24 hours  trimethoprim   80 mG/sulfamethoxazole 400 mG 1 Tablet(s) Oral daily    MEDICATIONS  (PRN):  acetaminophen     Tablet .. 650 milliGRAM(s) Oral every 6 hours PRN Mild Pain (1 - 3)  albuterol    90 MICROgram(s) HFA Inhaler 2 Puff(s) Inhalation every 6 hours PRN Shortness of Breath and/or Wheezing  dextrose Oral Gel 15 Gram(s) Oral once PRN Blood Glucose LESS THAN 70 milliGRAM(s)/deciliter      Labs:                        13.8   25.15 )-----------( 208      ( 15 Jul 2025 07:00 )             41.9     07-15    132[L]  |  92[L]  |  48[H]  ----------------------------<  190[H]  4.5   |  33[H]  |  1.03    Ca    9.1      15 Jul 2025 07:00  Phos  2.6     07-15  Mg     2.1     07-15        Urinalysis Basic - ( 15 Jul 2025 07:00 )    Color: x / Appearance: x / SG: x / pH: x  Gluc: 190 mg/dL / Ketone: x  / Bili: x / Urobili: x   Blood: x / Protein: x / Nitrite: x   Leuk Esterase: x / RBC: x / WBC x   Sq Epi: x / Non Sq Epi: x / Bacteria: x      SARS-CoV-2: NotDetec (2025 21:20)      Radiology: Reviewed

## 2025-07-16 ENCOUNTER — TRANSCRIPTION ENCOUNTER (OUTPATIENT)
Age: 75
End: 2025-07-16

## 2025-07-16 LAB
ANION GAP SERPL CALC-SCNC: 14 MMOL/L — SIGNIFICANT CHANGE UP (ref 5–17)
ANION GAP SERPL CALC-SCNC: 15 MMOL/L — SIGNIFICANT CHANGE UP (ref 5–17)
BUN SERPL-MCNC: 56 MG/DL — HIGH (ref 7–23)
BUN SERPL-MCNC: 77 MG/DL — HIGH (ref 7–23)
CALCIUM SERPL-MCNC: 8.6 MG/DL — SIGNIFICANT CHANGE UP (ref 8.4–10.5)
CALCIUM SERPL-MCNC: 9.7 MG/DL — SIGNIFICANT CHANGE UP (ref 8.4–10.5)
CHLORIDE SERPL-SCNC: 89 MMOL/L — LOW (ref 96–108)
CHLORIDE SERPL-SCNC: 93 MMOL/L — LOW (ref 96–108)
CO2 SERPL-SCNC: 29 MMOL/L — SIGNIFICANT CHANGE UP (ref 22–31)
CO2 SERPL-SCNC: 29 MMOL/L — SIGNIFICANT CHANGE UP (ref 22–31)
CREAT SERPL-MCNC: 0.98 MG/DL — SIGNIFICANT CHANGE UP (ref 0.5–1.3)
CREAT SERPL-MCNC: 1.36 MG/DL — HIGH (ref 0.5–1.3)
EGFR: 54 ML/MIN/1.73M2 — LOW
EGFR: 54 ML/MIN/1.73M2 — LOW
EGFR: 80 ML/MIN/1.73M2 — SIGNIFICANT CHANGE UP
EGFR: 80 ML/MIN/1.73M2 — SIGNIFICANT CHANGE UP
GLUCOSE SERPL-MCNC: 134 MG/DL — HIGH (ref 70–99)
GLUCOSE SERPL-MCNC: 259 MG/DL — HIGH (ref 70–99)
HCT VFR BLD CALC: 42.1 % — SIGNIFICANT CHANGE UP (ref 39–50)
HGB BLD-MCNC: 13.5 G/DL — SIGNIFICANT CHANGE UP (ref 13–17)
MAGNESIUM SERPL-MCNC: 2 MG/DL — SIGNIFICANT CHANGE UP (ref 1.6–2.6)
MCHC RBC-ENTMCNC: 28 PG — SIGNIFICANT CHANGE UP (ref 27–34)
MCHC RBC-ENTMCNC: 32.1 G/DL — SIGNIFICANT CHANGE UP (ref 32–36)
MCV RBC AUTO: 87.2 FL — SIGNIFICANT CHANGE UP (ref 80–100)
NRBC # BLD AUTO: 0 K/UL — SIGNIFICANT CHANGE UP (ref 0–0)
NRBC # FLD: 0 K/UL — SIGNIFICANT CHANGE UP (ref 0–0)
NRBC BLD AUTO-RTO: 0 /100 WBCS — SIGNIFICANT CHANGE UP (ref 0–0)
PHOSPHATE SERPL-MCNC: 3.2 MG/DL — SIGNIFICANT CHANGE UP (ref 2.5–4.5)
PLATELET # BLD AUTO: 211 K/UL — SIGNIFICANT CHANGE UP (ref 150–400)
PMV BLD: 10.8 FL — SIGNIFICANT CHANGE UP (ref 7–13)
POTASSIUM SERPL-MCNC: 3.3 MMOL/L — LOW (ref 3.5–5.3)
POTASSIUM SERPL-MCNC: 4 MMOL/L — SIGNIFICANT CHANGE UP (ref 3.5–5.3)
POTASSIUM SERPL-SCNC: 3.3 MMOL/L — LOW (ref 3.5–5.3)
POTASSIUM SERPL-SCNC: 4 MMOL/L — SIGNIFICANT CHANGE UP (ref 3.5–5.3)
RBC # BLD: 4.83 M/UL — SIGNIFICANT CHANGE UP (ref 4.2–5.8)
RBC # FLD: 17.8 % — HIGH (ref 10.3–14.5)
SODIUM SERPL-SCNC: 132 MMOL/L — LOW (ref 135–145)
SODIUM SERPL-SCNC: 137 MMOL/L — SIGNIFICANT CHANGE UP (ref 135–145)
WBC # BLD: 30.19 K/UL — HIGH (ref 3.8–10.5)
WBC # FLD AUTO: 30.19 K/UL — HIGH (ref 3.8–10.5)

## 2025-07-16 PROCEDURE — 93306 TTE W/DOPPLER COMPLETE: CPT

## 2025-07-16 PROCEDURE — 84156 ASSAY OF PROTEIN URINE: CPT

## 2025-07-16 PROCEDURE — 93970 EXTREMITY STUDY: CPT

## 2025-07-16 PROCEDURE — 87507 IADNA-DNA/RNA PROBE TQ 12-25: CPT

## 2025-07-16 PROCEDURE — 83880 ASSAY OF NATRIURETIC PEPTIDE: CPT

## 2025-07-16 PROCEDURE — 80048 BASIC METABOLIC PNL TOTAL CA: CPT

## 2025-07-16 PROCEDURE — 85025 COMPLETE CBC W/AUTO DIFF WBC: CPT

## 2025-07-16 PROCEDURE — 86850 RBC ANTIBODY SCREEN: CPT

## 2025-07-16 PROCEDURE — 86900 BLOOD TYPING SEROLOGIC ABO: CPT

## 2025-07-16 PROCEDURE — 73090 X-RAY EXAM OF FOREARM: CPT

## 2025-07-16 PROCEDURE — 87324 CLOSTRIDIUM AG IA: CPT

## 2025-07-16 PROCEDURE — 93005 ELECTROCARDIOGRAM TRACING: CPT

## 2025-07-16 PROCEDURE — 97116 GAIT TRAINING THERAPY: CPT

## 2025-07-16 PROCEDURE — 84133 ASSAY OF URINE POTASSIUM: CPT

## 2025-07-16 PROCEDURE — 99233 SBSQ HOSP IP/OBS HIGH 50: CPT | Mod: GC

## 2025-07-16 PROCEDURE — 82962 GLUCOSE BLOOD TEST: CPT

## 2025-07-16 PROCEDURE — 84540 ASSAY OF URINE/UREA-N: CPT

## 2025-07-16 PROCEDURE — 83735 ASSAY OF MAGNESIUM: CPT

## 2025-07-16 PROCEDURE — 99233 SBSQ HOSP IP/OBS HIGH 50: CPT

## 2025-07-16 PROCEDURE — 97165 OT EVAL LOW COMPLEX 30 MIN: CPT

## 2025-07-16 PROCEDURE — 81003 URINALYSIS AUTO W/O SCOPE: CPT

## 2025-07-16 PROCEDURE — 0225U NFCT DS DNA&RNA 21 SARSCOV2: CPT

## 2025-07-16 PROCEDURE — 85027 COMPLETE CBC AUTOMATED: CPT

## 2025-07-16 PROCEDURE — 97161 PT EVAL LOW COMPLEX 20 MIN: CPT

## 2025-07-16 PROCEDURE — 84300 ASSAY OF URINE SODIUM: CPT

## 2025-07-16 PROCEDURE — 87040 BLOOD CULTURE FOR BACTERIA: CPT

## 2025-07-16 PROCEDURE — 82570 ASSAY OF URINE CREATININE: CPT

## 2025-07-16 PROCEDURE — 36415 COLL VENOUS BLD VENIPUNCTURE: CPT

## 2025-07-16 PROCEDURE — 86901 BLOOD TYPING SEROLOGIC RH(D): CPT

## 2025-07-16 PROCEDURE — 87637 SARSCOV2&INF A&B&RSV AMP PRB: CPT

## 2025-07-16 PROCEDURE — 84100 ASSAY OF PHOSPHORUS: CPT

## 2025-07-16 PROCEDURE — 80053 COMPREHEN METABOLIC PANEL: CPT

## 2025-07-16 PROCEDURE — 83935 ASSAY OF URINE OSMOLALITY: CPT

## 2025-07-16 PROCEDURE — 71045 X-RAY EXAM CHEST 1 VIEW: CPT

## 2025-07-16 PROCEDURE — 87449 NOS EACH ORGANISM AG IA: CPT

## 2025-07-16 RX ORDER — TORSEMIDE 10 MG
20 TABLET ORAL EVERY 24 HOURS
Refills: 0 | Status: DISCONTINUED | OUTPATIENT
Start: 2025-07-17 | End: 2025-07-17

## 2025-07-16 RX ORDER — BUMETANIDE 1 MG/1
2 TABLET ORAL ONCE
Refills: 0 | Status: COMPLETED | OUTPATIENT
Start: 2025-07-16 | End: 2025-07-16

## 2025-07-16 RX ORDER — BACITRACIN ZINC AND POLYMYXIN B SULFATE 500; 10000 [USP'U]/G; [USP'U]/G
1 OINTMENT TOPICAL
Refills: 0 | Status: DISCONTINUED | OUTPATIENT
Start: 2025-07-16 | End: 2025-07-18

## 2025-07-16 RX ADMIN — HEPARIN SODIUM 5000 UNIT(S): 1000 INJECTION INTRAVENOUS; SUBCUTANEOUS at 14:31

## 2025-07-16 RX ADMIN — ATORVASTATIN CALCIUM 80 MILLIGRAM(S): 80 TABLET, FILM COATED ORAL at 21:08

## 2025-07-16 RX ADMIN — BUMETANIDE 2 MILLIGRAM(S): 1 TABLET ORAL at 06:11

## 2025-07-16 RX ADMIN — Medication 1 TABLET(S): at 11:50

## 2025-07-16 RX ADMIN — Medication 40 MILLIGRAM(S): at 06:11

## 2025-07-16 RX ADMIN — INSULIN LISPRO 6: 100 INJECTION, SOLUTION INTRAVENOUS; SUBCUTANEOUS at 13:34

## 2025-07-16 RX ADMIN — INSULIN LISPRO 4 UNIT(S): 100 INJECTION, SOLUTION INTRAVENOUS; SUBCUTANEOUS at 09:35

## 2025-07-16 RX ADMIN — INSULIN LISPRO 2: 100 INJECTION, SOLUTION INTRAVENOUS; SUBCUTANEOUS at 09:36

## 2025-07-16 RX ADMIN — INSULIN LISPRO 4 UNIT(S): 100 INJECTION, SOLUTION INTRAVENOUS; SUBCUTANEOUS at 13:35

## 2025-07-16 RX ADMIN — INSULIN LISPRO 4: 100 INJECTION, SOLUTION INTRAVENOUS; SUBCUTANEOUS at 21:49

## 2025-07-16 RX ADMIN — Medication 81 MILLIGRAM(S): at 11:50

## 2025-07-16 RX ADMIN — METOPROLOL SUCCINATE 100 MILLIGRAM(S): 50 TABLET, EXTENDED RELEASE ORAL at 06:11

## 2025-07-16 RX ADMIN — HEPARIN SODIUM 5000 UNIT(S): 1000 INJECTION INTRAVENOUS; SUBCUTANEOUS at 06:11

## 2025-07-16 RX ADMIN — HEPARIN SODIUM 5000 UNIT(S): 1000 INJECTION INTRAVENOUS; SUBCUTANEOUS at 21:08

## 2025-07-16 RX ADMIN — LOSARTAN POTASSIUM 25 MILLIGRAM(S): 100 TABLET, FILM COATED ORAL at 06:11

## 2025-07-16 RX ADMIN — INSULIN LISPRO 2: 100 INJECTION, SOLUTION INTRAVENOUS; SUBCUTANEOUS at 18:05

## 2025-07-16 RX ADMIN — Medication 100 MILLIGRAM(S): at 21:09

## 2025-07-16 RX ADMIN — BUMETANIDE 2 MILLIGRAM(S): 1 TABLET ORAL at 15:34

## 2025-07-16 RX ADMIN — BACITRACIN ZINC AND POLYMYXIN B SULFATE 1 APPLICATION(S): 500; 10000 OINTMENT TOPICAL at 18:06

## 2025-07-16 RX ADMIN — EZETIMIBE 10 MILLIGRAM(S): 10 TABLET ORAL at 11:49

## 2025-07-16 RX ADMIN — PREDNISONE 20 MILLIGRAM(S): 20 TABLET ORAL at 11:50

## 2025-07-16 RX ADMIN — INSULIN LISPRO 4 UNIT(S): 100 INJECTION, SOLUTION INTRAVENOUS; SUBCUTANEOUS at 18:04

## 2025-07-16 NOTE — PROGRESS NOTE ADULT - PROBLEM SELECTOR PLAN 9
- Resolving  - Downtrending BUN:Cr  - Monitor in the context of new diuretic regimen - Resolving  - Increased? BUN:Cr  - Monitor in the context of new diuretic regimen Resolved  - CTM in the context of new diuretic regimen

## 2025-07-16 NOTE — PROGRESS NOTE ADULT - PROBLEM SELECTOR PLAN 12
F: None  E: None  N: Nutrition consulted. Prescribed Ensure max x1/day. Placed on low carb diet.   DVT ppx: Heparin - 5000 units subcutaneous every 8 hrs  Bowel: None  Dispo: [facility]  Code status: F: None  E: Replete PRN  N: Prescribed Ensure max x1/day. Placed on low carb diet.   DVT ppx: Heparin - 5000 units subcutaneous every 8 hrs  Bowel: None  Dispo: [facility]  Code status:

## 2025-07-16 NOTE — DISCHARGE NOTE PROVIDER - DETAILS OF MALNUTRITION DIAGNOSIS/DIAGNOSES
This patient has been assessed with a concern for Malnutrition and was treated during this hospitalization for the following Nutrition diagnosis/diagnoses:     -  07/09/2025: Severe protein-calorie malnutrition   -  07/09/2025: Underweight (BMI < 19)

## 2025-07-16 NOTE — DISCHARGE NOTE PROVIDER - NSDCFUADDAPPT_GEN_ALL_CORE_FT
Follow up with ENDOCRINOLOGY (hormone doctors) within 1 week of discharge. Your endocrinologist Dr. Whiting will have her office reach out to you on Monday 07/21 to schedule an appointment. If you do not hear from their office, please call [phone] to make an appointment.

## 2025-07-16 NOTE — DISCHARGE NOTE PROVIDER - NSDCMRMEDTOKEN_GEN_ALL_CORE_FT
Albuterol (Eqv-Proventil HFA) 90 mcg/inh inhalation aerosol: 2 puff(s) inhaled 4 times a day  alcohol swabs : Apply topically to affected area 4 times a day  Aspir 81 oral delayed release tablet: 1 tab(s) orally once a day  ezetimibe 10 mg oral tablet: 1 tab(s) orally once a day  glucometer (prodigy): Test blood sugars four times a day. Dispense #1 glucometer.  hydroCHLOROthiazide 25 mg oral tablet: 1 tab(s) orally once a day  insulin lispro 100 units/mL injectable solution: 4 unit(s) injectable 4 times a day (before meals and at bedtime)  lancets: 1 application subcutaneously 4 times a day  Lipitor 80 mg oral tablet: 1 tab(s) orally once a day  lisinopril 40 mg oral tablet: 1 tab(s) orally once a day  metoprolol succinate 100 mg oral tablet, extended release: 1 tab(s) orally once a day  pantoprazole 40 mg oral delayed release tablet: 1 tab(s) orally once a day  predniSONE 20 mg oral tablet: 3 tab(s) orally every 12 hours  sulfamethoxazole-trimethoprim 400 mg-80 mg oral tablet: 1 tab(s) orally once a day  test strips (prodigy): 1 application subcutaneously 4 times a day. ** Compatible with patient&#x27;s glucometer **   Albuterol (Eqv-Proventil HFA) 90 mcg/inh inhalation aerosol: 2 puff(s) inhaled 4 times a day  alcohol swabs : Apply topically to affected area 4 times a day  Aspir 81 oral delayed release tablet: 1 tab(s) orally once a day  ezetimibe 10 mg oral tablet: 1 tab(s) orally once a day  glucometer (prodigy): Test blood sugars four times a day. Dispense #1 glucometer.  hydroCHLOROthiazide 25 mg oral tablet: 1 tab(s) orally once a day  insulin lispro 100 units/mL injectable solution: 4 unit(s) injectable 4 times a day (before meals and at bedtime)  lancets: 1 application subcutaneously 4 times a day  Lipitor 80 mg oral tablet: 1 tab(s) orally once a day  losartan 25 mg oral tablet: 1 tab(s) orally once a day  metoprolol succinate 100 mg oral tablet, extended release: 1 tab(s) orally once a day  pantoprazole 40 mg oral delayed release tablet: 1 tab(s) orally once a day  predniSONE 10 mg oral tablet: 1 tab(s) orally every 24 hours  predniSONE 20 mg oral tablet: 20 milligram(s) orally every 24 hours  sulfamethoxazole-trimethoprim 400 mg-80 mg oral tablet: 1 tab(s) orally once a day  test strips (prodigy): 1 application subcutaneously 4 times a day. ** Compatible with patient&#x27;s glucometer **   Albuterol (Eqv-Proventil HFA) 90 mcg/inh inhalation aerosol: 2 puff(s) inhaled 4 times a day  Aspir 81 oral delayed release tablet: 1 tab(s) orally once a day  ezetimibe 10 mg oral tablet: 1 tab(s) orally once a day  glucometer (prodigy): Test blood sugars four times a day. Dispense #1 glucometer.  insulin lispro 100 units/mL injectable solution: 4 unit(s) injectable 4 times a day (before meals and at bedtime)  lancets: 1 application subcutaneously 4 times a day  Lipitor 80 mg oral tablet: 1 tab(s) orally once a day  losartan 25 mg oral tablet: 1 tab(s) orally once a day  metoprolol succinate 100 mg oral tablet, extended release: 1 tab(s) orally once a day  Multiple Vitamins oral tablet: 1 tab(s) orally once a day  pantoprazole 40 mg oral delayed release tablet: 1 tab(s) orally once a day  predniSONE 10 mg oral tablet: 1 tab(s) orally every 24 hours  predniSONE 20 mg oral tablet: 20 milligram(s) orally every 24 hours  sulfamethoxazole-trimethoprim 400 mg-80 mg oral tablet: 1 tab(s) orally once a day  test strips (prodigy): 1 application subcutaneously 4 times a day. ** Compatible with patient&#x27;s glucometer **  thiamine 100 mg oral tablet: 3 tab(s) orally every 24 hours   Albuterol (Eqv-Proventil HFA) 90 mcg/inh inhalation aerosol: 2 puff(s) inhaled 4 times a day  Aspir 81 oral delayed release tablet: 1 tab(s) orally once a day  ezetimibe 10 mg oral tablet: 1 tab(s) orally once a day  glucometer (prodigy): Test blood sugars four times a day. Dispense #1 glucometer.  insulin lispro 100 units/mL injectable solution: 4 unit(s) injectable 4 times a day (before meals and at bedtime)  lancets: 1 application subcutaneously 4 times a day  Lipitor 80 mg oral tablet: 1 tab(s) orally once a day  losartan 25 mg oral tablet: 1 tab(s) orally once a day  metoprolol succinate 100 mg oral tablet, extended release: 1 tab(s) orally once a day  Multiple Vitamins oral tablet: 1 tab(s) orally once a day  pantoprazole 40 mg oral delayed release tablet: 1 tab(s) orally once a day  predniSONE 10 mg oral tablet: 1 tab(s) orally every 24 hours  predniSONE 20 mg oral tablet: 20 milligram(s) orally every 24 hours  sulfamethoxazole-trimethoprim 400 mg-80 mg oral tablet: 1 tab(s) orally once a day  test strips (prodigy): 1 application subcutaneously 4 times a day. ** Compatible with patient&#x27;s glucometer **  thiamine 100 mg oral tablet: 3 tab(s) orally every 24 hours  torsemide 20 mg oral tablet: 1 tab(s) orally every 24 hours   Adult Diapers - Size Small - 90 diapers for 30 days. : Indication: Fecal Incontinence [R15.9], Norovirus colitis [A08.11]  Dr. Lewis Almazan: [phone] | Dr. Shahana Lowe: [phone]  Albuterol (Eqv-Proventil HFA) 90 mcg/inh inhalation aerosol: 2 puff(s) inhaled 4 times a day  Aspir 81 oral delayed release tablet: 1 tab(s) orally once a day  ezetimibe 10 mg oral tablet: 1 tab(s) orally once a day  glucometer (prodigy): Test blood sugars four times a day. Dispense #1 glucometer.  insulin lispro 100 units/mL injectable solution: 4 unit(s) injectable 4 times a day (before meals and at bedtime)  lancets: 1 application subcutaneously 4 times a day  Lipitor 80 mg oral tablet: 1 tab(s) orally once a day  losartan 25 mg oral tablet: 1 tab(s) orally once a day  metoprolol succinate 100 mg oral tablet, extended release: 1 tab(s) orally once a day  Multiple Vitamins oral tablet: 1 tab(s) orally once a day  pantoprazole 40 mg oral delayed release tablet: 1 tab(s) orally once a day  predniSONE 10 mg oral tablet: 1 tab(s) orally every 24 hours  predniSONE 20 mg oral tablet: 20 milligram(s) orally every 24 hours  sulfamethoxazole-trimethoprim 400 mg-80 mg oral tablet: 1 tab(s) orally once a day  test strips (prodigy): 1 application subcutaneously 4 times a day. ** Compatible with patient&#x27;s glucometer **  thiamine 100 mg oral tablet: 3 tab(s) orally every 24 hours  torsemide 20 mg oral tablet: 1 tab(s) orally every 24 hours   Admelog SoloStar 100 units/mL injectable solution: 10 unit(s) injectable 3 times a day (before meals)  Admelog SoloStar 100 units/mL injectable solution: 1 unit(s) injectable 3 times a day (before meals) as needed for hyperglycemia (per low dose insulin sliding scale) Low dose insulin sliding scale. Check your finger stick glucose before breakfast, lunch, and dinner. Administer insulin based on the following instructions: 1 Unit(s) if Glucose 151 - 200 | 2 Unit(s) if Glucose 201 - 250 | 3 Unit(s) if Glucose 251 - 300 | 4 Unit(s) if Glucose 301 - 350 | 5 Unit(s) if Glucose 351 - 400  6 Unit(s) if Glucose Greater Than 400  Adult Diapers - Size Small - 90 diapers for 30 days. : Indication: Fecal Incontinence [R15.9], Norovirus colitis [A08.11]  Dr. Lewis Almazan: [phone] | Dr. Shahana Lowe: [phone]  Albuterol (Eqv-Proventil HFA) 90 mcg/inh inhalation aerosol: 2 puff(s) inhaled 4 times a day  Aspir 81 oral delayed release tablet: 1 tab(s) orally once a day  ezetimibe 10 mg oral tablet: 1 tab(s) orally once a day  glucometer (prodigy): Test blood sugars four times a day. Dispense #1 glucometer.  lancets: 1 application subcutaneously 4 times a day  Lipitor 80 mg oral tablet: 1 tab(s) orally once a day  losartan 25 mg oral tablet: 1 tab(s) orally once a day  metoprolol succinate 100 mg oral tablet, extended release: 1 tab(s) orally once a day  Multiple Vitamins oral tablet: 1 tab(s) orally once a day  pantoprazole 40 mg oral delayed release tablet: 1 tab(s) orally once a day  predniSONE 10 mg oral tablet: 1 tab(s) orally every 24 hours  predniSONE 20 mg oral tablet: 20 milligram(s) orally every 24 hours  sulfamethoxazole-trimethoprim 400 mg-80 mg oral tablet: 1 tab(s) orally once a day  test strips (prodigy): 1 application subcutaneously 4 times a day. ** Compatible with patient&#x27;s glucometer **  thiamine 100 mg oral tablet: 3 tab(s) orally every 24 hours  torsemide 20 mg oral tablet: 1 tab(s) orally every 24 hours   Admelog SoloStar 100 units/mL injectable solution: 10 unit(s) injectable 3 times a day (before meals)  Admelog SoloStar 100 units/mL injectable solution: 1 unit(s) injectable 3 times a day (before meals) as needed for hyperglycemia (per low dose insulin sliding scale) Low dose insulin sliding scale. Check your finger stick glucose before breakfast, lunch, and dinner. Administer insulin based on the following instructions: 1 Unit(s) if Glucose 151 - 200 | 2 Unit(s) if Glucose 201 - 250 | 3 Unit(s) if Glucose 251 - 300 | 4 Unit(s) if Glucose 301 - 350 | 5 Unit(s) if Glucose 351 - 400  6 Unit(s) if Glucose Greater Than 400  Adult Diapers - Size Small - 90 diapers for 30 days. : Indication: Fecal Incontinence [R15.9], Norovirus colitis [A08.11]  Dr. Lewis Almazan: [phone] | Dr. Shahana Lowe: [phone]  Albuterol (Eqv-Proventil HFA) 90 mcg/inh inhalation aerosol: 2 puff(s) inhaled 4 times a day  Aspir 81 oral delayed release tablet: 1 tab(s) orally once a day  ezetimibe 10 mg oral tablet: 1 tab(s) orally once a day  glucometer (prodigy): Test blood sugars four times a day. Dispense #1 glucometer.  Insulin Pen Needles, 4mm: 1 application subcutaneously 4 times a day. ** Use with insulin pen **  lancets: 1 application subcutaneously 4 times a day  Lipitor 80 mg oral tablet: 1 tab(s) orally once a day  losartan 25 mg oral tablet: 1 tab(s) orally once a day  metoprolol succinate 100 mg oral tablet, extended release: 1 tab(s) orally once a day  Multiple Vitamins oral tablet: 1 tab(s) orally once a day  pantoprazole 40 mg oral delayed release tablet: 1 tab(s) orally once a day  predniSONE 10 mg oral tablet: 1 tab(s) orally every 24 hours  predniSONE 20 mg oral tablet: 20 milligram(s) orally every 24 hours  sulfamethoxazole-trimethoprim 400 mg-80 mg oral tablet: 1 tab(s) orally once a day  test strips (prodigy): 1 application subcutaneously 4 times a day. ** Compatible with patient&#x27;s glucometer **  thiamine 100 mg oral tablet: 3 tab(s) orally every 24 hours  torsemide 20 mg oral tablet: 1 tab(s) orally every 24 hours

## 2025-07-16 NOTE — PROGRESS NOTE ADULT - SUBJECTIVE AND OBJECTIVE BOX
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 The pt developed immunotherapy-induced colitis 3 weeks ago at which point the Keytruda was stopped and he was started on a Prednisone taper. Initial dosage of Prednisone at 60mg and currently tapered down to 30mg. After onset of BLE swelling, Lasix dosage was increased to 60mg. The pt also notes that he used Lidocaine cream for his leg pain with significant relief. After noting no change in BLE swelling, pt presented to the ED for further treatment.    INTERVAL EVENTS:   No acute events overnight. Pt is still on contact precautions for Norovirus. He had ACE wraps placed on his bilateral LEs that he kept on until approx 5am    SUBJECTIVE:   Patient seen and examined at bedside. Still edematous, possibly marginal improvement. He also stated that he had 2 well formed bowel movements overnight. Still reporting some pain in R forearm/wrist along with mild swelling but xray does not have significant findings. Otherwise feeling relatively well    ROS:  Positive for chronic cough. Negative for SOB, fever, chills, chest pain, abdominal pain, nausea, vomiting, diarrhea, dysuria, dizziness, headache.        VITAL SIGNS:  Vital Signs Last 24 Hrs  T(C): 36.7 (15 Jul 2025 13:10), Max: 36.7 (15 Jul 2025 06:38)  T(F): 98.1 (15 Jul 2025 13:10), Max: 98.1 (15 Jul 2025 13:10)  HR: 84 (15 Jul 2025 13:10) (63 - 84)  BP: 135/78 (15 Jul 2025 13:10) (135/78 - 149/-)  BP(mean): 77 (15 Jul 2025 07:19) (77 - 101)  RR: 18 (15 Jul 2025 13:10) (17 - 18)  SpO2: 98% (15 Jul 2025 13:10) (98% - 98%)    Parameters below as of 15 Jul 2025 13:10  Patient On (Oxygen Delivery Method): room air    PHYSICAL EXAM:  Constitutional: AOx3. No acute distress. Resting comfortably in bed.  HEENT: Atraumatic. PERRL. EOMI. Clear conjunctiva. Moist mucous membranes.  Neck: Trachea midline. Supple.  Lungs: Mild rales noted at apex of lungs b/l. Lungs otherwise clear to auscultation b/l. No accessory muscle use. No wheezing. No stridor/retracting/tripoding.   Cardiovascular: Regular rate and rhythm. No murmurs, rubs, or gallops.  Abdomen: Soft, non-tender, non-distended. No rebound or guarding.    Extremities: 3+ b/l LE pitting edema still present + erythema w/o warmth. Right wrist edema.  Skin: Multiple, weeping skin wounds noted to b/l ankles. No pus noted.  Neurologic: No apparent focal neurological deficits. Answering questions, following commands, moving all extremities.   Psychiatric: Cooperative. Appropriate mood and affect.     MEDICATIONS  (STANDING):  aspirin enteric coated 81 milliGRAM(s) Oral daily  atorvastatin 80 milliGRAM(s) Oral at bedtime  dextrose 5%. 500 milliLiter(s) (250 mL/Hr) IV Continuous <Continuous>  dextrose 5%. 1000 milliLiter(s) (100 mL/Hr) IV Continuous <Continuous>  dextrose 5%. 1000 milliLiter(s) (50 mL/Hr) IV Continuous <Continuous>  dextrose 50% Injectable 25 Gram(s) IV Push once  dextrose 50% Injectable 12.5 Gram(s) IV Push once  dextrose 50% Injectable 25 Gram(s) IV Push once  ezetimibe 10 milliGRAM(s) Oral daily  furosemide   Injectable 40 milliGRAM(s) IV Push every 24 hours  glucagon  Injectable 1 milliGRAM(s) IntraMuscular once  heparin   Injectable 5000 Unit(s) SubCutaneous every 8 hours  hydrochlorothiazide 25 milliGRAM(s) Oral every 24 hours  insulin lispro (ADMELOG) corrective regimen sliding scale   SubCutaneous Before meals and at bedtime  insulin lispro Injectable (ADMELOG) 4 Unit(s) SubCutaneous three times a day before meals  losartan 25 milliGRAM(s) Oral daily  metoprolol succinate  milliGRAM(s) Oral daily  pantoprazole    Tablet 40 milliGRAM(s) Oral before breakfast  predniSONE   Tablet 20 milliGRAM(s) Oral every 24 hours  thiamine 100 milliGRAM(s) Oral every 24 hours  trimethoprim   80 mG/sulfamethoxazole 400 mG 1 Tablet(s) Oral daily    MEDICATIONS  (PRN):  acetaminophen     Tablet .. 650 milliGRAM(s) Oral every 6 hours PRN Mild Pain (1 - 3)  albuterol    90 MICROgram(s) HFA Inhaler 2 Puff(s) Inhalation every 6 hours PRN Shortness of Breath and/or Wheezing  dextrose Oral Gel 15 Gram(s) Oral once PRN Blood Glucose LESS THAN 70 milliGRAM(s)/deciliter    ALLERGIES:  NKDA, NKFA, [employer]KE[employer]      LABS:                         13.8   25.15 )-----------( 208      ( 15 Jul 2025 07:00 )             41.9     07-15    132[L]  |  92[L]  |  48[H]  ----------------------------<  190[H]  4.5   |  33[H]  |  1.03    Ca    9.1      15 Jul 2025 07:00  Phos  2.6     07-15  Mg     2.1     07-15        Urinalysis Basic - ( 15 Jul 2025 07:00 )    Color: x / Appearance: x / SG: x / pH: x  Gluc: 190 mg/dL / Ketone: x  / Bili: x / Urobili: x   Blood: x / Protein: x / Nitrite: x   Leuk Esterase: x / RBC: x / WBC x   Sq Epi: x / Non Sq Epi: x / Bacteria: x          RADIOLOGY, EKG & ADDITIONAL TESTS: Reviewed.  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 The pt developed immunotherapy-induced colitis 3 weeks ago at which point the Keytruda was stopped and he was started on a Prednisone taper. Initial dosage of Prednisone at 60mg and currently tapered down to 30mg. After onset of BLE swelling, Lasix dosage was increased to 60mg. The pt also notes that he used Lidocaine cream for his leg pain with significant relief. After noting no change in BLE swelling, pt presented to the ED for further treatment.    INTERVAL EVENTS:   No acute events overnight. Pt is still on contact precautions for Norovirus. He had ACE wraps placed on his bilateral LEs that he kept on.     SUBJECTIVE:   Patient seen and examined at bedside. Still edematous with moderate improvement. He also stated that he has been having a mix of loose, mushy BM w/ some bits of well-formed stool. Still reporting some pain in R forearm/wrist along with mild swelling but xray does not have significant findings. Otherwise feeling relatively well.     ROS:  Positive for chronic cough. Negative for SOB, fever, chills, chest pain, abdominal pain, nausea, vomiting, diarrhea, dysuria, dizziness, headache.    VITAL SIGNS:  Vital Signs Last 24 Hrs  T(C): 36.7 (16 Jul 2025 06:00), Max: 36.7 (15 Jul 2025 13:10)  T(F): 98.1 (16 Jul 2025 06:00), Max: 98.1 (15 Jul 2025 13:10)  HR: 84 (16 Jul 2025 06:00) (84 - 88)  BP: 150/72 (16 Jul 2025 06:00) (132/72 - 150/72)  BP(mean): --  RR: 18 (16 Jul 2025 06:00) (18 - 18)  SpO2: 92% (16 Jul 2025 06:00) (92% - 98%)    Parameters below as of 16 Jul 2025 06:00  Patient On (Oxygen Delivery Method): room air    PHYSICAL EXAM:  Constitutional: AOx3. No acute distress. Resting comfortably in bed.  HEENT: Atraumatic. PERRL. EOMI. Clear conjunctiva. Moist mucous membranes.  Neck: Trachea midline. Supple.  Lungs: Mild rales noted at apex of lungs b/l. Lungs otherwise clear to auscultation b/l. No accessory muscle use. No wheezing. No stridor/retracting/tripoding.   Cardiovascular: Regular rate and rhythm. No murmurs, rubs, or gallops.  Abdomen: Soft, non-tender, non-distended. No rebound or guarding.    Extremities: 3+ b/l LE pitting edema still present + increased erythema across the RLE w/o warmth. Right wrist edema.  Skin: Multiple, weeping skin wounds noted to b/l ankles. No bleeding or pus noted.  Neurologic: No apparent focal neurological deficits. Answering questions, following commands, moving all extremities.   Psychiatric: Cooperative. Appropriate mood and affect.     MEDICATIONS  (STANDING):  aspirin enteric coated 81 milliGRAM(s) Oral daily  atorvastatin 80 milliGRAM(s) Oral at bedtime  bumetanide Injectable 2 milliGRAM(s) IV Push <User Schedule>  dextrose 5%. 500 milliLiter(s) (250 mL/Hr) IV Continuous <Continuous>  dextrose 5%. 1000 milliLiter(s) (100 mL/Hr) IV Continuous <Continuous>  dextrose 5%. 1000 milliLiter(s) (50 mL/Hr) IV Continuous <Continuous>  dextrose 50% Injectable 25 Gram(s) IV Push once  dextrose 50% Injectable 12.5 Gram(s) IV Push once  dextrose 50% Injectable 25 Gram(s) IV Push once  ezetimibe 10 milliGRAM(s) Oral daily  glucagon  Injectable 1 milliGRAM(s) IntraMuscular once  heparin   Injectable 5000 Unit(s) SubCutaneous every 8 hours  insulin lispro (ADMELOG) corrective regimen sliding scale   SubCutaneous Before meals and at bedtime  insulin lispro Injectable (ADMELOG) 4 Unit(s) SubCutaneous three times a day before meals  losartan 25 milliGRAM(s) Oral daily  metOLazone 5 milliGRAM(s) Oral every 24 hours  metoprolol succinate  milliGRAM(s) Oral daily  pantoprazole    Tablet 40 milliGRAM(s) Oral before breakfast  predniSONE   Tablet 20 milliGRAM(s) Oral every 24 hours  thiamine 100 milliGRAM(s) Oral every 24 hours  trimethoprim   80 mG/sulfamethoxazole 400 mG 1 Tablet(s) Oral daily    MEDICATIONS  (PRN):  acetaminophen     Tablet .. 650 milliGRAM(s) Oral every 6 hours PRN Mild Pain (1 - 3)  albuterol    90 MICROgram(s) HFA Inhaler 2 Puff(s) Inhalation every 6 hours PRN Shortness of Breath and/or Wheezing  dextrose Oral Gel 15 Gram(s) Oral once PRN Blood Glucose LESS THAN 70 milliGRAM(s)/deciliter    ALLERGIES:  NKDA, NKFA, [employer]      LABS:                         13.5   30.19 )-----------( 211      ( 16 Jul 2025 08:00 )             42.1     07-16    132[L]  |  89[L]  |  56[H]  ----------------------------<  134[H]  4.0   |  29  |  0.98    Ca    9.7      16 Jul 2025 08:00  Phos  3.2     07-16  Mg     2.0     07-16        Urinalysis Basic - ( 16 Jul 2025 08:00 )    Color: x / Appearance: x / SG: x / pH: x  Gluc: 134 mg/dL / Ketone: x  / Bili: x / Urobili: x   Blood: x / Protein: x / Nitrite: x   Leuk Esterase: x / RBC: x / WBC x   Sq Epi: x / Non Sq Epi: x / Bacteria: x      CAPILLARY BLOOD GLUCOSE      POCT Blood Glucose.: 182 mg/dL (16 Jul 2025 09:09)  POCT Blood Glucose.: 211 mg/dL (15 Jul 2025 21:43)  POCT Blood Glucose.: 219 mg/dL (15 Jul 2025 17:37)  POCT Blood Glucose.: 261 mg/dL (15 Jul 2025 12:11)      RADIOLOGY, EKG & ADDITIONAL TESTS: Reviewed.  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 The pt developed immunotherapy-induced colitis 3 weeks ago at which point the Keytruda was stopped and he was started on a Prednisone taper. Initial dosage of Prednisone at 60mg and currently tapered down to 20mg (10mg on Monday 7/21). After onset of BLE swelling, Lasix dosage was increased to 60mg. After noting no change in BLE swelling, pt presented to the ED for further treatment.    INTERVAL EVENTS:   No acute events overnight. Pt is still on contact precautions for Norovirus. He had ACE wraps placed on his bilateral LEs that he kept on until this AM    SUBJECTIVE:   Patient seen and examined at bedside. Still edematous but improving, although redness noted on anterolateral aspect of right shin. 1-2 loose stools since yesterday. Still reporting some pain in R forearm/wrist along with mild swelling but xray does not have significant findings. Otherwise feeling relatively well.     ROS:  Positive for chronic cough. Negative for SOB, fever, chills, chest pain, abdominal pain, nausea, vomiting, diarrhea, dysuria, dizziness, headache.    VITAL SIGNS:  Vital Signs Last 24 Hrs  T(C): 36.7 (16 Jul 2025 06:00), Max: 36.7 (15 Jul 2025 13:10)  T(F): 98.1 (16 Jul 2025 06:00), Max: 98.1 (15 Jul 2025 13:10)  HR: 84 (16 Jul 2025 06:00) (84 - 88)  BP: 150/72 (16 Jul 2025 06:00) (132/72 - 150/72)  BP(mean): --  RR: 18 (16 Jul 2025 06:00) (18 - 18)  SpO2: 92% (16 Jul 2025 06:00) (92% - 98%)    Parameters below as of 16 Jul 2025 06:00  Patient On (Oxygen Delivery Method): room air    PHYSICAL EXAM:  Constitutional: AOx3. No acute distress. Resting comfortably in bed.  HEENT: Atraumatic. PERRL. EOMI. Clear conjunctiva. Moist mucous membranes.  Neck: Trachea midline. Supple.  Lungs: Mild rales noted at apex of lungs b/l. Lungs otherwise clear to auscultation b/l. No accessory muscle use. No wheezing. No stridor/retracting/tripoding.   Cardiovascular: Regular rate and rhythm. No murmurs, rubs, or gallops.  Abdomen: Soft, non-tender, non-distended. No rebound or guarding.    Extremities: 3+ b/l LE pitting edema still present + increased erythema across the RLE w/o warmth. Right wrist edema.  Skin: Multiple, weeping skin wounds noted to b/l ankles. No bleeding or pus noted.  Neurologic: No apparent focal neurological deficits. Answering questions, following commands, moving all extremities.   Psychiatric: Cooperative. Appropriate mood and affect.     MEDICATIONS  (STANDING):  aspirin enteric coated 81 milliGRAM(s) Oral daily  atorvastatin 80 milliGRAM(s) Oral at bedtime  bumetanide Injectable 2 milliGRAM(s) IV Push <User Schedule>  dextrose 5%. 500 milliLiter(s) (250 mL/Hr) IV Continuous <Continuous>  dextrose 5%. 1000 milliLiter(s) (100 mL/Hr) IV Continuous <Continuous>  dextrose 5%. 1000 milliLiter(s) (50 mL/Hr) IV Continuous <Continuous>  dextrose 50% Injectable 25 Gram(s) IV Push once  dextrose 50% Injectable 12.5 Gram(s) IV Push once  dextrose 50% Injectable 25 Gram(s) IV Push once  ezetimibe 10 milliGRAM(s) Oral daily  glucagon  Injectable 1 milliGRAM(s) IntraMuscular once  heparin   Injectable 5000 Unit(s) SubCutaneous every 8 hours  insulin lispro (ADMELOG) corrective regimen sliding scale   SubCutaneous Before meals and at bedtime  insulin lispro Injectable (ADMELOG) 4 Unit(s) SubCutaneous three times a day before meals  losartan 25 milliGRAM(s) Oral daily  metOLazone 5 milliGRAM(s) Oral every 24 hours  metoprolol succinate  milliGRAM(s) Oral daily  pantoprazole    Tablet 40 milliGRAM(s) Oral before breakfast  predniSONE   Tablet 20 milliGRAM(s) Oral every 24 hours  thiamine 100 milliGRAM(s) Oral every 24 hours  trimethoprim   80 mG/sulfamethoxazole 400 mG 1 Tablet(s) Oral daily    MEDICATIONS  (PRN):  acetaminophen     Tablet .. 650 milliGRAM(s) Oral every 6 hours PRN Mild Pain (1 - 3)  albuterol    90 MICROgram(s) HFA Inhaler 2 Puff(s) Inhalation every 6 hours PRN Shortness of Breath and/or Wheezing  dextrose Oral Gel 15 Gram(s) Oral once PRN Blood Glucose LESS THAN 70 milliGRAM(s)/deciliter    ALLERGIES:  NKDA, NKFA, [employer]      LABS:                         13.5   30.19 )-----------( 211      ( 16 Jul 2025 08:00 )             42.1     07-16    132[L]  |  89[L]  |  56[H]  ----------------------------<  134[H]  4.0   |  29  |  0.98    Ca    9.7      16 Jul 2025 08:00  Phos  3.2     07-16  Mg     2.0     07-16        Urinalysis Basic - ( 16 Jul 2025 08:00 )    Color: x / Appearance: x / SG: x / pH: x  Gluc: 134 mg/dL / Ketone: x  / Bili: x / Urobili: x   Blood: x / Protein: x / Nitrite: x   Leuk Esterase: x / RBC: x / WBC x   Sq Epi: x / Non Sq Epi: x / Bacteria: x      CAPILLARY BLOOD GLUCOSE      POCT Blood Glucose.: 182 mg/dL (16 Jul 2025 09:09)  POCT Blood Glucose.: 211 mg/dL (15 Jul 2025 21:43)  POCT Blood Glucose.: 219 mg/dL (15 Jul 2025 17:37)  POCT Blood Glucose.: 261 mg/dL (15 Jul 2025 12:11)      RADIOLOGY, EKG & ADDITIONAL TESTS: Reviewed.  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 The pt developed immunotherapy-induced colitis 3 weeks ago at which point the Keytruda was stopped and he was started on a Prednisone taper. Initial dosage of Prednisone at 60mg and currently tapered down to 20mg (10mg on Monday 7/21). After onset of BLE swelling, Lasix dosage was increased to 60mg. After noting no change in BLE swelling, pt presented to the ED for further treatment.    INTERVAL EVENTS:   No acute events overnight. Pt is still on contact precautions for Norovirus. He had ACE wraps placed on his bilateral LEs that he kept on until this AM    SUBJECTIVE:   Patient seen and examined at bedside. Still edematous but improving, although redness noted on anterolateral aspect of right shin. Non-tender, not hot or indurated or fluctuant. 1-2 loose stools since yesterday. Feels pain in R forearm has improved a lot with less swelling.    ROS:  Positive for chronic cough. Negative for SOB, fever, chills, chest pain, abdominal pain, nausea, vomiting, diarrhea, dysuria, dizziness, headache.    VITAL SIGNS:  T(C): 36.7 (16 Jul 2025 06:00), Max: 36.7 (15 Jul 2025 13:10)  T(F): 98.1 (16 Jul 2025 06:00), Max: 98.1 (15 Jul 2025 13:10)  HR: 84 (16 Jul 2025 06:00) (84 - 88)  BP: 150/72 (16 Jul 2025 06:00) (132/72 - 150/72)  RR: 18 (16 Jul 2025 06:00) (18 - 18)  SpO2: 92% (16 Jul 2025 06:00) (92% - 98%)    Parameters below as of 16 Jul 2025 06:00  Patient On (Oxygen Delivery Method): room air    PHYSICAL EXAM:  Constitutional: AOx3. No acute distress. Resting comfortably in bed.  HEENT: Atraumatic. PERRL. EOMI. Clear conjunctiva. Moist mucous membranes.  Neck: Trachea midline. Supple.  Lungs: Mild rales noted at apex of lungs b/l. Lungs otherwise clear to auscultation b/l. No accessory muscle use. No wheezing. No stridor/retracting/tripoding.   Cardiovascular: Regular rate and rhythm. No murmurs, rubs, or gallops.  Abdomen: Soft, non-tender, non-distended. No rebound or guarding.    Extremities: 3+ b/l LE pitting edema still present + increased erythema across the RLE w/o warmth. Right wrist edema.  Skin: Weeping skin wounds noted to b/l ankles. No bleeding or pus noted.  Neurologic: No apparent focal neurological deficits. Answering questions, following commands, moving all extremities.   Psychiatric: Cooperative. Appropriate mood and affect.       MEDICATIONS  (STANDING):  aspirin enteric coated 81 milliGRAM(s) Oral daily  atorvastatin 80 milliGRAM(s) Oral at bedtime  bumetanide Injectable 2 milliGRAM(s) IV Push <User Schedule>  dextrose 5%. 500 milliLiter(s) (250 mL/Hr) IV Continuous <Continuous>  dextrose 5%. 1000 milliLiter(s) (100 mL/Hr) IV Continuous <Continuous>  dextrose 5%. 1000 milliLiter(s) (50 mL/Hr) IV Continuous <Continuous>  dextrose 50% Injectable 25 Gram(s) IV Push once  dextrose 50% Injectable 12.5 Gram(s) IV Push once  dextrose 50% Injectable 25 Gram(s) IV Push once  ezetimibe 10 milliGRAM(s) Oral daily  glucagon  Injectable 1 milliGRAM(s) IntraMuscular once  heparin   Injectable 5000 Unit(s) SubCutaneous every 8 hours  insulin lispro (ADMELOG) corrective regimen sliding scale   SubCutaneous Before meals and at bedtime  insulin lispro Injectable (ADMELOG) 4 Unit(s) SubCutaneous three times a day before meals  losartan 25 milliGRAM(s) Oral daily  metOLazone 5 milliGRAM(s) Oral every 24 hours  metoprolol succinate  milliGRAM(s) Oral daily  pantoprazole    Tablet 40 milliGRAM(s) Oral before breakfast  predniSONE   Tablet 20 milliGRAM(s) Oral every 24 hours  thiamine 100 milliGRAM(s) Oral every 24 hours  trimethoprim   80 mG/sulfamethoxazole 400 mG 1 Tablet(s) Oral daily    MEDICATIONS  (PRN):  acetaminophen     Tablet .. 650 milliGRAM(s) Oral every 6 hours PRN Mild Pain (1 - 3)  albuterol    90 MICROgram(s) HFA Inhaler 2 Puff(s) Inhalation every 6 hours PRN Shortness of Breath and/or Wheezing  dextrose Oral Gel 15 Gram(s) Oral once PRN Blood Glucose LESS THAN 70 milliGRAM(s)/deciliter    ALLERGIES:  NKDA, NKFA, [employer]KE[employer]      LABS:                         13.5   30.19 )-----------( 211      ( 16 Jul 2025 08:00 )             42.1     07-16    132[L]  |  89[L]  |  56[H]  ----------------------------<  134[H]  4.0   |  29  |  0.98    Ca    9.7      16 Jul 2025 08:00  Phos  3.2     07-16  Mg     2.0     07-16        Urinalysis Basic - ( 16 Jul 2025 08:00 )    Color: x / Appearance: x / SG: x / pH: x  Gluc: 134 mg/dL / Ketone: x  / Bili: x / Urobili: x   Blood: x / Protein: x / Nitrite: x   Leuk Esterase: x / RBC: x / WBC x   Sq Epi: x / Non Sq Epi: x / Bacteria: x      CAPILLARY BLOOD GLUCOSE      POCT Blood Glucose.: 182 mg/dL (16 Jul 2025 09:09)  POCT Blood Glucose.: 211 mg/dL (15 Jul 2025 21:43)  POCT Blood Glucose.: 219 mg/dL (15 Jul 2025 17:37)  POCT Blood Glucose.: 261 mg/dL (15 Jul 2025 12:11)      RADIOLOGY, EKG & ADDITIONAL TESTS: Reviewed.

## 2025-07-16 NOTE — PROGRESS NOTE ADULT - PROBLEM SELECTOR PLAN 2
- Eventual plan to return to using Keytruda  - Follows with Dr. Pratt Has been on regular Prednisolone  - Uptrending - potentially d/t infectious etiology  - Trend WBC

## 2025-07-16 NOTE — PROGRESS NOTE ADULT - PROBLEM SELECTOR PLAN 5
- Continue Prednisone taper - Continue Prednisone taper, will taper to 10mg on Monday Pain in R forearm, mild swelling, no warmth  - Almost resolved  - Runs warm water on it in bathroom with good symptomatic relief  - no significant findings on forearm xray

## 2025-07-16 NOTE — PROGRESS NOTE ADULT - ASSESSMENT
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 Admitted for further evaluation and management in addition to treatment of hypokalemia and pre-renal MARYAN 2/2 to combination of diarrhea along with over-diuresis (25mg hydrochlorothiazide + 60mg furosemide). MARYAN has improved. 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 Admitted for further evaluation and management in addition to treatment of hypokalemia and pre-renal MARYAN 2/2 to combination of diarrhea along with over-diuresis (25mg hydrochlorothiazide + 60mg furosemide). MARYAN resolved. LE edema improving on new diuretic regimen but need to monitor electrolytes and kidney function closely

## 2025-07-16 NOTE — PROGRESS NOTE ADULT - ATTENDING COMMENTS
Date of service 7/16  Pt feels well. Walking wo LH/dizziness, chest pain, or dyspnea  Leg swelling slowly improving. Daily weight 53KG - admit was 50kg  Some erythema in anterior RIGHT shin  - no tenderness, increased heat. Weeping is improved w healing  areas dian over ankle   - Start topical polymixin.    - continue IV bumex w metolazone per Dr. Pérez   - continue ACE wrap and elevation of BLE   - BMP w Mg in AM

## 2025-07-16 NOTE — PROGRESS NOTE ADULT - PROBLEM SELECTOR PLAN 7
Has been on regular Prednisolone  - Downtrending   - Neutrophilia  - Steroid related? Tapering steroids  - Will continue to monitor with daily labs Has been on regular Prednisolone  - Uptrending - potentially d/t infectious etiology   - Will continue to monitor with daily labs - Resolved

## 2025-07-16 NOTE — DISCHARGE NOTE PROVIDER - CARE PROVIDER_API CALL
Emeli Abreu ()  Cardiovascular Disease  [address], [address]  [place] [address]  Phone: [phone]  Fax: [phone]  Scheduled Appointment: 08/26/2025 01:30 PM    Pal Manzo ()  Internal Medicine  [address], [address]  [place] [address]  Phone: [phone]  Fax: [phone]  Scheduled Appointment: 07/23/2025 11:00 AM

## 2025-07-16 NOTE — PROGRESS NOTE ADULT - SUBJECTIVE AND OBJECTIVE BOX
VASCULAR CARDIOLOGY ATTENDING PROGRESS NOTE    SERVICE LINE: [phone]    Subjective  LE edema without improvement    Current Medications:   acetaminophen     Tablet .. 650 milliGRAM(s) Oral every 6 hours PRN  albuterol    90 MICROgram(s) HFA Inhaler 2 Puff(s) Inhalation every 6 hours PRN  aspirin enteric coated 81 milliGRAM(s) Oral daily  atorvastatin 80 milliGRAM(s) Oral at bedtime  bumetanide Injectable 2 milliGRAM(s) IV Push <User Schedule>  dextrose 5%. 500 milliLiter(s) IV Continuous <Continuous>  dextrose 5%. 1000 milliLiter(s) IV Continuous <Continuous>  dextrose 5%. 1000 milliLiter(s) IV Continuous <Continuous>  dextrose 50% Injectable 25 Gram(s) IV Push once  dextrose 50% Injectable 12.5 Gram(s) IV Push once  dextrose 50% Injectable 25 Gram(s) IV Push once  dextrose Oral Gel 15 Gram(s) Oral once PRN  ezetimibe 10 milliGRAM(s) Oral daily  glucagon  Injectable 1 milliGRAM(s) IntraMuscular once  heparin   Injectable 5000 Unit(s) SubCutaneous every 8 hours  insulin lispro (ADMELOG) corrective regimen sliding scale   SubCutaneous Before meals and at bedtime  insulin lispro Injectable (ADMELOG) 4 Unit(s) SubCutaneous three times a day before meals  losartan 25 milliGRAM(s) Oral daily  metOLazone 5 milliGRAM(s) Oral every 24 hours  metoprolol succinate  milliGRAM(s) Oral daily  pantoprazole    Tablet 40 milliGRAM(s) Oral before breakfast  predniSONE   Tablet 20 milliGRAM(s) Oral every 24 hours  thiamine 100 milliGRAM(s) Oral every 24 hours  trimethoprim   80 mG/sulfamethoxazole 400 mG 1 Tablet(s) Oral daily      REVIEW OF SYSTEMS:  CONSTITUTIONAL: No weakness, fevers or chills  EYES/ENT: No visual changes;  No dysphagia  NECK: No pain or stiffness  RESPIRATORY: No cough, wheezing, hemoptysis; No shortness of breath  CARDIOVASCULAR: No chest pain or palpitations; No lower extremity edema  GASTROINTESTINAL: No abdominal or epigastric pain. No nausea, vomiting, or hematemesis; No diarrhea or constipation. No melena or hematochezia.  BACK: No back pain  GENITOURINARY: No dysuria, frequency or hematuria  NEUROLOGICAL: No numbness or weakness  SKIN: No itching, burning, rashes, or lesions   All other review of systems is negative unless indicated above.    Physical Exam:  T(F): 98 (07-15), Max: 98 (07-15)  HR: 63 (07-15) (63 - 80)  BP: 149/- (07-15) (136/82 - 149/-)  BP(mean): 77 (07-15) (77 - 101)  ABP: --  ABP(mean): --  RR: 17 (07-15)  SpO2: 98% (07-15)  GENERAL: No acute distress, well-developed  HEAD:  Atraumatic, Normocephalic  ENT: EOMI, PERRLA, conjunctiva and sclera clear, Neck supple, No JVD, moist mucosa  CHEST/LUNG: Clear to auscultation bilaterally; No wheeze, equal breath sounds bilaterally   BACK: No spinal tenderness  HEART: Regular rate and rhythm; No murmurs, rubs, or gallops  ABDOMEN: Soft, Nontender, Nondistended; Bowel sounds present  EXTREMITIES:  No clubbing, cyanosis, or edema  PSYCH: Nl behavior, nl affect  NEUROLOGY: AAOx3, non-focal, cranial nerves intact  SKIN: Normal color, No rashes or lesions  LINES:    Cardiovascular Diagnostic Testing: personally reviewed    CXR: Personally reviewed    Labs: Personally reviewed                        13.8   25.15 )-----------( 208      ( 15 Jul 2025 07:00 )             41.9     07-15    132[L]  |  92[L]  |  48[H]  ----------------------------<  190[H]  4.5   |  33[H]  |  1.03    Ca    9.1      15 Jul 2025 07:00  Phos  2.6     07-15  Mg     2.1     07-15

## 2025-07-16 NOTE — PROGRESS NOTE ADULT - PROBLEM SELECTOR PLAN 1
X Size Of Lesion In Cm (Optional): 0 Detail Level: Detailed Introduction Text (Please End With A Colon): The following procedure was deferred: Third-spacing 2/2 to NSCLC vs heart failure vs prednisone use.  - Less likely HF (echo 7/15 not significant)  - Monitor edema as pred continues to taper  - Compression stockings/ACE wraps  - advised to keep legs raised as much as possible  - New diuretic regimen IV Bumex 2mg BID + PO Metolazone 5mg QD as per Dr Pérez  - daily weights  - strict i/o Third-spacing 2/2 to NSCLC vs heart failure vs prednisone use.  - Less likely HF (echo 7/15 not significant)  - Monitor edema as pred continues to taper  - Compression stockings/ACE wraps  - advised to keep legs raised as much as possible  - New diuretic regimen IV Bumex 2mg BID + PO Metolazone 5mg QD as per Dr Pérez  - daily weights (goal weight: 50kg)  - strict i/o  - start Polymyxin ointment for weeping wounds Prednisone use >Third-spacing 2/2 to NSCLC  - Not HF (Echo 7/15 not significant)  - Monitor edema as pred continues to taper  - ACE wraps  - advised to keep legs raised as much as possible  - IV Bumex 2mg BID + PO Metolazone 5mg QD as per Dr Pérez  - daily weights (goal weight ~50kg)  - strict i/o  - start Polymyxin ointment for weeping wounds on R leg  - CTM R leg for evidence of cellulitis

## 2025-07-16 NOTE — PROGRESS NOTE ADULT - ASSESSMENT
75 yo man PMHx of severe carotid stenosis s/p CEA, CHB s/p PPM, HTN, DM2, COPD, stage IV metastatic non-small cell lung cancer s/p immunotherapy s/p colitis    Assessment  1. B/l LE edema  He reports acute new b/l below the knee pitting edema since starting PO prednisone  DVT ruled out. No significant LE venous reflux. Thin male, not typical patient with risk factor for lymphedema (CT A/P without lymphadenopathy w/o concern for malignancy induced lymphangitis/lymphedema)  UA without proteinuria, no significant renal disease. Mildly elevated LFT but no hepatic disease/cirrhosis to contribute to LE edema  TTE reviewed and with grade 1 diastolic dysfunction, EF normal, no major valvular disease  2. MARYAN - resolved  3. Severe carotid stenosis s/p CEA  4. CHB s/p PPM  5. HTN, DM2, COPD, stage IV metastatic lung cancer    Plan  1. LE edema, as explained above, likely from HFpEF (G1DD) and/or prednisone side effect  2. Recommend 1 more day of IV Bumex 2mg BID + metolazone 5mg PO QD and transition to PO torsemide 20mg QD to take at home, with close f/u with me as outpatient  3. LE elevation and compression    Thank you for the consult and the opportunity to take care of this patient.     Jaqueline Pérez M.D.  Cardiology | Vascular Cardiology Attending  Please call (c) [phone] for any questions    During non face-to-face time, I reviewed relevant portions of the patient’s medical record. During face-to-face time, I took a relevant history and examined the patient. I also explained differential diagnoses, relevant cardiac diagnoses, workup, and management plan, which required a high level of medical decision making. I answered all questions related to the patient's medical conditions.

## 2025-07-16 NOTE — DISCHARGE NOTE PROVIDER - PROVIDER TOKENS
PROVIDER:[TOKEN:[[id number]],SCHEDULEDAPPT:[08/26/2025],SCHEDULEDAPPTTIME:[01:30 PM]],PROVIDER:[TOKEN:[[id number]],SCHEDULEDAPPT:[07/23/2025],SCHEDULEDAPPTTIME:[11:00 AM]]

## 2025-07-16 NOTE — PROGRESS NOTE ADULT - ASSESSMENT
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 Admitted for further evaluation and management in addition to treatment of hypokalemia and pre-renal MARYAN 2/2 to combination of diarrhea along with over-diuresis (25mg hydrochlorothiazide + 60mg furosemide). MARYAN has improved. Swelling present bilaterally lower extremities      Nutritional Assessment:  · Nutritional Assessment  This patient has been assessed with a concern for Malnutrition and has been determined to have a diagnosis/diagnoses of Severe protein-calorie malnutrition and Underweight (BMI < 19).    This patient is being managed with:   Diet Consistent Carbohydrate Renal/No Snacks-  Supplement Feeding Modality:  Oral  Ensure Max Cans or Servings Per Day:  1       Frequency:  Daily  Entered: Jul 11 2025 11:19AM    COVID-19 Negative Lab Result:  · COVID-19 Negative Lab Result  COVID-19 ruled out    Problem/Plan - 1:  ·  Problem: Bilateral lower extremity edema.   ·  Plan: Third-spacing 2/2 to NSCLC vs heart failure vs prednisone use.  - Less likely HF (echo 7/15 not significant)  - Monitor edema as pred continues to taper  - Compression stockings/ACE wraps  - advised to keep legs raised as much as possible  - New diuretic regimen IV Bumex 2mg BID + PO Metolazone 5mg QD as per Dr Pérez  - daily weights (goal weight: 50kg)  - strict i/o  - start Polymyxin ointment for weeping wounds.    Problem/Plan - 2:  ·  Problem: Non-small cell carcinoma of lung, stage 4.   ·  Plan: - Eventual plan to return to using Keytruda  - Follows with Dr. Pratt.    Problem/Plan - 3:  ·  Problem: Right arm pain.   ·  Plan: Pain in R forearm, mild swelling, no warmth  - Lidocaine patch previously applied with some relief  - Runs warm water on it in bathroom with good symptomatic relief  - no significant findings on forearm xray.    Problem/Plan - 4:  ·  Problem: Hypokalemia.   ·  Plan: - Resolved.    Problem/Plan - 5:  ·  Problem: History of colitis.   ·  Plan: - Continue Prednisone taper, will taper to 10mg on Monday.    Problem/Plan - 6:  ·  Problem: Diabetes mellitus.   ·  Plan: - Continue Insulin lispro  - Lantus stopped  - Continue BSL monitoring.    Problem/Plan - 7:  ·  Problem: Leukocytosis.   ·  Plan: Has been on regular Prednisolone  - Uptrending - potentially d/t infectious etiology   - Will continue to monitor with daily labs.    Problem/Plan - 8:  ·  Problem: COPD without exacerbation.   ·  Plan: - Continue Albuterol prn.    Problem/Plan - 9:  ·  Problem: MARYAN (acute kidney injury).   ·  Plan: - Resolving  - Increased? BUN:Cr  - Monitor in the context of new diuretic regimen.    Problem/Plan - 10:  ·  Problem: HTN (hypertension).   ·  Plan; - Losartan.    Problem/Plan - 11:  ·  Problem: Hyperlipidemia.   ·  Plan: - On Atorvastatin 80mg PO at home bedtime  - On Ezetimibe 10mg PO at home daily.    Problem/Plan - 12:  ·  Problem: Prophylactic measure.   ·  Plan: F: None  E: None  N: Nutrition consulted. Prescribed Ensure max x1/day. Placed on low carb diet.   DVT ppx: Heparin - 5000 units subcutaneous every 8 hrs  Bowel: None  Dispo: RMF  Code status:

## 2025-07-16 NOTE — PROGRESS NOTE ADULT - PROBLEM SELECTOR PLAN 3
Pain in R forearm, mild swelling, no warmth  - Lidocaine patch previously applied with some relief  - Runs warm water on it in bathroom with good symptomatic relief  - no significant findings on forearm xray - Eventual plan to return to using Keytruda  - Follows with Dr. Pratt

## 2025-07-16 NOTE — DISCHARGE NOTE PROVIDER - NSDCCPCAREPLAN_GEN_ALL_CORE_FT
PRINCIPAL DISCHARGE DIAGNOSIS  Diagnosis: Bilateral lower extremity edema  Assessment and Plan of Treatment: You presented to hospital with "bilateral lower extemity edema" which means you had a build up of fluid in your legs. The cause of this was likely due to some degree of heart failure as well as a side effect from the steroids you are taking. We put you on a certain water tablet regimen (known as "diuretics") which helped to reduce the fluid levels in your legs, and you had a good response to this over time. In relation to your heart, you will have an appointment follow up appointment with Dr Abreu at [place] at the listed date.      SECONDARY DISCHARGE DIAGNOSES  Diagnosis: Hypokalemia  Assessment and Plan of Treatment: When you were admitted, you had "hypokalemia" which means low levels of potassium in your blood. This was likely due to the diarrhea you were experiencing as well as a side effect of the water tablets (diuretics) you were taking prior to admission. While you were an inpatient with us, we closely monitored your potassium levels and also frequently asked you about your bowel movements so we could help to keep your potassium levels within normal limits.    Diagnosis: MARYAN (acute kidney injury)  Assessment and Plan of Treatment: On admission to the hospital, you had "acute kidney injury," which means your kidney function was abnormal, and this was likely due to the ongoing diarrhea you were experiencing perhaps in addition to the water tablet you were using back then. You initially received intravenous fluids while you were with us, your diarrhea improved, and we changed your water tablet regimen appropriately with the help of Dr. Pérez, who specializes in Cardiology and Vascular medicine. Your kidney function improved as a result.    Diagnosis: Non-small cell carcinoma of lung, stage 4  Assessment and Plan of Treatment: For your lung cancer, you will have an outpatient follow up appointment with Dr Pratt on the listed date.    Diagnosis: History of colitis  Assessment and Plan of Treatment: The diarrhea you were experiencing prior to admission was likely related to the immunothrapy (Keytruda) you had been receiving for your cancer, but you were also diagnosed with Norovirus, a virus that leads to diarrhea. You experienced "colitis," which means inflammation of your bowels which led to this diarrhea. You then went on a tapering (gradually reducing) course of steroids to help with this.    Diagnosis: HTN (hypertension)  Assessment and Plan of Treatment: For your hypertension (high blood pressure), we started you on a drug called "Losartan." This not only helps to reduce blood pressure but can also help to protect the kidneys of people who have diabetes, so you may benefit from this medication.    Diagnosis: Diabetes mellitus  Assessment and Plan of Treatment: While an inpatient with us, your blood sugar levels were quite erratic. This was, at least in part, due to the steroids     PRINCIPAL DISCHARGE DIAGNOSIS  Diagnosis: Bilateral lower extremity edema  Assessment and Plan of Treatment: You presented to hospital with "bilateral lower extemity edema" which means you had a build up of fluid in your legs. The cause of this was likely due to some degree of heart failure as well as a side effect from the steroids you are taking. We put you on a certain water tablet regimen (known as "diuretics") which helped to reduce the fluid levels in your legs, and you had a good response to this over time. In relation to your heart, you will have an appointment follow up appointment with Dr. Abreu at [place] at the listed date.      SECONDARY DISCHARGE DIAGNOSES  Diagnosis: Hypokalemia  Assessment and Plan of Treatment: When you were admitted, you had "hypokalemia" which means low levels of potassium in your blood. This was likely due to the diarrhea you were experiencing as well as a side effect of the water tablets (diuretics) you were taking prior to admission. While you were an inpatient with us, we closely monitored your potassium levels and also frequently asked you about your bowel movements so we could help to keep your potassium levels within normal limits.    Diagnosis: MARYAN (acute kidney injury)  Assessment and Plan of Treatment: On admission to the hospital, you had "acute kidney injury," which means your kidney function was abnormal, and this was likely due to the ongoing diarrhea you were experiencing perhaps in addition to the water tablet you were using back then. You initially received intravenous fluids while you were with us, your diarrhea improved, and we changed your water tablet regimen appropriately with the help of Dr. Pérez, who specializes in Cardiology and Vascular medicine. Your kidney function improved as a result.    Diagnosis: Non-small cell carcinoma of lung, stage 4  Assessment and Plan of Treatment: For your lung cancer, you will have an outpatient follow up appointment with Dr Pratt on the listed date.    Diagnosis: History of colitis  Assessment and Plan of Treatment: The diarrhea you were experiencing prior to admission was likely related to the immunothrapy (Keytruda) you had been receiving for your cancer, but you were also diagnosed with Norovirus, a virus that leads to diarrhea. You experienced "colitis," which means inflammation of your bowels which led to this diarrhea. You then went on a tapering (gradually reducing) course of steroids to help with this.    Diagnosis: HTN (hypertension)  Assessment and Plan of Treatment: For your hypertension (high blood pressure), we started you on a drug called "Losartan." This not only helps to reduce blood pressure but can also help to protect the kidneys of people who have diabetes, so you may benefit from this medication.    Diagnosis: Diabetes mellitus  Assessment and Plan of Treatment: While an inpatient with us, your blood sugar levels were erratic. This was, at least in part, due to the steroids you were taking. You were reviewed by our Endocrinology consult service, and they altered your insulin regimen appropriately to help control your blood sugar levels more effectively throughout the day.     PRINCIPAL DISCHARGE DIAGNOSIS  Diagnosis: Bilateral lower extremity edema  Assessment and Plan of Treatment: You presented to hospital with "bilateral lower extemity edema" which means you had a build up of fluid in your legs. The cause of this was likely due to some degree of heart failure as well as a side effect from the steroids you are taking. As an inpatient you were put on a water tablet regimen (known as "diuretics") which helped to reduce the fluid levels in your legs, and you had a good response to this over time. In relation to your heart, you will have an appointment follow up appointment with Dr. Abreu at [facility] at the listed date. You will also have follow up with your PCP, Dr Manzo, at the listed date, and will continue to take that diuretic medication (Torsemide) which will be reviewed at your appointment.  **PLEASE ENSURE YOU ATTEND THESE APPOINTMENTS**      SECONDARY DISCHARGE DIAGNOSES  Diagnosis: Hypokalemia  Assessment and Plan of Treatment: When you were admitted, you had "hypokalemia" which means low levels of potassium in your blood. This was likely due to the diarrhea you were experiencing as well as a side effect of the water tablets (diuretics) you were taking prior to admission. While you were an inpatient with us, we closely monitored your potassium levels and also frequently asked you about your bowel movements so we could help to keep your potassium levels within normal limits.    Diagnosis: MARYAN (acute kidney injury)  Assessment and Plan of Treatment: On admission to the hospital, you had "acute kidney injury," which means your kidney function was abnormal, and this was likely due to the ongoing diarrhea you were experiencing in addition to the water tablet you were using back then. You initially received intravenous fluids while you were with us, your diarrhea improved, and we changed your water tablet regimen appropriately with the help of Dr. Pérez, who specializes in Cardiology and Vascular medicine. Your kidney function improved as a result.    Diagnosis: Non-small cell carcinoma of lung, stage 4  Assessment and Plan of Treatment: For your lung cancer, you will have an outpatient follow up appointment with Dr Pratt on the listed date.    Diagnosis: History of colitis  Assessment and Plan of Treatment: The diarrhea you were experiencing prior to admission was likely related to the immunothrapy (Keytruda) that you had been receiving for your cancer, but you were also diagnosed with Norovirus, a virus that leads to diarrhea. You experienced "colitis," which means there was inflammation of your bowels which led to this diarrhea. You then went on a tapering (gradually reducing) course of steroids to help reduce this inflammation.    Diagnosis: HTN (hypertension)  Assessment and Plan of Treatment: For your hypertension (high blood pressure), we started you on a drug called "Losartan." This not only helps to reduce blood pressure but can also help to protect the kidneys of people who have diabetes.    Diagnosis: Diabetes mellitus  Assessment and Plan of Treatment: While an inpatient with us, your blood sugar levels were erratic. This was, at least in part, due to the steroids you were taking. You were reviewed by our Endocrinology consult service, and they altered your insulin regimen appropriately to help control your blood sugar levels more effectively throughout the day.     PRINCIPAL DISCHARGE DIAGNOSIS  Diagnosis: Bilateral lower extremity edema  Assessment and Plan of Treatment: You presented to hospital with "bilateral lower extemity edema" which means you had a build up of fluid in your legs. The cause of this was likely due to some degree of heart failure as well as a side effect from the steroids you are taking. As an inpatient you were put on a water tablet regimen (known as "diuretics") which helped to reduce the fluid levels in your legs, and you had a good response to this over time. In relation to your heart, you will have an appointment follow up appointment with Dr. Abreu at [place] at the listed date. You will also have follow up with your PCP, Dr Manzo, at the listed date, and will continue to take that diuretic medication (Torsemide) which will be reviewed at your appointment.  **PLEASE ENSURE YOU ATTEND THESE APPOINTMENTS**      SECONDARY DISCHARGE DIAGNOSES  Diagnosis: Diabetes mellitus  Assessment and Plan of Treatment: While an inpatient with us, your blood sugar levels were erratic. This was, at least in part, due to the steroids you were taking. You were reviewed by our Endocrinology consult service, and they altered your insulin regimen appropriately to help control your blood sugar levels more effectively throughout the day.    Diagnosis: Hypokalemia  Assessment and Plan of Treatment: When you were admitted, you had "hypokalemia" which means low levels of potassium in your blood. This was likely due to the diarrhea you were experiencing as well as a side effect of the water tablets (diuretics) you were taking prior to admission. While you were an inpatient with us, we closely monitored your potassium levels and also frequently asked you about your bowel movements so we could help to keep your potassium levels within normal limits.    Diagnosis: MARYAN (acute kidney injury)  Assessment and Plan of Treatment: On admission to the hospital, you had "acute kidney injury," which means your kidney function was abnormal, and this was likely due to the ongoing diarrhea you were experiencing in addition to the water tablet you were using back then. You initially received intravenous fluids while you were with us, your diarrhea improved, and we changed your water tablet regimen appropriately with the help of Dr. Pérez, who specializes in Cardiology and Vascular medicine. Your kidney function improved as a result.    Diagnosis: Non-small cell carcinoma of lung, stage 4  Assessment and Plan of Treatment: For your lung cancer, you will have an outpatient follow up appointment with Dr Pratt on the listed date.  ---    • Taper PREDNISONE from 20mg daily to 10mg daily, with first dose of prednisone 10mg due Monday 07/21.    Diagnosis: History of colitis  Assessment and Plan of Treatment: The diarrhea you were experiencing prior to admission was likely related to the immunothrapy (Keytruda) that you had been receiving for your cancer, but you were also diagnosed with Norovirus, a virus that leads to diarrhea. You experienced "colitis," which means there was inflammation of your bowels which led to this diarrhea. You then went on a tapering (gradually reducing) course of steroids to help reduce this inflammation.    Diagnosis: HTN (hypertension)  Assessment and Plan of Treatment: For your hypertension (high blood pressure), we started you on a drug called "Losartan." This not only helps to reduce blood pressure but can also help to protect the kidneys of people who have diabetes.     PRINCIPAL DISCHARGE DIAGNOSIS  Diagnosis: Bilateral lower extremity edema  Assessment and Plan of Treatment: You presented to hospital with "bilateral lower extemity edema" which means you had a build up of fluid in your legs. The cause of this was likely due to some degree of heart failure as well as a side effect from the steroids you are taking. As an inpatient you were put on a water tablet regimen (known as "diuretics") which helped to reduce the fluid levels in your legs, and you had a good response to this over time. In relation to your heart, you will have an appointment follow up appointment with Dr. Abreu at [facility] at the listed date. You will also have follow up with your PCP, Dr Manzo, at the listed date, and will continue to take that diuretic medication (Torsemide) which will be reviewed at your appointment.  **PLEASE ENSURE YOU ATTEND THESE APPOINTMENTS**      SECONDARY DISCHARGE DIAGNOSES  Diagnosis: Diabetes mellitus  Assessment and Plan of Treatment: While an inpatient with us, your blood sugar levels were erratic. This was, at least in part, due to the steroids you were taking. You were reviewed by our Endocrinology consult service, and they altered your insulin regimen appropriately to help control your blood sugar levels more effectively throughout the day.    Diagnosis: Hypokalemia  Assessment and Plan of Treatment: When you were admitted, you had "hypokalemia" which means low levels of potassium in your blood. This was likely due to the diarrhea you were experiencing as well as a side effect of the water tablets (diuretics) you were taking prior to admission. While you were an inpatient with us, we closely monitored your potassium levels and also frequently asked you about your bowel movements so we could help to keep your potassium levels within normal limits.    Diagnosis: MARYAN (acute kidney injury)  Assessment and Plan of Treatment: On admission to the hospital, you had "acute kidney injury," which means your kidney function was abnormal, and this was likely due to the ongoing diarrhea you were experiencing in addition to the water tablet you were using back then. You initially received intravenous fluids while you were with us, your diarrhea improved, and we changed your water tablet regimen appropriately with the help of Dr. Pérez, who specializes in Cardiology and Vascular medicine. Your kidney function improved as a result.    Diagnosis: Non-small cell carcinoma of lung, stage 4  Assessment and Plan of Treatment: For your lung cancer, you will have an outpatient follow up appointment with Dr Pratt on the listed date.  ---    • Taper PREDNISONE from 20mg daily to 10mg daily, with first dose of prednisone 10mg due Monday 07/21.   • Take BACTRIM as directed, with last dose on 07/20    Diagnosis: History of colitis  Assessment and Plan of Treatment: The diarrhea you were experiencing prior to admission was likely related to the immunothrapy (Keytruda) that you had been receiving for your cancer, but you were also diagnosed with Norovirus, a virus that leads to diarrhea. You experienced "colitis," which means there was inflammation of your bowels which led to this diarrhea. You then went on a tapering (gradually reducing) course of steroids to help reduce this inflammation.    Diagnosis: HTN (hypertension)  Assessment and Plan of Treatment: For your hypertension (high blood pressure), we started you on a drug called "Losartan." This not only helps to reduce blood pressure but can also help to protect the kidneys of people who have diabetes.

## 2025-07-16 NOTE — PROGRESS NOTE ADULT - SUBJECTIVE AND OBJECTIVE BOX
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 Reports being on prednisone therapy for immunotherapy induced colitis. Was previously on 60 mg but was started on 30 mg on Monday. Reports no lightheadedness, dizziness nausea or vomiting, and diarrhea since decreasing the dose. Reports a month of b/l LE swelling, leg pain and heaviness that has made it hard to walk around and leave the house. Was initially started on 20 Lasix 1 month ago but was started on Lasix 60 mg 2 weeks ago. States has not helped with his leg pain. States this has not happened before. Denies fevers, chills, sore throat, rhinorrhea chest pain, SOB, PND, orthopnea, abdominal pain, dysuria or diarrhea.    ED Course:   Vitals: T 97.9  HR 82 /79 RR 18 Sp02 95% RA  Labs: WBC 22.29 K 2.8 C02 36 BUN 67 Cr 1.65   Imaging:   ·	B/l LE doppler: no evidence of LE DVT   ·	CXR without consolidations or effusion, looks similar to prior; Pending formal read   Interventions: 40 meq KCl PO, 10 meq x1 KCl  (08 Jul 2025 21:05)                            13.5   30.19 )-----------( 211      ( 16 Jul 2025 08:00 )             42.1       07-16    132[L]  |  89[L]  |  56[H]  ----------------------------<  134[H]  4.0   |  29  |  0.98    Ca    9.7      16 Jul 2025 08:00  Phos  3.2     07-16  Mg     2.0     07-16      Vital Signs Last 24 Hrs  T(C): 36.7 (16 Jul 2025 06:00), Max: 36.7 (15 Jul 2025 13:10)  T(F): 98.1 (16 Jul 2025 06:00), Max: 98.1 (15 Jul 2025 13:10)  HR: 84 (16 Jul 2025 06:00) (84 - 88)  BP: 150/72 (16 Jul 2025 06:00) (132/72 - 150/72)  BP(mean): --  RR: 18 (16 Jul 2025 06:00) (18 - 18)  SpO2: 92% (16 Jul 2025 06:00) (92% - 98%)    Parameters below as of 16 Jul 2025 06:00  Patient On (Oxygen Delivery Method): room air        MEDICATIONS  (STANDING):  aspirin enteric coated 81 milliGRAM(s) Oral daily  atorvastatin 80 milliGRAM(s) Oral at bedtime  bacitracin/polymyxin B Ointment 1 Application(s) Topical two times a day  bumetanide Injectable 2 milliGRAM(s) IV Push <User Schedule>  dextrose 5%. 500 milliLiter(s) (250 mL/Hr) IV Continuous <Continuous>  dextrose 5%. 1000 milliLiter(s) (100 mL/Hr) IV Continuous <Continuous>  dextrose 5%. 1000 milliLiter(s) (50 mL/Hr) IV Continuous <Continuous>  dextrose 50% Injectable 25 Gram(s) IV Push once  dextrose 50% Injectable 12.5 Gram(s) IV Push once  dextrose 50% Injectable 25 Gram(s) IV Push once  ezetimibe 10 milliGRAM(s) Oral daily  glucagon  Injectable 1 milliGRAM(s) IntraMuscular once  heparin   Injectable 5000 Unit(s) SubCutaneous every 8 hours  insulin lispro (ADMELOG) corrective regimen sliding scale   SubCutaneous Before meals and at bedtime  insulin lispro Injectable (ADMELOG) 4 Unit(s) SubCutaneous three times a day before meals  losartan 25 milliGRAM(s) Oral daily  metOLazone 5 milliGRAM(s) Oral every 24 hours  metoprolol succinate  milliGRAM(s) Oral daily  pantoprazole    Tablet 40 milliGRAM(s) Oral before breakfast  predniSONE   Tablet 20 milliGRAM(s) Oral every 24 hours  thiamine 100 milliGRAM(s) Oral every 24 hours  trimethoprim   80 mG/sulfamethoxazole 400 mG 1 Tablet(s) Oral daily    MEDICATIONS  (PRN):  acetaminophen     Tablet .. 650 milliGRAM(s) Oral every 6 hours PRN Mild Pain (1 - 3)  albuterol    90 MICROgram(s) HFA Inhaler 2 Puff(s) Inhalation every 6 hours PRN Shortness of Breath and/or Wheezing  dextrose Oral Gel 15 Gram(s) Oral once PRN Blood Glucose LESS THAN 70 milliGRAM(s)/deciliter      T(C): 36.7 (07-16-25 @ 06:00), Max: 36.7 (07-15-25 @ 13:10)  HR: 84 (07-16-25 @ 06:00) (84 - 88)  BP: 150/72 (07-16-25 @ 06:00) (132/72 - 150/72)  RR: 18 (07-16-25 @ 06:00) (18 - 18)  SpO2: 92% (07-16-25 @ 06:00) (92% - 98%)    Physical Exam:     74 y o man lying comfortably in semi Camp's position , awake , alert , no acute complaints     Head: normocephalic , atraumatic    Eyes: PERRLA , EOMI , no nystagmus , sclera anicteric    ENT / FACE: neg nasal discharge , uvula midline , no oropharyngeal erythema / exudate    Neck: supple , negative JVD , negative carotid bruits , no thyromegaly    Chest: bilateral rhonchi      Cardiovascular: regular rate and rhythm , neg murmurs / rubs / gallops    Abdomen: soft , non distended , no tenderness to palpation in all 4 quadrants ,  normal bowel sounds     Extremities: 3+ pitting edema ankle to shin; + erythema with  multiple skin wounds    Neurologic Exam:     Alert and oriented to person , place , date/year      Cranial Nerves:           II:                         pupils equal , round and reactive to light , visual fields intact         III/ IV/VI:             extraocular movements intact , neg nystagmus , neg ptosis        V:                        facial sensation intact , V1-3 normal        VII:                      face symmetric , no droop , normal eye closure and smile        VIII:                     hearing intact to finger rub bilaterally        IX and X:             no hoarseness , gag intact , palate/ uvula rise symmetrically        XI:                       SCM / trapezius strength intact bilateral        XII:                      no tongue deviation    Motor Exam:        > 3+/5 x 4 extremities , without drift     Sensation:         intact to light touch x 4 extremities                            no neglect or extinction on double simultaneous testing    DTR:           biceps/brachioradialis: equal                            patella/ankle: equal          neg Babinski      Coordination:            Finger to Nose:  neg dysmetria bilaterally        7/15/2025 Functional Status Assessment :       Pain Assessment/Number Scale (0-10) Adult  Presence of Pain: denies pain/discomfort (Rating = 0)  Pain Rating (0-10): Rest: 0 (no pain/absence of nonverbal indicators of pain)  Pain Rating (0-10): Activity: 0 (no pain/absence of nonverbal indicators of pain)    Safety      [facility] Functional Assessment: Basic Mobility  Type of Assessment: Daily assessment  Turning from your back to your side while in a flat bed without using bedrails?: 4 = No assist / stand by assistance  Moving from lying on your back to sitting on the flat side of a flat bed without using bedrails?: 4 = No assist / stand by assistance  Moving to and from a bed to a chair (including a wheelchair)?: 4 = No assist / stand by assistance  Standing up from a chair using your arms (e.g. wheelchair or bedside chair)?: 4 = No assist / stand by assistance  Walking in hospital room?: 4 = No assist / stand by assistance  Climbing 3-5 steps with a railing?:    4-calculated by average   Score: 24   Row Comment: Ask the patient "How much help from another person do you currently need? (If the patient hasn't done an activity recently, how much help from another person do you think he/she needs if he/she tried?)    Cognitive/Neuro      Cognitive/Neuro/Behavioral  Cognitive/Neuro/Behavioral [WDL Definition: Alert; opens eyes spontaneously; arouses to voice or touch; oriented x 4; follows commands; speech spontaneous, logical; purposeful motor response; behavior appropriate to situation]: WDL    Language Assistance  Preferred Language to Address Healthcare Preferred Language to Address Healthcare: English    Therapeutic Interventions      Sit-Stand Transfer Training  Transfer Training Sit-to-Stand Transfer: supervsion;  supervision;  full weight-bearing   straight cane  Transfer Training Stand-to-Sit Transfer: supervsion;  supervision;  full weight-bearing   straight cane  Sit-to-Stand Transfer Training Transfer Safety Analysis: decreased weight-shifting ability;  decreased strength    Gait Training  Gait Training: supervsion;  supervision;  full weight-bearing   straight cane;  100 feet;  x2  Gait Analysis: decreased weight-shifting ability;  decreased strength;  impaired balance    Stair Training  Physical Assist/Nonphysical Assist: Stand by assist   Weight-Bearing Restrictions: full weight-bearing  Assistive Device: left rail up;  left rail down;  straight cane  Number of Stairs: 12         PM&R Impression : as above    Current disposition plan recommendation :     d/c home with outpatient physical therapy

## 2025-07-16 NOTE — DISCHARGE NOTE PROVIDER - NSDCFUSCHEDAPPT_GEN_ALL_CORE_FT
Kolby Pratt  [facility] Physician [facility] [address]  Scheduled Appointment: 07/24/2025

## 2025-07-16 NOTE — DISCHARGE NOTE PROVIDER - HOSPITAL COURSE
INCOMPLETE DISCHARGE. 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 The pt developed immunotherapy-induced colitis 3 weeks ago at which point the Keytruda was stopped and he was started on a Prednisone taper. Initial dosage of Prednisone at 60mg and currently tapered down to 20mg (10mg on Monday 7/21). After onset of BLE swelling, Lasix dosage was increased to 60mg. After noting no change in BLE swelling, pt presented to the ED for further treatment.    Problem List/Main Diagnoses:    Problem/Plan - 1:  ·  Problem: Bilateral lower extremity edema.   ·  Plan: Prednisone use >Third-spacing 2/2 to NSCLC  - Not HF (Echo 7/15 not significant)  - Monitor edema as pred continues to taper  - ACE wraps  - advised to keep legs raised as much as possible  - IV Bumex 2mg BID + PO Metolazone 5mg QD as per Dr Pérez  - daily weights (goal weight ~50kg)  - strict i/o  - start Polymyxin ointment for weeping wounds on R leg  - CTM R leg for evidence of cellulitis.     Problem/Plan - 2:  ·  Problem: Leukocytosis.  ·  Plan: Has been on regular Prednisolone  - Uptrending - potentially d/t infectious etiology  - Trend WBC.     Problem/Plan - 3:  ·  Problem: Non-small cell carcinoma of lung, stage 4.  ·  Plan: - Eventual plan to return to using Keytruda  - Follows with Dr. Pratt.     Problem/Plan - 4:  ·  Problem: History of colitis.  ·  Plan: - Continue Prednisone taper, will taper to 10mg on Monday.     Problem/Plan - 5:  ·  Problem: Right arm pain.  ·  Plan: Pain in R forearm, mild swelling, no warmth  - Almost resolved  - Runs warm water on it in bathroom with good symptomatic relief  - no significant findings on forearm xray.     Problem/Plan - 6:  ·  Problem: Diabetes mellitus.   ·  Plan: - Continue Insulin lispro  - Lantus stopped  - Continue BSL monitoring.     Problem/Plan - 7:  ·  Problem: Hypokalemia.  ·  Plan: - Resolved.     Problem/Plan - 8:  ·  Problem: COPD without exacerbation.   ·  Plan: - Continue Albuterol prn.     Problem/Plan - 9:  ·  Problem: MARYAN (acute kidney injury).   ·  Plan: Resolved  - CTM in the context of new diuretic regimen.     Problem/Plan - 10:  ·  Problem: HTN (hypertension).   ·  Plan; - Losartan.     Problem/Plan - 11:  ·  Problem: Hyperlipidemia.   ·  Plan: - On Atorvastatin 80mg PO at home bedtime  - On Ezetimibe 10mg PO at home daily.    Patient was discharged to:    New medications:   Changes to old medications:  Medications that were stopped:    Items to follow up as outpatient:    Physical exam at the time of discharge:       LABS & STUDIES:  SARS-CoV-2: Geronimo (08 Jul 2025 21:20)   INCOMPLETE DISCHARGE. 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 The pt developed immunotherapy-induced colitis 3 weeks ago at which point the Keytruda was stopped and he was started on a Prednisone taper. Initial dosage of Prednisone at 60mg and currently tapered down to 20mg (10mg on Monday 7/21). After onset of BLE swelling, Lasix dosage was increased to 60mg. After noting no change in BLE swelling, pt presented to the ED for further treatment.    Problem List/Main Diagnoses:   ·  Problem: Bilateral lower extremity edema.   ·  Plan: Prednisone use vs. Third-spacing 2/2 to NSCLC vs. Heart failure (HFpEF) vs. Hypothyroidism  - Echo 7/15 mild diastolic dysfunction  - pred tapering  - ACE wraps  - advised to keep legs raised as much as possible  - switched to PO Torsemide today  - daily weights (goal weight ~50kg)  - strict i/o  - start Polymyxin ointment for weeping wounds on R leg  - CTM R leg for evidence of cellulitis  - f/u TSH.     Problem/Plan - 2:  ·  Problem: Diabetes mellitus.   ·  Plan: Glucose levels erratic. Previously on Lispro. Lantus at bedtime was added but resulted in hypoglycemia following day  - Continue Insulin lispro  - Lantus currently stopped  - f/u endocrine recs.     Problem/Plan - 3:  ·  Problem: Hypokalemia.   ·  Plan: Currently on diuretics and likely some osmotic diuresis given high glucose  - f/u PM BMP  - replete as needed.     Problem/Plan - 4:  ·  Problem: MARYAN (acute kidney injury).   ·  Plan: Resolved?  - CTM in the context of diuretic regimen  - Cystatin C as BUN elevated but Creatinine wnl (may be falsely low given low BMI).     Problem/Plan - 5:  ·  Problem: Leukocytosis.   ·  Plan: Has been on regular Prednisolone  - downtrending WBC today  - CTM WBC.     Problem/Plan - 6:  ·  Problem: Non-small cell carcinoma of lung, stage 4.   ·  Plan: - Eventual plan to return to using Keytruda  - Follows with Dr. Pratt.     Problem/Plan - 7:  ·  Problem: History of colitis.   ·  Plan: - Continue Prednisone taper, will taper to 10mg on Monday.     Problem/Plan - 8:  ·  Problem: Right arm pain.   ·  Plan: Pain in R forearm, mild swelling, no warmth  - Almost resolved  - Runs warm water on it in bathroom with good symptomatic relief  - no significant findings on forearm xray.     Problem/Plan - 9:  ·  Problem: COPD without exacerbation.   ·  Plan: - Continue Albuterol prn.     Problem/Plan - 10:  ·  Problem: HTN (hypertension).   ·  Plan; - Losartan.     Problem/Plan - 11:  ·  Problem: Hyperlipidemia.   ·  Plan: - On Atorvastatin 80mg PO at home bedtime  - On Ezetimibe 10mg PO at home daily.    Patient was discharged to home    New medications:   Changes to old medications:  Medications that were stopped:    Items to follow up as outpatient:    Physical exam at the time of discharge:       LABS & STUDIES:  SARS-CoV-2: Geronimo (08 Jul 2025 21:20)   INCOMPLETE DISCHARGE. 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 The pt developed immunotherapy-induced colitis 3 weeks ago at which point the Keytruda was stopped and he was started on a Prednisone taper. Initial dosage of Prednisone at 60mg and currently tapered down to 20mg (10mg on Monday 7/21). After onset of BLE swelling, Lasix dosage was increased to 60mg. After noting no change in BLE swelling, pt presented to the ED for further treatment.    Problem List/Main Diagnoses:   ·  Problem: Bilateral lower extremity edema.   ·  Plan: Prednisone use vs. Third-spacing 2/2 to NSCLC vs. Heart failure (HFpEF)  - Echo 7/15 mild diastolic dysfunction  - pred tapering  - ACE wraps + advised to keep legs raised as much as possible  - PO Torsemide      Problem/Plan - 2:  ·  Problem: Diabetes mellitus.   ·  Plan: Glucose levels erratic. Previously on Lispro. Lantus at bedtime was added but resulted in hypoglycemia following day  - f/u Endo recs on discharge.     Problem/Plan - 3:  ·  Problem: Hypokalemia.   ·  Plan: Currently on diuretics  - K normalized  - F/u BMP with PCP     Problem/Plan - 4:  ·  Problem: MARYAN (acute kidney injury).   ·  Plan: Creatinine normal but BMI ~18  - Elevated cystatin C, creatinine falsely low given BMI  - Continue current dose of Torsemide.     Problem/Plan - 5:  ·  Problem: Leukocytosis.   ·  Plan: Has been on regular Prednisolone  - downtrending WBC as Pred dose tapers     Problem/Plan - 6:  ·  Problem: Non-small cell carcinoma of lung, stage 4.   ·  Plan: - Eventual plan to return to using Keytruda  - Follows with Dr. Pratt.     Problem/Plan - 7:  ·  Problem: History of colitis.   ·  Plan: - Continue Prednisone taper, will taper to 10mg on Monday (10mg for 1 week)     Problem/Plan - 8:  ·  Problem: Right arm pain.   ·  Plan: Pain in R forearm  - Resolved     Problem/Plan - 9:  ·  Problem: COPD without exacerbation.   ·  Plan: - Continue Albuterol     Problem/Plan - 10:  ·  Problem: HTN (hypertension).   ·  Plan; - Losartan.     Problem/Plan - 11:  ·  Problem: Hyperlipidemia.   ·  Plan: Continue home meds  - On Atorvastatin 80mg PO at home bedtime  - On Ezetimibe 10mg PO at home daily    Patient was discharged to home    New medications:   Changes to old medications:  Medications that were stopped:    Items to follow up as outpatient: PCP, Cardiology, and Endocrinology appointments at listed dates    Physical exam at the time of discharge:  Constitutional: resting comfortably; NAD  HEENT: NC/AT, PERRL, EOMI, anicteric sclera, MMM  Neck: supple; no JVD or thyromegaly; no LAD  Respiratory: CTA B/L; no W/R/R, no retractions or increased work of breathing  Cardiac: +S1/S2; RRR; no M/R/G  Gastrointestinal: soft, NT/ND; no rebound or guarding; +BS  Extremities: 2+ b/l LE pitting edema still present (much improved from previous)  Musculoskeletal: NROM x4; no joint swelling, tenderness or erythema  Vascular: 2+ radial, DP/PT pulses B/L  Dermatologic: No bleeding or pus noted.  Neurologic: AAOx3, CN II-XII intact, no focal deficits, strength 5/5 and equal in all extremities, sensation intact  Psychiatric: calm, cooperative, behaviors are appropriate, denies SI/HI INCOMPLETE DISCHARGE. 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 The pt developed immunotherapy-induced colitis 3 weeks ago at which point the Keytruda was stopped and he was started on a Prednisone taper. Initial dosage of Prednisone at 60mg and currently tapered down to 20mg (10mg on Monday 7/21). After onset of BLE swelling, Lasix dosage was increased to 60mg. After noting no change in BLE swelling, pt presented to the ED for further treatment.    Problem List/Main Diagnoses:   ·  Problem: Bilateral lower extremity edema.   ·  Plan: Prednisone use vs. Third-spacing 2/2 to NSCLC vs. Heart failure (HFpEF)  - Echo 7/15 mild diastolic dysfunction  - pred tapering  - ACE wraps + advised to keep legs raised as much as possible  - PO Torsemide      Problem/Plan - 2:  ·  Problem: Diabetes mellitus.   ·  Plan: Glucose levels erratic. Previously on Lispro. Lantus at bedtime was added but resulted in hypoglycemia following day  - f/u Endo recs on discharge.     Problem/Plan - 3:  ·  Problem: Hypokalemia.   ·  Plan: Currently on diuretics  - K normalized  - F/u BMP with PCP     Problem/Plan - 4:  ·  Problem: MARYAN (acute kidney injury).   ·  Plan: Creatinine normal but BMI ~18  - Elevated cystatin C, creatinine falsely low given BMI  - Continue current dose of Torsemide.     Problem/Plan - 5:  ·  Problem: Leukocytosis.   ·  Plan: Has been on regular Prednisolone  - downtrending WBC as Pred dose tapers     Problem/Plan - 6:  ·  Problem: Non-small cell carcinoma of lung, stage 4.   ·  Plan: - Eventual plan to return to using Keytruda  - Follows with Dr. Pratt.     Problem/Plan - 7:  ·  Problem: History of colitis.   ·  Plan: - Continue Prednisone taper, will taper to 10mg on Monday (10mg for 1 week)     Problem/Plan - 8:  ·  Problem: Right arm pain.   ·  Plan: Pain in R forearm  - Resolved     Problem/Plan - 9:  ·  Problem: COPD without exacerbation.   ·  Plan: - Continue Albuterol     Problem/Plan - 10:  ·  Problem: HTN (hypertension).   ·  Plan; - Losartan.     Problem/Plan - 11:  ·  Problem: Hyperlipidemia.   ·  Plan: Continue home meds  - On Atorvastatin 80mg PO at home bedtime  - On Ezetimibe 10mg PO at home daily    Patient was discharged to home    New medications: low dose insulin sliding scale, premeal lispro 10u, torsemide 20, losartan 25mg PO daily  Changes to old medications: prednisone taper continued while inpatient  Medications that were stopped: HCTZ, lisinopril    Items to follow up as outpatient: PCP, Cardiology, and Endocrinology appointments at listed dates    Physical exam at the time of discharge:  Constitutional: resting comfortably; NAD  HEENT: NC/AT, PERRL, EOMI, anicteric sclera, MMM  Neck: supple; no JVD or thyromegaly; no LAD  Respiratory: CTA B/L; no W/R/R, no retractions or increased work of breathing  Cardiac: +S1/S2; RRR; no M/R/G  Gastrointestinal: soft, NT/ND; no rebound or guarding; +BS  Extremities: 2+ b/l LE pitting edema still present (much improved from previous)  Musculoskeletal: NROM x4; no joint swelling, tenderness or erythema  Vascular: 2+ radial, DP/PT pulses B/L  Dermatologic: No bleeding or pus noted.  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 The pt developed immunotherapy-induced colitis 3 weeks ago at which point the Keytruda was stopped and he was started on a Prednisone taper. Initial dosage of Prednisone at 60mg and currently tapered down to 20mg (10mg on Monday 7/21). After onset of BLE swelling, Lasix dosage was increased to 60mg. After noting no change in BLE swelling, pt presented to the ED for further treatment.    Problem List/Main Diagnoses:   ·  Problem: Bilateral lower extremity edema.   ·  Plan: Prednisone use vs. Third-spacing 2/2 to NSCLC vs. Heart failure (HFpEF)  - Echo 7/15 mild diastolic dysfunction  - pred tapering  - ACE wraps + advised to keep legs raised as much as possible  - PO Torsemide      Problem/Plan - 2:  ·  Problem: Diabetes mellitus.   ·  Plan: Glucose levels erratic. Previously on Lispro. Lantus at bedtime was added but resulted in hypoglycemia following day  - f/u Endo recs on discharge.     Problem/Plan - 3:  ·  Problem: Hypokalemia.   ·  Plan: Currently on diuretics  - K normalized  - F/u BMP with PCP     Problem/Plan - 4:  ·  Problem: MARYAN (acute kidney injury).   ·  Plan: Creatinine normal but BMI ~18  - Elevated cystatin C, creatinine falsely low given BMI  - Continue current dose of Torsemide.     Problem/Plan - 5:  ·  Problem: Leukocytosis.   ·  Plan: Has been on regular Prednisolone  - downtrending WBC as Pred dose tapers     Problem/Plan - 6:  ·  Problem: Non-small cell carcinoma of lung, stage 4.   ·  Plan: - Eventual plan to return to using Keytruda  - Follows with Dr. Pratt.     Problem/Plan - 7:  ·  Problem: History of colitis.   ·  Plan: - Continue Prednisone taper, will taper to 10mg on Monday (10mg for 1 week)     Problem/Plan - 8:  ·  Problem: Right arm pain.   ·  Plan: Pain in R forearm  - Resolved     Problem/Plan - 9:  ·  Problem: COPD without exacerbation.   ·  Plan: - Continue Albuterol     Problem/Plan - 10:  ·  Problem: HTN (hypertension).   ·  Plan; - Losartan.     Problem/Plan - 11:  ·  Problem: Hyperlipidemia.   ·  Plan: Continue home meds  - On Atorvastatin 80mg PO at home bedtime  - On Ezetimibe 10mg PO at home daily    Patient was discharged to home    New medications: low dose insulin sliding scale, premeal lispro 10u, torsemide 20, losartan 25mg PO daily  Changes to old medications: prednisone taper continued while inpatient  Medications that were stopped: HCTZ, lisinopril    Items to follow up as outpatient: PCP, Cardiology, and Endocrinology appointments at listed dates    Physical exam at the time of discharge:  Constitutional: resting comfortably; NAD  HEENT: NC/AT, PERRL, EOMI, anicteric sclera, MMM  Neck: supple; no JVD or thyromegaly; no LAD  Respiratory: CTA B/L; no W/R/R, no retractions or increased work of breathing  Cardiac: +S1/S2; RRR; no M/R/G  Gastrointestinal: soft, NT/ND; no rebound or guarding; +BS  Extremities: 2+ b/l LE pitting edema still present (much improved from previous)  Musculoskeletal: NROM x4; no joint swelling, tenderness or erythema  Vascular: 2+ radial, DP/PT pulses B/L  Dermatologic: No bleeding or pus noted.  Neurologic: AAOx3, CN II-XII intact, no focal deficits, strength 5/5 and equal in all extremities, sensation intact  Psychiatric: calm, cooperative, behaviors are appropriate, denies SI/HI

## 2025-07-16 NOTE — PROGRESS NOTE ADULT - NUTRITIONAL ASSESSMENT
Head atraumatic, normal cephalic shape. This patient has been assessed with a concern for Malnutrition and has been determined to have a diagnosis/diagnoses of Severe protein-calorie malnutrition and Underweight (BMI < 19).    This patient is being managed with:   Diet Consistent Carbohydrate Renal/No Snacks-  Supplement Feeding Modality:  Oral  Ensure Max Cans or Servings Per Day:  1       Frequency:  Daily  Entered: Jul 11 2025 11:19AM

## 2025-07-17 LAB
ADD ON TEST-SPECIMEN IN LAB: SIGNIFICANT CHANGE UP
ALBUMIN SERPL ELPH-MCNC: 2.8 G/DL — LOW (ref 3.3–5)
ALP SERPL-CCNC: 69 U/L — SIGNIFICANT CHANGE UP (ref 40–120)
ALT FLD-CCNC: 57 U/L — HIGH (ref 10–45)
ANION GAP SERPL CALC-SCNC: 10 MMOL/L — SIGNIFICANT CHANGE UP (ref 5–17)
ANION GAP SERPL CALC-SCNC: 13 MMOL/L — SIGNIFICANT CHANGE UP (ref 5–17)
AST SERPL-CCNC: 31 U/L — SIGNIFICANT CHANGE UP (ref 10–40)
BILIRUB SERPL-MCNC: 0.2 MG/DL — SIGNIFICANT CHANGE UP (ref 0.2–1.2)
BUN SERPL-MCNC: 67 MG/DL — HIGH (ref 7–23)
BUN SERPL-MCNC: 71 MG/DL — HIGH (ref 7–23)
CALCIUM SERPL-MCNC: 8.8 MG/DL — SIGNIFICANT CHANGE UP (ref 8.4–10.5)
CALCIUM SERPL-MCNC: 9 MG/DL — SIGNIFICANT CHANGE UP (ref 8.4–10.5)
CHLORIDE SERPL-SCNC: 92 MMOL/L — LOW (ref 96–108)
CHLORIDE SERPL-SCNC: 93 MMOL/L — LOW (ref 96–108)
CO2 SERPL-SCNC: 27 MMOL/L — SIGNIFICANT CHANGE UP (ref 22–31)
CO2 SERPL-SCNC: 35 MMOL/L — HIGH (ref 22–31)
CREAT SERPL-MCNC: 1.12 MG/DL — SIGNIFICANT CHANGE UP (ref 0.5–1.3)
CREAT SERPL-MCNC: 1.28 MG/DL — SIGNIFICANT CHANGE UP (ref 0.5–1.3)
EGFR: 58 ML/MIN/1.73M2 — LOW
EGFR: 58 ML/MIN/1.73M2 — LOW
EGFR: 68 ML/MIN/1.73M2 — SIGNIFICANT CHANGE UP
EGFR: 68 ML/MIN/1.73M2 — SIGNIFICANT CHANGE UP
GLUCOSE SERPL-MCNC: 190 MG/DL — HIGH (ref 70–99)
GLUCOSE SERPL-MCNC: 298 MG/DL — HIGH (ref 70–99)
HCT VFR BLD CALC: 38.1 % — LOW (ref 39–50)
HGB BLD-MCNC: 12.3 G/DL — LOW (ref 13–17)
MAGNESIUM SERPL-MCNC: 1.7 MG/DL — SIGNIFICANT CHANGE UP (ref 1.6–2.6)
MAGNESIUM SERPL-MCNC: 2.1 MG/DL — SIGNIFICANT CHANGE UP (ref 1.6–2.6)
MCHC RBC-ENTMCNC: 27.6 PG — SIGNIFICANT CHANGE UP (ref 27–34)
MCHC RBC-ENTMCNC: 32.3 G/DL — SIGNIFICANT CHANGE UP (ref 32–36)
MCV RBC AUTO: 85.4 FL — SIGNIFICANT CHANGE UP (ref 80–100)
NRBC # BLD AUTO: 0 K/UL — SIGNIFICANT CHANGE UP (ref 0–0)
NRBC # FLD: 0 K/UL — SIGNIFICANT CHANGE UP (ref 0–0)
NRBC BLD AUTO-RTO: 0 /100 WBCS — SIGNIFICANT CHANGE UP (ref 0–0)
PHOSPHATE SERPL-MCNC: 2.9 MG/DL — SIGNIFICANT CHANGE UP (ref 2.5–4.5)
PLATELET # BLD AUTO: 185 K/UL — SIGNIFICANT CHANGE UP (ref 150–400)
PMV BLD: 11.5 FL — SIGNIFICANT CHANGE UP (ref 7–13)
POTASSIUM SERPL-MCNC: 3.2 MMOL/L — LOW (ref 3.5–5.3)
POTASSIUM SERPL-MCNC: 4.2 MMOL/L — SIGNIFICANT CHANGE UP (ref 3.5–5.3)
POTASSIUM SERPL-SCNC: 3.2 MMOL/L — LOW (ref 3.5–5.3)
POTASSIUM SERPL-SCNC: 4.2 MMOL/L — SIGNIFICANT CHANGE UP (ref 3.5–5.3)
PROT SERPL-MCNC: 5.8 G/DL — LOW (ref 6–8.3)
RBC # BLD: 4.46 M/UL — SIGNIFICANT CHANGE UP (ref 4.2–5.8)
RBC # FLD: 17.9 % — HIGH (ref 10.3–14.5)
SODIUM SERPL-SCNC: 133 MMOL/L — LOW (ref 135–145)
SODIUM SERPL-SCNC: 137 MMOL/L — SIGNIFICANT CHANGE UP (ref 135–145)
WBC # BLD: 20.11 K/UL — HIGH (ref 3.8–10.5)
WBC # FLD AUTO: 20.11 K/UL — HIGH (ref 3.8–10.5)

## 2025-07-17 PROCEDURE — 99233 SBSQ HOSP IP/OBS HIGH 50: CPT

## 2025-07-17 PROCEDURE — 99233 SBSQ HOSP IP/OBS HIGH 50: CPT | Mod: GC

## 2025-07-17 PROCEDURE — 99222 1ST HOSP IP/OBS MODERATE 55: CPT

## 2025-07-17 RX ORDER — INSULIN LISPRO 100 U/ML
8 INJECTION, SOLUTION INTRAVENOUS; SUBCUTANEOUS
Refills: 0 | Status: DISCONTINUED | OUTPATIENT
Start: 2025-07-17 | End: 2025-07-18

## 2025-07-17 RX ORDER — INSULIN LISPRO 100 U/ML
8 INJECTION, SOLUTION INTRAVENOUS; SUBCUTANEOUS ONCE
Refills: 0 | Status: COMPLETED | OUTPATIENT
Start: 2025-07-17 | End: 2025-07-17

## 2025-07-17 RX ORDER — PREDNISONE 20 MG/1
10 TABLET ORAL EVERY 24 HOURS
Refills: 0 | Status: CANCELLED | OUTPATIENT
Start: 2025-07-21 | End: 2025-07-18

## 2025-07-17 RX ORDER — INSULIN LISPRO 100 U/ML
10 INJECTION, SOLUTION INTRAVENOUS; SUBCUTANEOUS ONCE
Refills: 0 | Status: DISCONTINUED | OUTPATIENT
Start: 2025-07-17 | End: 2025-07-17

## 2025-07-17 RX ORDER — PREDNISONE 20 MG/1
20 TABLET ORAL EVERY 24 HOURS
Refills: 0 | Status: DISCONTINUED | OUTPATIENT
Start: 2025-07-17 | End: 2025-07-18

## 2025-07-17 RX ORDER — INSULIN LISPRO 100 U/ML
10 INJECTION, SOLUTION INTRAVENOUS; SUBCUTANEOUS
Refills: 0 | Status: DISCONTINUED | OUTPATIENT
Start: 2025-07-17 | End: 2025-07-18

## 2025-07-17 RX ORDER — B1/B2/B3/B5/B6/B12/VIT C/FOLIC 500-0.5 MG
1 TABLET ORAL DAILY
Refills: 0 | Status: DISCONTINUED | OUTPATIENT
Start: 2025-07-17 | End: 2025-07-18

## 2025-07-17 RX ADMIN — INSULIN LISPRO 4: 100 INJECTION, SOLUTION INTRAVENOUS; SUBCUTANEOUS at 18:23

## 2025-07-17 RX ADMIN — BACITRACIN ZINC AND POLYMYXIN B SULFATE 1 APPLICATION(S): 500; 10000 OINTMENT TOPICAL at 18:32

## 2025-07-17 RX ADMIN — Medication 300 MILLIGRAM(S): at 12:52

## 2025-07-17 RX ADMIN — INSULIN LISPRO 4 UNIT(S): 100 INJECTION, SOLUTION INTRAVENOUS; SUBCUTANEOUS at 13:14

## 2025-07-17 RX ADMIN — LOSARTAN POTASSIUM 25 MILLIGRAM(S): 100 TABLET, FILM COATED ORAL at 06:50

## 2025-07-17 RX ADMIN — Medication 20 MILLIGRAM(S): at 06:49

## 2025-07-17 RX ADMIN — INSULIN LISPRO 8 UNIT(S): 100 INJECTION, SOLUTION INTRAVENOUS; SUBCUTANEOUS at 18:24

## 2025-07-17 RX ADMIN — INSULIN LISPRO 2: 100 INJECTION, SOLUTION INTRAVENOUS; SUBCUTANEOUS at 09:33

## 2025-07-17 RX ADMIN — Medication 40 MILLIEQUIVALENT(S): at 09:33

## 2025-07-17 RX ADMIN — INSULIN LISPRO 4 UNIT(S): 100 INJECTION, SOLUTION INTRAVENOUS; SUBCUTANEOUS at 09:32

## 2025-07-17 RX ADMIN — ATORVASTATIN CALCIUM 80 MILLIGRAM(S): 80 TABLET, FILM COATED ORAL at 22:15

## 2025-07-17 RX ADMIN — Medication 40 MILLIGRAM(S): at 06:49

## 2025-07-17 RX ADMIN — HEPARIN SODIUM 5000 UNIT(S): 1000 INJECTION INTRAVENOUS; SUBCUTANEOUS at 13:13

## 2025-07-17 RX ADMIN — METOPROLOL SUCCINATE 100 MILLIGRAM(S): 50 TABLET, EXTENDED RELEASE ORAL at 06:49

## 2025-07-17 RX ADMIN — BACITRACIN ZINC AND POLYMYXIN B SULFATE 1 APPLICATION(S): 500; 10000 OINTMENT TOPICAL at 06:50

## 2025-07-17 RX ADMIN — PREDNISONE 20 MILLIGRAM(S): 20 TABLET ORAL at 09:34

## 2025-07-17 RX ADMIN — Medication 100 MILLIEQUIVALENT(S): at 12:49

## 2025-07-17 RX ADMIN — Medication 100 MILLIEQUIVALENT(S): at 09:34

## 2025-07-17 RX ADMIN — INSULIN LISPRO 2: 100 INJECTION, SOLUTION INTRAVENOUS; SUBCUTANEOUS at 22:14

## 2025-07-17 RX ADMIN — Medication 100 MILLIEQUIVALENT(S): at 11:13

## 2025-07-17 RX ADMIN — EZETIMIBE 10 MILLIGRAM(S): 10 TABLET ORAL at 09:34

## 2025-07-17 RX ADMIN — Medication 81 MILLIGRAM(S): at 09:34

## 2025-07-17 RX ADMIN — HEPARIN SODIUM 5000 UNIT(S): 1000 INJECTION INTRAVENOUS; SUBCUTANEOUS at 06:49

## 2025-07-17 RX ADMIN — Medication 1 TABLET(S): at 09:34

## 2025-07-17 RX ADMIN — INSULIN LISPRO 8: 100 INJECTION, SOLUTION INTRAVENOUS; SUBCUTANEOUS at 13:14

## 2025-07-17 RX ADMIN — Medication 1 TABLET(S): at 12:53

## 2025-07-17 RX ADMIN — HEPARIN SODIUM 5000 UNIT(S): 1000 INJECTION INTRAVENOUS; SUBCUTANEOUS at 22:15

## 2025-07-17 NOTE — PROGRESS NOTE ADULT - PROBLEM SELECTOR PLAN 12
F: None  E: Replete PRN  N: Prescribed Ensure max x1/day. Placed on low carb diet.   DVT ppx: Heparin - 5000 units subcutaneous every 8 hrs  Bowel: None  Dispo: RMF  Code status: F: None  E: Replete PRN  N: Prescribed Ensure max x3/day along with 300mg Thiamine + Multivitamin daily. Placed on low carb diet.   DVT ppx: Heparin - 5000 units subcutaneous every 8 hrs  Bowel: None  Dispo: RMF  Code status:

## 2025-07-17 NOTE — PROGRESS NOTE ADULT - PROBLEM SELECTOR PLAN 8
- Continue Albuterol prn Pain in R forearm, mild swelling, no warmth  - Almost resolved  - Runs warm water on it in bathroom with good symptomatic relief  - no significant findings on forearm xray

## 2025-07-17 NOTE — PROGRESS NOTE ADULT - ATTENDING COMMENTS
Patient reports LE edema has been improving since admission, stool better formed. Has been urinating a lot and has some incontincne. No other complaints or events reported.    Labs, imaging reviewed    Monitor UOP. creatinine, replete K. Monitor CHEM at 4m,   Check TSH, patient hyperglycemic on steroids, reported only on Lispro outpatient, did not tolerate Lantus inpatient. Will get endo assistance  DVT ppx

## 2025-07-17 NOTE — PROGRESS NOTE ADULT - ASSESSMENT
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 Admitted for further evaluation and management in addition to treatment of hypokalemia and pre-renal MARYAN 2/2 to combination of diarrhea along with over-diuresis (25mg hydrochlorothiazide + 60mg furosemide). MARYAN resolved. LE edema improving on new diuretic regimen but need to monitor electrolytes and kidney function closely

## 2025-07-17 NOTE — PROGRESS NOTE ADULT - PROBLEM SELECTOR PLAN 1
Prednisone use >Third-spacing 2/2 to NSCLC vs. Heart failure (HFpEF)  - Echo 7/15 mild diastolic dysfunction  - Monitor edema as pred continues to taper  - ACE wraps  - advised to keep legs raised as much as possible  - IV Bumex 2mg BID + PO Metolazone 5mg QD as per Dr Pérez  - daily weights (goal weight ~50kg)  - strict i/o  - start Polymyxin ointment for weeping wounds on R leg  - CTM R leg for evidence of cellulitis Prednisone use vs. Third-spacing 2/2 to NSCLC vs. Heart failure (HFpEF) vs. Hypothyroidism  - Echo 7/15 mild diastolic dysfunction  - pred tapering  - ACE wraps  - advised to keep legs raised as much as possible  - switched to PO Torsemide today  - daily weights (goal weight ~50kg)  - strict i/o  - start Polymyxin ointment for weeping wounds on R leg  - CTM R leg for evidence of cellulitis  - f/u TSH

## 2025-07-17 NOTE — PROGRESS NOTE ADULT - PROBLEM SELECTOR PLAN 9
Resolved  - CTM in the context of new diuretic regimen Resolved?  - CTM in the context of new diuretic regimen  - Cystatin C as BUN elevated and Creatinine may be falsely low given low BMI - Continue Albuterol prn

## 2025-07-17 NOTE — CONSULT NOTE ADULT - PROBLEM SELECTOR RECOMMENDATION 9
Type 2 diabetes mellitus with steroid induced hyperglycemia  - Please continue lantus *** units at bedtime.   - Continue lispro *** units before each meal.  - Continue lispro moderate / low dose sliding scale before meals and at bedtime.  - Patient's fingerstick glucose goal is 100-180 mg/dL.    - For discharge, patient can ***.    - Patient can follow up at discharge with [facility] by calling [phone] to make an appointment.      Case discussed with Dr. Chambers. Primary team updated. Type 2 diabetes mellitus with steroid induced hyperglycemia  - Increase lispro to 10 units before breakfast and 8 units before lunch and dinner.  - Continue lispro moderate dose sliding scale before meals and at bedtime.  - Patient's fingerstick glucose goal is 100-180 mg/dL.    - For discharge, patient can ***.    - Patient can follow up at discharge with [facility] by calling [phone] to make an appointment.      Case discussed with Dr. Chambers. Primary team updated.

## 2025-07-17 NOTE — PROGRESS NOTE ADULT - SUBJECTIVE AND OBJECTIVE BOX
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 The pt developed immunotherapy-induced colitis 3 weeks ago at which point the Keytruda was stopped and he was started on a Prednisone taper. Initial dosage of Prednisone at 60mg and currently tapered down to 20mg (10mg on Monday 7/21). After onset of BLE swelling, Lasix dosage was increased to 60mg. After noting no change in BLE swelling, pt presented to the ED for further treatment.    INTERVAL EVENTS:   No acute events overnight. Pt is still on contact precautions for Norovirus. He had ACE wraps placed on his bilateral LEs that he kept on until this AM    SUBJECTIVE:   Patient seen and examined at bedside. Still edematous but improving, although redness noted on anterolateral aspect of right shin. Non-tender, not hot or indurated or fluctuant. 1-2 loose stools since yesterday. Feels pain in R forearm has improved a lot with less swelling.    ROS:  Positive for chronic cough. Negative for SOB, fever, chills, chest pain, abdominal pain, nausea, vomiting, diarrhea, dysuria, dizziness, headache.    VITAL SIGNS:  T(C): 36.7 (16 Jul 2025 06:00), Max: 36.7 (15 Jul 2025 13:10)  T(F): 98.1 (16 Jul 2025 06:00), Max: 98.1 (15 Jul 2025 13:10)  HR: 84 (16 Jul 2025 06:00) (84 - 88)  BP: 150/72 (16 Jul 2025 06:00) (132/72 - 150/72)  RR: 18 (16 Jul 2025 06:00) (18 - 18)  SpO2: 92% (16 Jul 2025 06:00) (92% - 98%)    Parameters below as of 16 Jul 2025 06:00  Patient On (Oxygen Delivery Method): room air    PHYSICAL EXAM:  Constitutional: AOx3. No acute distress. Resting comfortably in bed.  HEENT: Atraumatic. PERRL. EOMI. Clear conjunctiva. Moist mucous membranes.  Neck: Trachea midline. Supple.  Lungs: Mild rales noted at apex of lungs b/l. Lungs otherwise clear to auscultation b/l. No accessory muscle use. No wheezing. No stridor/retracting/tripoding.   Cardiovascular: Regular rate and rhythm. No murmurs, rubs, or gallops.  Abdomen: Soft, non-tender, non-distended. No rebound or guarding.    Extremities: 3+ b/l LE pitting edema still present + increased erythema across the RLE w/o warmth. Right wrist edema.  Skin: Weeping skin wounds noted to b/l ankles. No bleeding or pus noted.  Neurologic: No apparent focal neurological deficits. Answering questions, following commands, moving all extremities.   Psychiatric: Cooperative. Appropriate mood and affect.       MEDICATIONS  (STANDING):  aspirin enteric coated 81 milliGRAM(s) Oral daily  atorvastatin 80 milliGRAM(s) Oral at bedtime  bumetanide Injectable 2 milliGRAM(s) IV Push <User Schedule>  dextrose 5%. 500 milliLiter(s) (250 mL/Hr) IV Continuous <Continuous>  dextrose 5%. 1000 milliLiter(s) (100 mL/Hr) IV Continuous <Continuous>  dextrose 5%. 1000 milliLiter(s) (50 mL/Hr) IV Continuous <Continuous>  dextrose 50% Injectable 25 Gram(s) IV Push once  dextrose 50% Injectable 12.5 Gram(s) IV Push once  dextrose 50% Injectable 25 Gram(s) IV Push once  ezetimibe 10 milliGRAM(s) Oral daily  glucagon  Injectable 1 milliGRAM(s) IntraMuscular once  heparin   Injectable 5000 Unit(s) SubCutaneous every 8 hours  insulin lispro (ADMELOG) corrective regimen sliding scale   SubCutaneous Before meals and at bedtime  insulin lispro Injectable (ADMELOG) 4 Unit(s) SubCutaneous three times a day before meals  losartan 25 milliGRAM(s) Oral daily  metOLazone 5 milliGRAM(s) Oral every 24 hours  metoprolol succinate  milliGRAM(s) Oral daily  pantoprazole    Tablet 40 milliGRAM(s) Oral before breakfast  predniSONE   Tablet 20 milliGRAM(s) Oral every 24 hours  thiamine 100 milliGRAM(s) Oral every 24 hours  trimethoprim   80 mG/sulfamethoxazole 400 mG 1 Tablet(s) Oral daily    MEDICATIONS  (PRN):  acetaminophen     Tablet .. 650 milliGRAM(s) Oral every 6 hours PRN Mild Pain (1 - 3)  albuterol    90 MICROgram(s) HFA Inhaler 2 Puff(s) Inhalation every 6 hours PRN Shortness of Breath and/or Wheezing  dextrose Oral Gel 15 Gram(s) Oral once PRN Blood Glucose LESS THAN 70 milliGRAM(s)/deciliter    ALLERGIES:  NKDA, NKFA, [employer]KE[employer]      LABS:                         13.5   30.19 )-----------( 211      ( 16 Jul 2025 08:00 )             42.1     07-16    132[L]  |  89[L]  |  56[H]  ----------------------------<  134[H]  4.0   |  29  |  0.98    Ca    9.7      16 Jul 2025 08:00  Phos  3.2     07-16  Mg     2.0     07-16        Urinalysis Basic - ( 16 Jul 2025 08:00 )    Color: x / Appearance: x / SG: x / pH: x  Gluc: 134 mg/dL / Ketone: x  / Bili: x / Urobili: x   Blood: x / Protein: x / Nitrite: x   Leuk Esterase: x / RBC: x / WBC x   Sq Epi: x / Non Sq Epi: x / Bacteria: x      CAPILLARY BLOOD GLUCOSE      POCT Blood Glucose.: 182 mg/dL (16 Jul 2025 09:09)  POCT Blood Glucose.: 211 mg/dL (15 Jul 2025 21:43)  POCT Blood Glucose.: 219 mg/dL (15 Jul 2025 17:37)  POCT Blood Glucose.: 261 mg/dL (15 Jul 2025 12:11)      RADIOLOGY, EKG & ADDITIONAL TESTS: Reviewed.  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 The pt developed immunotherapy-induced colitis 3 weeks ago at which point the Keytruda was stopped and he was started on a Prednisone taper. Initial dosage of Prednisone at 60mg and currently tapered down to 20mg (10mg on Monday 7/21). After onset of BLE swelling, Lasix dosage was increased to 60mg. After noting no change in BLE swelling, pt presented to the ED for further treatment.    INTERVAL EVENTS:   No acute events overnight. Remains in contact precautions for Norovirus. ACE wraps placed yesterday evening and he wore them overnight    SUBJECTIVE:   Patient seen and examined at bedside. Still edematous but looks like moderate improvement. 3 loose stools since yesterday ("mushy," denies diarrhea). Denies pain    ROS:  Positive for chronic cough. Negative for SOB, fever, chills, chest pain, abdominal pain, nausea, vomiting, diarrhea, dysuria, dizziness, headache.      VITAL SIGNS:  Vital Signs Last 24 Hrs  T(C): 36.4 (17 Jul 2025 06:45), Max: 36.8 (16 Jul 2025 21:19)  T(F): 97.5 (17 Jul 2025 06:45), Max: 98.2 (16 Jul 2025 21:19)  HR: 94 (17 Jul 2025 06:45) (87 - 94)  BP: 136/84 (17 Jul 2025 06:45) (106/64 - 136/84)  BP(mean): 78 (16 Jul 2025 21:19) (78 - 78)  RR: 18 (17 Jul 2025 06:45) (18 - 18)  SpO2: 94% (17 Jul 2025 06:45) (94% - 97%)    Parameters below as of 17 Jul 2025 06:45  Patient On (Oxygen Delivery Method): room air        PHYSICAL EXAM:  Constitutional: resting comfortably; NAD  HEENT: NC/AT, PERRL, EOMI, anicteric sclera, MMM  Neck: supple; no JVD or thyromegaly; no LAD  Respiratory: CTA B/L; no W/R/R, no retractions or increased work of breathing  Cardiac: +S1/S2; RRR; no M/R/G  Gastrointestinal: soft, NT/ND; no rebound or guarding; +BS  Extremities: 3+ b/l LE pitting edema still present + increased erythema across the RLE w/o warmth.  Musculoskeletal: NROM x4; no joint swelling, tenderness or erythema  Vascular: 2+ radial, DP/PT pulses B/L  Dermatologic: Weeping skin wounds noted to b/l ankles. No bleeding or pus noted.  Neurologic: AAOx3, CN II-XII intact, no focal deficits, strength 5/5 and equal in all extremities, sensation intact  Psychiatric: calm, cooperative, behaviors are appropriate, denies SI/HI      MEDICATIONS  (STANDING):  aspirin enteric coated 81 milliGRAM(s) Oral daily  atorvastatin 80 milliGRAM(s) Oral at bedtime  bumetanide Injectable 2 milliGRAM(s) IV Push <User Schedule>  dextrose 5%. 500 milliLiter(s) (250 mL/Hr) IV Continuous <Continuous>  dextrose 5%. 1000 milliLiter(s) (100 mL/Hr) IV Continuous <Continuous>  dextrose 5%. 1000 milliLiter(s) (50 mL/Hr) IV Continuous <Continuous>  dextrose 50% Injectable 25 Gram(s) IV Push once  dextrose 50% Injectable 12.5 Gram(s) IV Push once  dextrose 50% Injectable 25 Gram(s) IV Push once  ezetimibe 10 milliGRAM(s) Oral daily  glucagon  Injectable 1 milliGRAM(s) IntraMuscular once  heparin   Injectable 5000 Unit(s) SubCutaneous every 8 hours  insulin lispro (ADMELOG) corrective regimen sliding scale   SubCutaneous Before meals and at bedtime  insulin lispro Injectable (ADMELOG) 4 Unit(s) SubCutaneous three times a day before meals  losartan 25 milliGRAM(s) Oral daily  metOLazone 5 milliGRAM(s) Oral every 24 hours  metoprolol succinate  milliGRAM(s) Oral daily  pantoprazole    Tablet 40 milliGRAM(s) Oral before breakfast  predniSONE   Tablet 20 milliGRAM(s) Oral every 24 hours  thiamine 100 milliGRAM(s) Oral every 24 hours  trimethoprim   80 mG/sulfamethoxazole 400 mG 1 Tablet(s) Oral daily    MEDICATIONS  (PRN):  acetaminophen     Tablet .. 650 milliGRAM(s) Oral every 6 hours PRN Mild Pain (1 - 3)  albuterol    90 MICROgram(s) HFA Inhaler 2 Puff(s) Inhalation every 6 hours PRN Shortness of Breath and/or Wheezing  dextrose Oral Gel 15 Gram(s) Oral once PRN Blood Glucose LESS THAN 70 milliGRAM(s)/deciliter    ALLERGIES:  NKDA, NKFA, [employer]KE[employer]      LABS:                        12.3   20.11 )-----------( 185      ( 17 Jul 2025 05:30 )             38.1     07-17    137  |  92[L]  |  71[H]  ----------------------------<  190[H]  3.2[L]   |  35[H]  |  1.28    Ca    9.0      17 Jul 2025 05:30  Phos  2.9     07-17  Mg     2.1     07-17    TPro  6.3  /  Alb  3.0[L]  /  TBili  0.4  /  DBili  0.1  /  AST  27  /  ALT  58[H]  /  AlkPhos  82  07-17      Urinalysis Basic - ( 17 Jul 2025 05:30 )    Color: x / Appearance: x / SG: x / pH: x  Gluc: 190 mg/dL / Ketone: x  / Bili: x / Urobili: x   Blood: x / Protein: x / Nitrite: x   Leuk Esterase: x / RBC: x / WBC x   Sq Epi: x / Non Sq Epi: x / Bacteria: x        RADIOLOGY, EKG & ADDITIONAL TESTS: Reviewed. 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 The pt developed immunotherapy-induced colitis 3 weeks ago at which point the Keytruda was stopped and he was started on a Prednisone taper. Initial dosage of Prednisone at 60mg and currently tapered down to 20mg (10mg on Monday 7/21). After onset of BLE swelling, Lasix dosage was increased to 60mg. After noting no change in BLE swelling, pt presented to the ED for further treatment.    INTERVAL EVENTS:   No acute events overnight. Remains in contact precautions for Norovirus. ACE wraps placed yesterday evening and he wore them overnight    SUBJECTIVE:   Patient seen and examined at bedside. Still edematous but looks like moderate improvement. 3 loose stools since yesterday ("mushy," denies diarrhea). Denies pain    ROS:  Positive for chronic cough. Negative for SOB, fever, chills, chest pain, abdominal pain, nausea, vomiting, diarrhea, dysuria, dizziness, headache.      VITAL SIGNS:  Vital Signs Last 24 Hrs  T(C): 36.4 (17 Jul 2025 06:45), Max: 36.8 (16 Jul 2025 21:19)  T(F): 97.5 (17 Jul 2025 06:45), Max: 98.2 (16 Jul 2025 21:19)  HR: 94 (17 Jul 2025 06:45) (87 - 94)  BP: 136/84 (17 Jul 2025 06:45) (106/64 - 136/84)  BP(mean): 78 (16 Jul 2025 21:19) (78 - 78)  RR: 18 (17 Jul 2025 06:45) (18 - 18)  SpO2: 94% (17 Jul 2025 06:45) (94% - 97%)    Parameters below as of 17 Jul 2025 06:45  Patient On (Oxygen Delivery Method): room air      PHYSICAL EXAM:  Constitutional: resting comfortably; NAD  HEENT: NC/AT, PERRL, EOMI, anicteric sclera, MMM  Neck: supple; no JVD or thyromegaly; no LAD  Respiratory: CTA B/L; no W/R/R, no retractions or increased work of breathing  Cardiac: +S1/S2; RRR; no M/R/G  Gastrointestinal: soft, NT/ND; no rebound or guarding; +BS  Extremities: 3+ b/l LE pitting edema still present + increased erythema across the RLE w/o warmth.  Musculoskeletal: NROM x4; no joint swelling, tenderness or erythema  Vascular: 2+ radial, DP/PT pulses B/L  Dermatologic: Weeping skin wounds noted to b/l ankles. No bleeding or pus noted.  Neurologic: AAOx3, CN II-XII intact, no focal deficits, strength 5/5 and equal in all extremities, sensation intact  Psychiatric: calm, cooperative, behaviors are appropriate, denies SI/HI      MEDICATIONS  (STANDING):  aspirin enteric coated 81 milliGRAM(s) Oral daily  atorvastatin 80 milliGRAM(s) Oral at bedtime  bumetanide Injectable 2 milliGRAM(s) IV Push <User Schedule>  dextrose 5%. 500 milliLiter(s) (250 mL/Hr) IV Continuous <Continuous>  dextrose 5%. 1000 milliLiter(s) (100 mL/Hr) IV Continuous <Continuous>  dextrose 5%. 1000 milliLiter(s) (50 mL/Hr) IV Continuous <Continuous>  dextrose 50% Injectable 25 Gram(s) IV Push once  dextrose 50% Injectable 12.5 Gram(s) IV Push once  dextrose 50% Injectable 25 Gram(s) IV Push once  ezetimibe 10 milliGRAM(s) Oral daily  glucagon  Injectable 1 milliGRAM(s) IntraMuscular once  heparin   Injectable 5000 Unit(s) SubCutaneous every 8 hours  insulin lispro (ADMELOG) corrective regimen sliding scale   SubCutaneous Before meals and at bedtime  insulin lispro Injectable (ADMELOG) 4 Unit(s) SubCutaneous three times a day before meals  losartan 25 milliGRAM(s) Oral daily  metOLazone 5 milliGRAM(s) Oral every 24 hours  metoprolol succinate  milliGRAM(s) Oral daily  pantoprazole    Tablet 40 milliGRAM(s) Oral before breakfast  predniSONE   Tablet 20 milliGRAM(s) Oral every 24 hours  thiamine 100 milliGRAM(s) Oral every 24 hours  trimethoprim   80 mG/sulfamethoxazole 400 mG 1 Tablet(s) Oral daily    MEDICATIONS  (PRN):  acetaminophen     Tablet .. 650 milliGRAM(s) Oral every 6 hours PRN Mild Pain (1 - 3)  albuterol    90 MICROgram(s) HFA Inhaler 2 Puff(s) Inhalation every 6 hours PRN Shortness of Breath and/or Wheezing  dextrose Oral Gel 15 Gram(s) Oral once PRN Blood Glucose LESS THAN 70 milliGRAM(s)/deciliter    ALLERGIES:  NKDA, NKFA, [employer]KE[employer]      LABS:                        12.3   20.11 )-----------( 185      ( 17 Jul 2025 05:30 )             38.1     07-17    137  |  92[L]  |  71[H]  ----------------------------<  190[H]  3.2[L]   |  35[H]  |  1.28    Ca    9.0      17 Jul 2025 05:30  Phos  2.9     07-17  Mg     2.1     07-17    TPro  6.3  /  Alb  3.0[L]  /  TBili  0.4  /  DBili  0.1  /  AST  27  /  ALT  58[H]  /  AlkPhos  82  07-17      Urinalysis Basic - ( 17 Jul 2025 05:30 )    Color: x / Appearance: x / SG: x / pH: x  Gluc: 190 mg/dL / Ketone: x  / Bili: x / Urobili: x   Blood: x / Protein: x / Nitrite: x   Leuk Esterase: x / RBC: x / WBC x   Sq Epi: x / Non Sq Epi: x / Bacteria: x        RADIOLOGY, EKG & ADDITIONAL TESTS: Reviewed.

## 2025-07-17 NOTE — PROGRESS NOTE ADULT - PROBLEM SELECTOR PLAN 6
- Continue Insulin lispro  - Lantus stopped  - Continue BSL monitoring Pain in R forearm, mild swelling, no warmth  - Almost resolved  - Runs warm water on it in bathroom with good symptomatic relief  - no significant findings on forearm xray - Eventual plan to return to using Keytruda  - Follows with Dr. Pratt

## 2025-07-17 NOTE — CONSULT NOTE ADULT - TIME BILLING
Bedside exam and interview.  Reviewed labs and glucose.  Insulin adjustment.  Discussed plan of care with primary team.  Documentation of encounter.

## 2025-07-17 NOTE — PROGRESS NOTE ADULT - PROBLEM SELECTOR PLAN 2
Has been on regular Prednisolone  - Uptrending - potentially d/t infectious etiology  - Trend WBC Glucose levels erratic. Previously on Lispro. Lantus at bedtime was added but resulted in hypoglycemia following day  - Continue Insulin lispro  - Lantus currently stopped  - f/u endocrine recs

## 2025-07-17 NOTE — PROGRESS NOTE ADULT - NUTRITIONAL ASSESSMENT
This patient has been assessed with a concern for Malnutrition and has been determined to have a diagnosis/diagnoses of Severe protein-calorie malnutrition and Underweight (BMI < 19).    This patient is being managed with:   Diet Consistent Carbohydrate Renal/No Snacks-  Supplement Feeding Modality:  Oral  Ensure Max Cans or Servings Per Day:  1       Frequency:  Daily  Entered: Jul 11 2025 11:19AM   This patient has been assessed with a concern for Malnutrition and has been determined to have a diagnosis/diagnoses of Severe protein-calorie malnutrition and Underweight (BMI < 19).    This patient is being managed with:   Diet Consistent Carbohydrate Renal/No Snacks-  Supplement Feeding Modality:  Oral  Ensure Max Cans or Servings Per Day:  1       Frequency:  THREE TIMES DAILY  Entered: Jul 17 2025

## 2025-07-17 NOTE — PROGRESS NOTE ADULT - PROBLEM SELECTOR PLAN 3
- Eventual plan to return to using Keytruda  - Follows with Dr. Pratt Has been on regular Prednisolone  - downtrending WBC today  - CTM WBC Currently on diuretics and likely some osmotic diuresis given high glucose  - f/u PM BMP  - replete as needed

## 2025-07-17 NOTE — PROGRESS NOTE ADULT - PROBLEM SELECTOR PLAN 4
- Continue Prednisone taper, will taper to 10mg on Monday - Eventual plan to return to using Keytruda  - Follows with Dr. Pratt Resolved?  - CTM in the context of diuretic regimen  - Cystatin C as BUN elevated but Creatinine wnl (may be falsely low given low BMI)

## 2025-07-17 NOTE — CONSULT NOTE ADULT - ASSESSMENT
74M PMHx of tobacco use with stage IV metastatic non-small cell lung cancer (diagnosed in September 2023  on Keytruda/ pembrolizumab last infusion on 5/16),  immunotherapy induced colitis on prednisone, COPD, HTN, DM, S/p carotic endarterectomy 2020, presenting with 2 weeks of b/l LE edema and pain without improvement on lasix, found to have MARYAN and hypokalemia, admitted for PT eval and further evaluation.     Endocrine is consulted for diabetes management (new, chronic, stable, exacerbated).    24 hour glucose data reviewed.  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 Admitted for further evaluation and management in addition to treatment of hypokalemia and pre-renal MARYAN 2/2 to combination of diarrhea along with over-diuresis (25mg hydrochlorothiazide + 60mg furosemide). MARYAN resolved. LE edema improving on new diuretic regimen but need to monitor electrolytes and kidney function closely    Endocrine is consulted for diabetes management (new, chronic, stable, exacerbated).    24 hour glucose data reviewed.  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 Admitted for further evaluation and management in addition to treatment of hypokalemia and pre-renal MARYAN 2/2 to combination of diarrhea along with over-diuresis (25mg hydrochlorothiazide + 60mg furosemide). MARYAN resolved. LE edema improving on new diuretic regimen but need to monitor electrolytes and kidney function closely    Endocrine is consulted for type 2 diabetes management (chronic, exacerbated by steroids).    Prednisone 30mg daily on admission, decreased to 20mg on 7/14 and planned for decreased to 10mg on 7/21.  24 hour glucose data reviewed.  3 labs reviewed - normal/abnormal  Insulin adjustment    A1C: 7.3 %  BUN: 71  Creatinine: 1.28  GFR: 58  Weight: 53.07  BMI: 18.3

## 2025-07-17 NOTE — PROGRESS NOTE ADULT - SUBJECTIVE AND OBJECTIVE BOX
VASCULAR CARDIOLOGY ATTENDING PROGRESS NOTE    SERVICE LINE: [phone]  Subjective  LE edema much improved    Current Medications:   acetaminophen     Tablet .. 650 milliGRAM(s) Oral every 6 hours PRN  albuterol    90 MICROgram(s) HFA Inhaler 2 Puff(s) Inhalation every 6 hours PRN  aspirin enteric coated 81 milliGRAM(s) Oral daily  atorvastatin 80 milliGRAM(s) Oral at bedtime  bumetanide Injectable 2 milliGRAM(s) IV Push <User Schedule>  dextrose 5%. 500 milliLiter(s) IV Continuous <Continuous>  dextrose 5%. 1000 milliLiter(s) IV Continuous <Continuous>  dextrose 5%. 1000 milliLiter(s) IV Continuous <Continuous>  dextrose 50% Injectable 25 Gram(s) IV Push once  dextrose 50% Injectable 12.5 Gram(s) IV Push once  dextrose 50% Injectable 25 Gram(s) IV Push once  dextrose Oral Gel 15 Gram(s) Oral once PRN  ezetimibe 10 milliGRAM(s) Oral daily  glucagon  Injectable 1 milliGRAM(s) IntraMuscular once  heparin   Injectable 5000 Unit(s) SubCutaneous every 8 hours  insulin lispro (ADMELOG) corrective regimen sliding scale   SubCutaneous Before meals and at bedtime  insulin lispro Injectable (ADMELOG) 4 Unit(s) SubCutaneous three times a day before meals  losartan 25 milliGRAM(s) Oral daily  metOLazone 5 milliGRAM(s) Oral every 24 hours  metoprolol succinate  milliGRAM(s) Oral daily  pantoprazole    Tablet 40 milliGRAM(s) Oral before breakfast  predniSONE   Tablet 20 milliGRAM(s) Oral every 24 hours  thiamine 100 milliGRAM(s) Oral every 24 hours  trimethoprim   80 mG/sulfamethoxazole 400 mG 1 Tablet(s) Oral daily      REVIEW OF SYSTEMS:  CONSTITUTIONAL: No weakness, fevers or chills  EYES/ENT: No visual changes;  No dysphagia  NECK: No pain or stiffness  RESPIRATORY: No cough, wheezing, hemoptysis; No shortness of breath  CARDIOVASCULAR: No chest pain or palpitations; No lower extremity edema  GASTROINTESTINAL: No abdominal or epigastric pain. No nausea, vomiting, or hematemesis; No diarrhea or constipation. No melena or hematochezia.  BACK: No back pain  GENITOURINARY: No dysuria, frequency or hematuria  NEUROLOGICAL: No numbness or weakness  SKIN: No itching, burning, rashes, or lesions   All other review of systems is negative unless indicated above.    Physical Exam:  T(F): 98 (07-15), Max: 98 (07-15)  HR: 63 (07-15) (63 - 80)  BP: 149/- (07-15) (136/82 - 149/-)  BP(mean): 77 (07-15) (77 - 101)  ABP: --  ABP(mean): --  RR: 17 (07-15)  SpO2: 98% (07-15)  GENERAL: No acute distress, well-developed  HEAD:  Atraumatic, Normocephalic  ENT: EOMI, PERRLA, conjunctiva and sclera clear, Neck supple, No JVD, moist mucosa  CHEST/LUNG: Clear to auscultation bilaterally; No wheeze, equal breath sounds bilaterally   BACK: No spinal tenderness  HEART: Regular rate and rhythm; No murmurs, rubs, or gallops  ABDOMEN: Soft, Nontender, Nondistended; Bowel sounds present  EXTREMITIES:  No clubbing, cyanosis, or edema  PSYCH: Nl behavior, nl affect  NEUROLOGY: AAOx3, non-focal, cranial nerves intact  SKIN: Normal color, No rashes or lesions  LINES:    Cardiovascular Diagnostic Testing: personally reviewed    CXR: Personally reviewed    Labs: Personally reviewed                        13.8   25.15 )-----------( 208      ( 15 Jul 2025 07:00 )             41.9     07-15    132[L]  |  92[L]  |  48[H]  ----------------------------<  190[H]  4.5   |  33[H]  |  1.03    Ca    9.1      15 Jul 2025 07:00  Phos  2.6     07-15  Mg     2.1     07-15

## 2025-07-17 NOTE — PROGRESS NOTE ADULT - ASSESSMENT
73 yo man PMHx of severe carotid stenosis s/p CEA, CHB s/p PPM, HTN, DM2, COPD, stage IV metastatic non-small cell lung cancer s/p immunotherapy s/p colitis    Assessment  1. B/l LE edema  He reports acute new b/l below the knee pitting edema since starting PO prednisone  DVT ruled out. No significant LE venous reflux. Thin male, not typical patient with risk factor for lymphedema (CT A/P without lymphadenopathy w/o concern for malignancy induced lymphangitis/lymphedema)  UA without proteinuria, no significant renal disease. Mildly elevated LFT but no hepatic disease/cirrhosis to contribute to LE edema  TTE reviewed and with grade 1 diastolic dysfunction, EF normal, no major valvular disease  2. MARYAN   3. Severe carotid stenosis s/p CEA  4. CHB s/p PPM  5. HTN, DM2, COPD, stage IV metastatic lung cancer    Plan  1. LE edema, as explained above, likely from HFpEF (G1DD) and prednisone side effect, responded well to IV diuretics  2. No more IV diuretics warranted given slight MARYAN yesterday (now better)  3. Should be on PO torsemide 20mg QD at home until next cardiology f/u in 1 week ([facility] Dr. Abrue - I will let him know of patient's admission)    Thank you for the consult and the opportunity to take care of this patient.     Jaqueline Pérez M.D.  Cardiology | Vascular Cardiology Attending  Please call (c) [phone] for any questions    During non face-to-face time, I reviewed relevant portions of the patient’s medical record. During face-to-face time, I took a relevant history and examined the patient. I also explained differential diagnoses, relevant cardiac diagnoses, workup, and management plan, which required a high level of medical decision making. I answered all questions related to the patient's medical conditions.

## 2025-07-17 NOTE — PROGRESS NOTE ADULT - PROBLEM SELECTOR PLAN 5
Pain in R forearm, mild swelling, no warmth  - Almost resolved  - Runs warm water on it in bathroom with good symptomatic relief  - no significant findings on forearm xray - Continue Prednisone taper, will taper to 10mg on Monday Has been on regular Prednisolone  - downtrending WBC today  - CTM WBC

## 2025-07-18 ENCOUNTER — TRANSCRIPTION ENCOUNTER (OUTPATIENT)
Age: 75
End: 2025-07-18

## 2025-07-18 VITALS
TEMPERATURE: 98 F | HEART RATE: 75 BPM | DIASTOLIC BLOOD PRESSURE: 73 MMHG | OXYGEN SATURATION: 96 % | SYSTOLIC BLOOD PRESSURE: 129 MMHG | RESPIRATION RATE: 18 BRPM

## 2025-07-18 DIAGNOSIS — M79.89 OTHER SPECIFIED SOFT TISSUE DISORDERS: ICD-10-CM

## 2025-07-18 LAB
ANION GAP SERPL CALC-SCNC: 16 MMOL/L — SIGNIFICANT CHANGE UP (ref 5–17)
BASOPHILS # BLD AUTO: 0.01 K/UL — SIGNIFICANT CHANGE UP (ref 0–0.2)
BASOPHILS NFR BLD AUTO: 0.1 % — SIGNIFICANT CHANGE UP (ref 0–2)
BUN SERPL-MCNC: 76 MG/DL — HIGH (ref 7–23)
CALCIUM SERPL-MCNC: 9.3 MG/DL — SIGNIFICANT CHANGE UP (ref 8.4–10.5)
CHLORIDE SERPL-SCNC: 93 MMOL/L — LOW (ref 96–108)
CO2 SERPL-SCNC: 25 MMOL/L — SIGNIFICANT CHANGE UP (ref 22–31)
CREAT SERPL-MCNC: 1.04 MG/DL — SIGNIFICANT CHANGE UP (ref 0.5–1.3)
CYSTATIN C SERPL-MCNC: 1.89 MG/L — HIGH (ref 0.82–1.52)
EGFR: 74 ML/MIN/1.73M2 — SIGNIFICANT CHANGE UP
EGFR: 74 ML/MIN/1.73M2 — SIGNIFICANT CHANGE UP
EOSINOPHIL # BLD AUTO: 0.01 K/UL — SIGNIFICANT CHANGE UP (ref 0–0.5)
EOSINOPHIL NFR BLD AUTO: 0.1 % — SIGNIFICANT CHANGE UP (ref 0–6)
GFR/BSA.PRED SERPLBLD CYS-BASED-ARV: 31 ML/MIN/1.73M2 — LOW
GLUCOSE SERPL-MCNC: 184 MG/DL — HIGH (ref 70–99)
HCT VFR BLD CALC: 38.6 % — LOW (ref 39–50)
HGB BLD-MCNC: 12.6 G/DL — LOW (ref 13–17)
IMM GRANULOCYTES # BLD AUTO: 0.63 K/UL — HIGH (ref 0–0.07)
IMM GRANULOCYTES NFR BLD AUTO: 3.6 % — HIGH (ref 0–0.9)
LYMPHOCYTES # BLD AUTO: 4.78 K/UL — HIGH (ref 1–3.3)
LYMPHOCYTES NFR BLD AUTO: 27.7 % — SIGNIFICANT CHANGE UP (ref 13–44)
MAGNESIUM SERPL-MCNC: 2 MG/DL — SIGNIFICANT CHANGE UP (ref 1.6–2.6)
MCHC RBC-ENTMCNC: 28.3 PG — SIGNIFICANT CHANGE UP (ref 27–34)
MCHC RBC-ENTMCNC: 32.6 G/DL — SIGNIFICANT CHANGE UP (ref 32–36)
MCV RBC AUTO: 86.7 FL — SIGNIFICANT CHANGE UP (ref 80–100)
MONOCYTES # BLD AUTO: 0.9 K/UL — SIGNIFICANT CHANGE UP (ref 0–0.9)
MONOCYTES NFR BLD AUTO: 5.2 % — SIGNIFICANT CHANGE UP (ref 2–14)
NEUTROPHILS # BLD AUTO: 10.94 K/UL — HIGH (ref 1.8–7.4)
NEUTROPHILS NFR BLD AUTO: 63.3 % — SIGNIFICANT CHANGE UP (ref 43–77)
NRBC # BLD AUTO: 0 K/UL — SIGNIFICANT CHANGE UP (ref 0–0)
NRBC # FLD: 0 K/UL — SIGNIFICANT CHANGE UP (ref 0–0)
NRBC BLD AUTO-RTO: 0 /100 WBCS — SIGNIFICANT CHANGE UP (ref 0–0)
PHOSPHATE SERPL-MCNC: 3 MG/DL — SIGNIFICANT CHANGE UP (ref 2.5–4.5)
PLATELET # BLD AUTO: 155 K/UL — SIGNIFICANT CHANGE UP (ref 150–400)
PMV BLD: 11.9 FL — SIGNIFICANT CHANGE UP (ref 7–13)
POTASSIUM SERPL-MCNC: 4.2 MMOL/L — SIGNIFICANT CHANGE UP (ref 3.5–5.3)
POTASSIUM SERPL-SCNC: 4.2 MMOL/L — SIGNIFICANT CHANGE UP (ref 3.5–5.3)
RBC # BLD: 4.45 M/UL — SIGNIFICANT CHANGE UP (ref 4.2–5.8)
RBC # FLD: 18.2 % — HIGH (ref 10.3–14.5)
SODIUM SERPL-SCNC: 134 MMOL/L — LOW (ref 135–145)
WBC # BLD: 17.27 K/UL — HIGH (ref 3.8–10.5)
WBC # FLD AUTO: 17.27 K/UL — HIGH (ref 3.8–10.5)

## 2025-07-18 PROCEDURE — 84133 ASSAY OF URINE POTASSIUM: CPT

## 2025-07-18 PROCEDURE — 82962 GLUCOSE BLOOD TEST: CPT

## 2025-07-18 PROCEDURE — 84439 ASSAY OF FREE THYROXINE: CPT

## 2025-07-18 PROCEDURE — 87637 SARSCOV2&INF A&B&RSV AMP PRB: CPT

## 2025-07-18 PROCEDURE — 80053 COMPREHEN METABOLIC PANEL: CPT

## 2025-07-18 PROCEDURE — 84100 ASSAY OF PHOSPHORUS: CPT

## 2025-07-18 PROCEDURE — 87449 NOS EACH ORGANISM AG IA: CPT

## 2025-07-18 PROCEDURE — 97165 OT EVAL LOW COMPLEX 30 MIN: CPT

## 2025-07-18 PROCEDURE — 97116 GAIT TRAINING THERAPY: CPT

## 2025-07-18 PROCEDURE — 84156 ASSAY OF PROTEIN URINE: CPT

## 2025-07-18 PROCEDURE — 81003 URINALYSIS AUTO W/O SCOPE: CPT

## 2025-07-18 PROCEDURE — 80076 HEPATIC FUNCTION PANEL: CPT

## 2025-07-18 PROCEDURE — 71045 X-RAY EXAM CHEST 1 VIEW: CPT

## 2025-07-18 PROCEDURE — 87324 CLOSTRIDIUM AG IA: CPT

## 2025-07-18 PROCEDURE — 82570 ASSAY OF URINE CREATININE: CPT

## 2025-07-18 PROCEDURE — 0225U NFCT DS DNA&RNA 21 SARSCOV2: CPT

## 2025-07-18 PROCEDURE — 93005 ELECTROCARDIOGRAM TRACING: CPT

## 2025-07-18 PROCEDURE — 93970 EXTREMITY STUDY: CPT

## 2025-07-18 PROCEDURE — 86850 RBC ANTIBODY SCREEN: CPT

## 2025-07-18 PROCEDURE — 85027 COMPLETE CBC AUTOMATED: CPT

## 2025-07-18 PROCEDURE — 84540 ASSAY OF URINE/UREA-N: CPT

## 2025-07-18 PROCEDURE — 99233 SBSQ HOSP IP/OBS HIGH 50: CPT | Mod: GC

## 2025-07-18 PROCEDURE — 99232 SBSQ HOSP IP/OBS MODERATE 35: CPT

## 2025-07-18 PROCEDURE — 83735 ASSAY OF MAGNESIUM: CPT

## 2025-07-18 PROCEDURE — 36415 COLL VENOUS BLD VENIPUNCTURE: CPT

## 2025-07-18 PROCEDURE — 74018 RADEX ABDOMEN 1 VIEW: CPT

## 2025-07-18 PROCEDURE — 86901 BLOOD TYPING SEROLOGIC RH(D): CPT

## 2025-07-18 PROCEDURE — 80048 BASIC METABOLIC PNL TOTAL CA: CPT

## 2025-07-18 PROCEDURE — 73090 X-RAY EXAM OF FOREARM: CPT

## 2025-07-18 PROCEDURE — 99285 EMERGENCY DEPT VISIT HI MDM: CPT | Mod: 25

## 2025-07-18 PROCEDURE — 87040 BLOOD CULTURE FOR BACTERIA: CPT

## 2025-07-18 PROCEDURE — 83935 ASSAY OF URINE OSMOLALITY: CPT

## 2025-07-18 PROCEDURE — 96374 THER/PROPH/DIAG INJ IV PUSH: CPT

## 2025-07-18 PROCEDURE — 74018 RADEX ABDOMEN 1 VIEW: CPT | Mod: 26

## 2025-07-18 PROCEDURE — 97161 PT EVAL LOW COMPLEX 20 MIN: CPT

## 2025-07-18 PROCEDURE — 86900 BLOOD TYPING SEROLOGIC ABO: CPT

## 2025-07-18 PROCEDURE — 82610 CYSTATIN C: CPT

## 2025-07-18 PROCEDURE — 85025 COMPLETE CBC W/AUTO DIFF WBC: CPT

## 2025-07-18 PROCEDURE — 83880 ASSAY OF NATRIURETIC PEPTIDE: CPT

## 2025-07-18 PROCEDURE — 93306 TTE W/DOPPLER COMPLETE: CPT

## 2025-07-18 PROCEDURE — 99233 SBSQ HOSP IP/OBS HIGH 50: CPT

## 2025-07-18 PROCEDURE — 84300 ASSAY OF URINE SODIUM: CPT

## 2025-07-18 PROCEDURE — 87507 IADNA-DNA/RNA PROBE TQ 12-25: CPT

## 2025-07-18 PROCEDURE — 84443 ASSAY THYROID STIM HORMONE: CPT

## 2025-07-18 RX ORDER — INSULIN LISPRO 100 U/ML
10 INJECTION, SOLUTION INTRAVENOUS; SUBCUTANEOUS
Qty: 5 | Refills: 0
Start: 2025-07-18 | End: 2025-08-16

## 2025-07-18 RX ORDER — B1/B2/B3/B5/B6/B12/VIT C/FOLIC 500-0.5 MG
1 TABLET ORAL
Qty: 0 | Refills: 0 | DISCHARGE
Start: 2025-07-18

## 2025-07-18 RX ORDER — LOSARTAN POTASSIUM 100 MG/1
1 TABLET, FILM COATED ORAL
Qty: 0 | Refills: 0 | DISCHARGE
Start: 2025-07-18

## 2025-07-18 RX ORDER — TORSEMIDE 10 MG
20 TABLET ORAL EVERY 24 HOURS
Refills: 0 | Status: DISCONTINUED | OUTPATIENT
Start: 2025-07-18 | End: 2025-07-18

## 2025-07-18 RX ORDER — INSULIN LISPRO 100 U/ML
1 INJECTION, SOLUTION INTRAVENOUS; SUBCUTANEOUS
Qty: 5 | Refills: 0
Start: 2025-07-18 | End: 2025-08-16

## 2025-07-18 RX ORDER — LOSARTAN POTASSIUM 100 MG/1
1 TABLET, FILM COATED ORAL
Qty: 30 | Refills: 0
Start: 2025-07-18 | End: 2025-08-16

## 2025-07-18 RX ADMIN — METOPROLOL SUCCINATE 100 MILLIGRAM(S): 50 TABLET, EXTENDED RELEASE ORAL at 06:41

## 2025-07-18 RX ADMIN — PREDNISONE 20 MILLIGRAM(S): 20 TABLET ORAL at 09:20

## 2025-07-18 RX ADMIN — Medication 81 MILLIGRAM(S): at 09:21

## 2025-07-18 RX ADMIN — Medication 20 MILLIGRAM(S): at 09:34

## 2025-07-18 RX ADMIN — HEPARIN SODIUM 5000 UNIT(S): 1000 INJECTION INTRAVENOUS; SUBCUTANEOUS at 13:04

## 2025-07-18 RX ADMIN — Medication 1 TABLET(S): at 09:21

## 2025-07-18 RX ADMIN — EZETIMIBE 10 MILLIGRAM(S): 10 TABLET ORAL at 09:19

## 2025-07-18 RX ADMIN — Medication 40 MILLIGRAM(S): at 06:41

## 2025-07-18 RX ADMIN — Medication 1 TABLET(S): at 09:19

## 2025-07-18 RX ADMIN — INSULIN LISPRO 8 UNIT(S): 100 INJECTION, SOLUTION INTRAVENOUS; SUBCUTANEOUS at 12:39

## 2025-07-18 RX ADMIN — INSULIN LISPRO 2: 100 INJECTION, SOLUTION INTRAVENOUS; SUBCUTANEOUS at 12:38

## 2025-07-18 RX ADMIN — HEPARIN SODIUM 5000 UNIT(S): 1000 INJECTION INTRAVENOUS; SUBCUTANEOUS at 06:41

## 2025-07-18 RX ADMIN — INSULIN LISPRO 10 UNIT(S): 100 INJECTION, SOLUTION INTRAVENOUS; SUBCUTANEOUS at 09:18

## 2025-07-18 RX ADMIN — INSULIN LISPRO 2: 100 INJECTION, SOLUTION INTRAVENOUS; SUBCUTANEOUS at 09:16

## 2025-07-18 RX ADMIN — BACITRACIN ZINC AND POLYMYXIN B SULFATE 1 APPLICATION(S): 500; 10000 OINTMENT TOPICAL at 06:42

## 2025-07-18 RX ADMIN — Medication 300 MILLIGRAM(S): at 09:20

## 2025-07-18 RX ADMIN — LOSARTAN POTASSIUM 25 MILLIGRAM(S): 100 TABLET, FILM COATED ORAL at 06:41

## 2025-07-18 NOTE — DISCHARGE NOTE NURSING/CASE MANAGEMENT/SOCIAL WORK - FINANCIAL ASSISTANCE
[facility] provides services at a reduced cost to those who are determined to be eligible through [facility]’s financial assistance program. Information regarding [facility]’s financial assistance program can be found by going to [email or web address] or by calling [phone].

## 2025-07-18 NOTE — PROGRESS NOTE ADULT - ASSESSMENT
75 yo man PMHx of severe carotid stenosis s/p CEA, CHB s/p PPM, HTN, DM2, COPD, stage IV metastatic non-small cell lung cancer s/p immunotherapy s/p colitis    Assessment  1. B/l LE edema  He reports acute new b/l below the knee pitting edema since starting PO prednisone  DVT ruled out. No significant LE venous reflux. Thin male, not typical patient with risk factor for lymphedema (CT A/P without lymphadenopathy w/o concern for malignancy induced lymphangitis/lymphedema)  UA without proteinuria, no significant renal disease. Mildly elevated LFT but no hepatic disease/cirrhosis to contribute to LE edema  TTE reviewed and with grade 1 diastolic dysfunction, EF normal, no major valvular disease  2. MARYAN   3. Severe carotid stenosis s/p CEA  4. CHB s/p PPM  5. HTN, DM2, COPD, stage IV metastatic lung cancer    Plan  1. LE edema, as explained above, likely from HFpEF (G1DD) and prednisone side effect, responded well to IV diuretics  2. No more IV diuretics warranted given slight MARYAN yesterday (now better)  3. DC on PO torsemide 20mg QD at home until next cardiology f/u ([facility] Dr. Abreu)    Thank you for the consult and the opportunity to take care of this patient.     Jaqueline Pérez M.D.  Cardiology | Vascular Cardiology Attending  Please call (c) [phone] for any questions    During non face-to-face time, I reviewed relevant portions of the patient’s medical record. During face-to-face time, I took a relevant history and examined the patient. I also explained differential diagnoses, relevant cardiac diagnoses, workup, and management plan, which required a high level of medical decision making. I answered all questions related to the patient's medical conditions.

## 2025-07-18 NOTE — PROGRESS NOTE ADULT - SUBJECTIVE AND OBJECTIVE BOX
VASCULAR CARDIOLOGY ATTENDING PROGRESS NOTE    SERVICE LINE: [phone]  Subjective  LE edema much improved    Current Medications:   acetaminophen     Tablet .. 650 milliGRAM(s) Oral every 6 hours PRN  albuterol    90 MICROgram(s) HFA Inhaler 2 Puff(s) Inhalation every 6 hours PRN  aspirin enteric coated 81 milliGRAM(s) Oral daily  atorvastatin 80 milliGRAM(s) Oral at bedtime  bumetanide Injectable 2 milliGRAM(s) IV Push <User Schedule>  dextrose 5%. 500 milliLiter(s) IV Continuous <Continuous>  dextrose 5%. 1000 milliLiter(s) IV Continuous <Continuous>  dextrose 5%. 1000 milliLiter(s) IV Continuous <Continuous>  dextrose 50% Injectable 25 Gram(s) IV Push once  dextrose 50% Injectable 12.5 Gram(s) IV Push once  dextrose 50% Injectable 25 Gram(s) IV Push once  dextrose Oral Gel 15 Gram(s) Oral once PRN  ezetimibe 10 milliGRAM(s) Oral daily  glucagon  Injectable 1 milliGRAM(s) IntraMuscular once  heparin   Injectable 5000 Unit(s) SubCutaneous every 8 hours  insulin lispro (ADMELOG) corrective regimen sliding scale   SubCutaneous Before meals and at bedtime  insulin lispro Injectable (ADMELOG) 4 Unit(s) SubCutaneous three times a day before meals  losartan 25 milliGRAM(s) Oral daily  metOLazone 5 milliGRAM(s) Oral every 24 hours  metoprolol succinate  milliGRAM(s) Oral daily  pantoprazole    Tablet 40 milliGRAM(s) Oral before breakfast  predniSONE   Tablet 20 milliGRAM(s) Oral every 24 hours  thiamine 100 milliGRAM(s) Oral every 24 hours  trimethoprim   80 mG/sulfamethoxazole 400 mG 1 Tablet(s) Oral daily      REVIEW OF SYSTEMS:  CONSTITUTIONAL: No weakness, fevers or chills  EYES/ENT: No visual changes;  No dysphagia  NECK: No pain or stiffness  RESPIRATORY: No cough, wheezing, hemoptysis; No shortness of breath  CARDIOVASCULAR: No chest pain or palpitations; No lower extremity edema  GASTROINTESTINAL: No abdominal or epigastric pain. No nausea, vomiting, or hematemesis; No diarrhea or constipation. No melena or hematochezia.  BACK: No back pain  GENITOURINARY: No dysuria, frequency or hematuria  NEUROLOGICAL: No numbness or weakness  SKIN: No itching, burning, rashes, or lesions   All other review of systems is negative unless indicated above.    Physical Exam:  T(F): 98 (07-15), Max: 98 (07-15)  HR: 63 (07-15) (63 - 80)  BP: 149/- (07-15) (136/82 - 149/-)  BP(mean): 77 (07-15) (77 - 101)  ABP: --  ABP(mean): --  RR: 17 (07-15)  SpO2: 98% (07-15)  GENERAL: No acute distress, well-developed  HEAD:  Atraumatic, Normocephalic  ENT: EOMI, PERRLA, conjunctiva and sclera clear, Neck supple, No JVD, moist mucosa  CHEST/LUNG: Clear to auscultation bilaterally; No wheeze, equal breath sounds bilaterally   BACK: No spinal tenderness  HEART: Regular rate and rhythm; No murmurs, rubs, or gallops  ABDOMEN: Soft, Nontender, Nondistended; Bowel sounds present  EXTREMITIES:  No clubbing, cyanosis, or edema  PSYCH: Nl behavior, nl affect  NEUROLOGY: AAOx3, non-focal, cranial nerves intact  SKIN: Normal color, No rashes or lesions  LINES:    Cardiovascular Diagnostic Testing: personally reviewed    CXR: Personally reviewed    Labs: Personally reviewed                        13.8   25.15 )-----------( 208      ( 15 Jul 2025 07:00 )             41.9     07-15    132[L]  |  92[L]  |  48[H]  ----------------------------<  190[H]  4.5   |  33[H]  |  1.03    Ca    9.1      15 Jul 2025 07:00  Phos  2.6     07-15  Mg     2.1     07-15

## 2025-07-18 NOTE — PROGRESS NOTE ADULT - PROBLEM SELECTOR PLAN 2
Glucose levels erratic. Previously on Lispro. Lantus at bedtime was added but resulted in hypoglycemia following day  - Continue Insulin lispro  - Lantus currently stopped  - f/u endocrine recs Glucose levels erratic. Previously on Lispro. Lantus at bedtime was added but resulted in hypoglycemia following day  - f/u Endo recs on discharge

## 2025-07-18 NOTE — PROGRESS NOTE ADULT - PROBLEM SELECTOR PROBLEM 1
Bilateral lower extremity edema
Diabetes mellitus
Bilateral lower extremity edema

## 2025-07-18 NOTE — CHART NOTE - NSCHARTNOTEFT_GEN_A_CORE
Admitting Diagnosis:   Patient is a 74y old  Male who presents with a chief complaint of BLE edema (2025 14:12)    PAST MEDICAL & SURGICAL HISTORY:  Hypertension  COPD without exacerbation  Diabetes mellitus  Stage 4 lung cancer  History of colitis  No significant past surgical history    Current Nutrition Order:  Consistent Carbohydrate Renal  Ensure Max x3/day   fortified oatmeal (245kcal, 19g protein) x1/day  Gelatein (80kcal, 20g protein, <1g carbohydrate) x1/day  strawberry vanilla fortified smoothie (230kcal, 17g protein) x1/day     PO Intake: Good (%) [ x ]  Fair (50-75%) [   ] Poor (<25%) [   ]    GI Issues:   Pt denies nausea/vomiting  Per RN, pt passing low volume formed BMs  No abdominal distension/discomfort noted     Pain:  No pain reported    Skin Integrity:  No pressure injuries documented   1+ right hand and 2+ bilateral ankle edema noted    25 @ 07:01  -  25 @ 07:00  --------------------------------------------------------  IN: 0 mL / OUT: 1800 mL / NET: -1800 mL    25 @ 07:01  -  25 @ 14:36  --------------------------------------------------------  IN: 0 mL / OUT: 400 mL / NET: -400 mL    Labs:       134[L]  |  93[L]  |  76[H]  ----------------------------<  184[H]  4.2   |  25  |  1.04    Ca    9.3      2025 07:15  Phos  3.0       Mg     2.0         TPro  5.8[L]  /  Alb  2.8[L]  /  TBili  0.2  /  DBili  x   /  AST  31  /  ALT  57[H]  /  AlkPhos  69      CAPILLARY BLOOD GLUCOSE  POCT Blood Glucose.: 172 mg/dL (2025 12:07)  POCT Blood Glucose.: 188 mg/dL (2025 08:51)  POCT Blood Glucose.: 190 mg/dL (2025 22:04)  POCT Blood Glucose.: 230 mg/dL (2025 18:12)    Medications:  MEDICATIONS  (STANDING):  aspirin enteric coated 81 milliGRAM(s) Oral daily  atorvastatin 80 milliGRAM(s) Oral at bedtime  bacitracin/polymyxin B Ointment 1 Application(s) Topical two times a day  dextrose 5%. 1000 milliLiter(s) (100 mL/Hr) IV Continuous <Continuous>  dextrose 5%. 1000 milliLiter(s) (50 mL/Hr) IV Continuous <Continuous>  dextrose 50% Injectable 25 Gram(s) IV Push once  dextrose 50% Injectable 12.5 Gram(s) IV Push once  dextrose 50% Injectable 25 Gram(s) IV Push once  ezetimibe 10 milliGRAM(s) Oral daily  glucagon  Injectable 1 milliGRAM(s) IntraMuscular once  heparin   Injectable 5000 Unit(s) SubCutaneous every 8 hours  insulin lispro (ADMELOG) corrective regimen sliding scale   SubCutaneous Before meals and at bedtime  insulin lispro Injectable (ADMELOG) 10 Unit(s) SubCutaneous before breakfast  insulin lispro Injectable (ADMELOG) 8 Unit(s) SubCutaneous before lunch  insulin lispro Injectable (ADMELOG) 8 Unit(s) SubCutaneous before dinner  losartan 25 milliGRAM(s) Oral daily  metoprolol succinate  milliGRAM(s) Oral daily  multivitamin 1 Tablet(s) Oral daily  pantoprazole    Tablet 40 milliGRAM(s) Oral before breakfast  predniSONE   Tablet 20 milliGRAM(s) Oral every 24 hours  thiamine 300 milliGRAM(s) Oral every 24 hours  torsemide 20 milliGRAM(s) Oral every 24 hours  trimethoprim   80 mG/sulfamethoxazole 400 mG 1 Tablet(s) Oral daily    MEDICATIONS  (PRN):  acetaminophen     Tablet .. 650 milliGRAM(s) Oral every 6 hours PRN Mild Pain (1 - 3)  albuterol    90 MICROgram(s) HFA Inhaler 2 Puff(s) Inhalation every 6 hours PRN Shortness of Breath and/or Wheezing  dextrose Oral Gel 15 Gram(s) Oral once PRN Blood Glucose LESS THAN 70 milliGRAM(s)/deciliter    Anthropometrics:  Height: 5'7"  Weight: 114 pounds / 51.7 kilograms  BMI: 17.8  IBW: 148 pounds / 67.1 kilograms            77%IBW    Weight Change:   No new weights obtained since admission. Recommend nursing to trend weekly weights. RD to continue monitoring weights as able.    [Severe Chronic Protein Calorie Malnutrition: Risk Assessment Completed ]  - Wt Loss: 13% wt loss in <2 months  - NFPE: severe muscle and fat wasting    Estimated energy needs:   Calories: 30-35kcal/k-2348kcal/d  Protein: 1.2-1.5g/k-101g/d  Defer fluids to team.  Estimated needs based on IBW as dosing wt 117 pounds / 51.7 kilograms is <80% IBW (77%). Needs adjusted for age, BMI, COPD, cancer, and malnutrition.     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 : diarrhea, +norovirus. MARYAN resolved. LE edema improving on new diuretic regimen but need to monitor electrolytes and kidney function closely.    Pt seen on 7WO for follow-up. Labs and medication orders reviewed. Na 134 <low>, K/Mg/Phos WNL, BUN/Cr 76 <high>/1.04 WNL, POC blood glucose (-) 172-315. Ordered for 10U prebreakfast admelog, 8U prelunch admelog, 8U predinner admelog, prednisone, multivitamin, torsemide. On Consistent Carbohydrate diet with Ensure Max x3/day. Pt sitting up in bed on visit this AM. Reports ongoing completion of meals and oral nutrition supplements. Requests continued Ensure x3/day, notes limited menu options in setting of therapeutic diet order restrictions. Pt denies difficulty chewing/swallowing. Reports BMs returning to normal, per RN diarrhea resolved. RD provided extensive education. See nutrition recommendations - RD communicated to team. RD to remain available.     Previous Nutrition Diagnosis:  Severe chronic malnutrition related to inadequate intake in setting of increased physiological demand for cancer, COPD as evidenced by severe muscle and fat wasting, 13% wt loss in <2 months.    Active [ x ]  Resolved [   ]    Goal:  Pt to consistently meet >75% nutrient needs. Reduce signs and symptoms of protein-calorie malnutrition.    Recommendations:  1. Recommend Consistent Carbohydrate diet with Ensure Max (150kcal, 30g protein) x3/day.  >>Recommend d/c Renal restriction in setting of low/WNL K/Phos and no HD.   >>Dietary to provide fortified oatmeal (245kcal, 19g protein), strawberry vanilla fortified smoothie (230kcal, 17g protein), and Gelatein (80kcal, 20g protein) x1/day.    >>Encourage and monitor PO intake. [place] dietary preferences as able.   2. Continue micronutrient supplementation.   3. Monitor GI tolerance, weight trends, labs, and skin integrity.  4. Defer bowel and pain regimens to team.   5. RD to remain available for diet education/intervention prn.    Education:  Provided written and verbal education on high protein high calorie nutrition and nutrition therapy for blood glucose control. Reviewed low sodium diet to mitigate fluid retention. Encouraged ongoing use of oral nutrition supplements between meals. RD answered all questions. Pt aware RD remains available for additional questions/concerns, RD provided outpatient nutrition team contact information.

## 2025-07-18 NOTE — PROGRESS NOTE ADULT - REASON FOR ADMISSION
BLE edema

## 2025-07-18 NOTE — PROGRESS NOTE ADULT - ASSESSMENT
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 Heme/onc consulted for known NSCLC.    # stage IV NSCLC    ·	Follows with primary oncologist, Dr. Kolby Pratt.  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 Started on pembrolizumab 10/30/2023 with a treatment break from August to December 2024 due to organizing pneumonia, likely immune related adverse event per Dr. Landaverde.  ·	Hospitalized 5/21/25-5/27/25 for immune checkpoint inhibitor induced colitis. Was started on prednisone at that time, currently on weekly taper.  ·	Pembrolizumab being held indefinitely, given IO induced colitis; last dose 4/28/25. He is s/p 18 months of therapy and overall SD.    - currently on prednisone 20mg daily (since 7/14/25). taper to prednisone 10mg daily upon discharge (starting MONDAY 7/21/25)  - do not suspect small fecal incontinence with urine is representative of immune mediated colitis. 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 Kolby Pratt (as below)    Heme/onc will continue to follow. Discussed with Dr. Marc Hinojosa.    ======================================    Outpatient appointment made with medical oncology, as below. Please include in patient's discharge paperwork. Please notify patient at bedside, as well.    Appointment time/date:  7/24/25 at 11:00am    Dr. 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 Heme/onc consulted for known NSCLC.    # stage IV NSCLC    ·	Follows with primary oncologist, Dr. Kolby Pratt.  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 Started on pembrolizumab 10/30/2023 with a treatment break from August to December 2024 due to organizing pneumonia, likely immune related adverse event per Dr. Landaverde.  ·	Hospitalized 5/21/25-5/27/25 for immune checkpoint inhibitor induced colitis. Was started on prednisone at that time, currently on weekly taper.  ·	Pembrolizumab being held indefinitely, given IO induced colitis; last dose 4/28/25. He is s/p 18 months of therapy and overall SD.    - currently on prednisone 20mg daily (since 7/14/25)  - taper to prednisone 10mg daily upon discharge (starting MONDAY 7/21/25)  - do not suspect small fecal incontinence with urine is representative of immune mediated colitis. 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 Kolby Pratt (as below)    Heme/onc will continue to follow. Discussed with Dr. Marc Hinojosa.    ======================================    Outpatient appointment made with medical oncology, as below. Please include in patient's discharge paperwork. Please notify patient at bedside, as well.    Appointment time/date:  7/24/25 at 11:00am    Dr. Kolby Pratt  [address] [address]

## 2025-07-18 NOTE — PROGRESS NOTE ADULT - ATTENDING COMMENTS
Feeling overall improved, diarrhea improving, no abdominal pain, no N/V, no other complaints or events reported.    General: AOX3, NAD, lying in bed, speaking in full sentences, no labored breathing on RA  HEENT: AT/NC, no facial asymmetry   Lungs: limited, no crackles, no wheezes  Abdomen: soft, no tenderness, no guarding   Extremities:  warm, edema improved, no tenderness, no focal deficit    Labs reviewed    Patient clinically improving, K stable, Bicarb 25,  BUN/Creatinine noted, Cystatin C noted  Will continue Torsemide, bladder scan without retention  Tapering steroids per Oncology  Appreciate Endocrinology  PT evaluation  DVT ppx: SQH

## 2025-07-18 NOTE — PROGRESS NOTE ADULT - SUBJECTIVE AND OBJECTIVE BOX
SUBJECTIVE / INTERVAL HPI: Patient was seen and examined this morning. AXR ordered for concern of overflow incontinence.  Endocrine is consulted for type 2 diabetes management (chronic, exacerbated by steroids).  Prednisone 30mg daily on admission, decreased to 20mg on 7/14 and planned for decreased to 10mg on 7/21.    CAPILLARY BLOOD GLUCOSE & INSULIN RECEIVED  190 mg/dL (07-17 @ 05:30)  185 mg/dL (07-17 @ 09:20) - Lispro 4+2  315 mg/dL (07-17 @ 12:42) -Lispro 4+8  298 mg/dL (07-17 @ 14:00)  230 mg/dL (07-17 @ 18:12) - Lispro 4  190 mg/dL (07-17 @ 22:04) - Lispro 2  184 mg/dL (07-18 @ 07:15)  188 mg/dL (07-18 @ 08:51) - Lispro 10+2  mg/dL (07-18 @ lunch)      REVIEW OF SYSTEMS  Constitutional:  Negative fever, chills or loss of appetite.  Eyes:  Negative blurry vision or double vision.  Cardiovascular:  Negative for chest pain or palpitations.  Respiratory:  Negative for  wheezing, or shortness of breath. + cough  Gastrointestinal:  Negative for nausea, vomiting, constipation, or abdominal pain. + diarrhea   Genitourinary:  Negative frequency, urgency or dysuria.  Neurologic:  No headache, confusion, dizziness, lightheadedness.    PHYSICAL EXAM  Vital Signs Last 24 Hrs  T(C): 36.4 (18 Jul 2025 06:29), Max: 36.9 (17 Jul 2025 21:05)  T(F): 97.5 (18 Jul 2025 06:29), Max: 98.4 (17 Jul 2025 21:05)  HR: 71 (18 Jul 2025 06:29) (71 - 81)  BP: 127/78 (18 Jul 2025 06:29) (111/72 - 127/78)  BP(mean): --  RR: 17 (18 Jul 2025 06:29) (17 - 18)  SpO2: 98% (18 Jul 2025 06:29) (96% - 98%)    Parameters below as of 17 Jul 2025 21:05  Patient On (Oxygen Delivery Method): room air      Constitutional: Awake, alert, in no acute distress.   HEENT: Normocephalic, atraumatic, CARRIE.  Respiratory: Lungs clear to ausculation bilaterally.   Cardiovascular: regular rhythm, normal S1 and S2, no audible murmurs.   GI: soft, non-tender, non-distended, bowel sounds present.  Extremities: B/L LES wrapped in ace; pitting edema at knee above wrap.  Psychiatric: AAO x 3. Normal affect/mood.     LABS  CBC - WBC/HGB/HTC/PLT: 17.27/12.6/38.6/155 (07-18-25)  BMP - Na/K/Cl/Bicarb/BUN/Cr/Gluc/AG/eGFR: 134/4.2/93/25/76/1.04/184/16/74 (07-18-25)  Ca - 9.3 (07-18-25)  Phos - 3.0 (07-18-25)  Mg - 2.0 (07-18-25)  LFT - Alb/Tprot/Tbili/Dbili/AlkPhos/ALT/AST: 2.8/--/0.2/--/69/57/31 (07-17-25)    Thyroid Stimulating Hormone, Serum: 1.380 (07-17-25)  Total T4/Free T4: --/0.934 (07-17-25)    MEDICATIONS  MEDICATIONS  (STANDING):  aspirin enteric coated 81 milliGRAM(s) Oral daily  atorvastatin 80 milliGRAM(s) Oral at bedtime  bacitracin/polymyxin B Ointment 1 Application(s) Topical two times a day  dextrose 5%. 1000 milliLiter(s) (100 mL/Hr) IV Continuous <Continuous>  dextrose 5%. 1000 milliLiter(s) (50 mL/Hr) IV Continuous <Continuous>  dextrose 50% Injectable 25 Gram(s) IV Push once  dextrose 50% Injectable 12.5 Gram(s) IV Push once  dextrose 50% Injectable 25 Gram(s) IV Push once  ezetimibe 10 milliGRAM(s) Oral daily  glucagon  Injectable 1 milliGRAM(s) IntraMuscular once  heparin   Injectable 5000 Unit(s) SubCutaneous every 8 hours  insulin lispro (ADMELOG) corrective regimen sliding scale   SubCutaneous Before meals and at bedtime  insulin lispro Injectable (ADMELOG) 10 Unit(s) SubCutaneous before breakfast  insulin lispro Injectable (ADMELOG) 8 Unit(s) SubCutaneous before lunch  insulin lispro Injectable (ADMELOG) 8 Unit(s) SubCutaneous before dinner  losartan 25 milliGRAM(s) Oral daily  metoprolol succinate  milliGRAM(s) Oral daily  multivitamin 1 Tablet(s) Oral daily  pantoprazole    Tablet 40 milliGRAM(s) Oral before breakfast  predniSONE   Tablet 20 milliGRAM(s) Oral every 24 hours  thiamine 300 milliGRAM(s) Oral every 24 hours  torsemide 20 milliGRAM(s) Oral every 24 hours  trimethoprim   80 mG/sulfamethoxazole 400 mG 1 Tablet(s) Oral daily    MEDICATIONS  (PRN):  acetaminophen     Tablet .. 650 milliGRAM(s) Oral every 6 hours PRN Mild Pain (1 - 3)  albuterol    90 MICROgram(s) HFA Inhaler 2 Puff(s) Inhalation every 6 hours PRN Shortness of Breath and/or Wheezing  dextrose Oral Gel 15 Gram(s) Oral once PRN Blood Glucose LESS THAN 70 milliGRAM(s)/deciliter         SUBJECTIVE / INTERVAL HPI: Patient was seen and examined this morning. Patient and RN reports frequent, small, soft stools upon urination. No diarrhea. AXR ordered for concern of overflow incontinence.  Endocrine is consulted for type 2 diabetes management (chronic, exacerbated by steroids).  Prednisone 30mg daily on admission, decreased to 20mg on 7/14 and planned for decreased to 10mg on 7/21.    CAPILLARY BLOOD GLUCOSE & INSULIN RECEIVED  190 mg/dL (07-17 @ 05:30)  185 mg/dL (07-17 @ 09:20) - Lispro 4+2  315 mg/dL (07-17 @ 12:42) -Lispro 4+8  298 mg/dL (07-17 @ 14:00)  230 mg/dL (07-17 @ 18:12) - Lispro 4. Ate cod, rice and fruit.  190 mg/dL (07-17 @ 22:04) - Lispro 2  184 mg/dL (07-18 @ 07:15)  188 mg/dL (07-18 @ 08:51) - Lispro 10+2. Ate congee, maybe a parfait, egg, and glucerna.  172 mg/dL (07-18 @ lunch) - Lispro 8+2      REVIEW OF SYSTEMS  Constitutional:  Negative fever, chills or loss of appetite.  Eyes:  Negative blurry vision or double vision.  Cardiovascular:  Negative for chest pain or palpitations.  Respiratory:  Negative for  wheezing, or shortness of breath. + cough  Gastrointestinal:  Negative for nausea, vomiting, diarrhea, constipation, or abdominal pain.   Genitourinary:  Negative frequency, urgency or dysuria.  Neurologic:  No headache, confusion, dizziness, lightheadedness.    PHYSICAL EXAM  Vital Signs Last 24 Hrs  T(C): 36.4 (18 Jul 2025 06:29), Max: 36.9 (17 Jul 2025 21:05)  T(F): 97.5 (18 Jul 2025 06:29), Max: 98.4 (17 Jul 2025 21:05)  HR: 71 (18 Jul 2025 06:29) (71 - 81)  BP: 127/78 (18 Jul 2025 06:29) (111/72 - 127/78)  BP(mean): --  RR: 17 (18 Jul 2025 06:29) (17 - 18)  SpO2: 98% (18 Jul 2025 06:29) (96% - 98%)    Parameters below as of 17 Jul 2025 21:05  Patient On (Oxygen Delivery Method): room air      Constitutional: Awake, alert, in no acute distress.   HEENT: Normocephalic, atraumatic, CARRIE.  Respiratory: Lungs clear to ausculation bilaterally.   Cardiovascular: regular rhythm, normal S1 and S2, no audible murmurs.   GI: soft, non-tender, non-distended, bowel sounds present.  Extremities: B/L LES wrapped in ace; pitting edema at knee above wrap.  Psychiatric: AAO x 3. Normal affect/mood.     LABS  CBC - WBC/HGB/HTC/PLT: 17.27/12.6/38.6/155 (07-18-25)  BMP - Na/K/Cl/Bicarb/BUN/Cr/Gluc/AG/eGFR: 134/4.2/93/25/76/1.04/184/16/74 (07-18-25)  Ca - 9.3 (07-18-25)  Phos - 3.0 (07-18-25)  Mg - 2.0 (07-18-25)  LFT - Alb/Tprot/Tbili/Dbili/AlkPhos/ALT/AST: 2.8/--/0.2/--/69/57/31 (07-17-25)    Thyroid Stimulating Hormone, Serum: 1.380 (07-17-25)  Total T4/Free T4: --/0.934 (07-17-25)    MEDICATIONS  MEDICATIONS  (STANDING):  aspirin enteric coated 81 milliGRAM(s) Oral daily  atorvastatin 80 milliGRAM(s) Oral at bedtime  bacitracin/polymyxin B Ointment 1 Application(s) Topical two times a day  dextrose 5%. 1000 milliLiter(s) (100 mL/Hr) IV Continuous <Continuous>  dextrose 5%. 1000 milliLiter(s) (50 mL/Hr) IV Continuous <Continuous>  dextrose 50% Injectable 25 Gram(s) IV Push once  dextrose 50% Injectable 12.5 Gram(s) IV Push once  dextrose 50% Injectable 25 Gram(s) IV Push once  ezetimibe 10 milliGRAM(s) Oral daily  glucagon  Injectable 1 milliGRAM(s) IntraMuscular once  heparin   Injectable 5000 Unit(s) SubCutaneous every 8 hours  insulin lispro (ADMELOG) corrective regimen sliding scale   SubCutaneous Before meals and at bedtime  insulin lispro Injectable (ADMELOG) 10 Unit(s) SubCutaneous before breakfast  insulin lispro Injectable (ADMELOG) 8 Unit(s) SubCutaneous before lunch  insulin lispro Injectable (ADMELOG) 8 Unit(s) SubCutaneous before dinner  losartan 25 milliGRAM(s) Oral daily  metoprolol succinate  milliGRAM(s) Oral daily  multivitamin 1 Tablet(s) Oral daily  pantoprazole    Tablet 40 milliGRAM(s) Oral before breakfast  predniSONE   Tablet 20 milliGRAM(s) Oral every 24 hours  thiamine 300 milliGRAM(s) Oral every 24 hours  torsemide 20 milliGRAM(s) Oral every 24 hours  trimethoprim   80 mG/sulfamethoxazole 400 mG 1 Tablet(s) Oral daily    MEDICATIONS  (PRN):  acetaminophen     Tablet .. 650 milliGRAM(s) Oral every 6 hours PRN Mild Pain (1 - 3)  albuterol    90 MICROgram(s) HFA Inhaler 2 Puff(s) Inhalation every 6 hours PRN Shortness of Breath and/or Wheezing  dextrose Oral Gel 15 Gram(s) Oral once PRN Blood Glucose LESS THAN 70 milliGRAM(s)/deciliter

## 2025-07-18 NOTE — PROGRESS NOTE ADULT - PROBLEM SELECTOR PLAN 1
Prednisone use vs. Third-spacing 2/2 to NSCLC vs. Heart failure (HFpEF) vs. Hypothyroidism  - Echo 7/15 mild diastolic dysfunction  - pred tapering  - ACE wraps  - advised to keep legs raised as much as possible  - switched to PO Torsemide today  - daily weights (goal weight ~50kg)  - strict i/o  - start Polymyxin ointment for weeping wounds on R leg  - CTM R leg for evidence of cellulitis  - f/u TSH Prednisone use vs. Third-spacing 2/2 to NSCLC vs. Heart failure (HFpEF)  - Echo 7/15 mild diastolic dysfunction  - pred tapering  - ACE wraps + advised to keep legs raised as much as possible  - PO Torsemide + daily weights (goal weight ~50kg) + strict i/o

## 2025-07-18 NOTE — PROGRESS NOTE ADULT - SUBJECTIVE AND OBJECTIVE BOX
Patient is a 74y old  Male who presents with a chief complaint of BLE edema (2025 10:49)    INTERVAL HISTORY: Examined at bedside. Notes he has been having 3 formed or semi-formed bowel movements daily. Notes a small amount of fecal incontinence with urination. Denies large volume diarrhea, states that this is not reminiscent of his prior hospitalization for immune mediated colitis. No abdominal pain, nausea, or vomiting.    REVIEW OF SYSTEMS:  All other review of systems is negative unless indicated above.    OBJECTIVE:  T(C): 36.4 (25 @ 12:31), Max: 36.9 (25 @ 21:05)  HR: 75 (25 @ 12:31) (71 - 81)  BP: 129/73 (25 @ 12:31) (111/72 - 129/73)  RR: 18 (25 @ 12:31) (17 - 18)  SpO2: 96% (25 @ 12:31) (96% - 98%)  Daily     Daily Weight in k.9 (2025 09:04)    Physical Exam:  General: in no acute distress  Eyes: EOMI intact bilaterally. Anicteric sclerae, moist conjunctivae  HENT: Moist mucous membranes  Neck: Trachea midline, supple  Lungs: Normal respiratory effort, no accessory muscle use.  Abdomen: Soft, non-tender, +mildly distended; No rebound or guarding  Extremities: Bilateral lower legs wrapped in ACE bandage. +pedal edema below ACE wrap  Neurological: Alert and appropriately conversant  Skin: Warm and dry. No obvious rash     Medications:  MEDICATIONS  (STANDING):  aspirin enteric coated 81 milliGRAM(s) Oral daily  atorvastatin 80 milliGRAM(s) Oral at bedtime  bacitracin/polymyxin B Ointment 1 Application(s) Topical two times a day  dextrose 5%. 1000 milliLiter(s) (100 mL/Hr) IV Continuous <Continuous>  dextrose 5%. 1000 milliLiter(s) (50 mL/Hr) IV Continuous <Continuous>  dextrose 50% Injectable 25 Gram(s) IV Push once  dextrose 50% Injectable 12.5 Gram(s) IV Push once  dextrose 50% Injectable 25 Gram(s) IV Push once  ezetimibe 10 milliGRAM(s) Oral daily  glucagon  Injectable 1 milliGRAM(s) IntraMuscular once  heparin   Injectable 5000 Unit(s) SubCutaneous every 8 hours  insulin lispro (ADMELOG) corrective regimen sliding scale   SubCutaneous Before meals and at bedtime  insulin lispro Injectable (ADMELOG) 10 Unit(s) SubCutaneous before breakfast  insulin lispro Injectable (ADMELOG) 8 Unit(s) SubCutaneous before lunch  insulin lispro Injectable (ADMELOG) 8 Unit(s) SubCutaneous before dinner  losartan 25 milliGRAM(s) Oral daily  metoprolol succinate  milliGRAM(s) Oral daily  multivitamin 1 Tablet(s) Oral daily  pantoprazole    Tablet 40 milliGRAM(s) Oral before breakfast  predniSONE   Tablet 20 milliGRAM(s) Oral every 24 hours  thiamine 300 milliGRAM(s) Oral every 24 hours  torsemide 20 milliGRAM(s) Oral every 24 hours  trimethoprim   80 mG/sulfamethoxazole 400 mG 1 Tablet(s) Oral daily    MEDICATIONS  (PRN):  acetaminophen     Tablet .. 650 milliGRAM(s) Oral every 6 hours PRN Mild Pain (1 - 3)  albuterol    90 MICROgram(s) HFA Inhaler 2 Puff(s) Inhalation every 6 hours PRN Shortness of Breath and/or Wheezing  dextrose Oral Gel 15 Gram(s) Oral once PRN Blood Glucose LESS THAN 70 milliGRAM(s)/deciliter      Labs:                        12.6   17.27 )-----------( 155      ( 2025 07:15 )             38.6     07-18    134[L]  |  93[L]  |  76[H]  ----------------------------<  184[H]  4.2   |  25  |  1.04    Ca    9.3      2025 07:15  Phos  3.0     07-18  Mg     2.0     07-18    TPro  5.8[L]  /  Alb  2.8[L]  /  TBili  0.2  /  DBili  x   /  AST  31  /  ALT  57[H]  /  AlkPhos  69  07-17    Urinalysis Basic - ( 2025 07:15 )    Color: x / Appearance: x / SG: x / pH: x  Gluc: 184 mg/dL / Ketone: x  / Bili: x / Urobili: x   Blood: x / Protein: x / Nitrite: x   Leuk Esterase: x / RBC: x / WBC x   Sq Epi: x / Non Sq Epi: x / Bacteria: x    SARS-CoV-2: NotDete (2025 21:20)    Radiology: Reviewed

## 2025-07-18 NOTE — PROGRESS NOTE ADULT - ASSESSMENT
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 Admitted for further evaluation and management in addition to treatment of hypokalemia and pre-renal MARYAN 2/2 to combination of diarrhea along with over-diuresis (25mg hydrochlorothiazide + 60mg furosemide). MARYAN resolved. LE edema improving on new diuretic regimen but need to monitor electrolytes and kidney function closely    Endocrine is consulted for type 2 diabetes management (chronic, exacerbated by steroids).    Prednisone 30mg daily on admission, decreased to 20mg on 7/14 and planned for decreased to 10mg on 7/21.  24 hour glucose data reviewed.  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 Admitted for further evaluation and management in addition to treatment of hypokalemia and pre-renal MARYAN 2/2 to combination of diarrhea along with over-diuresis (25mg hydrochlorothiazide + 60mg furosemide). MARYAN resolved. LE edema improving on new diuretic regimen but need to monitor electrolytes and kidney function closely    Endocrine is consulted for type 2 diabetes management (chronic, exacerbated by steroids).    Prednisone 30mg daily on admission, decreased to 20mg on 7/14 and planned for decreased to 10mg on 7/21.  24 hour glucose data reviewed.  Insulin adjustment    A1C: 7.3 %  BUN: 76  Creatinine: 1.04  GFR: 74  Weight: 53.07  BMI: 18.3

## 2025-07-18 NOTE — PROGRESS NOTE ADULT - PROBLEM SELECTOR PLAN 12
F: None  E: Replete PRN  N: Prescribed Ensure max x3/day along with 300mg Thiamine + Multivitamin daily. Placed on low carb diet.   DVT ppx: Heparin - 5000 units subcutaneous every 8 hrs  Bowel: None  Dispo: RMF  Code status:

## 2025-07-18 NOTE — PROGRESS NOTE ADULT - PROVIDER SPECIALTY LIST ADULT
Heme/Onc
Internal Medicine
Rehab Medicine
Vascular Cardiology
Endocrinology
Heme/Onc
Internal Medicine
Vascular Cardiology
Rehab Medicine
Vascular Cardiology
Rehab Medicine
Internal Medicine
Hospitalist
Internal Medicine

## 2025-07-18 NOTE — PROGRESS NOTE ADULT - PROBLEM SELECTOR PLAN 3
Currently on diuretics and likely some osmotic diuresis given high glucose  - f/u PM BMP  - replete as needed Currently on diuretics and likely some osmotic diuresis given high glucose. K normal this AM  - f/u PM BMP (2pm)  - replete as needed

## 2025-07-18 NOTE — PROGRESS NOTE ADULT - NUTRITIONAL ASSESSMENT
This patient has been assessed with a concern for Malnutrition and has been determined to have a diagnosis/diagnoses of Severe protein-calorie malnutrition and Underweight (BMI < 19).    This patient is being managed with:   Diet Consistent Carbohydrate Renal/No Snacks-  Supplement Feeding Modality:  Oral  Ensure Max Cans or Servings Per Day:  1       Frequency:  Three Times a day  Entered: Jul 17 2025 11:35AM

## 2025-07-18 NOTE — PROGRESS NOTE ADULT - PROBLEM SELECTOR PLAN 1
Type 2 diabetes mellitus with steroid induced hyperglycemia  - Increase lispro to 10 units before breakfast and 8 units before lunch and dinner.  - Continue lispro moderate dose sliding scale before meals and at bedtime.  - Patient's fingerstick glucose goal is 100-180 mg/dL.    - For discharge, patient can ***.    - Patient can follow up at discharge with [facility] by calling [phone] to make an appointment.      Case discussed with Dr. Chambers. Primary team updated. Type 2 diabetes mellitus with steroid induced hyperglycemia  - Continue lispro to 10 units before breakfast and 8 units before lunch and dinner.  - Continue lispro moderate dose sliding scale before meals and at bedtime.  - Patient's fingerstick glucose goal is 100-180 mg/dL.    - For discharge, patient can ***.    - Patient can follow up at discharge with [facility] by calling [phone] to make an appointment.      Case discussed with Dr. Chambers. Primary team updated. Type 2 diabetes mellitus with steroid induced hyperglycemia  - Lispro to 10 units before meals.  - Continue lispro moderate dose sliding scale before meals and at bedtime.  - Patient's fingerstick glucose goal is 100-180 mg/dL.    - For discharge: Lispro 10 units before meals + BIB.   - Patient can follow up at discharge with [facility] by calling [phone] to make an appointment.  Dr Whiting will call him next week to assess glucose with decrease of prednisone on Monday.    Case discussed with Dr. Chambers. Primary team updated.

## 2025-07-18 NOTE — PROGRESS NOTE ADULT - SUBJECTIVE AND OBJECTIVE BOX
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 The pt developed immunotherapy-induced colitis 3 weeks ago at which point the Keytruda was stopped and he was started on a Prednisone taper. Initial dosage of Prednisone at 60mg and currently tapered down to 20mg (10mg on Monday 7/21). After onset of BLE swelling, Lasix dosage was increased to 60mg. After noting no change in BLE swelling, pt presented to the ED for further treatment.    INTERVAL EVENTS:   No acute events overnight. Remains in contact precautions for Norovirus. ACE wraps placed yesterday evening and he wore them overnight    SUBJECTIVE:   Patient seen and examined at bedside. Still edematous but looks like moderate improvement. 3 loose stools since yesterday ("mushy," denies diarrhea). Denies pain    ROS:  Positive for chronic cough. Negative for SOB, fever, chills, chest pain, abdominal pain, nausea, vomiting, diarrhea, dysuria, dizziness, headache.      VITAL SIGNS:  Vital Signs Last 24 Hrs  T(C): 36.4 (17 Jul 2025 06:45), Max: 36.8 (16 Jul 2025 21:19)  T(F): 97.5 (17 Jul 2025 06:45), Max: 98.2 (16 Jul 2025 21:19)  HR: 94 (17 Jul 2025 06:45) (87 - 94)  BP: 136/84 (17 Jul 2025 06:45) (106/64 - 136/84)  BP(mean): 78 (16 Jul 2025 21:19) (78 - 78)  RR: 18 (17 Jul 2025 06:45) (18 - 18)  SpO2: 94% (17 Jul 2025 06:45) (94% - 97%)    Parameters below as of 17 Jul 2025 06:45  Patient On (Oxygen Delivery Method): room air      PHYSICAL EXAM:  Constitutional: resting comfortably; NAD  HEENT: NC/AT, PERRL, EOMI, anicteric sclera, MMM  Neck: supple; no JVD or thyromegaly; no LAD  Respiratory: CTA B/L; no W/R/R, no retractions or increased work of breathing  Cardiac: +S1/S2; RRR; no M/R/G  Gastrointestinal: soft, NT/ND; no rebound or guarding; +BS  Extremities: 3+ b/l LE pitting edema still present + increased erythema across the RLE w/o warmth.  Musculoskeletal: NROM x4; no joint swelling, tenderness or erythema  Vascular: 2+ radial, DP/PT pulses B/L  Dermatologic: Weeping skin wounds noted to b/l ankles. No bleeding or pus noted.  Neurologic: AAOx3, CN II-XII intact, no focal deficits, strength 5/5 and equal in all extremities, sensation intact  Psychiatric: calm, cooperative, behaviors are appropriate, denies SI/HI      MEDICATIONS  (STANDING):  aspirin enteric coated 81 milliGRAM(s) Oral daily  atorvastatin 80 milliGRAM(s) Oral at bedtime  bumetanide Injectable 2 milliGRAM(s) IV Push <User Schedule>  dextrose 5%. 500 milliLiter(s) (250 mL/Hr) IV Continuous <Continuous>  dextrose 5%. 1000 milliLiter(s) (100 mL/Hr) IV Continuous <Continuous>  dextrose 5%. 1000 milliLiter(s) (50 mL/Hr) IV Continuous <Continuous>  dextrose 50% Injectable 25 Gram(s) IV Push once  dextrose 50% Injectable 12.5 Gram(s) IV Push once  dextrose 50% Injectable 25 Gram(s) IV Push once  ezetimibe 10 milliGRAM(s) Oral daily  glucagon  Injectable 1 milliGRAM(s) IntraMuscular once  heparin   Injectable 5000 Unit(s) SubCutaneous every 8 hours  insulin lispro (ADMELOG) corrective regimen sliding scale   SubCutaneous Before meals and at bedtime  insulin lispro Injectable (ADMELOG) 4 Unit(s) SubCutaneous three times a day before meals  losartan 25 milliGRAM(s) Oral daily  metOLazone 5 milliGRAM(s) Oral every 24 hours  metoprolol succinate  milliGRAM(s) Oral daily  pantoprazole    Tablet 40 milliGRAM(s) Oral before breakfast  predniSONE   Tablet 20 milliGRAM(s) Oral every 24 hours  thiamine 100 milliGRAM(s) Oral every 24 hours  trimethoprim   80 mG/sulfamethoxazole 400 mG 1 Tablet(s) Oral daily    MEDICATIONS  (PRN):  acetaminophen     Tablet .. 650 milliGRAM(s) Oral every 6 hours PRN Mild Pain (1 - 3)  albuterol    90 MICROgram(s) HFA Inhaler 2 Puff(s) Inhalation every 6 hours PRN Shortness of Breath and/or Wheezing  dextrose Oral Gel 15 Gram(s) Oral once PRN Blood Glucose LESS THAN 70 milliGRAM(s)/deciliter    ALLERGIES:  NKDA, NKFA, [employer]KE[employer]      LABS:                        12.3   20.11 )-----------( 185      ( 17 Jul 2025 05:30 )             38.1     07-17    137  |  92[L]  |  71[H]  ----------------------------<  190[H]  3.2[L]   |  35[H]  |  1.28    Ca    9.0      17 Jul 2025 05:30  Phos  2.9     07-17  Mg     2.1     07-17    TPro  6.3  /  Alb  3.0[L]  /  TBili  0.4  /  DBili  0.1  /  AST  27  /  ALT  58[H]  /  AlkPhos  82  07-17      Urinalysis Basic - ( 17 Jul 2025 05:30 )    Color: x / Appearance: x / SG: x / pH: x  Gluc: 190 mg/dL / Ketone: x  / Bili: x / Urobili: x   Blood: x / Protein: x / Nitrite: x   Leuk Esterase: x / RBC: x / WBC x   Sq Epi: x / Non Sq Epi: x / Bacteria: x        RADIOLOGY, EKG & ADDITIONAL TESTS: Reviewed. 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 The pt developed immunotherapy-induced colitis 3 weeks ago at which point the Keytruda was stopped and he was started on a Prednisone taper. Initial dosage of Prednisone at 60mg and currently tapered down to 20mg (10mg on Monday 7/21). After onset of BLE swelling, Lasix dosage was increased to 60mg. After noting no change in BLE swelling, pt presented to the ED for further treatment.    INTERVAL EVENTS:   No acute events overnight.    SUBJECTIVE:   Patient seen and examined at bedside. Still edematous but looks like moderate improvement. 3 loose stools since yesterday ("mushy," denies diarrhea). Denies pain    ROS:  Positive for chronic cough. Negative for SOB, fever, chills, chest pain, abdominal pain, nausea, vomiting, diarrhea, dysuria, dizziness, headache.      VITAL SIGNS:  Vital Signs Last 24 Hrs  T(C): 36.4 (18 Jul 2025 12:31), Max: 36.9 (17 Jul 2025 21:05)  T(F): 97.5 (18 Jul 2025 12:31), Max: 98.4 (17 Jul 2025 21:05)  HR: 75 (18 Jul 2025 12:31) (71 - 81)  BP: 129/73 (18 Jul 2025 12:31) (111/72 - 129/73)  BP(mean): --  RR: 18 (18 Jul 2025 12:31) (17 - 18)  SpO2: 96% (18 Jul 2025 12:31) (96% - 98%)    Parameters below as of 18 Jul 2025 12:31  Patient On (Oxygen Delivery Method): room air      PHYSICAL EXAM:  Constitutional: resting comfortably; NAD  HEENT: NC/AT, PERRL, EOMI, anicteric sclera, MMM  Neck: supple; no JVD or thyromegaly; no LAD  Respiratory: CTA B/L; no W/R/R, no retractions or increased work of breathing  Cardiac: +S1/S2; RRR; no M/R/G  Gastrointestinal: soft, NT/ND; no rebound or guarding; +BS  Extremities: 2+ b/l LE pitting edema still present + increased erythema across the RLE w/o warmth.  Musculoskeletal: NROM x4; no joint swelling, tenderness or erythema  Vascular: 2+ radial, DP/PT pulses B/L  Dermatologic: Weeping skin wounds noted to b/l ankles. No bleeding or pus noted.  Neurologic: AAOx3, CN II-XII intact, no focal deficits, strength 5/5 and equal in all extremities, sensation intact  Psychiatric: calm, cooperative, behaviors are appropriate, denies SI/HI      MEDICATIONS  (STANDING):  aspirin enteric coated 81 milliGRAM(s) Oral daily  atorvastatin 80 milliGRAM(s) Oral at bedtime  bumetanide Injectable 2 milliGRAM(s) IV Push <User Schedule>  dextrose 5%. 500 milliLiter(s) (250 mL/Hr) IV Continuous <Continuous>  dextrose 5%. 1000 milliLiter(s) (100 mL/Hr) IV Continuous <Continuous>  dextrose 5%. 1000 milliLiter(s) (50 mL/Hr) IV Continuous <Continuous>  dextrose 50% Injectable 25 Gram(s) IV Push once  dextrose 50% Injectable 12.5 Gram(s) IV Push once  dextrose 50% Injectable 25 Gram(s) IV Push once  ezetimibe 10 milliGRAM(s) Oral daily  glucagon  Injectable 1 milliGRAM(s) IntraMuscular once  heparin   Injectable 5000 Unit(s) SubCutaneous every 8 hours  insulin lispro (ADMELOG) corrective regimen sliding scale   SubCutaneous Before meals and at bedtime  insulin lispro Injectable (ADMELOG) 4 Unit(s) SubCutaneous three times a day before meals  losartan 25 milliGRAM(s) Oral daily  metOLazone 5 milliGRAM(s) Oral every 24 hours  metoprolol succinate  milliGRAM(s) Oral daily  pantoprazole    Tablet 40 milliGRAM(s) Oral before breakfast  predniSONE   Tablet 20 milliGRAM(s) Oral every 24 hours  thiamine 100 milliGRAM(s) Oral every 24 hours  trimethoprim   80 mG/sulfamethoxazole 400 mG 1 Tablet(s) Oral daily    MEDICATIONS  (PRN):  acetaminophen     Tablet .. 650 milliGRAM(s) Oral every 6 hours PRN Mild Pain (1 - 3)  albuterol    90 MICROgram(s) HFA Inhaler 2 Puff(s) Inhalation every 6 hours PRN Shortness of Breath and/or Wheezing  dextrose Oral Gel 15 Gram(s) Oral once PRN Blood Glucose LESS THAN 70 milliGRAM(s)/deciliter    ALLERGIES:  NKDA, NKFA, [employer]      LABS:                       12.6   17.27 )-----------( 155      ( 18 Jul 2025 07:15 )             38.6     07-18    134[L]  |  93[L]  |  76[H]  ----------------------------<  184[H]  4.2   |  25  |  1.04    Ca    9.3      18 Jul 2025 07:15  Phos  3.0     07-18  Mg     2.0     07-18    TPro  5.8[L]  /  Alb  2.8[L]  /  TBili  0.2  /  DBili  x   /  AST  31  /  ALT  57[H]  /  AlkPhos  69  07-17      Urinalysis Basic - ( 18 Jul 2025 07:15 )    Color: x / Appearance: x / SG: x / pH: x  Gluc: 184 mg/dL / Ketone: x  / Bili: x / Urobili: x   Blood: x / Protein: x / Nitrite: x   Leuk Esterase: x / RBC: x / WBC x   Sq Epi: x / Non Sq Epi: x / Bacteria: x      RADIOLOGY, EKG & ADDITIONAL TESTS: Reviewed.

## 2025-07-18 NOTE — PROGRESS NOTE ADULT - PROBLEM SELECTOR PLAN 5
Has been on regular Prednisolone  - downtrending WBC today  - CTM WBC 75 Has been on regular Prednisolone  - downtrending WBC again today  - CTM WBC

## 2025-07-18 NOTE — PROGRESS NOTE ADULT - PROBLEM SELECTOR PLAN 8
Pain in R forearm, mild swelling, no warmth  - Almost resolved  - Runs warm water on it in bathroom with good symptomatic relief  - no significant findings on forearm xray Pain in R forearm  - Resolved

## 2025-07-18 NOTE — PROGRESS NOTE ADULT - TIME BILLING
Face to face visit. Review of meds, imaging, labs, orders. Coordination of care and documentation
Bedside exam and interview.  Reviewed labs and glucose.  Insulin adjustment.  Education (Diet, medication).  Discussed plan of care with primary team.  Documentation of encounter.
Bedside exam and interview   Reviewed vitals, labs, imaging    Discussed patient's plan of care with housestaff  Documentation of encounter.  Time spent on the encounter excludes teaching and separately reported services
Face to face visit. Review of meds, imaging, labs, orders. Coordination of care and documentation
Bedside exam, reviewed labs, imaging, hospital course   Documentation of encounter, discussion with housestaff excluding teaching

## 2025-07-18 NOTE — DISCHARGE NOTE NURSING/CASE MANAGEMENT/SOCIAL WORK - PATIENT PORTAL LINK FT
You can access the FollowMyHealth Patient Portal offered by [facility] by registering at the following website: [email or web address]. By joining [facility]’s FollowMyHealth portal, you will also be able to view your health information using other applications (apps) compatible with our system.

## 2025-07-18 NOTE — PROGRESS NOTE ADULT - PROBLEM SELECTOR PLAN 4
Resolved?  - CTM in the context of diuretic regimen  - Cystatin C as BUN elevated but Creatinine wnl (may be falsely low given low BMI) Creatinine normal but BMI ~18  - Elevated cystatin C, creatinine falsely low given BMI  - Continue current dose of Torsemide

## 2025-07-19 RX ORDER — PREDNISONE 20 MG/1
20 TABLET ORAL
Qty: 4 | Refills: 0
Start: 2025-07-19 | End: 2025-07-20

## 2025-07-19 RX ORDER — TORSEMIDE 10 MG
1 TABLET ORAL
Qty: 30 | Refills: 0
Start: 2025-07-19 | End: 2025-08-17

## 2025-07-21 ENCOUNTER — INPATIENT (INPATIENT)
Facility: HOSPITAL | Age: 75
LOS: 2 days | Discharge: NURSING, REHAB OR LONG-TERM CARE | DRG: 291 | End: 2025-07-24
Attending: STUDENT IN AN ORGANIZED HEALTH CARE EDUCATION/TRAINING PROGRAM | Admitting: STUDENT IN AN ORGANIZED HEALTH CARE EDUCATION/TRAINING PROGRAM
Payer: MEDICARE

## 2025-07-21 VITALS
RESPIRATION RATE: 18 BRPM | SYSTOLIC BLOOD PRESSURE: 145 MMHG | TEMPERATURE: 98 F | HEIGHT: 67 IN | OXYGEN SATURATION: 98 % | DIASTOLIC BLOOD PRESSURE: 75 MMHG | HEART RATE: 63 BPM | WEIGHT: 119.93 LBS

## 2025-07-21 DIAGNOSIS — C34.90 MALIGNANT NEOPLASM OF UNSPECIFIED PART OF UNSPECIFIED BRONCHUS OR LUNG: ICD-10-CM

## 2025-07-21 DIAGNOSIS — Z86.73 PERSONAL HISTORY OF TRANSIENT ISCHEMIC ATTACK (TIA), AND CEREBRAL INFARCTION WITHOUT RESIDUAL DEFICITS: ICD-10-CM

## 2025-07-21 DIAGNOSIS — D72.829 ELEVATED WHITE BLOOD CELL COUNT, UNSPECIFIED: ICD-10-CM

## 2025-07-21 DIAGNOSIS — Z86.79 PERSONAL HISTORY OF OTHER DISEASES OF THE CIRCULATORY SYSTEM: ICD-10-CM

## 2025-07-21 DIAGNOSIS — Z29.9 ENCOUNTER FOR PROPHYLACTIC MEASURES, UNSPECIFIED: ICD-10-CM

## 2025-07-21 DIAGNOSIS — E78.5 HYPERLIPIDEMIA, UNSPECIFIED: ICD-10-CM

## 2025-07-21 DIAGNOSIS — J44.9 CHRONIC OBSTRUCTIVE PULMONARY DISEASE, UNSPECIFIED: ICD-10-CM

## 2025-07-21 DIAGNOSIS — I10 ESSENTIAL (PRIMARY) HYPERTENSION: ICD-10-CM

## 2025-07-21 DIAGNOSIS — R60.0 LOCALIZED EDEMA: ICD-10-CM

## 2025-07-21 DIAGNOSIS — Z87.19 PERSONAL HISTORY OF OTHER DISEASES OF THE DIGESTIVE SYSTEM: ICD-10-CM

## 2025-07-21 DIAGNOSIS — E11.9 TYPE 2 DIABETES MELLITUS WITHOUT COMPLICATIONS: ICD-10-CM

## 2025-07-21 LAB
ANION GAP SERPL CALC-SCNC: 8 MMOL/L — SIGNIFICANT CHANGE UP (ref 5–17)
APPEARANCE UR: CLEAR — SIGNIFICANT CHANGE UP
BASOPHILS # BLD AUTO: 0.06 K/UL — SIGNIFICANT CHANGE UP (ref 0–0.2)
BASOPHILS NFR BLD AUTO: 0.3 % — SIGNIFICANT CHANGE UP (ref 0–2)
BILIRUB UR-MCNC: NEGATIVE — SIGNIFICANT CHANGE UP
BUN SERPL-MCNC: 54 MG/DL — HIGH (ref 7–23)
CALCIUM SERPL-MCNC: 9.2 MG/DL — SIGNIFICANT CHANGE UP (ref 8.4–10.5)
CHLORIDE SERPL-SCNC: 102 MMOL/L — SIGNIFICANT CHANGE UP (ref 96–108)
CO2 SERPL-SCNC: 29 MMOL/L — SIGNIFICANT CHANGE UP (ref 22–31)
COLOR SPEC: YELLOW — SIGNIFICANT CHANGE UP
CREAT SERPL-MCNC: 0.93 MG/DL — SIGNIFICANT CHANGE UP (ref 0.5–1.3)
DIFF PNL FLD: NEGATIVE — SIGNIFICANT CHANGE UP
EGFR: 85 ML/MIN/1.73M2 — SIGNIFICANT CHANGE UP
EGFR: 85 ML/MIN/1.73M2 — SIGNIFICANT CHANGE UP
EOSINOPHIL # BLD AUTO: 0.01 K/UL — SIGNIFICANT CHANGE UP (ref 0–0.5)
EOSINOPHIL NFR BLD AUTO: 0.1 % — SIGNIFICANT CHANGE UP (ref 0–6)
GLUCOSE SERPL-MCNC: 263 MG/DL — HIGH (ref 70–99)
GLUCOSE UR QL: 250 MG/DL
HCT VFR BLD CALC: 38 % — LOW (ref 39–50)
HGB BLD-MCNC: 12 G/DL — LOW (ref 13–17)
IMM GRANULOCYTES # BLD AUTO: 0.8 K/UL — HIGH (ref 0–0.07)
IMM GRANULOCYTES NFR BLD AUTO: 4.4 % — HIGH (ref 0–0.9)
KETONES UR QL: NEGATIVE MG/DL — SIGNIFICANT CHANGE UP
LEUKOCYTE ESTERASE UR-ACNC: NEGATIVE — SIGNIFICANT CHANGE UP
LYMPHOCYTES # BLD AUTO: 4.24 K/UL — HIGH (ref 1–3.3)
LYMPHOCYTES NFR BLD AUTO: 23.6 % — SIGNIFICANT CHANGE UP (ref 13–44)
MAGNESIUM SERPL-MCNC: 1.7 MG/DL — SIGNIFICANT CHANGE UP (ref 1.6–2.6)
MCHC RBC-ENTMCNC: 28 PG — SIGNIFICANT CHANGE UP (ref 27–34)
MCHC RBC-ENTMCNC: 31.6 G/DL — LOW (ref 32–36)
MCV RBC AUTO: 88.6 FL — SIGNIFICANT CHANGE UP (ref 80–100)
MONOCYTES # BLD AUTO: 0.88 K/UL — SIGNIFICANT CHANGE UP (ref 0–0.9)
MONOCYTES NFR BLD AUTO: 4.9 % — SIGNIFICANT CHANGE UP (ref 2–14)
NEUTROPHILS # BLD AUTO: 11.99 K/UL — HIGH (ref 1.8–7.4)
NEUTROPHILS NFR BLD AUTO: 66.7 % — SIGNIFICANT CHANGE UP (ref 43–77)
NITRITE UR-MCNC: NEGATIVE — SIGNIFICANT CHANGE UP
NRBC # BLD AUTO: 0 K/UL — SIGNIFICANT CHANGE UP (ref 0–0)
NRBC # FLD: 0 K/UL — SIGNIFICANT CHANGE UP (ref 0–0)
NRBC BLD AUTO-RTO: 0 /100 WBCS — SIGNIFICANT CHANGE UP (ref 0–0)
NT-PROBNP SERPL-SCNC: 2190 PG/ML — HIGH (ref 0–300)
PH UR: 6.5 — SIGNIFICANT CHANGE UP (ref 5–8)
PLATELET # BLD AUTO: 180 K/UL — SIGNIFICANT CHANGE UP (ref 150–400)
PMV BLD: 10.1 FL — SIGNIFICANT CHANGE UP (ref 7–13)
POTASSIUM SERPL-MCNC: 3.7 MMOL/L — SIGNIFICANT CHANGE UP (ref 3.5–5.3)
POTASSIUM SERPL-SCNC: 3.7 MMOL/L — SIGNIFICANT CHANGE UP (ref 3.5–5.3)
PROT UR-MCNC: NEGATIVE MG/DL — SIGNIFICANT CHANGE UP
RBC # BLD: 4.29 M/UL — SIGNIFICANT CHANGE UP (ref 4.2–5.8)
RBC # FLD: 18.7 % — HIGH (ref 10.3–14.5)
SODIUM SERPL-SCNC: 139 MMOL/L — SIGNIFICANT CHANGE UP (ref 135–145)
SP GR SPEC: 1.01 — SIGNIFICANT CHANGE UP (ref 1–1.03)
UROBILINOGEN FLD QL: 0.2 MG/DL — SIGNIFICANT CHANGE UP (ref 0.2–1)
WBC # BLD: 17.98 K/UL — HIGH (ref 3.8–10.5)
WBC # FLD AUTO: 17.98 K/UL — HIGH (ref 3.8–10.5)

## 2025-07-21 PROCEDURE — 83735 ASSAY OF MAGNESIUM: CPT

## 2025-07-21 PROCEDURE — 85025 COMPLETE CBC W/AUTO DIFF WBC: CPT

## 2025-07-21 PROCEDURE — 83880 ASSAY OF NATRIURETIC PEPTIDE: CPT

## 2025-07-21 PROCEDURE — 81003 URINALYSIS AUTO W/O SCOPE: CPT

## 2025-07-21 PROCEDURE — 99223 1ST HOSP IP/OBS HIGH 75: CPT

## 2025-07-21 PROCEDURE — 82962 GLUCOSE BLOOD TEST: CPT

## 2025-07-21 PROCEDURE — 93010 ELECTROCARDIOGRAM REPORT: CPT

## 2025-07-21 PROCEDURE — 99285 EMERGENCY DEPT VISIT HI MDM: CPT

## 2025-07-21 PROCEDURE — 71045 X-RAY EXAM CHEST 1 VIEW: CPT | Mod: 26

## 2025-07-21 PROCEDURE — 36415 COLL VENOUS BLD VENIPUNCTURE: CPT

## 2025-07-21 PROCEDURE — 80048 BASIC METABOLIC PNL TOTAL CA: CPT

## 2025-07-21 RX ORDER — DEXTROSE 50 % IN WATER 50 %
25 SYRINGE (ML) INTRAVENOUS ONCE
Refills: 0 | Status: DISCONTINUED | OUTPATIENT
Start: 2025-07-21 | End: 2025-07-24

## 2025-07-21 RX ORDER — LOSARTAN POTASSIUM 100 MG/1
25 TABLET, FILM COATED ORAL DAILY
Refills: 0 | Status: DISCONTINUED | OUTPATIENT
Start: 2025-07-21 | End: 2025-07-24

## 2025-07-21 RX ORDER — METOPROLOL SUCCINATE 50 MG/1
100 TABLET, EXTENDED RELEASE ORAL DAILY
Refills: 0 | Status: DISCONTINUED | OUTPATIENT
Start: 2025-07-21 | End: 2025-07-24

## 2025-07-21 RX ORDER — INSULIN LISPRO 100 U/ML
INJECTION, SOLUTION INTRAVENOUS; SUBCUTANEOUS
Refills: 0 | Status: DISCONTINUED | OUTPATIENT
Start: 2025-07-21 | End: 2025-07-24

## 2025-07-21 RX ORDER — DEXTROSE 50 % IN WATER 50 %
12.5 SYRINGE (ML) INTRAVENOUS ONCE
Refills: 0 | Status: DISCONTINUED | OUTPATIENT
Start: 2025-07-21 | End: 2025-07-24

## 2025-07-21 RX ORDER — ASPIRIN 325 MG
81 TABLET ORAL DAILY
Refills: 0 | Status: DISCONTINUED | OUTPATIENT
Start: 2025-07-21 | End: 2025-07-24

## 2025-07-21 RX ORDER — FUROSEMIDE 10 MG/ML
40 INJECTION INTRAMUSCULAR; INTRAVENOUS ONCE
Refills: 0 | Status: COMPLETED | OUTPATIENT
Start: 2025-07-21 | End: 2025-07-21

## 2025-07-21 RX ORDER — INSULIN LISPRO 100 U/ML
10 INJECTION, SOLUTION INTRAVENOUS; SUBCUTANEOUS
Refills: 0 | Status: DISCONTINUED | OUTPATIENT
Start: 2025-07-21 | End: 2025-07-22

## 2025-07-21 RX ORDER — ENOXAPARIN SODIUM 100 MG/ML
40 INJECTION SUBCUTANEOUS EVERY 24 HOURS
Refills: 0 | Status: DISCONTINUED | OUTPATIENT
Start: 2025-07-21 | End: 2025-07-24

## 2025-07-21 RX ORDER — BUMETANIDE 1 MG/1
2 TABLET ORAL DAILY
Refills: 0 | Status: DISCONTINUED | OUTPATIENT
Start: 2025-07-21 | End: 2025-07-22

## 2025-07-21 RX ORDER — GLUCAGON 3 MG/1
1 POWDER NASAL ONCE
Refills: 0 | Status: DISCONTINUED | OUTPATIENT
Start: 2025-07-21 | End: 2025-07-24

## 2025-07-21 RX ORDER — ALBUTEROL SULFATE 2.5 MG/3ML
2.5 VIAL, NEBULIZER (ML) INHALATION EVERY 6 HOURS
Refills: 0 | Status: DISCONTINUED | OUTPATIENT
Start: 2025-07-21 | End: 2025-07-21

## 2025-07-21 RX ORDER — SODIUM CHLORIDE 9 G/1000ML
1000 INJECTION, SOLUTION INTRAVENOUS
Refills: 0 | Status: DISCONTINUED | OUTPATIENT
Start: 2025-07-21 | End: 2025-07-24

## 2025-07-21 RX ORDER — DEXTROSE 50 % IN WATER 50 %
15 SYRINGE (ML) INTRAVENOUS ONCE
Refills: 0 | Status: DISCONTINUED | OUTPATIENT
Start: 2025-07-21 | End: 2025-07-24

## 2025-07-21 RX ORDER — PREDNISONE 20 MG/1
1 TABLET ORAL
Qty: 7 | Refills: 0
Start: 2025-07-21 | End: 2025-07-27

## 2025-07-21 RX ORDER — PREDNISONE 20 MG/1
10 TABLET ORAL DAILY
Refills: 0 | Status: DISCONTINUED | OUTPATIENT
Start: 2025-07-22 | End: 2025-07-24

## 2025-07-21 RX ORDER — ALBUTEROL SULFATE 2.5 MG/3ML
2 VIAL, NEBULIZER (ML) INHALATION EVERY 4 HOURS
Refills: 0 | Status: DISCONTINUED | OUTPATIENT
Start: 2025-07-21 | End: 2025-07-24

## 2025-07-21 RX ORDER — ATORVASTATIN CALCIUM 80 MG/1
80 TABLET, FILM COATED ORAL AT BEDTIME
Refills: 0 | Status: DISCONTINUED | OUTPATIENT
Start: 2025-07-21 | End: 2025-07-24

## 2025-07-21 RX ORDER — PREDNISONE 20 MG/1
10 TABLET ORAL DAILY
Refills: 0 | Status: DISCONTINUED | OUTPATIENT
Start: 2025-07-21 | End: 2025-07-21

## 2025-07-21 RX ADMIN — LOSARTAN POTASSIUM 25 MILLIGRAM(S): 100 TABLET, FILM COATED ORAL at 21:13

## 2025-07-21 RX ADMIN — Medication 81 MILLIGRAM(S): at 21:13

## 2025-07-21 RX ADMIN — ATORVASTATIN CALCIUM 80 MILLIGRAM(S): 80 TABLET, FILM COATED ORAL at 21:13

## 2025-07-21 RX ADMIN — METOPROLOL SUCCINATE 100 MILLIGRAM(S): 50 TABLET, EXTENDED RELEASE ORAL at 21:13

## 2025-07-21 RX ADMIN — INSULIN LISPRO 6: 100 INJECTION, SOLUTION INTRAVENOUS; SUBCUTANEOUS at 22:30

## 2025-07-21 RX ADMIN — ENOXAPARIN SODIUM 40 MILLIGRAM(S): 100 INJECTION SUBCUTANEOUS at 21:13

## 2025-07-21 RX ADMIN — FUROSEMIDE 40 MILLIGRAM(S): 10 INJECTION INTRAMUSCULAR; INTRAVENOUS at 14:46

## 2025-07-21 RX ADMIN — BUMETANIDE 2 MILLIGRAM(S): 1 TABLET ORAL at 21:13

## 2025-07-22 ENCOUNTER — NON-APPOINTMENT (OUTPATIENT)
Age: 75
End: 2025-07-22

## 2025-07-22 ENCOUNTER — TRANSCRIPTION ENCOUNTER (OUTPATIENT)
Age: 75
End: 2025-07-22

## 2025-07-22 DIAGNOSIS — I50.33 ACUTE ON CHRONIC DIASTOLIC (CONGESTIVE) HEART FAILURE: ICD-10-CM

## 2025-07-22 DIAGNOSIS — E11.65 TYPE 2 DIABETES MELLITUS WITH HYPERGLYCEMIA: ICD-10-CM

## 2025-07-22 LAB
ANION GAP SERPL CALC-SCNC: 12 MMOL/L — SIGNIFICANT CHANGE UP (ref 5–17)
BUN SERPL-MCNC: 49 MG/DL — HIGH (ref 7–23)
CALCIUM SERPL-MCNC: 9.3 MG/DL — SIGNIFICANT CHANGE UP (ref 8.4–10.5)
CHLORIDE SERPL-SCNC: 98 MMOL/L — SIGNIFICANT CHANGE UP (ref 96–108)
CO2 SERPL-SCNC: 32 MMOL/L — HIGH (ref 22–31)
CREAT SERPL-MCNC: 1 MG/DL — SIGNIFICANT CHANGE UP (ref 0.5–1.3)
CRP SERPL-MCNC: 6.8 MG/L — HIGH (ref 0–4)
EGFR: 78 ML/MIN/1.73M2 — SIGNIFICANT CHANGE UP
EGFR: 78 ML/MIN/1.73M2 — SIGNIFICANT CHANGE UP
ERYTHROCYTE [SEDIMENTATION RATE] IN BLOOD: 30 MM/HR — HIGH (ref 0–15)
GLUCOSE SERPL-MCNC: 165 MG/DL — HIGH (ref 70–99)
HCT VFR BLD CALC: 37.5 % — LOW (ref 39–50)
HGB BLD-MCNC: 11.9 G/DL — LOW (ref 13–17)
MAGNESIUM SERPL-MCNC: 1.6 MG/DL — SIGNIFICANT CHANGE UP (ref 1.6–2.6)
MCHC RBC-ENTMCNC: 28.3 PG — SIGNIFICANT CHANGE UP (ref 27–34)
MCHC RBC-ENTMCNC: 31.7 G/DL — LOW (ref 32–36)
MCV RBC AUTO: 89.1 FL — SIGNIFICANT CHANGE UP (ref 80–100)
NRBC # BLD AUTO: 0 K/UL — SIGNIFICANT CHANGE UP (ref 0–0)
NRBC # FLD: 0 K/UL — SIGNIFICANT CHANGE UP (ref 0–0)
NRBC BLD AUTO-RTO: 0 /100 WBCS — SIGNIFICANT CHANGE UP (ref 0–0)
PHOSPHATE SERPL-MCNC: 3.6 MG/DL — SIGNIFICANT CHANGE UP (ref 2.5–4.5)
PLATELET # BLD AUTO: 176 K/UL — SIGNIFICANT CHANGE UP (ref 150–400)
PMV BLD: 10.3 FL — SIGNIFICANT CHANGE UP (ref 7–13)
POTASSIUM SERPL-MCNC: 3.9 MMOL/L — SIGNIFICANT CHANGE UP (ref 3.5–5.3)
POTASSIUM SERPL-SCNC: 3.9 MMOL/L — SIGNIFICANT CHANGE UP (ref 3.5–5.3)
RBC # BLD: 4.21 M/UL — SIGNIFICANT CHANGE UP (ref 4.2–5.8)
RBC # FLD: 18.6 % — HIGH (ref 10.3–14.5)
SODIUM SERPL-SCNC: 142 MMOL/L — SIGNIFICANT CHANGE UP (ref 135–145)
WBC # BLD: 15.66 K/UL — HIGH (ref 3.8–10.5)
WBC # FLD AUTO: 15.66 K/UL — HIGH (ref 3.8–10.5)

## 2025-07-22 PROCEDURE — 93925 LOWER EXTREMITY STUDY: CPT | Mod: 26

## 2025-07-22 PROCEDURE — 81003 URINALYSIS AUTO W/O SCOPE: CPT

## 2025-07-22 PROCEDURE — 73630 X-RAY EXAM OF FOOT: CPT | Mod: 26,50

## 2025-07-22 PROCEDURE — 80048 BASIC METABOLIC PNL TOTAL CA: CPT

## 2025-07-22 PROCEDURE — 85025 COMPLETE CBC W/AUTO DIFF WBC: CPT

## 2025-07-22 PROCEDURE — 84100 ASSAY OF PHOSPHORUS: CPT

## 2025-07-22 PROCEDURE — 83735 ASSAY OF MAGNESIUM: CPT

## 2025-07-22 PROCEDURE — 86140 C-REACTIVE PROTEIN: CPT

## 2025-07-22 PROCEDURE — 82962 GLUCOSE BLOOD TEST: CPT

## 2025-07-22 PROCEDURE — 85027 COMPLETE CBC AUTOMATED: CPT

## 2025-07-22 PROCEDURE — 99233 SBSQ HOSP IP/OBS HIGH 50: CPT | Mod: GC

## 2025-07-22 PROCEDURE — 36415 COLL VENOUS BLD VENIPUNCTURE: CPT

## 2025-07-22 PROCEDURE — 85652 RBC SED RATE AUTOMATED: CPT

## 2025-07-22 PROCEDURE — 83880 ASSAY OF NATRIURETIC PEPTIDE: CPT

## 2025-07-22 RX ORDER — INSULIN LISPRO 100 U/ML
12 INJECTION, SOLUTION INTRAVENOUS; SUBCUTANEOUS
Refills: 0 | Status: DISCONTINUED | OUTPATIENT
Start: 2025-07-22 | End: 2025-07-23

## 2025-07-22 RX ORDER — BUMETANIDE 1 MG/1
2 TABLET ORAL EVERY 12 HOURS
Refills: 0 | Status: DISCONTINUED | OUTPATIENT
Start: 2025-07-22 | End: 2025-07-24

## 2025-07-22 RX ORDER — MAGNESIUM SULFATE 500 MG/ML
2 SYRINGE (ML) INJECTION ONCE
Refills: 0 | Status: COMPLETED | OUTPATIENT
Start: 2025-07-22 | End: 2025-07-22

## 2025-07-22 RX ORDER — ACETAMINOPHEN 500 MG/5ML
650 LIQUID (ML) ORAL EVERY 6 HOURS
Refills: 0 | Status: DISCONTINUED | OUTPATIENT
Start: 2025-07-22 | End: 2025-07-24

## 2025-07-22 RX ADMIN — INSULIN LISPRO 6: 100 INJECTION, SOLUTION INTRAVENOUS; SUBCUTANEOUS at 12:58

## 2025-07-22 RX ADMIN — ENOXAPARIN SODIUM 40 MILLIGRAM(S): 100 INJECTION SUBCUTANEOUS at 18:03

## 2025-07-22 RX ADMIN — INSULIN LISPRO 6: 100 INJECTION, SOLUTION INTRAVENOUS; SUBCUTANEOUS at 23:59

## 2025-07-22 RX ADMIN — INSULIN LISPRO 2: 100 INJECTION, SOLUTION INTRAVENOUS; SUBCUTANEOUS at 18:02

## 2025-07-22 RX ADMIN — Medication 81 MILLIGRAM(S): at 12:58

## 2025-07-22 RX ADMIN — ATORVASTATIN CALCIUM 80 MILLIGRAM(S): 80 TABLET, FILM COATED ORAL at 21:52

## 2025-07-22 RX ADMIN — Medication 650 MILLIGRAM(S): at 21:55

## 2025-07-22 RX ADMIN — INSULIN LISPRO 2: 100 INJECTION, SOLUTION INTRAVENOUS; SUBCUTANEOUS at 09:14

## 2025-07-22 RX ADMIN — INSULIN LISPRO 10 UNIT(S): 100 INJECTION, SOLUTION INTRAVENOUS; SUBCUTANEOUS at 12:58

## 2025-07-22 RX ADMIN — BUMETANIDE 2 MILLIGRAM(S): 1 TABLET ORAL at 23:59

## 2025-07-22 RX ADMIN — INSULIN LISPRO 10 UNIT(S): 100 INJECTION, SOLUTION INTRAVENOUS; SUBCUTANEOUS at 09:14

## 2025-07-22 RX ADMIN — INSULIN LISPRO 12 UNIT(S): 100 INJECTION, SOLUTION INTRAVENOUS; SUBCUTANEOUS at 18:02

## 2025-07-22 RX ADMIN — BUMETANIDE 2 MILLIGRAM(S): 1 TABLET ORAL at 12:58

## 2025-07-22 RX ADMIN — Medication 25 GRAM(S): at 12:59

## 2025-07-23 DIAGNOSIS — I50.33 ACUTE ON CHRONIC DIASTOLIC (CONGESTIVE) HEART FAILURE: ICD-10-CM

## 2025-07-23 LAB
ANION GAP SERPL CALC-SCNC: 8 MMOL/L — SIGNIFICANT CHANGE UP (ref 5–17)
BASOPHILS # BLD AUTO: 0.06 K/UL — SIGNIFICANT CHANGE UP (ref 0–0.2)
BASOPHILS NFR BLD AUTO: 0.4 % — SIGNIFICANT CHANGE UP (ref 0–2)
BUN SERPL-MCNC: 46 MG/DL — HIGH (ref 7–23)
CALCIUM SERPL-MCNC: 8.8 MG/DL — SIGNIFICANT CHANGE UP (ref 8.4–10.5)
CHLORIDE SERPL-SCNC: 97 MMOL/L — SIGNIFICANT CHANGE UP (ref 96–108)
CO2 SERPL-SCNC: 36 MMOL/L — HIGH (ref 22–31)
CREAT SERPL-MCNC: 0.87 MG/DL — SIGNIFICANT CHANGE UP (ref 0.5–1.3)
EGFR: 89 ML/MIN/1.73M2 — SIGNIFICANT CHANGE UP
EGFR: 89 ML/MIN/1.73M2 — SIGNIFICANT CHANGE UP
EOSINOPHIL # BLD AUTO: 0.05 K/UL — SIGNIFICANT CHANGE UP (ref 0–0.5)
EOSINOPHIL NFR BLD AUTO: 0.3 % — SIGNIFICANT CHANGE UP (ref 0–6)
GLUCOSE SERPL-MCNC: 212 MG/DL — HIGH (ref 70–99)
HCT VFR BLD CALC: 37.9 % — LOW (ref 39–50)
HGB BLD-MCNC: 12 G/DL — LOW (ref 13–17)
IMM GRANULOCYTES # BLD AUTO: 0.56 K/UL — HIGH (ref 0–0.07)
IMM GRANULOCYTES NFR BLD AUTO: 3.3 % — HIGH (ref 0–0.9)
LYMPHOCYTES # BLD AUTO: 3.83 K/UL — HIGH (ref 1–3.3)
LYMPHOCYTES NFR BLD AUTO: 22.5 % — SIGNIFICANT CHANGE UP (ref 13–44)
MAGNESIUM SERPL-MCNC: 1.9 MG/DL — SIGNIFICANT CHANGE UP (ref 1.6–2.6)
MCHC RBC-ENTMCNC: 27.8 PG — SIGNIFICANT CHANGE UP (ref 27–34)
MCHC RBC-ENTMCNC: 31.7 G/DL — LOW (ref 32–36)
MCV RBC AUTO: 87.9 FL — SIGNIFICANT CHANGE UP (ref 80–100)
MONOCYTES # BLD AUTO: 0.51 K/UL — SIGNIFICANT CHANGE UP (ref 0–0.9)
MONOCYTES NFR BLD AUTO: 3 % — SIGNIFICANT CHANGE UP (ref 2–14)
NEUTROPHILS # BLD AUTO: 11.98 K/UL — HIGH (ref 1.8–7.4)
NEUTROPHILS NFR BLD AUTO: 70.5 % — SIGNIFICANT CHANGE UP (ref 43–77)
NRBC # BLD AUTO: 0 K/UL — SIGNIFICANT CHANGE UP (ref 0–0)
NRBC # FLD: 0 K/UL — SIGNIFICANT CHANGE UP (ref 0–0)
NRBC BLD AUTO-RTO: 0 /100 WBCS — SIGNIFICANT CHANGE UP (ref 0–0)
PHOSPHATE SERPL-MCNC: 2.6 MG/DL — SIGNIFICANT CHANGE UP (ref 2.5–4.5)
PLATELET # BLD AUTO: 191 K/UL — SIGNIFICANT CHANGE UP (ref 150–400)
PMV BLD: 10 FL — SIGNIFICANT CHANGE UP (ref 7–13)
POTASSIUM SERPL-MCNC: 3.7 MMOL/L — SIGNIFICANT CHANGE UP (ref 3.5–5.3)
POTASSIUM SERPL-SCNC: 3.7 MMOL/L — SIGNIFICANT CHANGE UP (ref 3.5–5.3)
RBC # BLD: 4.31 M/UL — SIGNIFICANT CHANGE UP (ref 4.2–5.8)
RBC # FLD: 18.6 % — HIGH (ref 10.3–14.5)
SODIUM SERPL-SCNC: 141 MMOL/L — SIGNIFICANT CHANGE UP (ref 135–145)
WBC # BLD: 16.99 K/UL — HIGH (ref 3.8–10.5)
WBC # FLD AUTO: 16.99 K/UL — HIGH (ref 3.8–10.5)

## 2025-07-23 PROCEDURE — 99233 SBSQ HOSP IP/OBS HIGH 50: CPT | Mod: GC

## 2025-07-23 RX ORDER — LISINOPRIL 5 MG/1
1 TABLET ORAL
Refills: 0 | DISCHARGE

## 2025-07-23 RX ORDER — HYDROCHLOROTHIAZIDE 50 MG/1
1 TABLET ORAL
Refills: 0 | DISCHARGE

## 2025-07-23 RX ORDER — INSULIN LISPRO 100 U/ML
14 INJECTION, SOLUTION INTRAVENOUS; SUBCUTANEOUS
Refills: 0 | Status: DISCONTINUED | OUTPATIENT
Start: 2025-07-23 | End: 2025-07-24

## 2025-07-23 RX ADMIN — PREDNISONE 10 MILLIGRAM(S): 20 TABLET ORAL at 05:46

## 2025-07-23 RX ADMIN — INSULIN LISPRO 14 UNIT(S): 100 INJECTION, SOLUTION INTRAVENOUS; SUBCUTANEOUS at 13:37

## 2025-07-23 RX ADMIN — ATORVASTATIN CALCIUM 80 MILLIGRAM(S): 80 TABLET, FILM COATED ORAL at 21:13

## 2025-07-23 RX ADMIN — INSULIN LISPRO 4: 100 INJECTION, SOLUTION INTRAVENOUS; SUBCUTANEOUS at 09:21

## 2025-07-23 RX ADMIN — INSULIN LISPRO 6: 100 INJECTION, SOLUTION INTRAVENOUS; SUBCUTANEOUS at 13:32

## 2025-07-23 RX ADMIN — Medication 81 MILLIGRAM(S): at 13:33

## 2025-07-23 RX ADMIN — INSULIN LISPRO 6: 100 INJECTION, SOLUTION INTRAVENOUS; SUBCUTANEOUS at 22:19

## 2025-07-23 RX ADMIN — METOPROLOL SUCCINATE 100 MILLIGRAM(S): 50 TABLET, EXTENDED RELEASE ORAL at 05:46

## 2025-07-23 RX ADMIN — INSULIN LISPRO 12 UNIT(S): 100 INJECTION, SOLUTION INTRAVENOUS; SUBCUTANEOUS at 09:21

## 2025-07-23 RX ADMIN — BUMETANIDE 2 MILLIGRAM(S): 1 TABLET ORAL at 13:33

## 2025-07-23 RX ADMIN — BUMETANIDE 2 MILLIGRAM(S): 1 TABLET ORAL at 23:44

## 2025-07-23 RX ADMIN — Medication 650 MILLIGRAM(S): at 00:00

## 2025-07-23 RX ADMIN — LOSARTAN POTASSIUM 25 MILLIGRAM(S): 100 TABLET, FILM COATED ORAL at 05:47

## 2025-07-24 ENCOUNTER — TRANSCRIPTION ENCOUNTER (OUTPATIENT)
Age: 75
End: 2025-07-24

## 2025-07-24 VITALS
TEMPERATURE: 97 F | DIASTOLIC BLOOD PRESSURE: 77 MMHG | RESPIRATION RATE: 17 BRPM | HEART RATE: 73 BPM | OXYGEN SATURATION: 97 % | SYSTOLIC BLOOD PRESSURE: 145 MMHG

## 2025-07-24 PROCEDURE — 81003 URINALYSIS AUTO W/O SCOPE: CPT

## 2025-07-24 PROCEDURE — 83880 ASSAY OF NATRIURETIC PEPTIDE: CPT

## 2025-07-24 PROCEDURE — 80048 BASIC METABOLIC PNL TOTAL CA: CPT

## 2025-07-24 PROCEDURE — 36415 COLL VENOUS BLD VENIPUNCTURE: CPT

## 2025-07-24 PROCEDURE — 85027 COMPLETE CBC AUTOMATED: CPT

## 2025-07-24 PROCEDURE — 85025 COMPLETE CBC W/AUTO DIFF WBC: CPT

## 2025-07-24 PROCEDURE — 84100 ASSAY OF PHOSPHORUS: CPT

## 2025-07-24 PROCEDURE — 99285 EMERGENCY DEPT VISIT HI MDM: CPT

## 2025-07-24 PROCEDURE — 86140 C-REACTIVE PROTEIN: CPT

## 2025-07-24 PROCEDURE — 96374 THER/PROPH/DIAG INJ IV PUSH: CPT

## 2025-07-24 PROCEDURE — 73630 X-RAY EXAM OF FOOT: CPT

## 2025-07-24 PROCEDURE — 71045 X-RAY EXAM CHEST 1 VIEW: CPT

## 2025-07-24 PROCEDURE — 93005 ELECTROCARDIOGRAM TRACING: CPT

## 2025-07-24 PROCEDURE — 97161 PT EVAL LOW COMPLEX 20 MIN: CPT

## 2025-07-24 PROCEDURE — 83735 ASSAY OF MAGNESIUM: CPT

## 2025-07-24 PROCEDURE — 93925 LOWER EXTREMITY STUDY: CPT

## 2025-07-24 PROCEDURE — 82962 GLUCOSE BLOOD TEST: CPT

## 2025-07-24 PROCEDURE — 85652 RBC SED RATE AUTOMATED: CPT

## 2025-07-24 PROCEDURE — 97116 GAIT TRAINING THERAPY: CPT

## 2025-07-24 PROCEDURE — 99239 HOSP IP/OBS DSCHRG MGMT >30: CPT

## 2025-07-24 RX ORDER — INSULIN LISPRO 100 U/ML
14 INJECTION, SOLUTION INTRAVENOUS; SUBCUTANEOUS
Qty: 0 | Refills: 0 | DISCHARGE
Start: 2025-07-24

## 2025-07-24 RX ORDER — FUROSEMIDE 10 MG/ML
1 INJECTION INTRAMUSCULAR; INTRAVENOUS
Refills: 0 | DISCHARGE

## 2025-07-24 RX ORDER — BUMETANIDE 1 MG/1
1 TABLET ORAL
Qty: 0 | Refills: 0 | DISCHARGE
Start: 2025-07-24

## 2025-07-24 RX ADMIN — INSULIN LISPRO 2: 100 INJECTION, SOLUTION INTRAVENOUS; SUBCUTANEOUS at 09:36

## 2025-07-24 RX ADMIN — METOPROLOL SUCCINATE 100 MILLIGRAM(S): 50 TABLET, EXTENDED RELEASE ORAL at 05:56

## 2025-07-24 RX ADMIN — INSULIN LISPRO 14 UNIT(S): 100 INJECTION, SOLUTION INTRAVENOUS; SUBCUTANEOUS at 09:37

## 2025-07-24 RX ADMIN — LOSARTAN POTASSIUM 25 MILLIGRAM(S): 100 TABLET, FILM COATED ORAL at 05:56

## 2025-07-24 RX ADMIN — PREDNISONE 10 MILLIGRAM(S): 20 TABLET ORAL at 05:58

## 2025-07-28 DIAGNOSIS — R63.0 ANOREXIA: ICD-10-CM

## 2025-07-28 DIAGNOSIS — I50.32 CHRONIC DIASTOLIC (CONGESTIVE) HEART FAILURE: ICD-10-CM

## 2025-07-28 DIAGNOSIS — K52.1 TOXIC GASTROENTERITIS AND COLITIS: ICD-10-CM

## 2025-07-28 DIAGNOSIS — E87.3 ALKALOSIS: ICD-10-CM

## 2025-07-28 DIAGNOSIS — R26.2 DIFFICULTY IN WALKING, NOT ELSEWHERE CLASSIFIED: ICD-10-CM

## 2025-07-28 DIAGNOSIS — A08.11 ACUTE GASTROENTEROPATHY DUE TO NORWALK AGENT: ICD-10-CM

## 2025-07-28 DIAGNOSIS — Z79.52 LONG TERM (CURRENT) USE OF SYSTEMIC STEROIDS: ICD-10-CM

## 2025-07-28 DIAGNOSIS — C34.90 MALIGNANT NEOPLASM OF UNSPECIFIED PART OF UNSPECIFIED BRONCHUS OR LUNG: ICD-10-CM

## 2025-07-28 DIAGNOSIS — Z95.0 PRESENCE OF CARDIAC PACEMAKER: ICD-10-CM

## 2025-07-28 DIAGNOSIS — T45.1X5A ADVERSE EFFECT OF ANTINEOPLASTIC AND IMMUNOSUPPRESSIVE DRUGS, INITIAL ENCOUNTER: ICD-10-CM

## 2025-07-28 DIAGNOSIS — I11.0 HYPERTENSIVE HEART DISEASE WITH HEART FAILURE: ICD-10-CM

## 2025-07-28 DIAGNOSIS — J44.9 CHRONIC OBSTRUCTIVE PULMONARY DISEASE, UNSPECIFIED: ICD-10-CM

## 2025-07-28 DIAGNOSIS — Z79.82 LONG TERM (CURRENT) USE OF ASPIRIN: ICD-10-CM

## 2025-07-28 DIAGNOSIS — Z11.52 ENCOUNTER FOR SCREENING FOR COVID-19: ICD-10-CM

## 2025-07-28 DIAGNOSIS — M79.631 PAIN IN RIGHT FOREARM: ICD-10-CM

## 2025-07-28 DIAGNOSIS — E78.5 HYPERLIPIDEMIA, UNSPECIFIED: ICD-10-CM

## 2025-07-28 DIAGNOSIS — Z79.620 LONG TERM (CURRENT) USE OF IMMUNOSUPPRESSIVE BIOLOGIC: ICD-10-CM

## 2025-07-28 DIAGNOSIS — E87.0 HYPEROSMOLALITY AND HYPERNATREMIA: ICD-10-CM

## 2025-07-28 DIAGNOSIS — C79.31 SECONDARY MALIGNANT NEOPLASM OF BRAIN: ICD-10-CM

## 2025-07-28 DIAGNOSIS — E87.6 HYPOKALEMIA: ICD-10-CM

## 2025-07-28 DIAGNOSIS — G72.0 DRUG-INDUCED MYOPATHY: ICD-10-CM

## 2025-07-28 DIAGNOSIS — Z87.891 PERSONAL HISTORY OF NICOTINE DEPENDENCE: ICD-10-CM

## 2025-07-28 DIAGNOSIS — Z79.4 LONG TERM (CURRENT) USE OF INSULIN: ICD-10-CM

## 2025-07-28 DIAGNOSIS — E43 UNSPECIFIED SEVERE PROTEIN-CALORIE MALNUTRITION: ICD-10-CM

## 2025-07-28 DIAGNOSIS — T38.0X5A ADVERSE EFFECT OF GLUCOCORTICOIDS AND SYNTHETIC ANALOGUES, INITIAL ENCOUNTER: ICD-10-CM

## 2025-07-28 DIAGNOSIS — E11.65 TYPE 2 DIABETES MELLITUS WITH HYPERGLYCEMIA: ICD-10-CM

## 2025-07-30 DIAGNOSIS — I44.2 ATRIOVENTRICULAR BLOCK, COMPLETE: ICD-10-CM

## 2025-07-30 DIAGNOSIS — Z79.82 LONG TERM (CURRENT) USE OF ASPIRIN: ICD-10-CM

## 2025-07-30 DIAGNOSIS — D72.829 ELEVATED WHITE BLOOD CELL COUNT, UNSPECIFIED: ICD-10-CM

## 2025-07-30 DIAGNOSIS — T38.0X5A ADVERSE EFFECT OF GLUCOCORTICOIDS AND SYNTHETIC ANALOGUES, INITIAL ENCOUNTER: ICD-10-CM

## 2025-07-30 DIAGNOSIS — E43 UNSPECIFIED SEVERE PROTEIN-CALORIE MALNUTRITION: ICD-10-CM

## 2025-07-30 DIAGNOSIS — Z79.52 LONG TERM (CURRENT) USE OF SYSTEMIC STEROIDS: ICD-10-CM

## 2025-07-30 DIAGNOSIS — R60.0 LOCALIZED EDEMA: ICD-10-CM

## 2025-07-30 DIAGNOSIS — E11.65 TYPE 2 DIABETES MELLITUS WITH HYPERGLYCEMIA: ICD-10-CM

## 2025-07-30 DIAGNOSIS — Z79.620 LONG TERM (CURRENT) USE OF IMMUNOSUPPRESSIVE BIOLOGIC: ICD-10-CM

## 2025-07-30 DIAGNOSIS — I50.33 ACUTE ON CHRONIC DIASTOLIC (CONGESTIVE) HEART FAILURE: ICD-10-CM

## 2025-07-30 DIAGNOSIS — K52.1 TOXIC GASTROENTERITIS AND COLITIS: ICD-10-CM

## 2025-07-30 DIAGNOSIS — J44.9 CHRONIC OBSTRUCTIVE PULMONARY DISEASE, UNSPECIFIED: ICD-10-CM

## 2025-07-30 DIAGNOSIS — C79.31 SECONDARY MALIGNANT NEOPLASM OF BRAIN: ICD-10-CM

## 2025-07-30 DIAGNOSIS — Z86.73 PERSONAL HISTORY OF TRANSIENT ISCHEMIC ATTACK (TIA), AND CEREBRAL INFARCTION WITHOUT RESIDUAL DEFICITS: ICD-10-CM

## 2025-07-30 DIAGNOSIS — L08.9 LOCAL INFECTION OF THE SKIN AND SUBCUTANEOUS TISSUE, UNSPECIFIED: ICD-10-CM

## 2025-07-30 DIAGNOSIS — C34.90 MALIGNANT NEOPLASM OF UNSPECIFIED PART OF UNSPECIFIED BRONCHUS OR LUNG: ICD-10-CM

## 2025-07-30 DIAGNOSIS — Z79.84 LONG TERM (CURRENT) USE OF ORAL HYPOGLYCEMIC DRUGS: ICD-10-CM

## 2025-07-30 DIAGNOSIS — I11.0 HYPERTENSIVE HEART DISEASE WITH HEART FAILURE: ICD-10-CM

## 2025-07-30 DIAGNOSIS — Z95.0 PRESENCE OF CARDIAC PACEMAKER: ICD-10-CM

## 2025-08-05 ENCOUNTER — TRANSCRIPTION ENCOUNTER (OUTPATIENT)
Age: 75
End: 2025-08-05

## 2025-08-07 ENCOUNTER — APPOINTMENT (OUTPATIENT)
Dept: HEMATOLOGY ONCOLOGY | Facility: CLINIC | Age: 75
End: 2025-08-07
Payer: MEDICARE

## 2025-08-07 DIAGNOSIS — K52.9 NONINFECTIVE GASTROENTERITIS AND COLITIS, UNSPECIFIED: ICD-10-CM

## 2025-08-07 DIAGNOSIS — C79.31 SECONDARY MALIGNANT NEOPLASM OF BRAIN: ICD-10-CM

## 2025-08-07 PROCEDURE — 99212 OFFICE O/P EST SF 10 MIN: CPT | Mod: 93

## 2025-08-12 ENCOUNTER — TRANSCRIPTION ENCOUNTER (OUTPATIENT)
Age: 75
End: 2025-08-12

## 2025-08-19 ENCOUNTER — TRANSCRIPTION ENCOUNTER (OUTPATIENT)
Age: 75
End: 2025-08-19

## 2025-09-08 ENCOUNTER — APPOINTMENT (OUTPATIENT)
Dept: CT IMAGING | Facility: HOSPITAL | Age: 75
End: 2025-09-08

## 2025-09-15 ENCOUNTER — APPOINTMENT (OUTPATIENT)
Dept: HEMATOLOGY ONCOLOGY | Facility: CLINIC | Age: 75
End: 2025-09-15

## 2025-09-15 DIAGNOSIS — C34.91 MALIGNANT NEOPLASM OF UNSPECIFIED PART OF RIGHT BRONCHUS OR LUNG: ICD-10-CM

## (undated) DEVICE — NDL ASPIRATION VIZISHOT2 25G

## (undated) DEVICE — NDL ASPIRATION VIZISHOT2 22G

## (undated) DEVICE — FORCEP RADIAL JAW 4 W NDL 2.2MM 2.8MM 240CM ORANGE DISP